# Patient Record
Sex: FEMALE | Race: BLACK OR AFRICAN AMERICAN | NOT HISPANIC OR LATINO | Employment: OTHER | URBAN - METROPOLITAN AREA
[De-identification: names, ages, dates, MRNs, and addresses within clinical notes are randomized per-mention and may not be internally consistent; named-entity substitution may affect disease eponyms.]

---

## 2017-08-02 ENCOUNTER — OFFICE VISIT (OUTPATIENT)
Dept: URGENT CARE | Facility: CLINIC | Age: 57
End: 2017-08-02
Payer: COMMERCIAL

## 2017-08-02 VITALS
DIASTOLIC BLOOD PRESSURE: 126 MMHG | SYSTOLIC BLOOD PRESSURE: 159 MMHG | TEMPERATURE: 98 F | BODY MASS INDEX: 44.89 KG/M2 | HEART RATE: 79 BPM | HEIGHT: 67 IN | WEIGHT: 286 LBS | OXYGEN SATURATION: 99 %

## 2017-08-02 DIAGNOSIS — J01.90 ACUTE SINUSITIS, RECURRENCE NOT SPECIFIED, UNSPECIFIED LOCATION: Primary | ICD-10-CM

## 2017-08-02 DIAGNOSIS — I10 ESSENTIAL HYPERTENSION: ICD-10-CM

## 2017-08-02 DIAGNOSIS — E11.8 TYPE 2 DIABETES MELLITUS WITH COMPLICATION, WITHOUT LONG-TERM CURRENT USE OF INSULIN: ICD-10-CM

## 2017-08-02 PROBLEM — E11.9 DIABETES MELLITUS, TYPE 2: Status: ACTIVE | Noted: 2017-08-02

## 2017-08-02 PROCEDURE — 99204 OFFICE O/P NEW MOD 45 MIN: CPT | Mod: S$GLB,,, | Performed by: FAMILY MEDICINE

## 2017-08-02 PROCEDURE — 4010F ACE/ARB THERAPY RXD/TAKEN: CPT | Mod: S$GLB,,, | Performed by: FAMILY MEDICINE

## 2017-08-02 RX ORDER — AMOXICILLIN AND CLAVULANATE POTASSIUM 875; 125 MG/1; MG/1
1 TABLET, FILM COATED ORAL 2 TIMES DAILY
Qty: 20 TABLET | Refills: 0 | Status: SHIPPED | OUTPATIENT
Start: 2017-08-02 | End: 2017-08-12

## 2017-08-02 RX ORDER — CODEINE PHOSPHATE AND GUAIFENESIN 10; 100 MG/5ML; MG/5ML
10 SOLUTION ORAL NIGHTLY PRN
Qty: 120 ML | Refills: 0 | Status: SHIPPED | OUTPATIENT
Start: 2017-08-02 | End: 2017-08-16

## 2017-08-02 RX ORDER — BISOPROLOL FUMARATE 10 MG/1
10 TABLET, FILM COATED ORAL 2 TIMES DAILY
COMMUNITY
Start: 2017-05-10 | End: 2018-09-24

## 2017-08-02 RX ORDER — VALSARTAN AND HYDROCHLOROTHIAZIDE 320; 25 MG/1; MG/1
1 TABLET, FILM COATED ORAL DAILY
COMMUNITY
Start: 2017-05-08 | End: 2018-07-30 | Stop reason: CLARIF

## 2017-08-02 RX ORDER — DULAGLUTIDE 0.75 MG/.5ML
INJECTION, SOLUTION SUBCUTANEOUS
COMMUNITY
Start: 2017-07-03 | End: 2018-04-10 | Stop reason: ALTCHOICE

## 2017-08-02 RX ORDER — METFORMIN HYDROCHLORIDE 1000 MG/1
1000 TABLET ORAL 2 TIMES DAILY WITH MEALS
COMMUNITY
Start: 2017-04-25 | End: 2018-09-20

## 2017-08-02 RX ORDER — BIMATOPROST 0.1 MG/ML
1 SOLUTION/ DROPS OPHTHALMIC NIGHTLY
COMMUNITY
Start: 2017-05-19

## 2017-08-02 RX ORDER — MONTELUKAST SODIUM 10 MG/1
10 TABLET ORAL NIGHTLY
Qty: 30 TABLET | Refills: 2 | Status: ON HOLD | OUTPATIENT
Start: 2017-08-02 | End: 2018-04-04

## 2017-08-02 RX ORDER — CETIRIZINE HYDROCHLORIDE 10 MG/1
10 TABLET ORAL DAILY
Qty: 30 TABLET | Refills: 2 | Status: ON HOLD | OUTPATIENT
Start: 2017-08-02 | End: 2018-04-04

## 2017-08-02 RX ORDER — AMLODIPINE BESYLATE 10 MG/1
10 TABLET ORAL EVERY MORNING
COMMUNITY
Start: 2017-05-08 | End: 2018-09-24

## 2017-08-02 RX ORDER — TIMOLOL MALEATE 5 MG/ML
1 SOLUTION/ DROPS OPHTHALMIC 2 TIMES DAILY
COMMUNITY
Start: 2017-05-19

## 2017-08-02 NOTE — PROGRESS NOTES
"Subjective:       Patient ID: Saba Driver is a 56 y.o. female.    Vitals:  height is 5' 7" (1.702 m) and weight is 129.7 kg (286 lb). Her oral temperature is 98 °F (36.7 °C). Her blood pressure is 159/126 (abnormal) and her pulse is 79. Her oxygen saturation is 99%.     Chief Complaint: Sore Throat and Cough    Sore Throat    This is a new problem. The current episode started in the past 7 days. The problem has been gradually worsening. Neither side of throat is experiencing more pain than the other. The maximum temperature recorded prior to her arrival was 100.4 - 100.9 F. The fever has been present for 3 to 4 days. Associated symptoms include congestion and coughing. Pertinent negatives include no abdominal pain, ear pain, headaches, hoarse voice or shortness of breath.   Cough   Associated symptoms include a sore throat. Pertinent negatives include no chest pain, chills, ear pain, eye redness, fever, headaches, myalgias, shortness of breath or wheezing.     Review of Systems   Constitution: Negative for chills, fever and malaise/fatigue.   HENT: Positive for congestion and sore throat. Negative for ear pain, headaches and hoarse voice.    Eyes: Negative for discharge and redness.   Cardiovascular: Negative for chest pain, dyspnea on exertion and leg swelling.   Respiratory: Positive for cough and sputum production. Negative for shortness of breath and wheezing.    Musculoskeletal: Negative for myalgias.   Gastrointestinal: Negative for abdominal pain and nausea.       Objective:      Physical Exam   Constitutional: She is oriented to person, place, and time. She appears well-developed and well-nourished.   HENT:   Head: Normocephalic.   Right Ear: External ear normal.   Left Ear: External ear normal.   Nose: Mucosal edema, rhinorrhea and sinus tenderness present. Right sinus exhibits maxillary sinus tenderness and frontal sinus tenderness. Left sinus exhibits maxillary sinus tenderness and frontal sinus " tenderness.   Mouth/Throat: Oropharyngeal exudate, posterior oropharyngeal edema and posterior oropharyngeal erythema present.   Eyes: Conjunctivae and EOM are normal.   Neck: Normal range of motion.   Cardiovascular: Normal rate, regular rhythm and normal heart sounds.    Pulmonary/Chest: Effort normal.   Neurological: She is oriented to person, place, and time. She has normal reflexes.   Vitals reviewed.      Assessment:       1. Acute sinusitis, recurrence not specified, unspecified location    2. Essential hypertension    3. Type 2 diabetes mellitus with complication, without long-term current use of insulin        Plan:         Acute sinusitis, recurrence not specified, unspecified location  -     amoxicillin-clavulanate 875-125mg (AUGMENTIN) 875-125 mg per tablet; Take 1 tablet by mouth 2 (two) times daily.  Dispense: 20 tablet; Refill: 0  -     montelukast (SINGULAIR) 10 mg tablet; Take 1 tablet (10 mg total) by mouth nightly.  Dispense: 30 tablet; Refill: 2  -     cetirizine (ZYRTEC) 10 MG tablet; Take 1 tablet (10 mg total) by mouth once daily.  Dispense: 30 tablet; Refill: 2  -     guaifenesin-codeine 100-10 mg/5 ml (CHERATUSSIN AC)  mg/5 mL syrup; Take 10 mLs by mouth nightly as needed for Cough.  Dispense: 120 mL; Refill: 0    Essential hypertension         -      Your Blood Pressure was elevated today. Take your blood pressure daily for a couple of weeks. If it remains elevated above 140/90 you need to make a followup appt with your doctor and get started on new medication      Type 2 diabetes mellitus with complication, without long-term current use of insulin

## 2017-08-02 NOTE — PATIENT INSTRUCTIONS

## 2017-09-05 ENCOUNTER — OFFICE VISIT (OUTPATIENT)
Dept: URGENT CARE | Facility: CLINIC | Age: 57
End: 2017-09-05
Payer: COMMERCIAL

## 2017-09-05 VITALS
OXYGEN SATURATION: 100 % | RESPIRATION RATE: 20 BRPM | SYSTOLIC BLOOD PRESSURE: 141 MMHG | HEART RATE: 76 BPM | DIASTOLIC BLOOD PRESSURE: 61 MMHG | BODY MASS INDEX: 44.89 KG/M2 | HEIGHT: 67 IN | TEMPERATURE: 98 F | WEIGHT: 286 LBS

## 2017-09-05 DIAGNOSIS — M94.0 ACUTE COSTOCHONDRITIS: Primary | ICD-10-CM

## 2017-09-05 PROCEDURE — 3008F BODY MASS INDEX DOCD: CPT | Mod: S$GLB,,,

## 2017-09-05 PROCEDURE — 99213 OFFICE O/P EST LOW 20 MIN: CPT | Mod: S$GLB,,,

## 2017-09-05 NOTE — PATIENT INSTRUCTIONS
Chest Wall Pain: Costochondritis    The chest pain that you have had today is caused by costochondritis. This condition is caused by an inflammation of the cartilage joining your ribs to your breastbone. It is not caused by heart or lung problems. The inflammation may have been brought on by a blow to the chest, lifting heavy objects, intense exercise, or an illness that made you cough and sneeze. It often occurs during times of emotional stress. It can be painful, but it is not dangerous. It usually goes away in 1 to 2 weeks. But it may happen again. Rarely, a more serious condition may cause symptoms similar to costochondritis. Thats why its important to watch for the warning signs listed below.  Home care  Follow these guidelines when caring for yourself at home:  · If you feel that emotional stress is a cause of your condition, try to figure out the sources of that stress. It may not be obvious! Learn ways to deal with the stress in your life. This can include regular exercise, muscle relaxation, meditation, or simply taking time out for yourself. For more information about this, talk with your health care provider. Or go to your local library and look at books on stress reduction.  · You may use acetaminophen or ibuprofen to control pain, unless another pain medicine was prescribed. If you have liver disease or ever had a stomach ulcer, talk with your health care provider before using these medicines.  · You can also help ease pain by using a hot, wet compress or heating pad. Use this with or without a medicated skin cream that helps relieves pain.  · Do stretching exercise as advised by your provider.  · Take any prescribed medicines as directed.  Follow-up care  Follow up with your health care provider, or as advised, if you do not start to get better in the next 2 days.  When to seek medical advice  Call your health care provider right away if any of these occur:  · A change in the type of pain. Call  if it feels different, becomes more serious, lasts longer, or spreads into your shoulder, arm, neck, jaw, or back.  · Shortness of breath or pain gets worse when you breathe  · Weakness, dizziness, or fainting  · Cough with dark-colored sputum (phlegm) or blood  · Abdominal pain  · Dark red or black stools  · Fever of 100.4ºF (38ºC) or higher, or as directed by your health care provider  Date Last Reviewed: 11/24/2014  © 1247-0562 [employer]. [address] [place] [address]. All rights reserved. This information is not intended as a substitute for professional medical care. Always follow your healthcare professional's instructions.      I recommend regular doses of Aleve 220 mg (up to two tabs three times a day) taken regularly and for a short time.       Patient was advised that there is no suggestion of coronary disease.   Return as needed.

## 2017-09-05 NOTE — PROGRESS NOTES
Subjective:       Patient ID: Saab Driver is a 56 y.o. female.    Vitals:  vitals were not taken for this visit.    Chief Complaint: Cough    Patient has a feeling of rubbing in the chest on insp/exp at night especially.  No Pain per se.  She has hypertension and diabetes well controlled per Dr. Verma.  No exertional symoptom.    Not SOB.  No prior ACS, etc.  No sputum production or fever.        Cough   This is a recurrent problem. The current episode started 1 to 4 weeks ago. The problem has been unchanged. The problem occurs every few minutes. The cough is non-productive. Associated symptoms include chest pain and shortness of breath. Pertinent negatives include no chills, ear pain, eye redness, fever, headaches, myalgias, sore throat or wheezing. The symptoms are aggravated by exercise and lying down. She has tried nothing for the symptoms. The treatment provided no relief. Pt states she has H/x of pleurisy     Review of Systems   Constitution: Negative for chills, fever and malaise/fatigue.   HENT: Negative for congestion, ear pain, hoarse voice and sore throat.    Eyes: Negative for discharge and redness.   Cardiovascular: Positive for chest pain and dyspnea on exertion. Negative for leg swelling.   Respiratory: Positive for cough and shortness of breath. Negative for sputum production and wheezing.    Musculoskeletal: Negative for myalgias.   Gastrointestinal: Negative for abdominal pain and nausea.   Neurological: Negative for headaches.       Objective:      Physical Exam   Constitutional: She is oriented to person, place, and time. She appears well-nourished. No distress.   HENT:   Mouth/Throat: Oropharynx is clear and moist.   Cardiovascular: Normal rate and regular rhythm.  Exam reveals no friction rub.    No murmur heard.  Pulmonary/Chest: Effort normal and breath sounds normal. She has no wheezes. She exhibits tenderness (At right lower costochondral junction.   ).   Neurological: She is alert and  oriented to person, place, and time.       Assessment:       No diagnosis found.    Plan:         There are no diagnoses linked to this encounter.

## 2018-03-16 PROBLEM — N95.0 PMB (POSTMENOPAUSAL BLEEDING): Status: ACTIVE | Noted: 2018-03-16

## 2018-03-21 ENCOUNTER — INITIAL CONSULT (OUTPATIENT)
Dept: GYNECOLOGIC ONCOLOGY | Facility: CLINIC | Age: 58
End: 2018-03-21
Payer: COMMERCIAL

## 2018-03-21 VITALS
SYSTOLIC BLOOD PRESSURE: 166 MMHG | HEART RATE: 67 BPM | WEIGHT: 255.94 LBS | BODY MASS INDEX: 40.09 KG/M2 | DIASTOLIC BLOOD PRESSURE: 72 MMHG

## 2018-03-21 DIAGNOSIS — C80.1 ADENOCARCINOMA: ICD-10-CM

## 2018-03-21 DIAGNOSIS — R59.9 ADENOPATHY: Primary | ICD-10-CM

## 2018-03-21 DIAGNOSIS — D49.89 NEOPLASM OF PELVIS: ICD-10-CM

## 2018-03-21 PROCEDURE — 99245 OFF/OP CONSLTJ NEW/EST HI 55: CPT | Mod: S$GLB,,, | Performed by: OBSTETRICS & GYNECOLOGY

## 2018-03-21 PROCEDURE — 99999 PR PBB SHADOW E&M-EST. PATIENT-LVL III: CPT | Mod: PBBFAC,,, | Performed by: OBSTETRICS & GYNECOLOGY

## 2018-03-21 RX ORDER — NORETHINDRONE 5 MG/1
5 TABLET ORAL DAILY
COMMUNITY
Start: 2018-03-14 | End: 2018-05-31

## 2018-03-21 NOTE — LETTER
March 24, 2018      Shakir Alexis MD  [address]  [address]  [place] [address]           [facility] - Gynecologic Oncology  [address], [address]  [facility] [address]  Phone: [phone]  Fax: [phone]          Patient: Saba Driver   MR Number: [id number]   YOB: 1960   Date of Visit: 3/21/2018       Dear Dr. Shakir Alexis:    Thank you for referring Saba Driver to me for evaluation. Attached you will find relevant portions of my assessment and plan of care.    If you have questions, please do not hesitate to call me. I look forward to following Saba Driver along with you.    Sincerely,    Noelle Lala MD    Enclosure  CC:  No Recipients    If you would like to receive this communication electronically, please contact [email or web address] or [phone] to request more information on [employer] Link access.    For providers and/or their staff who would like to refer a patient to [facility], please contact us through our one-stop-shop provider referral line, [facility], at [phone].    If you feel you have received this communication in error or would no longer like to receive these types of communications, please e-mail [email or web address]

## 2018-03-21 NOTE — PROGRESS NOTES
Subjective:      Patient ID: Saba Driver is a 57 y.o. female.    Chief Complaint: Advice Only (ref by Dr Alexis)      HPI  Referred by Dr. Shakir Alexis for postmenopausal bleeding. I have reviewed Dr. Alexis's notes. P0. On pelvic uterus is enlarged and fixed, unable to perform EMB due to altered anatomy of the cervix. She had been admitted to  for vaginal bleeding and anemia requiring transfusion. Fixed sore R inguinal node. Blind pap returned adenocarcinoma.     Pelvic US 3/12/18  UT enlarged 13.3cm with 5.1cm fibroid anterior. Limited visualization of EMS.  LOV 2.9cm  ROV 3.6cm    Medical co-morbidities include DM, HTN. No anticoagulation.     No prior abdominal surgeries    Family history significant for mom with pancreatic cancer, denies history of breast, ovarian, uterine, or colon cancer.     MMG last year normal per patient.   Review of Systems   Constitutional: Negative for appetite change, chills, fatigue and fever.   HENT: Negative for mouth sores.    Respiratory: Negative for cough and shortness of breath.    Cardiovascular: Negative for leg swelling.   Gastrointestinal: Negative for abdominal pain, blood in stool, constipation and diarrhea.   Endocrine: Negative for cold intolerance.   Genitourinary: Negative for dysuria and vaginal bleeding.   Musculoskeletal: Negative for myalgias.   Skin: Negative for rash.   Allergic/Immunologic: Negative.    Neurological: Negative for weakness and numbness.   Hematological: Negative for adenopathy. Does not bruise/bleed easily.   Psychiatric/Behavioral: Negative for confusion.       Past Medical History:   Diagnosis Date    Adenocarcinoma 3/24/2018    Diabetes mellitus, type 2     Glaucoma     Hypertension      Past Surgical History:   Procedure Laterality Date    BREAST LUMPECTOMY      R early 20's L at 13 years old     Family History   Problem Relation Age of Onset    Diabetes Mother     Hypertension Mother     Pancreatic cancer Mother     Hypertension  Maternal Grandmother     Diabetes Maternal Grandmother     Hypertension Maternal Grandfather     Diabetes Maternal Grandfather     Breast cancer Neg Hx     Colon cancer Neg Hx     Ovarian cancer Neg Hx      Social History     Social History    Marital status: Single     Spouse name: N/A    Number of children: N/A    Years of education: N/A     Occupational History    Not on file.     Social History Main Topics    Smoking status: Never Smoker    Smokeless tobacco: Never Used    Alcohol use Yes    Drug use: No    Sexual activity: No     Other Topics Concern    Not on file     Social History Narrative    No narrative on file     Current Outpatient Prescriptions   Medication Sig    amlodipine (NORVASC) 10 MG tablet Take 10 mg by mouth once daily.     bisoprolol (ZEBETA) 10 MG tablet Take 10 mg by mouth 2 (two) times daily.     LUMIGAN 0.01 % Drop Place 1 drop into both eyes every evening.     metformin (GLUCOPHAGE) 1000 MG tablet Take 1,000 mg by mouth 2 (two) times daily with meals.     norethindrone (AYGESTIN) 5 mg Tab Take 5 mg by mouth 2 (two) times daily.     timolol maleate 0.5% (TIMOPTIC) 0.5 % Drop Place 1 drop into both eyes 2 (two) times daily.     valsartan-hydrochlorothiazide (DIOVAN-HCT) 320-25 mg per tablet Take 1 tablet by mouth once daily.     cetirizine (ZYRTEC) 10 MG tablet Take 1 tablet (10 mg total) by mouth once daily.    montelukast (SINGULAIR) 10 mg tablet Take 1 tablet (10 mg total) by mouth nightly.    TRULICITY 0.75 mg/0.5 mL PnIj      No current facility-administered medications for this visit.      Review of patient's allergies indicates:  No Known Allergies    Objective:   Physical Exam:   Constitutional: She is oriented to person, place, and time. She appears well-developed and well-nourished.    HENT:   Head: Normocephalic and atraumatic.    Eyes: EOM are normal. Pupils are equal, round, and reactive to light.    Neck: Normal range of motion. Neck supple. No  thyromegaly present.    Cardiovascular: Normal rate, regular rhythm and intact distal pulses.     Pulmonary/Chest: Effort normal and breath sounds normal. No respiratory distress. She has no wheezes.        Abdominal: Soft. Bowel sounds are normal. She exhibits no distension, no ascites and no mass. There is no tenderness.     Genitourinary: Rectum normal.       Pelvic exam was performed with patient supine. There is no lesion on the right labia. There is no lesion on the left labia. Uterus is enlarged and fixed.   Genitourinary Comments: Unable to visualize cervix due to distorted anatomy. No mucosal lesions. Enlarged fixed uterus, no mobility. Enlarged fixed R inguinal node.            Musculoskeletal: Normal range of motion and moves all extremeties.      Lymphadenopathy:     She has no cervical adenopathy.        Right: Inguinal adenopathy present. No supraclavicular adenopathy present.        Left: No inguinal and no supraclavicular adenopathy present.    Neurological: She is alert and oriented to person, place, and time.    Skin: Skin is warm and dry. No rash noted.    Psychiatric: She has a normal mood and affect.       Assessment:     1. Adenopathy    2. Neoplasm of pelvis    3. Adenocarcinoma        Plan:     Orders Placed This Encounter   Procedures    CT Chest With Contrast    CT Abdomen Pelvis With Contrast    IR Biopsy Lymph Node       I reviewed with the patient the constellation of findings thus far consistent with an advanced gynecologic malignancy. Likely uterine in origin. Inguinal adenopathy would make this Stage IV. I have recommended CT CAP for complete metastatic survey. Pap cytology showing adenocarcinoma but need tissue diagnosis. Will ask IR for biopsy. Pelvic disease is surgically unresectable at present. I have recommended neoadjuvant chemotherapy based on tissue diagnosis. The patient was allowed to ask questions. All were answered to her satisfaction.

## 2018-03-22 ENCOUNTER — TELEPHONE (OUTPATIENT)
Dept: GYNECOLOGIC ONCOLOGY | Facility: CLINIC | Age: 58
End: 2018-03-22

## 2018-03-22 ENCOUNTER — HOSPITAL ENCOUNTER (OUTPATIENT)
Dept: RADIOLOGY | Facility: HOSPITAL | Age: 58
Discharge: HOME | End: 2018-03-22
Attending: OBSTETRICS & GYNECOLOGY
Payer: COMMERCIAL

## 2018-03-22 DIAGNOSIS — D49.89 NEOPLASM OF PELVIS: ICD-10-CM

## 2018-03-22 DIAGNOSIS — D49.89 NEOPLASM OF PELVIS: Primary | ICD-10-CM

## 2018-03-22 PROCEDURE — 25500020 PHARM REV CODE 255: Performed by: OBSTETRICS & GYNECOLOGY

## 2018-03-22 PROCEDURE — 74177 CT ABD & PELVIS W/CONTRAST: CPT | Mod: TC

## 2018-03-22 PROCEDURE — 71260 CT THORAX DX C+: CPT | Mod: TC

## 2018-03-22 PROCEDURE — 74177 CT ABD & PELVIS W/CONTRAST: CPT | Mod: 26,,, | Performed by: RADIOLOGY

## 2018-03-22 PROCEDURE — 71260 CT THORAX DX C+: CPT | Mod: 26,,, | Performed by: RADIOLOGY

## 2018-03-22 RX ADMIN — IOHEXOL 30 ML: 300 INJECTION, SOLUTION INTRAVENOUS at 03:03

## 2018-03-22 RX ADMIN — IOHEXOL 100 ML: 350 INJECTION, SOLUTION INTRAVENOUS at 03:03

## 2018-03-22 NOTE — TELEPHONE ENCOUNTER
----- Message from Cecilia De La Vega sent at 3/22/2018  9:29 AM CDT -----  Patient will be coming in today 3/22 for a Ct scan patient will need lab orders for creatinine please. Thanks

## 2018-03-22 NOTE — TELEPHONE ENCOUNTER
Pt informed she will need to have labs done today before ct scan. Pt advised she will need to be there for 1:30. She voiced understanding

## 2018-03-22 NOTE — TELEPHONE ENCOUNTER
Spoke with pt. Pt informed biopsy is under review wit the IR department. Pt advised biopsy is to be done at main campus. Pt voiced understanding

## 2018-03-22 NOTE — TELEPHONE ENCOUNTER
----- Message from Apple Esqueda sent at 3/22/2018  8:07 AM CDT -----  Contact: Jacquelyn  x  1st Request  _  2nd Request  _  3rd Request    Who: Jacquelyn    Why: please send orders for ct scan and blood work..please advise    What Number to Call Back: [phone]    When to Expect a call back: (Before the end of the day)   -- if call after 3:00 call back will be tomorrow.

## 2018-03-22 NOTE — TELEPHONE ENCOUNTER
----- Message from Adria Navarrete sent at 3/22/2018  8:36 AM CDT -----  Contact: Pt  X_ 1st Request  _ 2nd Request  _ 3rd Request    Who:DINORAH FRIED [[id number]]    Why: Patient would like to speak with staff in regards to scheduling her biopsy    What Number to Call Back: [phone]    When to Expect a call back: (Before the end of the day)  -- if call after 3:00 call back will be tomorrow.

## 2018-03-23 ENCOUNTER — TELEPHONE (OUTPATIENT)
Dept: GYNECOLOGIC ONCOLOGY | Facility: CLINIC | Age: 58
End: 2018-03-23

## 2018-03-23 NOTE — TELEPHONE ENCOUNTER
Reviewed CT with patient. Demonstrates inguinal node and enlarged uterus. Also reviewed pap showing adenocarcinoma. I have recommended we continue as planned with the IR biopsy. Pelvic disease is not surgically resectable at this time. Will plan for neoadjuvant chemotherapy once more definitive diagnosis is established with IR biopsy. She voiced understanding.

## 2018-03-24 PROBLEM — C80.1 ADENOCARCINOMA: Status: ACTIVE | Noted: 2018-03-24

## 2018-03-27 ENCOUNTER — TELEPHONE (OUTPATIENT)
Dept: GYNECOLOGIC ONCOLOGY | Facility: CLINIC | Age: 58
End: 2018-03-27

## 2018-03-27 NOTE — TELEPHONE ENCOUNTER
----- Message from Candy Marroquin sent at 3/27/2018  8:38 AM CDT -----  Contact: DINORAH FRIED [[id number]]  x_  1st Request  _  2nd Request  _  3rd Request        Who: DINORAH FRIED [[id number]]  Why: Requesting a call back in regards to getting results and the plan of care.     Please return the call at earliest convenience. Thanks!    What Number to Call Back: [phone]      When to Expect a call back: (Within 24 hours)

## 2018-03-27 NOTE — TELEPHONE ENCOUNTER
Spoke with pt. Pt informed biopsy is in review, that's why appt has not been scheduled. Pt advised I will contact the IR department. She voiced understanding

## 2018-03-27 NOTE — TELEPHONE ENCOUNTER
Spoke with pt. Pt advised approval for biopsy to be scheduled was received this morning, she should be getting to call with appt date and time. She says thank you

## 2018-03-28 DIAGNOSIS — D49.89 NEOPLASM OF PELVIS: Primary | ICD-10-CM

## 2018-04-03 DIAGNOSIS — D49.89 NEOPLASM OF PELVIS: Primary | ICD-10-CM

## 2018-04-04 ENCOUNTER — SURGERY (OUTPATIENT)
Age: 58
End: 2018-04-04

## 2018-04-04 ENCOUNTER — HOSPITAL ENCOUNTER (OUTPATIENT)
Facility: HOSPITAL | Age: 58
Discharge: HOME | End: 2018-04-04
Attending: OBSTETRICS & GYNECOLOGY | Admitting: OBSTETRICS & GYNECOLOGY
Payer: COMMERCIAL

## 2018-04-04 VITALS
SYSTOLIC BLOOD PRESSURE: 130 MMHG | HEIGHT: 67 IN | TEMPERATURE: 100 F | RESPIRATION RATE: 14 BRPM | BODY MASS INDEX: 40.02 KG/M2 | HEART RATE: 62 BPM | WEIGHT: 255 LBS | OXYGEN SATURATION: 100 % | DIASTOLIC BLOOD PRESSURE: 62 MMHG

## 2018-04-04 DIAGNOSIS — D49.89 NEOPLASM OF PELVIS: ICD-10-CM

## 2018-04-04 DIAGNOSIS — R59.9 ADENOPATHY: ICD-10-CM

## 2018-04-04 LAB — POCT GLUCOSE: 128 MG/DL (ref 70–110)

## 2018-04-04 PROCEDURE — 82962 GLUCOSE BLOOD TEST: CPT

## 2018-04-04 PROCEDURE — 25000003 PHARM REV CODE 250: Performed by: FAMILY MEDICINE

## 2018-04-04 PROCEDURE — 88305 TISSUE EXAM BY PATHOLOGIST: CPT | Mod: 26,,, | Performed by: PATHOLOGY

## 2018-04-04 PROCEDURE — 88342 IMHCHEM/IMCYTCHM 1ST ANTB: CPT | Mod: 26,,, | Performed by: PATHOLOGY

## 2018-04-04 PROCEDURE — 88333 PATH CONSLTJ SURG CYTO XM 1: CPT | Mod: 26,,, | Performed by: PATHOLOGY

## 2018-04-04 PROCEDURE — 88305 TISSUE EXAM BY PATHOLOGIST: CPT | Performed by: PATHOLOGY

## 2018-04-04 PROCEDURE — 63600175 PHARM REV CODE 636 W HCPCS: Performed by: RADIOLOGY

## 2018-04-04 PROCEDURE — 63600175 PHARM REV CODE 636 W HCPCS: Performed by: FAMILY MEDICINE

## 2018-04-04 PROCEDURE — 88342 IMHCHEM/IMCYTCHM 1ST ANTB: CPT | Performed by: PATHOLOGY

## 2018-04-04 PROCEDURE — 88341 IMHCHEM/IMCYTCHM EA ADD ANTB: CPT | Mod: 26,,, | Performed by: PATHOLOGY

## 2018-04-04 RX ORDER — SODIUM CHLORIDE 9 MG/ML
500 INJECTION, SOLUTION INTRAVENOUS ONCE
Status: COMPLETED | OUTPATIENT
Start: 2018-04-04 | End: 2018-04-04

## 2018-04-04 RX ORDER — MIDAZOLAM HYDROCHLORIDE 1 MG/ML
INJECTION INTRAMUSCULAR; INTRAVENOUS CODE/TRAUMA/SEDATION MEDICATION
Status: COMPLETED | OUTPATIENT
Start: 2018-04-04 | End: 2018-04-04

## 2018-04-04 RX ORDER — FENTANYL CITRATE 50 UG/ML
INJECTION, SOLUTION INTRAMUSCULAR; INTRAVENOUS CODE/TRAUMA/SEDATION MEDICATION
Status: COMPLETED | OUTPATIENT
Start: 2018-04-04 | End: 2018-04-04

## 2018-04-04 RX ORDER — MIDAZOLAM HYDROCHLORIDE 1 MG/ML
1 INJECTION INTRAMUSCULAR; INTRAVENOUS
Status: DISCONTINUED | OUTPATIENT
Start: 2018-04-04 | End: 2018-04-04 | Stop reason: HOSPADM

## 2018-04-04 RX ORDER — FENTANYL CITRATE 50 UG/ML
50 INJECTION, SOLUTION INTRAMUSCULAR; INTRAVENOUS
Status: DISCONTINUED | OUTPATIENT
Start: 2018-04-04 | End: 2018-04-04 | Stop reason: HOSPADM

## 2018-04-04 RX ADMIN — FENTANYL CITRATE 50 MCG: 50 INJECTION, SOLUTION INTRAMUSCULAR; INTRAVENOUS at 11:04

## 2018-04-04 RX ADMIN — MIDAZOLAM HYDROCHLORIDE 1 MG: 1 INJECTION, SOLUTION INTRAMUSCULAR; INTRAVENOUS at 11:04

## 2018-04-04 RX ADMIN — SODIUM CHLORIDE 500 ML: 9 INJECTION, SOLUTION INTRAVENOUS at 09:04

## 2018-04-04 RX ADMIN — MIDAZOLAM HYDROCHLORIDE 1 MG: 1 INJECTION, SOLUTION INTRAMUSCULAR; INTRAVENOUS at 10:04

## 2018-04-04 RX ADMIN — FENTANYL CITRATE 50 MCG: 50 INJECTION, SOLUTION INTRAMUSCULAR; INTRAVENOUS at 10:04

## 2018-04-04 NOTE — PROGRESS NOTES
Patient arrived in [facility] bay [facility] via stretcher post procedure. Report received from RELL Vazquez. Patient denies pain or discomfort. Dressing to right groin is clean, dry and intact.

## 2018-04-04 NOTE — H&P
Radiology History & Physical      SUBJECTIVE:     Chief Complaint: lymphadenopathy    History of Present Illness:  Saba Driver is a 57 y.o. female who presents for right inguinal node biopsy.    Past Medical History:   Diagnosis Date    Adenocarcinoma 3/24/2018    Diabetes mellitus, type 2     Glaucoma     Hypertension      Past Surgical History:   Procedure Laterality Date    BREAST LUMPECTOMY      R early 20's L at 13 years old       Home Meds:   Prior to Admission medications    Medication Sig Start Date End Date Taking? Authorizing Provider   amlodipine (NORVASC) 10 MG tablet Take 10 mg by mouth once daily.  5/8/17  Yes Historical Provider, MD   bisoprolol (ZEBETA) 10 MG tablet Take 10 mg by mouth 2 (two) times daily.  5/10/17  Yes Historical Provider, MD   LUMIGAN 0.01 % Drop Place 1 drop into both eyes every evening.  5/19/17  Yes Historical Provider, MD   metformin (GLUCOPHAGE) 1000 MG tablet Take 1,000 mg by mouth 2 (two) times daily with meals.  4/25/17  Yes Historical Provider, MD   norethindrone (AYGESTIN) 5 mg Tab Take 5 mg by mouth 2 (two) times daily.  3/14/18  Yes Historical Provider, MD   timolol maleate 0.5% (TIMOPTIC) 0.5 % Drop Place 1 drop into both eyes 2 (two) times daily.  5/19/17  Yes Historical Provider, MD   valsartan-hydrochlorothiazide (DIOVAN-HCT) 320-25 mg per tablet Take 1 tablet by mouth once daily.  5/8/17  Yes Historical Provider, MD   TRULICITY 0.75 mg/0.5 mL PnIj  7/3/17   Historical Provider, MD   cetirizine (ZYRTEC) 10 MG tablet Take 1 tablet (10 mg total) by mouth once daily. 8/2/17 4/4/18  Silvestre Almodovar MD   montelukast (SINGULAIR) 10 mg tablet Take 1 tablet (10 mg total) by mouth nightly. 8/2/17 4/4/18  Silvestre Almodovar MD     Anticoagulants/Antiplatelets: no anticoagulation    Allergies: Review of patient's allergies indicates:  No Known Allergies  Sedation History:  no adverse reactions    Review of Systems:   As documented in primary provider H&P.      OBJECTIVE:      Vital Signs (Most Recent)  Temp: 98.1 °F (36.7 °C) (04/04/18 0902)  Pulse: 66 (04/04/18 0902)  Resp: 16 (04/04/18 0902)  BP: (!) 143/52 (04/04/18 0902)  SpO2: 100 % (04/04/18 0902)    Physical Exam:  ASA: 3  Mallampati: 1      Laboratory  Lab Results   Component Value Date    INR 0.9 04/04/2018       Lab Results   Component Value Date    WBC 11.10 04/04/2018    HGB 8.8 (L) 04/04/2018    HCT 29.8 (L) 04/04/2018    MCV 74 (L) 04/04/2018     (H) 04/04/2018      Lab Results   Component Value Date    GLU 97 03/22/2018     03/22/2018    K 3.7 03/22/2018     03/22/2018    CO2 27 03/22/2018    BUN 9 03/22/2018    CREATININE 0.9 03/22/2018    CALCIUM 9.6 03/22/2018       ASSESSMENT/PLAN:     Sedation Plan: moderate  Patient will undergo ct guided right inguinal lymph node biopsy.    Sameer Mohamud MD  Department of Radiology  Pager: [phone]

## 2018-04-04 NOTE — PROCEDURES
Radiology Post-Procedure Note    Pre Op Diagnosis: Lymphadenopathy  Post Op Diagnosis: Same    Procedure: ultrasound guided right inguinal lymph node biopsy.    Procedure performed by: Dr. Elias Price and Dr. Jim Villanueva    Written Informed Consent Obtained: Yes  Specimen Removed: YES 4 biopsy specimens of lymph node  Estimated Blood Loss: Minimal    Findings:     Successful ultrasound guided biopsy of an enlarged right inguinal lymph node. Please see separate imaging dictation for further details.    Patient tolerated procedure well.    Jim Villanueva MD  PGY-5  Department of Radiology  [phone]

## 2018-04-04 NOTE — DISCHARGE INSTRUCTIONS
For scheduling: Call Roseline at [phone]    For questions or concerns call: NATALIE MON-FRI 8 AM- 5PM [phone]. Radiology resident on call [phone].    For immediate concerns that are not emergent, you may call our radiology clinic at: [phone]

## 2018-04-04 NOTE — SEDATION DOCUMENTATION
Pt to Interventional Radiology room 173 via stretcher for Lymph node biopsy. Pt transferred to procedure table in the supine position. No complaints of pain or discomfort at this time. Procedure site (right groin) prepped by Dr Villanueva. Will continue to monitor.

## 2018-04-04 NOTE — PROGRESS NOTES
Pt given discharge instructions/handouts. Questions answered. Pt and friend verbalize understanding. PIV. Pt given all personal items. Pt resting comfortably, denies pain/discomfort. Pt to garage, wheelchair offered, pt denied. No acute events.

## 2018-04-05 NOTE — DISCHARGE SUMMARY
Radiology Discharge Summary      Hospital Course: No complications    Admit Date: 4/4/2018  Discharge Date: 4/4/2018    Instructions Given to Patient: Yes  Diet: Resume prior diet  Activity: activity as tolerated and no driving for today    Description of Condition on Discharge: Stable  Vital Signs (Most Recent): Temp: 100 °F (37.8 °C) (04/04/18 1145)  Pulse: 62 (04/04/18 1230)  Resp: 14 (04/04/18 1230)  BP: 130/62 (04/04/18 1230)  SpO2: 100 % (04/04/18 1230)    Discharge Disposition: Home    Discharge Diagnosis: Lymphadenopathy     Follow-up: per Oncology    Jim Villanueva MD  PGY-5  Department of Radiology  [phone]

## 2018-04-06 ENCOUNTER — TELEPHONE (OUTPATIENT)
Dept: INTERVENTIONAL RADIOLOGY/VASCULAR | Facility: CLINIC | Age: 58
End: 2018-04-06

## 2018-04-09 ENCOUNTER — TELEPHONE (OUTPATIENT)
Dept: INTERVENTIONAL RADIOLOGY/VASCULAR | Facility: HOSPITAL | Age: 58
End: 2018-04-09

## 2018-04-09 NOTE — TELEPHONE ENCOUNTER
Spoke with patient via telephone. Notified of lab results. Recommended follow up. Patient tells me she has a primary care provider. I will mail her a copy of her results to bring to her PCP for follow up. Patient verbalized understanding and agreement. She denies any SOB, dizziness, or fatigue.

## 2018-04-10 ENCOUNTER — TELEPHONE (OUTPATIENT)
Dept: HEMATOLOGY/ONCOLOGY | Facility: CLINIC | Age: 58
End: 2018-04-10

## 2018-04-10 ENCOUNTER — OFFICE VISIT (OUTPATIENT)
Dept: GYNECOLOGIC ONCOLOGY | Facility: CLINIC | Age: 58
End: 2018-04-10
Payer: COMMERCIAL

## 2018-04-10 VITALS
HEIGHT: 67 IN | BODY MASS INDEX: 38.92 KG/M2 | SYSTOLIC BLOOD PRESSURE: 153 MMHG | WEIGHT: 248 LBS | DIASTOLIC BLOOD PRESSURE: 64 MMHG | HEART RATE: 75 BPM

## 2018-04-10 DIAGNOSIS — C54.1 ENDOMETRIAL CANCER: ICD-10-CM

## 2018-04-10 PROCEDURE — 3078F DIAST BP <80 MM HG: CPT | Mod: CPTII,S$GLB,, | Performed by: OBSTETRICS & GYNECOLOGY

## 2018-04-10 PROCEDURE — 3077F SYST BP >= 140 MM HG: CPT | Mod: CPTII,S$GLB,, | Performed by: OBSTETRICS & GYNECOLOGY

## 2018-04-10 PROCEDURE — 99999 PR PBB SHADOW E&M-EST. PATIENT-LVL III: CPT | Mod: PBBFAC,,, | Performed by: OBSTETRICS & GYNECOLOGY

## 2018-04-10 PROCEDURE — 99214 OFFICE O/P EST MOD 30 MIN: CPT | Mod: S$GLB,,, | Performed by: OBSTETRICS & GYNECOLOGY

## 2018-04-10 NOTE — LETTER
April 14, 2018        Shakir Alexis MD  [address]  [address]  [place] [address]             [facility] Gynecologic On[facility]  [address], [address]  [facility] [address]  Phone: [phone]  Fax: [phone]   Patient: Saba Driver   MR Number: [id number]   YOB: 1960   Date of Visit: 4/10/2018       Dear Dr. Alexis:    Thank you for referring Saba Driver to me for evaluation. Below are the relevant portions of my assessment and plan of care.            If you have questions, please do not hesitate to call me. I look forward to following Saba along with you.    Sincerely,      Noelle Lala MD           CC  Kate Boyd MD

## 2018-04-12 ENCOUNTER — LAB VISIT (OUTPATIENT)
Dept: LAB | Facility: HOSPITAL | Age: 58
End: 2018-04-12
Attending: INTERNAL MEDICINE
Payer: COMMERCIAL

## 2018-04-12 ENCOUNTER — INITIAL CONSULT (OUTPATIENT)
Dept: HEMATOLOGY/ONCOLOGY | Facility: CLINIC | Age: 58
End: 2018-04-12
Payer: COMMERCIAL

## 2018-04-12 ENCOUNTER — TELEPHONE (OUTPATIENT)
Dept: HEMATOLOGY/ONCOLOGY | Facility: CLINIC | Age: 58
End: 2018-04-12

## 2018-04-12 VITALS
DIASTOLIC BLOOD PRESSURE: 67 MMHG | WEIGHT: 247.69 LBS | SYSTOLIC BLOOD PRESSURE: 149 MMHG | HEIGHT: 67 IN | BODY MASS INDEX: 38.88 KG/M2 | OXYGEN SATURATION: 98 % | HEART RATE: 78 BPM | TEMPERATURE: 98 F

## 2018-04-12 DIAGNOSIS — C80.1 ADENOCARCINOMA: ICD-10-CM

## 2018-04-12 DIAGNOSIS — D49.89 NEOPLASM OF PELVIS: Primary | ICD-10-CM

## 2018-04-12 DIAGNOSIS — E11.8 TYPE 2 DIABETES MELLITUS WITH COMPLICATION, WITHOUT LONG-TERM CURRENT USE OF INSULIN: ICD-10-CM

## 2018-04-12 DIAGNOSIS — D49.89 PELVIC NEOPLASM: Primary | ICD-10-CM

## 2018-04-12 DIAGNOSIS — D50.9 IRON DEFICIENCY ANEMIA, UNSPECIFIED IRON DEFICIENCY ANEMIA TYPE: ICD-10-CM

## 2018-04-12 LAB
FERRITIN SERPL-MCNC: 25 NG/ML
IRON SERPL-MCNC: 13 UG/DL
SATURATED IRON: 3 %
TOTAL IRON BINDING CAPACITY: 451 UG/DL
TRANSFERRIN SERPL-MCNC: 305 MG/DL

## 2018-04-12 PROCEDURE — 83540 ASSAY OF IRON: CPT

## 2018-04-12 PROCEDURE — 99999 PR PBB SHADOW E&M-EST. PATIENT-LVL IV: CPT | Mod: PBBFAC,,, | Performed by: INTERNAL MEDICINE

## 2018-04-12 PROCEDURE — 99205 OFFICE O/P NEW HI 60 MIN: CPT | Mod: S$GLB,,, | Performed by: INTERNAL MEDICINE

## 2018-04-12 PROCEDURE — 82728 ASSAY OF FERRITIN: CPT

## 2018-04-12 PROCEDURE — 36415 COLL VENOUS BLD VENIPUNCTURE: CPT

## 2018-04-12 PROCEDURE — 3077F SYST BP >= 140 MM HG: CPT | Mod: CPTII,S$GLB,, | Performed by: INTERNAL MEDICINE

## 2018-04-12 PROCEDURE — 3078F DIAST BP <80 MM HG: CPT | Mod: CPTII,S$GLB,, | Performed by: INTERNAL MEDICINE

## 2018-04-12 RX ORDER — PROCHLORPERAZINE MALEATE 10 MG
10 TABLET ORAL EVERY 6 HOURS PRN
Qty: 30 TABLET | Refills: 3 | Status: SHIPPED | OUTPATIENT
Start: 2018-04-12 | End: 2018-09-14 | Stop reason: SDUPTHER

## 2018-04-12 RX ORDER — ONDANSETRON HYDROCHLORIDE 8 MG/1
8 TABLET, FILM COATED ORAL EVERY 12 HOURS PRN
Qty: 30 TABLET | Refills: 2 | Status: SHIPPED | OUTPATIENT
Start: 2018-04-12 | End: 2018-09-14 | Stop reason: SDUPTHER

## 2018-04-12 NOTE — PROGRESS NOTES
Subjective:       Patient ID: Saba Driver is a 57 y.o. female.    Chief Complaint: Consult (transfer from )    HPI   REASON FOR CONSULTATION: Stage IVB (inguinal mets) endometrial cancer  REFERRING PHYSICIAN; Noelle Lala M.D.       Pt transferring care to  for therapy     HPI ( per Dr. Lala) Referred by Dr. Shakir Alexis for postmenopausal bleeding. I have reviewed Dr. Alexis's notes. P0. On pelvic uterus is enlarged and fixed, unable to perform EMB due to altered anatomy of the cervix. She had been admitted to  for vaginal bleeding and anemia requiring transfusion. Fixed sore R inguinal node. Blind pap returned adenocarcinoma.      Seen in consult with Dr. Lala  3/21/18. Constellation of findings thus far consistent with an advanced gynecologic malignancy. Likely uterine in origin. Inguinal adenopathy would make this Stage IVB. Pap cytology showing adenocarcinoma but need tissue diagnosis. Will plan IR for biopsy of inguinal node. Pelvic disease is surgically unresectable at present. Recommended neoadjuvant chemotherapy based on tissue diagnosis.      IR biopsy 4/4/18  FINAL PATHOLOGIC DIAGNOSIS  RIGHT INGUINAL LYMPH NODE, CT GUIDED BIOPSY WITH ONSITE ADEQUACY (CYTOLOGY AND CELL BLOCK):  - Adenocarcinoma. See note.  Note: The carcinoma is positive for CK7, p53, vimentin and ER (patchy). It is negative for CDX2, CEA, CK20 and WT1. This immunoprofile is compatible with an endometrial origin. All stains have appropriate controls.      Today,she is doing well  She reports vag bleeding  She reports diminished appetite and wt loss  45 lb wt loss x 9 mos   No pelvic or abd pain   No SOB/CP   Accompanied by family members     Adela Verma PCP    Review of Systems   Constitutional: Negative for appetite change, fatigue, fever and unexpected weight change.   HENT: Negative for mouth sores.    Eyes: Negative for visual disturbance.   Respiratory: Negative for cough and shortness of breath.    Cardiovascular:  "Negative for chest pain.   Gastrointestinal: Negative for abdominal pain and diarrhea.   Genitourinary: Positive for vaginal bleeding. Negative for frequency.   Musculoskeletal: Negative for back pain.   Skin: Negative for rash.   Neurological: Negative for headaches.   Hematological: Negative for adenopathy.   Psychiatric/Behavioral: The patient is not nervous/anxious.        Objective:       Vitals:    04/12/18 1322   BP: (!) 149/67   BP Location: Left arm   Patient Position: Sitting   BP Method: Medium (Automatic)   Pulse: 78   Temp: 98.2 °F (36.8 °C)   TempSrc: Oral   SpO2: 98%   Weight: 112.4 kg (247 lb 11 oz)   Height: 5' 7" (1.702 m)         Physical Exam   Constitutional: She is oriented to person, place, and time. She appears well-developed and well-nourished.   HENT:   Head: Normocephalic.   Mouth/Throat: Oropharynx is clear and moist. No oropharyngeal exudate.   Eyes: Conjunctivae and lids are normal. Pupils are equal, round, and reactive to light. No scleral icterus.   Neck: Normal range of motion. Neck supple. No thyromegaly present.   Cardiovascular: Normal rate, regular rhythm and normal heart sounds.    No murmur heard.  Pulmonary/Chest: Breath sounds normal. She has no wheezes. She has no rales.   Abdominal: Soft. Bowel sounds are normal. She exhibits no distension and no mass. There is no hepatosplenomegaly. There is no tenderness. There is no rebound and no guarding.   Musculoskeletal: Normal range of motion. She exhibits no edema or tenderness.   Lymphadenopathy:     She has no cervical adenopathy.     She has no axillary adenopathy.        Right: No supraclavicular adenopathy present.        Left: No supraclavicular adenopathy present.   Neurological: She is alert and oriented to person, place, and time. No cranial nerve deficit. Coordination normal.   Skin: Skin is warm and dry. No ecchymosis, no petechiae and no rash noted. No erythema.   Psychiatric: She has a normal mood and affect.     "     CT a/p 3/23/2018   1. Right groin mass, differential diagnosis includes lymphoma.  2. Large uterus, multiple fibroids.  3. Breast nodules and calcifications discussed above.  4. Additional remote findings above.    Assessment:       1. Neoplasm of pelvis    2. Adenocarcinoma    3. Iron deficiency anemia, unspecified iron deficiency anemia type    4. Type 2 diabetes mellitus with complication, without long-term current use of insulin        Plan:   In summary, patient with recently diagnosedStage IVB (inguinal mets) endometrial cancer. Dr. Lala has discussed staging, prognosis and plan with patient. This was reviewed with pt. The natural history of endometrial cancer. 5 year survival for stage IVB is 15%. Surgically unresectable at present based on exam and imaging. It has been  recommended neoadjuvant systemic chemotherapy with Carbo AUC6/Taxol 175mg/m2 IV [id number]. Will plan for 4 cycles and repeat imaging/clinical exam for consideration of surgical debulking. She would like to have treatment on the WB.   Plan testing cbc,cmp, Festudes today  PAC placement  Chemo teaching  Informed consent     All questions posed answered to patient's satisfaction    Thank you for allowing me to participate the care of your patient    Cbc,cmp, Fe studies  prior to f/u  In 1mo      Cc: MD Noelle Hicks MD

## 2018-04-12 NOTE — Clinical Note
PAC placement FE studies today Cbc,cmp, FE studies prior to f/u  Chemo teachng  Informed consent Chemo cycle 1 next week

## 2018-04-12 NOTE — PATIENT INSTRUCTIONS
Carboplatin injection  What is this medicine?  CARBOPLATIN (RUDI lata sahra tin) is a chemotherapy drug. It targets fast dividing cells, like cancer cells, and causes these cells to die. This medicine is used to treat ovarian cancer and many other cancers.  How should I use this medicine?  This drug is usually given as an infusion into a vein. It is administered in a hospital or clinic by a specially trained health care professional.  Talk to your pediatrician regarding the use of this medicine in children. Special care may be needed.  What side effects may I notice from receiving this medicine?  Side effects that you should report to your doctor or health care professional as soon as possible:  · allergic reactions like skin rash, itching or hives, swelling of the face, lips, or tongue  · signs of infection - fever or chills, cough, sore throat, pain or difficulty passing urine  · signs of decreased platelets or bleeding - bruising, pinpoint red spots on the skin, black, tarry stools, nosebleeds  · signs of decreased red blood cells - unusually weak or tired, fainting spells, lightheadedness  · breathing problems  · changes in hearing  · changes in vision  · chest pain  · high blood pressure  · low blood counts - This drug may decrease the number of white blood cells, red blood cells and platelets. You may be at increased risk for infections and bleeding.  · nausea and vomiting  · pain, swelling, redness or irritation at the injection site  · pain, tingling, numbness in the hands or feet  · problems with balance, talking, walking  · trouble passing urine or change in the amount of urine  Side effects that usually do not require medical attention (report to your doctor or health care professional if they continue or are bothersome):  · hair loss  · loss of appetite  · metallic taste in the mouth or changes in taste  What may interact with this medicine?  · medicines for seizures  · medicines to increase blood counts  like filgrastim, pegfilgrastim, sargramostim  · some antibiotics like amikacin, gentamicin, neomycin, streptomycin, tobramycin  · vaccines  Talk to your doctor or health care professional before taking any of these medicines:  · acetaminophen  · aspirin  · ibuprofen  · ketoprofen  · naproxen  What if I miss a dose?  It is important not to miss a dose. Call your doctor or health care professional if you are unable to keep an appointment.  Where should I keep my medicine?  This drug is given in a hospital or clinic and will not be stored at home.  What should I tell my health care provider before I take this medicine?  They need to know if you have any of these conditions:  · blood disorders  · hearing problems  · kidney disease  · recent or ongoing radiation therapy  · an unusual or allergic reaction to carboplatin, cisplatin, other chemotherapy, other medicines, foods, dyes, or preservatives  · pregnant or trying to get pregnant  · breast-feeding  What should I watch for while using this medicine?  Your condition will be monitored carefully while you are receiving this medicine. You will need important blood work done while you are taking this medicine.  This drug may make you feel generally unwell. This is not uncommon, as chemotherapy can affect healthy cells as well as cancer cells. Report any side effects. Continue your course of treatment even though you feel ill unless your doctor tells you to stop.  In some cases, you may be given additional medicines to help with side effects. Follow all directions for their use.  Call your doctor or health care professional for advice if you get a fever, chills or sore throat, or other symptoms of a cold or flu. Do not treat yourself. This drug decreases your body's ability to fight infections. Try to avoid being around people who are sick.  This medicine may increase your risk to bruise or bleed. Call your doctor or health care professional if you notice any unusual  bleeding.  Be careful brushing and flossing your teeth or using a toothpick because you may get an infection or bleed more easily. If you have any dental work done, tell your dentist you are receiving this medicine.  Avoid taking products that contain aspirin, acetaminophen, ibuprofen, naproxen, or ketoprofen unless instructed by your doctor. These medicines may hide a fever.  Do not become pregnant while taking this medicine. Women should inform their doctor if they wish to become pregnant or think they might be pregnant. There is a potential for serious side effects to an unborn child. Talk to your health care professional or pharmacist for more information. Do not breast-feed an infant while taking this medicine.  NOTE:This sheet is a summary. It may not cover all possible information. If you have questions about this medicine, talk to your doctor, pharmacist, or health care provider. Copyright© 2017 [employer]        Paclitaxel injection  What is this medicine?  PACLITAXEL (BRAYDEN li TAX el) is a chemotherapy drug. It targets fast dividing cells, like cancer cells, and causes these cells to die. This medicine is used to treat ovarian cancer, breast cancer, and other cancers.  How should I use this medicine?  This drug is given as an infusion into a vein. It is administered in a hospital or clinic by a specially trained health care professional.  Talk to your pediatrician regarding the use of this medicine in children. Special care may be needed.  What side effects may I notice from receiving this medicine?  Side effects that you should report to your doctor or health care professional as soon as possible:  · allergic reactions like skin rash, itching or hives, swelling of the face, lips, or tongue  · low blood counts - This drug may decrease the number of white blood cells, red blood cells and platelets. You may be at increased risk for infections and bleeding.  · signs of infection - fever or chills, cough,  sore throat, pain or difficulty passing urine  · signs of decreased platelets or bleeding - bruising, pinpoint red spots on the skin, black, tarry stools, nosebleeds  · signs of decreased red blood cells - unusually weak or tired, fainting spells, lightheadedness  · breathing problems  · chest pain  · high or low blood pressure  · mouth sores  · nausea and vomiting  · pain, swelling, redness or irritation at the injection site  · pain, tingling, numbness in the hands or feet  · slow or irregular heartbeat  · swelling of the ankle, feet, hands  Side effects that usually do not require medical attention (report to your doctor or health care professional if they continue or are bothersome):  · bone pain  · complete hair loss including hair on your head, underarms, pubic hair, eyebrows, and eyelashes  · changes in the color of fingernails  · diarrhea  · loosening of the fingernails  · loss of appetite  · muscle or joint pain  · red flush to skin  · sweating  What may interact with this medicine?  Do not take this medicine with any of the following medications:  · disulfiram  · metronidazole  This medicine may also interact with the following medications:  · cyclosporine  · diazepam  · ketoconazole  · medicines to increase blood counts like filgrastim, pegfilgrastim, sargramostim  · other chemotherapy drugs like cisplatin, doxorubicin, epirubicin, etoposide, teniposide, vincristine  · quinidine  · testosterone  · vaccines  · verapamil  Talk to your doctor or health care professional before taking any of these medicines:  · acetaminophen  · aspirin  · ibuprofen  · ketoprofen  · naproxen  What if I miss a dose?  It is important not to miss your dose. Call your doctor or health care professional if you are unable to keep an appointment.  Where should I keep my medicine?  This drug is given in a hospital or clinic and will not be stored at home.  What should I tell my health care provider before I take this medicine?  They  need to know if you have any of these conditions:  · blood disorders  · irregular heartbeat  · infection (especially a virus infection such as chickenpox, cold sores, or herpes)  · liver disease  · previous or ongoing radiation therapy  · an unusual or allergic reaction to paclitaxel, alcohol, polyoxyethylated castor oil, other chemotherapy agents, other medicines, foods, dyes, or preservatives  · pregnant or trying to get pregnant  · breast-feeding  What should I watch for while using this medicine?  Your condition will be monitored carefully while you are receiving this medicine. You will need important blood work done while you are taking this medicine.  This medicine can cause serious allergic reactions. To reduce your risk you will need to take other medicine(s) before treatment with this medicine. If you experience allergic reactions like skin rash, itching or hives, swelling of the face, lips, or tongue, tell your doctor or health care professional right away.  In some cases, you may be given additional medicines to help with side effects. Follow all directions for their use.  This drug may make you feel generally unwell. This is not uncommon, as chemotherapy can affect healthy cells as well as cancer cells. Report any side effects. Continue your course of treatment even though you feel ill unless your doctor tells you to stop.  Call your doctor or health care professional for advice if you get a fever, chills or sore throat, or other symptoms of a cold or flu. Do not treat yourself. This drug decreases your body's ability to fight infections. Try to avoid being around people who are sick.  This medicine may increase your risk to bruise or bleed. Call your doctor or health care professional if you notice any unusual bleeding.  Be careful brushing and flossing your teeth or using a toothpick because you may get an infection or bleed more easily. If you have any dental work done, tell your dentist you are  receiving this medicine.  Avoid taking products that contain aspirin, acetaminophen, ibuprofen, naproxen, or ketoprofen unless instructed by your doctor. These medicines may hide a fever.  Do not become pregnant while taking this medicine. Women should inform their doctor if they wish to become pregnant or think they might be pregnant. There is a potential for serious side effects to an unborn child. Talk to your health care professional or pharmacist for more information. Do not breast-feed an infant while taking this medicine.  Men are advised not to father a child while receiving this medicine.  This product may contain alcohol. Ask your pharmacist or healthcare provider if this medicine contains alcohol. Be sure to tell all healthcare providers you are taking this medicine. Certain medicines, like metronidazole and disulfiram, can cause an unpleasant reaction when taken with alcohol. The reaction includes flushing, headache, nausea, vomiting, sweating, and increased thirst. The reaction can last from 30 minutes to several hours.  NOTE:This sheet is a summary. It may not cover all possible information. If you have questions about this medicine, talk to your doctor, pharmacist, or health care provider. Copyright© 2017 [employer]        Mouth Care During Chemotherapy     Brush gently with an extra soft toothbrush and mild toothpaste.     Mouth sores (stomatitis) and dry mouth are common side effects of chemotherapy and radiation therapy. These side effects occur because these treatments affect normal cells as well as cancer cells. Using the tips on this handout may help you feel better.   Remedies that help  · Rinse with 1/2 teaspoon baking soda and 1/4 teaspoon salt mixed in 1 cup of warm water. This helps keep your mouth free of germs.  · Use products that coat and protect the mouth and throat. Or use medications that coat and soothe mouth sores themselves.  · Numb your mouth and throat with special sprays or  lozenges to make eating easier.  Prevent mouth sores  · Buy an extra soft toothbrush and mild toothpaste.  · Gently brush your teeth and gums.  · Have your dentist treat any dental problems before your therapy begins.  Moisten a dry mouth  · Drink plenty of water. Take frequent sips or suck on ice chips.  · Suck on sugar-free candy and lozenges. Chew sugar-free gum.  · Use products that moisten the mouth if your doctor prescribes them.  · Apply lip balm to help prevent dry lips.  · Avoid mouthwash that contains alcohol.  Choose foods less likely to irritate  Try foods that are:  · Soft and go down smoothly, such as a milkshake or food puréed with a   · Served cold or at room temperature  · Cooked until tender and cut into small pieces  Avoid foods that are:  · Sharp or crunchy  · Hot, salty, or spicy  · Acidic, such as citrus juices  When to seek medical advice  · You develop mouth sores  · Mouth pain keeps you from eating or resting   Date Last Reviewed: 1/5/2016  © 2093-3611 [employer]. [address] [place] [address]. All rights reserved. This information is not intended as a substitute for professional medical care. Always follow your healthcare professional's instructions.        Nail Care During Chemotherapy     Wear gloves when you garden or do housework.      Minor nail problems are common side effects of chemotherapy. These side effects occur because the treatment affects normal cells as well as cancer cells. To manage these side effects, try the tips below.  Caution: Call your doctor if your cuticles become red and painful or show other changes.   Nail changes  Nail problems tend to be minor. In most cases, you can take care of them yourself. Dont be surprised if your nails become:  · Darkened  · Brittle  · Cracked  · Marked with vertical lines or bands  · Detached from the skin  What you can do  If nail problems occur, be patient. Damaged nails can be repaired only with  new growth, which is slow. Fingernails grow about 1/8 inch a month. Toenails grow about 1/24 inch a month. Until your nails grow back:  · Keep them short and filed. Be careful when cutting around cuticles.  · Talk to your healthcare provider before using nail strengtheners or seeing a manicurist.  · Reduce the risk of infection. Wear gloves when washing dishes, gardening, or doing other work around the house. Be careful when you cut your nails and cuticles.  Date Last Reviewed: 1/5/2016 © 2000-2017 [employer]. [address] [place] [id number]. All rights reserved. This information is not intended as a substitute for professional medical care. Always follow your healthcare professional's instructions.        Nutrition During Chemotherapy     Drink plenty of liquids, such as water.     During chemotherapy, the energy provided by a healthy diet can help you rebuild normal cells. It can also help you keep up your strength and fight infection. As a result, you may feel better and be more able to cope with side effects. Ask your doctor about your nutrition needs.  Drink plenty of fluids  · Fluids help the body produce urine and decrease constipation. They help prevent kidney and bladder problems. They also help replace fluids lost from vomiting and diarrhea.  · Try water, unsweetened juices, and other flavored drinks without caffeine. They flush toxins from the body.  Get enough calories  · Calories are fuel. The body uses this fuel to perform all of its functions, including healing.  · Its OK to be lean, but be sure you are not underweight. If you are, try eating more calories.  · Eat calorie-dense foods such as avocados, peanut butter, eggs, and ice cream.  · If you need extra calories, add butter, gravy, and sauces to foods (if tolerated).  · If you don't need the extra calories, try to limit foods that are fried, greasy, or high in fat or added sugar.  Eat protein, fruits, and  vegetables  · Protein builds muscle, bone, skin, and blood. It helps your body heal and fight infection. It also helps boost your energy level.  · Good protein choices include yogurt, eggs, chicken, lean meats, beans, and peanut butter.  · Fruits and vegetables are full of important vitamins, minerals, and fiber to help your body function properly.  · Try to eat a variety of vegetables, fruits, whole grains, and beans.  · Ask your doctor about instant protein powder or other supplements.  Eating right during treatment  Side effects may make it a little harder to eat well on some days. The following tips will help you continue to get the nutrition you need:  · Be open to new foods and recipes.  · Eat small portions often and slowly.  · Have a healthy snack instead of a meal if you are not very hungry.  · Try eating in a new setting.  · Physical activity, such as walking, can help increase your appetite. Try to be active for at least 30 minutes each day.  · Boost your diet by getting the vitamins and minerals you need from fruits, vegetables, and whole grains.  · If you live alone and are not up to cooking, ask your healthcare provider about Meals on Wheels or other outreach programs.  · Sometimes, it is best to follow your appetitie. Eat when you are hungry, but when you ar enot, forcing yourself to eat can make you feel bad, nauseated, or even cause you to vomit.   Date Last Reviewed: 1/6/2016  © 8092-1992 [employer]. [address] [place] [address], [place] [address]. All rights reserved. This information is not intended as a substitute for professional medical care. Always follow your healthcare professional's instructions.        Understanding Chemotherapy     Chemotherapy is often given in an outpatient setting.      Chemotherapy (chemo) is a treatment for cancer. Chemo can be a single medication. Or, it can be a combination of medicines. When used alone or along with surgery or radiation therapy, it can  "often shrink a tumor or prevent its spread.  How chemotherapy works  Chemotherapy kills cells that grow quickly.  Cancer cells can be fast growing cells; but many healthy cells grow fast, too.  Fast growing cells of the mouth, stomach lining, bone marrow, skin and hair are able to grow back, but cancer cells that die, are not.  That is why side effects such as hair loss, nausea, and low blood cell counts resolve with time.  Usually, chemotherapy is given in "cycles" of treatment.  A "cycle" is the time from one cancer treatment to the next.  For example, if a treatment is given 2 weeks in a row, and then one week off, it is referred to as a 3 week cycle.  If a treatment is given once every 3 week, it is referred to as a 3 week cycle, too.  Time between treatments (during the cycle) is necessary to let normal cell recover before the next treatment.  The goals of chemotherapy  Chemo can kill cancer cells. As a result, it may do the following:  · Shrink cancer before surgery (neoadjuvent care)  · Rid the body of cancer cells that remain after surgery (adjuvent care)  · Reduce symptoms (such as pain) (palliative care)  · Control cancer for a period of time (palliative care)  · Cause remission (no evidence of the disease on medical testing)  · Cure cancer (no evidence of the disease years after treatment)  Side effects of chemotherapy  When healthy cells are damaged, side effects may develop including:  · Nausea and vomiting  · Hair loss  · Anemia (low red blood cell count)  · Infections  · Bleeding  · Mouth and throat sores  · Skin changes (dry skin, itching, acne)  · Lack of interest in sex  · Trouble remembering and concentrating  · Stress and depression  Long-term risks and complications  There are some long-term risks with chemo. But the benefits usually outweigh the risks. Risks depend on the type of chemo used. Some possible long-term risks include:  · Infertility  · Damage to certain organs, such as the heart, " kidneys, liver, or lungs  · Lasting nerve damage  · Another cancer forming at a later time  Date Last Reviewed: 12/27/2015  © 6911-1396 [employer]. [address] [place] [address], [place] [address]. All rights reserved. This information is not intended as a substitute for professional medical care. Always follow your healthcare professional's instructions.        Chemotherapy: Common Questions About Activity During Treatment     Working from home may be an option.   You may have questions about how chemotherapy could affect the things you take for granted in everyday life. Here are some answers to common questions, and some of the adjustments you may need to make.  Will I still be able to work?  Many people do still work during chemotherapy. If you find you have less energy, you may need to talk with your employer about adjusting your schedule:  · Work at home or reduce the number of hours you work.  · Plan time off that fits best with your treatment cycle.  Should I exercise?  Ask your healthcare provider about starting an exercise program. It may help you sleep better and sometimes even helps control your appetite. It is also good for your sense of well-being:  · Exercise when you feel most energetic.  · Keep the pace moderate. Even small amounts of exercise can help. Instead of jogging, walk or ride a stationary bike.  Will I be affected sexually?  Chemotherapy can cause sexual changes in men and women:  · You may notice changes in your desire to have sex. Hugging and cuddling may seem more important now.  · Chemotherapy can cause short-term or permanent infertility. Talk to your healthcare provider if you are planning on having children. Men may want to bank, or freeze, sperm.  · Most chemotherapy drugs can cause birth defects if taken during pregnancy. Talk to your healthcare provider about whether you need to use birth control throughout treatment.  Date Last Reviewed: 12/27/2015  © 4307-2283 [employer]. [address] [place] [address], [place] [address]. All rights reserved. This information is not intended as a substitute for professional medical care. Always follow your healthcare professional's instructions.        Managing Fatigue     Family members can help with meals and chores around the house.   Fatigue is common. It can be caused by worry, lack of sleep, or poor appetite. Fatigue can also be a sign of anemia, a shortage of red blood cells. You might need medical treatment for anemia. The tips below can help you feel better.  Conserving energy  · Keep track of the times of day when you are most tired and plan around them. For instance, if you are more tired in the afternoon, try to get tasks done in the morning.  · Decide which tasks are most important. Do those first.  · Pass tasks along to others when you need to. Ask for help.  · Accept help when its offered. Tell people what they can do to help. For instance, you may need someone to fix a meal, fold clothes, or put gas in your car.  · Plan rest times. You may want to take a nap each day. Just sitting quietly for a few minutes can make you feel more rested.  What you can do to feel better  · Relax before you try to sleep. Take a bath or read for a while.  · Form a sleep pattern. Go to bed at the same time each night and get up at the same time each morning.  · Eat well. Choose foods from all of the food groups each day.  · Exercise. Take a brisk walk to help increase your energy.  · Avoid caffeine and alcohol. Drink plenty of water or fruit juices instead.  Treating anemia  If you begin to feel more tired than normal, tell your doctor. Fatigue could be a sign of anemia. This problem is fairly common in cancer patients, especially during chemotherapy and radiation treatments. If your red blood cell count is too low, you may get a blood transfusion. In some cases, you may need medicine to increase the number of red blood cells your body  makes.  When to call your healthcare provider  Call your healthcare provider if you have:  · Shortness of breath or chest pain  · A dizzy feeling when you get up from lying or sitting down  · Paler skin than normal  · Extreme tiredness that is not helped by sleep   Date Last Reviewed: 1/3/2016  © 5456-3282 [employer]. [address] [place] [address], [place] [address]. All rights reserved. This information is not intended as a substitute for professional medical care. Always follow your healthcare professional's instructions.        Discharge Instructions: Flushing Your Central Line  You are going home with a central line in place. This is also called a central venous access device (CVAD) or central venous catheter (CVC). A small, soft tube called a catheter has been put in a vein that leads to your heart. This line stays in place until you no longer need it, and then it is taken out. The line carries medicine or nutrition into your body. It may also be used to draw blood. The central line must be flushed regularly. This will keep it clean and prevent blood clots from blocking the catheter. To flush it, you will use a syringe to inject solution into the injection cap of the catheter. This sheet explains how to do that.  Prevent infection with good hand hygiene  A central line can let germs into your body. This can lead to serious and sometimes deadly infections. To prevent infection, its very important that you, your caregivers, and others around you use good hand hygiene. This means washing your hands well with soap and water, and cleaning them with alcohol-based hand gel as directed. Never touch the central line or bandage (dressing) without first using one of these methods.  To wash your hands with soap and water:  · Wet your hands with warm water. (Avoid hot water, which can cause skin irritation when you wash your hands often.)  · Apply enough soap to cover the entire surface of your hands, including  your fingers.  · Rub your hands together briskly for at least 15 seconds. Make sure to rub the front and back of each hand up to the wrist, your fingers and fingernails, between the fingers, and each thumb.  · Rinse your hands with warm water.  · Dry your hands completely with a new, unused paper towel. Dont use a cloth towel or other reusable towel. These can harbor germs.  · Use the paper towel to turn off the faucet, then throw it away. If youre in a bathroom, also use a paper towel to open the door instead of touching the handle.  When you dont have access to soap and water: Use alcohol-based hand gel to clean your hands. The gel should have at least 60% alcohol. Follow the instructions on the package. Your health care team can answer any questions you have about when to use hand gel, or when its better to wash with soap and water.   When to flush the central line  · Flush the central line at least once every 12 hours, or as instructed by your healthcare team.  · Flush the central line before and after infusing medicines through the line.  · Follow any special instructions your healthcare team gives you.  Avoiding infection while flushing the central line  To flush the central line, you need to touch the end of the catheter (the lumen) and inject solution into it. The central line provides a direct path into your bloodstream. This makes the risk of infection high. So you must be very careful to keep your work area and supplies clean. Following the steps below will help. You may also get specific instructions from your healthcare team.  Supplies for flushing the central line  A general list of supplies is below. Your healthcare team will provide you with a list of specific items and brands to use. Or you may get a kit that has everything you need. Your supplies may include:  · Saline flush (a solution that cleans the central line) or heparin flush (a medication that prevents blood clots). These often come in  already filled (prefilled) syringes.  · Disinfectant supplies (such as chlorhexidine wipes) for cleaning the top of the catheter.  · Plastic bags for throwing away supplies after flushing.  To flush the catheter  Step 1. Wash your hands  Wash your hands well with soap and water. Use the method described above.  Step 2. Prepare your work area  · Choose an area with a hard, flat surface where you can easily spread out the supplies, such as a desk or table. Dont use the bathroom. It has too many germs.  · Move pets and children out of the work area. Keep them out until the flushing process is done.  · Clean washable surfaces with soap and water. Dry with a clean, unused paper towel. Then throw the paper towel away.  · Spread clean, unused paper towels over your work surface. Use as many as you need to cover it.  · If you need to cough or sneeze, move away from your work surface first.   Step 3. Lay out your supplies  · Clean your hands with soap and water or hand gel. Do this before you lay out your supplies on the work surface.  · After cleaning your hands, only touch your supplies. If you do touch anything else, such as furniture or your clothes, clean your hands again. This is very important for preventing infection.  · Place your supplies on the cleaned and dried work surface. Lay them out in the order you will be using them.  · You will likely use prefilled syringes that contain saline or heparin. If you are not using prefilled syringes, ask your healthcare team for instructions.  · Keep syringes capped for now. This keeps the tips germ-free (sterile).   Step 4. Flush the catheter  · Clean the injection cap on your catheter, using disinfectant wipes or other supplies, as directed by your healthcare team. Using friction, scrub the top, the tip (including the threaded edges), and the sides for 10 to 15 seconds. Then wait for the cap to dry completely (up to 30 seconds). Follow any specific instructions your  provider has given.  · After cleaning the injection cap, dont touch it. Instead, hold the end of the catheter (the lumen) securely. Make sure the cleaned injection cap doesnt touch anything. If it does, germs could easily enter your body.  · Uncap the syringe and remove air bubbles as directed by your healthcare team. If youre flushing separately with saline and heparin, use the saline solution first.  · If the tubing above the injection cap is clamped, unclamp it now.  · Attach the syringe to the injection cap and twist to secure it.  · Pull back on the syringe plunger and watch for blood in the catheter. This is a sign that the catheter is working correctly. If you dont see blood moving through the catheter, stop and call your healthcare team right away. (Note: For each lumen, only check for blood during the first flush. If youre infusing medicine and then flushing the catheter again, you dont need to check for blood with the second flush.)  · Push the plunger slowly so the solution goes into the catheter. The plunger should be easy to push. If there is resistance, dont force it. Make sure the syringe is twisted into the injection cap and the tubing above the cap is unclamped. If you still feel resistance, stop and call your healthcare provider.  · Repeat the process for each lumen. Use a new syringe each time. You may have as many as 3 lumens.  · After injecting the solution, reclamp the tubing (if there is a clamp). Your healthcare team may give you specific instructions about this.  · If youre also flushing with heparin, repeat all of Step 4 using the heparin flush.   Step 5. Dispose of used supplies  · After you use a syringe, place it in a special container (sharps container). You can buy a sharps container at a pharmacy or medical supply store. You can also use an empty laundry detergent bottle, or any other puncture-proof container and lid.  · Discard any other used supplies in a plastic bag. Throw  this in the trash.  · Wash your hands well with soap and water.  Follow-up care  A home health care nurse or other nurse will follow your progress. Contact this person if you have any questions about the flushing process.  Follow up with your healthcare provider, or as advised.  When to seek medical care  Call your healthcare provider right away if you have any of the following:  · Pain, burning, or swelling in your shoulder, chest, back, arm, or leg  · Fever of 100.4°F (38.0°C) or higher  · Chills  · Signs of infection at the catheter site (pain, redness, drainage, burning, or stinging)  · Leaking at the catheter insertion site  · Coughing, wheezing, or shortness of breath  · A racing or irregular heartbeat  · Muscle stiffness or trouble moving  · Gurgling noises coming from the catheter  · Problems flushing the catheter  · The catheter falls out, breaks, cracks, leaks, or has other damage   Date Last Reviewed: 7/1/2016  © 3964-4734 [employer]. [address] [place] [address], [place] [address]. All rights reserved. This information is not intended as a substitute for professional medical care. Always follow your healthcare professional's instructions.

## 2018-04-12 NOTE — LETTER
April 15, 2018      Noelle Lala MD  [address]  [facility] [address]           [facility]  [address] [facility] [address] [place] [address]  Phone: [phone]          Patient: Saba Driver   MR Number: [id number]   YOB: 1960   Date of Visit: 4/12/2018       Dear Dr. Noelle Lala:    Thank you for referring Saba Driver to me for evaluation. Attached you will find relevant portions of my assessment and plan of care.    If you have questions, please do not hesitate to call me. I look forward to following Saba Driver along with you.    Sincerely,        Enclosure  CC:  No Recipients    If you would like to receive this communication electronically, please contact [email or web address] or [phone] to request more information on [employer] Link access.    For providers and/or their staff who would like to refer a patient to [facility], please contact us through our one-stop-shop provider referral line, Janeen Cheung, at [phone].    If you feel you have received this communication in error or would no longer like to receive these types of communications, please e-mail [email or web address]

## 2018-04-14 PROBLEM — C54.1 ENDOMETRIAL CANCER: Status: ACTIVE | Noted: 2018-04-14

## 2018-04-14 NOTE — PROGRESS NOTES
Subjective:      Patient ID: Saba Driver is a 57 y.o. female.    Chief Complaint: Follow-up (Review biopsy results)      HPI  Referred by Dr. Shakir Alexis for postmenopausal bleeding. P0. On pelvic uterus is enlarged and fixed, unable to perform EMB due to altered anatomy of the cervix. She had been admitted to  for vaginal bleeding and anemia requiring transfusion. Fixed sore R inguinal node. Blind pap returned adenocarcinoma.      Pelvic US 3/12/18  UT enlarged 13.3cm with 5.1cm fibroid anterior. Limited visualization of EMS.  LOV 2.9cm  ROV 3.6cm     Medical co-morbidities include DM, HTN. No anticoagulation.      No prior abdominal surgeries     Family history significant for mom with pancreatic cancer, denies history of breast, ovarian, uterine, or colon cancer.      MMG last year normal per patient.     Seen in consult with me 3/21/18. Constellation of findings thus far consistent with an advanced gynecologic malignancy. Likely uterine in origin. Inguinal adenopathy would make this Stage IVB. Pap cytology showing adenocarcinoma but need tissue diagnosis. Will plan IR for biopsy of inguinal node. Pelvic disease is surgically unresectable at present. Recommended neoadjuvant chemotherapy based on tissue diagnosis.     IR biopsy 4/4/18  FINAL PATHOLOGIC DIAGNOSIS  RIGHT INGUINAL LYMPH NODE, CT GUIDED BIOPSY WITH ONSITE ADEQUACY (CYTOLOGY AND CELL BLOCK):  - Adenocarcinoma. See note.  Note: The carcinoma is positive for CK7, p53, vimentin and ER (patchy). It is negative for CDX2, CEA, CK20 and WT1. This immunoprofile is compatible with an endometrial origin. All stains have appropriate controls.    Presents today for further treatment planning discussion.     [place]iew of Systems   Constitutional: Negative for appetite change, chills, fatigue and fever.   HENT: Negative for mouth sores.    Respiratory: Negative for cough and shortness of breath.    Cardiovascular: Negative for leg swelling.   Gastrointestinal:  Negative for abdominal pain, blood in stool, constipation and diarrhea.   Endocrine: Negative for cold intolerance.   Genitourinary: Negative for dysuria and vaginal bleeding.   Musculoskeletal: Negative for myalgias.   Skin: Negative for rash.   Allergic/Immunologic: Negative.    Neurological: Negative for weakness and numbness.   Hematological: Negative for adenopathy. Does not bruise/bleed easily.   Psychiatric/Behavioral: Negative for confusion.       Objective:   Physical Exam:   Constitutional: She is oriented to person, place, and time. She appears well-developed and well-nourished.    HENT:   Head: Normocephalic and atraumatic.    Eyes: EOM are normal. Pupils are equal, round, and reactive to light.    Neck: Normal range of motion. Neck supple. No thyromegaly present.    Cardiovascular: Normal rate, regular rhythm and intact distal pulses.     Pulmonary/Chest: Effort normal and breath sounds normal. No respiratory distress. She has no wheezes.        Abdominal: Soft. Bowel sounds are normal. She exhibits no distension, no ascites and no mass. There is no tenderness.             Musculoskeletal: Normal range of motion and moves all extremeties.      Lymphadenopathy:     She has no cervical adenopathy.        Right: No supraclavicular adenopathy present.        Left: No supraclavicular adenopathy present.    Neurological: She is alert and oriented to person, place, and time.    Skin: Skin is warm and dry. No rash noted.    Psychiatric: She has a normal mood and affect.       Assessment:     1. Endometrial cancer        Plan:   No orders of the defined types were placed in this encounter.    Stage IVB (inguinal mets) endometrial cancer. We discussed the natural history of endometrial cancer. 5 year survival for stage IVB is 15%. Surgically unresectable at present based on exam and imaging. I have recommended neoadjuvant systemic chemotherapy with Carbo AUC6/Taxol 175mg/m2 IV d24qcec. Will plan for 4 cycles and  repeat imaging/clinical exam for consideration of surgical debulking. She would like to have treatment on the WB. I have reached out to Dr. Boyd for this.

## 2018-04-17 ENCOUNTER — HOSPITAL ENCOUNTER (OUTPATIENT)
Dept: INTERVENTIONAL RADIOLOGY/VASCULAR | Facility: HOSPITAL | Age: 58
Discharge: HOME | End: 2018-04-17
Attending: INTERNAL MEDICINE
Payer: COMMERCIAL

## 2018-04-17 ENCOUNTER — SURGERY (OUTPATIENT)
Age: 58
End: 2018-04-17

## 2018-04-17 ENCOUNTER — HOSPITAL ENCOUNTER (OUTPATIENT)
Dept: PREADMISSION TESTING | Facility: HOSPITAL | Age: 58
Discharge: HOME | End: 2018-04-17
Attending: RADIOLOGY
Payer: COMMERCIAL

## 2018-04-17 ENCOUNTER — CLINICAL SUPPORT (OUTPATIENT)
Dept: HEMATOLOGY/ONCOLOGY | Facility: CLINIC | Age: 58
End: 2018-04-17
Attending: RADIOLOGY
Payer: COMMERCIAL

## 2018-04-17 ENCOUNTER — HOSPITAL ENCOUNTER (OUTPATIENT)
Facility: HOSPITAL | Age: 58
Discharge: HOME | End: 2018-04-17
Attending: RADIOLOGY | Admitting: RADIOLOGY
Payer: COMMERCIAL

## 2018-04-17 VITALS
SYSTOLIC BLOOD PRESSURE: 128 MMHG | HEART RATE: 69 BPM | DIASTOLIC BLOOD PRESSURE: 55 MMHG | RESPIRATION RATE: 18 BRPM | OXYGEN SATURATION: 100 %

## 2018-04-17 VITALS
HEIGHT: 67 IN | TEMPERATURE: 99 F | SYSTOLIC BLOOD PRESSURE: 126 MMHG | DIASTOLIC BLOOD PRESSURE: 62 MMHG | OXYGEN SATURATION: 100 % | RESPIRATION RATE: 18 BRPM | HEART RATE: 81 BPM | WEIGHT: 247.19 LBS | BODY MASS INDEX: 38.8 KG/M2

## 2018-04-17 DIAGNOSIS — D49.89 NEOPLASM OF PELVIS: ICD-10-CM

## 2018-04-17 DIAGNOSIS — D49.89 NEOPLASM OF PELVIS: Primary | ICD-10-CM

## 2018-04-17 DIAGNOSIS — D49.89 PELVIC NEOPLASM: ICD-10-CM

## 2018-04-17 DIAGNOSIS — C54.1 ENDOMETRIAL CANCER: Primary | ICD-10-CM

## 2018-04-17 DIAGNOSIS — Z79.899 CHEMOPROPHYLAXIS: Primary | ICD-10-CM

## 2018-04-17 LAB
ALBUMIN SERPL BCP-MCNC: 2.9 G/DL
ALP SERPL-CCNC: 72 U/L
ALT SERPL W/O P-5'-P-CCNC: 17 U/L
ANION GAP SERPL CALC-SCNC: 10 MMOL/L
AST SERPL-CCNC: 13 U/L
BILIRUB SERPL-MCNC: 0.2 MG/DL
BUN SERPL-MCNC: 10 MG/DL
CALCIUM SERPL-MCNC: 9.5 MG/DL
CHLORIDE SERPL-SCNC: 101 MMOL/L
CO2 SERPL-SCNC: 29 MMOL/L
CREAT SERPL-MCNC: 0.9 MG/DL
EST. GFR  (AFRICAN AMERICAN): >60 ML/MIN/1.73 M^2
EST. GFR  (NON AFRICAN AMERICAN): >60 ML/MIN/1.73 M^2
GLUCOSE SERPL-MCNC: 143 MG/DL
POTASSIUM SERPL-SCNC: 3.3 MMOL/L
PROT SERPL-MCNC: 7.1 G/DL
SODIUM SERPL-SCNC: 140 MMOL/L

## 2018-04-17 PROCEDURE — 36561 INSERT TUNNELED CV CATH: CPT | Mod: RT,,, | Performed by: RADIOLOGY

## 2018-04-17 PROCEDURE — 99153 MOD SED SAME PHYS/QHP EA: CPT

## 2018-04-17 PROCEDURE — 99999 PR PBB SHADOW E&M-EST. PATIENT-LVL I: CPT | Mod: PBBFAC,,,

## 2018-04-17 PROCEDURE — 77001 FLUOROGUIDE FOR VEIN DEVICE: CPT | Mod: 26,,, | Performed by: RADIOLOGY

## 2018-04-17 PROCEDURE — 99152 MOD SED SAME PHYS/QHP 5/>YRS: CPT | Mod: ,,, | Performed by: RADIOLOGY

## 2018-04-17 PROCEDURE — 77001 FLUOROGUIDE FOR VEIN DEVICE: CPT | Mod: TC

## 2018-04-17 PROCEDURE — 36561 INSERT TUNNELED CV CATH: CPT

## 2018-04-17 PROCEDURE — 99152 MOD SED SAME PHYS/QHP 5/>YRS: CPT

## 2018-04-17 PROCEDURE — 76937 US GUIDE VASCULAR ACCESS: CPT | Mod: TC

## 2018-04-17 PROCEDURE — 36415 COLL VENOUS BLD VENIPUNCTURE: CPT

## 2018-04-17 PROCEDURE — 80053 COMPREHEN METABOLIC PANEL: CPT

## 2018-04-17 PROCEDURE — 63600175 PHARM REV CODE 636 W HCPCS: Performed by: RADIOLOGY

## 2018-04-17 PROCEDURE — 76937 US GUIDE VASCULAR ACCESS: CPT | Mod: 26,,, | Performed by: RADIOLOGY

## 2018-04-17 RX ORDER — MIDAZOLAM HYDROCHLORIDE 1 MG/ML
INJECTION INTRAMUSCULAR; INTRAVENOUS CODE/TRAUMA/SEDATION MEDICATION
Status: COMPLETED | OUTPATIENT
Start: 2018-04-17 | End: 2018-04-17

## 2018-04-17 RX ORDER — FENTANYL CITRATE 50 UG/ML
INJECTION, SOLUTION INTRAMUSCULAR; INTRAVENOUS CODE/TRAUMA/SEDATION MEDICATION
Status: COMPLETED | OUTPATIENT
Start: 2018-04-17 | End: 2018-04-17

## 2018-04-17 RX ORDER — HYDROCODONE BITARTRATE AND ACETAMINOPHEN 5; 325 MG/1; MG/1
1 TABLET ORAL EVERY 4 HOURS PRN
Status: DISCONTINUED | OUTPATIENT
Start: 2018-04-17 | End: 2018-04-18 | Stop reason: HOSPADM

## 2018-04-17 RX ORDER — DEXAMETHASONE 4 MG/1
8 TABLET ORAL EVERY 12 HOURS
Qty: 24 TABLET | Refills: 0 | Status: SHIPPED | OUTPATIENT
Start: 2018-04-17 | End: 2018-07-30 | Stop reason: CLARIF

## 2018-04-17 RX ORDER — CEFAZOLIN SODIUM 1 G/50ML
SOLUTION INTRAVENOUS
Status: COMPLETED | OUTPATIENT
Start: 2018-04-17 | End: 2018-04-17

## 2018-04-17 RX ADMIN — FENTANYL CITRATE 50 MCG: 50 INJECTION INTRAMUSCULAR; INTRAVENOUS at 09:04

## 2018-04-17 RX ADMIN — CEFAZOLIN SODIUM 2 G: 1 SOLUTION INTRAVENOUS at 09:04

## 2018-04-17 RX ADMIN — MIDAZOLAM HYDROCHLORIDE 1 MG: 1 INJECTION, SOLUTION INTRAMUSCULAR; INTRAVENOUS at 09:04

## 2018-04-17 NOTE — DISCHARGE INSTRUCTIONS
Mediport Postoperative Instructions    Care of the Incision  1.  After the surgery, the incision will be covered with a gauze dressing.  This dressing may be removed after 48 hours.  Under the dressing you will find several thin paper tapes (steri-strips) covering the incision.  The Steri-strips should be left in place.  They will curl up at the edges and fall off on their won in a few weeks.  The stitches are underneath the skin and will dissolve.    2.  Your mediport can be accessed anytime after your discharge from the hospital.    3.  You may shower the night of your procedure.  Do not scrub the incision.  Just let the water run over the dressing and then gently pat it dry.  In two days, after removal of the dressing, do not scrub the incision.  Let the water run over the incision and then gently pat it dry.    4.  Do not submerge the incision for 6 weeks (no baths, hot tubs or swimming pools.)    Comfort Measures  1.  For the first few days, it is common for the area around the incision to be swollen, discolored (black & blue), and sore.  To help reduce swelling, apply an ice pack or a  bag of frozen peas may be applied to the swollen area for 15 to 20 minutes every hour for 3 days or more as needed.  Wear loose, comfortable clothing.    2  We recommend the use of over-the-counter anti-inflammatory medication such as ibuprofen (Advil, Motrin, Aleve) to minimize swelling, inflammation, and pain.  Take medication every 4-6 hours with food.    Activity  1.  Lifting with the arm on the side of the mediport should be restricted to 10-15 pounds or less for 2 weeks.  Avoid pushing, pulling, straining, or any strenuous activity.  You may climb stairs.  You may drive if you are comfortable enough behind the wheel and can safely operate the vehicle.    Do not drive, drink alcohol, or sign legal documents for 24 hours, or if taking narcotic pain medication.    Return to Work  1.  You may return to work when you feel you  are ready at any point after surgery.    Follow Up  1.  You will not have to return to the office for follow up care of your mediport.  The cancer nurses are very well educated on mediports and will monitor your healing.  We will see you back for mediport removal once approved by your doctors and when treatments are complete.    When to Call the Office  1.  Call our office if you develop a fever (temperature greater than 101), shaking chills, bleeding, increasing redness or drainage from your incision, or with any other problem that concerns you.  Our office number is [phone].      Fall Prevention  Millions of people fall every year and injure themselves. You may have had anesthesia or sedation which may increase your risk of falling. You may have health issues that put you at an increased risk of falling.     Here are ways to reduce your risk of falling.  ·   · Make your home safe by keeping walkways clear of objects you may trip over.  · Use non-slip pads under rugs. Do not use area rugs or small throw rugs.  · Use non-slip mats in bathtubs and showers.  · Install handrails and lights on staircases.  · Do not walk in poorly lit areas.  · Do not stand on chairs or wobbly ladders.  · Use caution when reaching overhead or looking upward. This position can cause a loss of balance.  · Be sure your shoes fit properly, have non-slip bottoms and are in good condition.   · Wear shoes both inside and out. Avoid going barefoot or wearing slippers.  · Be cautious when going up and down stairs, curbs, and when walking on uneven sidewalks.  · If your balance is poor, consider using a cane or walker.  · If your fall was related to alcohol use, stop or limit alcohol intake.   · If your fall was related to use of sleeping medicines, talk to your doctor about this. You may need to reduce your dosage at bedtime if you awaken during the night to go to the bathroom.    · To reduce the need for nighttime bathroom trips:  ¨ Avoid  drinking fluids for several hours before going to bed  ¨ Empty your bladder before going to bed  ¨ Men can keep a urinal at the bedside  · Stay as active as you can. Balance, flexibility, strength, and endurance all come from exercise. They all play a role in preventing falls. Ask your healthcare provider which types of activity are right for you.  · Get your vision checked on a regular basis.  · If you have pets, know where they are before you stand up or walk so you don't trip over them.  · Use night lights.

## 2018-04-17 NOTE — PLAN OF CARE
Irais-operative instructions, medication directives and pain scales reviewed with Ms Driver. All questions the patient had  were answered. Re-assurance about surgical procedure and day of surgery routine given as needed. Ms Driver verbalized understanding of the irais-op instructions.Labs drawn and sent to lab.

## 2018-04-17 NOTE — TELEPHONE ENCOUNTER
Called pt to let her know that the Decadron rx was sent to the pharmacy, instructions reviewed. She voiced understanding.

## 2018-04-17 NOTE — DISCHARGE SUMMARY
Radiology Discharge Summary      Admit date: 4/17/2018  7:25 AM  Discharge date: April 17, 2018    Instructions Given to patient: YesVerbal    Diet: Regular    Activity:Restriction as listed: No strenuous activities for 48 hours    Medications on discharge (List): Refer to Discharge Medication List    Hospital Course: Placement of right chest port for long-term IV access.    Description of Condition on Discharge: stable    Discharge Disposition: Home    Discharge Diagnosis: Endometrial cancer    Follow up with Dr. Boyd as scheduled.

## 2018-04-17 NOTE — OP NOTE
[facility]  Interventional Radiology  High Risk Procedure - Outpatient    Date: 04/17/2018 Time: 10:34 AM    Pre-Op Diagnosis: Port placement for chemo, endometrial cancer    Post-Op Diagnosis: same    Procedure Performed by: Gm Craig MD    Assistant: none    Procedure: Placement of right IJ chest port.    Specimen/Tissue Removed: No    Estimated Blood Loss: Less than 20 mL    Procedure Note/Findings: Right chest port placed and flushes well. No immediate post-procedure complications noted.    Please refer to dictated report for additional details.

## 2018-04-17 NOTE — PROGRESS NOTES
Chemotherapy Education    Saba Driver attended chemotherapy class alone.  [facility] chemo class video watched and resource binder provided to patient as well as handouts on Taxol and Carboplatin from [email or web address].  Most common side effects and regimen reviewed.  Information provided for all support groups and additional relevant resources.  Also provided my contact information for any future questions or concerns. Maximum support offered.  Schedule provided and reviewed. She has nausea prescriptions should she need them and Decadron rx also provided to take prior to Thursday's first chemo appt. Port placed earlier, consent in chart.

## 2018-04-17 NOTE — H&P
[facility]  History & Physical - Short Stay  Interventional Radiology    SUBJECTIVE:     Chief Complaint/Reason for Admission: Endometrial Cancer for Port-A-Cath placement    Informant(s):  self and Electronic Health Record    History of Present Illness:  Saba Driver is a 57 y.o. female with a history of endometrial cancer.    Patient presents for Port-A-Cath placement.    Scheduled Meds:   Continuous Infusions:   PRN Meds:     Review of patient's allergies indicates:  No Known Allergies    Past Medical History:   Diagnosis Date    Adenocarcinoma 3/24/2018    Diabetes mellitus, type 2     Endometrial cancer 4/14/2018    Glaucoma     Hypertension      Past Surgical History:   Procedure Laterality Date    BREAST LUMPECTOMY      R early 20's L at 13 years old     Family History   Problem Relation Age of Onset    Diabetes Mother     Hypertension Mother     Pancreatic cancer Mother     Hypertension Maternal Grandmother     Diabetes Maternal Grandmother     Hypertension Maternal Grandfather     Diabetes Maternal Grandfather     Breast cancer Neg Hx     Colon cancer Neg Hx     Ovarian cancer Neg Hx      Social History   Substance Use Topics    Smoking status: Never Smoker    Smokeless tobacco: Never Used    Alcohol use Yes      Comment: occassional use        Review of Systems:  ROS not obtained.    OBJECTIVE:     Vital Signs (Most Recent):       Physical Exam:  Lungs: No respiratory distress  Cardiac: regular rate and rhythm  alert and oriented    Laboratory  CBC:   Lab Results   Component Value Date/Time    WBC 11.10 04/04/2018 07:55 AM    RBC 4.01 04/04/2018 07:55 AM    HGB 8.8 (L) 04/04/2018 07:55 AM    HCT 29.8 (L) 04/04/2018 07:55 AM     (H) 04/04/2018 07:55 AM    MCV 74 (L) 04/04/2018 07:55 AM    MCH 21.9 (L) 04/04/2018 07:55 AM    MCHC 29.5 (L) 04/04/2018 07:55 AM     Coagulation:   Lab Results   Component Value Date/Time    INR 0.9 04/04/2018 07:55 AM          ASSESSMENT/PLAN:     Chest port placement for chemotherapy.    Patient will undergo chest port placement.    Sedation/Anesthesia Assessment:  ASA Classification: II = Mild systemic disease  Mallampati Score: II (hard and soft palate, upper portion of tonsils anduvula visible)    Sedation History: No problems    Sedation Plan: Conscious sedation

## 2018-04-19 ENCOUNTER — INFUSION (OUTPATIENT)
Dept: INFUSION THERAPY | Facility: HOSPITAL | Age: 58
End: 2018-04-19
Attending: INTERNAL MEDICINE
Payer: COMMERCIAL

## 2018-04-19 DIAGNOSIS — D49.89 NEOPLASM OF PELVIS: Primary | ICD-10-CM

## 2018-04-19 PROCEDURE — 96417 CHEMO IV INFUS EACH ADDL SEQ: CPT

## 2018-04-19 PROCEDURE — 96361 HYDRATE IV INFUSION ADD-ON: CPT

## 2018-04-19 PROCEDURE — A4216 STERILE WATER/SALINE, 10 ML: HCPCS | Performed by: INTERNAL MEDICINE

## 2018-04-19 PROCEDURE — S0028 INJECTION, FAMOTIDINE, 20 MG: HCPCS | Performed by: INTERNAL MEDICINE

## 2018-04-19 PROCEDURE — 96415 CHEMO IV INFUSION ADDL HR: CPT

## 2018-04-19 PROCEDURE — 25000003 PHARM REV CODE 250: Performed by: INTERNAL MEDICINE

## 2018-04-19 PROCEDURE — 63600175 PHARM REV CODE 636 W HCPCS: Performed by: INTERNAL MEDICINE

## 2018-04-19 PROCEDURE — 96413 CHEMO IV INFUSION 1 HR: CPT

## 2018-04-19 PROCEDURE — 96367 TX/PROPH/DG ADDL SEQ IV INF: CPT

## 2018-04-19 RX ORDER — HEPARIN 100 UNIT/ML
500 SYRINGE INTRAVENOUS
Status: DISCONTINUED | OUTPATIENT
Start: 2018-04-19 | End: 2018-04-19 | Stop reason: HOSPADM

## 2018-04-19 RX ORDER — DIPHENHYDRAMINE HYDROCHLORIDE 50 MG/ML
50 INJECTION INTRAMUSCULAR; INTRAVENOUS ONCE AS NEEDED
Status: CANCELLED | OUTPATIENT
Start: 2018-04-19 | End: 2018-04-19

## 2018-04-19 RX ORDER — FAMOTIDINE 10 MG/ML
20 INJECTION INTRAVENOUS
Status: CANCELLED | OUTPATIENT
Start: 2018-04-19

## 2018-04-19 RX ORDER — SODIUM CHLORIDE 0.9 % (FLUSH) 0.9 %
10 SYRINGE (ML) INJECTION
Status: DISCONTINUED | OUTPATIENT
Start: 2018-04-19 | End: 2018-04-19 | Stop reason: HOSPADM

## 2018-04-19 RX ORDER — FAMOTIDINE 10 MG/ML
20 INJECTION INTRAVENOUS
Status: DISCONTINUED | OUTPATIENT
Start: 2018-04-19 | End: 2018-04-19

## 2018-04-19 RX ORDER — HEPARIN 100 UNIT/ML
500 SYRINGE INTRAVENOUS
Status: CANCELLED | OUTPATIENT
Start: 2018-04-19

## 2018-04-19 RX ORDER — FAMOTIDINE 20 MG/50ML
20 INJECTION, SOLUTION INTRAVENOUS 2 TIMES DAILY
Status: DISCONTINUED | OUTPATIENT
Start: 2018-04-19 | End: 2018-04-19 | Stop reason: HOSPADM

## 2018-04-19 RX ORDER — SODIUM CHLORIDE 0.9 % (FLUSH) 0.9 %
10 SYRINGE (ML) INJECTION
Status: CANCELLED | OUTPATIENT
Start: 2018-04-19

## 2018-04-19 RX ORDER — EPINEPHRINE 0.3 MG/.3ML
0.3 INJECTION SUBCUTANEOUS ONCE AS NEEDED
Status: CANCELLED | OUTPATIENT
Start: 2018-04-19 | End: 2018-04-19

## 2018-04-19 RX ADMIN — SODIUM CHLORIDE: 0.9 INJECTION, SOLUTION INTRAVENOUS at 02:04

## 2018-04-19 RX ADMIN — PALONOSETRON HYDROCHLORIDE: 0.25 INJECTION INTRAVENOUS at 10:04

## 2018-04-19 RX ADMIN — FAMOTIDINE 20 MG: 20 INJECTION, SOLUTION INTRAVENOUS at 09:04

## 2018-04-19 RX ADMIN — SODIUM CHLORIDE 875 MG: 9 INJECTION, SOLUTION INTRAVENOUS at 01:04

## 2018-04-19 RX ADMIN — SODIUM CHLORIDE, PRESERVATIVE FREE 10 ML: 5 INJECTION INTRAVENOUS at 02:04

## 2018-04-19 RX ADMIN — DIPHENHYDRAMINE HYDROCHLORIDE 50 MG: 50 INJECTION INTRAMUSCULAR; INTRAVENOUS at 09:04

## 2018-04-19 RX ADMIN — PACLITAXEL 402 MG: 6 INJECTION, SOLUTION INTRAVENOUS at 10:04

## 2018-04-19 RX ADMIN — HEPARIN 500 UNITS: 100 SYRINGE at 02:04

## 2018-04-19 NOTE — PLAN OF CARE
Problem: Patient Care Overview  Goal: Plan of Care Review  Outcome: Ongoing (interventions implemented as appropriate)  Patient introduced to chemo unit and staff. Instructed on possible side effects and when to contact MD. Patient received Carboplatin and Taxol. Tolerated well. No reactions noted during visit. VSS. Patient received discharge instructions and verbalized understanding.

## 2018-04-28 PROBLEM — D50.9 IRON DEFICIENCY ANEMIA: Status: ACTIVE | Noted: 2018-04-28

## 2018-05-08 ENCOUNTER — LAB VISIT (OUTPATIENT)
Dept: LAB | Facility: HOSPITAL | Age: 58
End: 2018-05-08
Attending: INTERNAL MEDICINE
Payer: COMMERCIAL

## 2018-05-08 DIAGNOSIS — C80.1 ADENOCARCINOMA: ICD-10-CM

## 2018-05-08 LAB
ALBUMIN SERPL BCP-MCNC: 3.1 G/DL
ALP SERPL-CCNC: 76 U/L
ALT SERPL W/O P-5'-P-CCNC: 18 U/L
ANION GAP SERPL CALC-SCNC: 11 MMOL/L
AST SERPL-CCNC: 18 U/L
BASOPHILS # BLD AUTO: 0.05 K/UL
BASOPHILS NFR BLD: 0.5 %
BILIRUB SERPL-MCNC: 0.2 MG/DL
BUN SERPL-MCNC: 10 MG/DL
CALCIUM SERPL-MCNC: 9.1 MG/DL
CHLORIDE SERPL-SCNC: 102 MMOL/L
CO2 SERPL-SCNC: 28 MMOL/L
CREAT SERPL-MCNC: 0.9 MG/DL
DIFFERENTIAL METHOD: ABNORMAL
EOSINOPHIL # BLD AUTO: 0.3 K/UL
EOSINOPHIL NFR BLD: 3.5 %
ERYTHROCYTE [DISTWIDTH] IN BLOOD BY AUTOMATED COUNT: 18.6 %
EST. GFR  (AFRICAN AMERICAN): >60 ML/MIN/1.73 M^2
EST. GFR  (NON AFRICAN AMERICAN): >60 ML/MIN/1.73 M^2
FERRITIN SERPL-MCNC: 22 NG/ML
GLUCOSE SERPL-MCNC: 219 MG/DL
HCT VFR BLD AUTO: 28.4 %
HGB BLD-MCNC: 7.6 G/DL
IMM GRANULOCYTES # BLD AUTO: 0.08 K/UL
IMM GRANULOCYTES NFR BLD AUTO: 0.8 %
IRON SERPL-MCNC: 23 UG/DL
LYMPHOCYTES # BLD AUTO: 2.9 K/UL
LYMPHOCYTES NFR BLD: 30.2 %
MCH RBC QN AUTO: 20.3 PG
MCHC RBC AUTO-ENTMCNC: 26.8 G/DL
MCV RBC AUTO: 76 FL
MONOCYTES # BLD AUTO: 0.8 K/UL
MONOCYTES NFR BLD: 8 %
NEUTROPHILS # BLD AUTO: 5.4 K/UL
NEUTROPHILS NFR BLD: 57 %
NRBC BLD-RTO: 0 /100 WBC
PLATELET # BLD AUTO: 560 K/UL
PMV BLD AUTO: 10.3 FL
POTASSIUM SERPL-SCNC: 3.6 MMOL/L
PROT SERPL-MCNC: 6.9 G/DL
RBC # BLD AUTO: 3.74 M/UL
SATURATED IRON: 5 %
SODIUM SERPL-SCNC: 141 MMOL/L
TOTAL IRON BINDING CAPACITY: 465 UG/DL
TRANSFERRIN SERPL-MCNC: 314 MG/DL
WBC # BLD AUTO: 9.46 K/UL

## 2018-05-08 PROCEDURE — 36415 COLL VENOUS BLD VENIPUNCTURE: CPT | Mod: PO

## 2018-05-08 PROCEDURE — 85025 COMPLETE CBC W/AUTO DIFF WBC: CPT

## 2018-05-08 PROCEDURE — 82728 ASSAY OF FERRITIN: CPT

## 2018-05-08 PROCEDURE — 83540 ASSAY OF IRON: CPT

## 2018-05-08 PROCEDURE — 80053 COMPREHEN METABOLIC PANEL: CPT

## 2018-05-10 ENCOUNTER — OFFICE VISIT (OUTPATIENT)
Dept: HEMATOLOGY/ONCOLOGY | Facility: CLINIC | Age: 58
End: 2018-05-10
Payer: COMMERCIAL

## 2018-05-10 ENCOUNTER — INFUSION (OUTPATIENT)
Dept: INFUSION THERAPY | Facility: HOSPITAL | Age: 58
End: 2018-05-10
Attending: INTERNAL MEDICINE
Payer: COMMERCIAL

## 2018-05-10 VITALS
HEIGHT: 67 IN | SYSTOLIC BLOOD PRESSURE: 144 MMHG | BODY MASS INDEX: 38.17 KG/M2 | DIASTOLIC BLOOD PRESSURE: 69 MMHG | WEIGHT: 243.19 LBS | HEART RATE: 83 BPM | TEMPERATURE: 99 F | OXYGEN SATURATION: 98 %

## 2018-05-10 DIAGNOSIS — T45.1X5A ANTINEOPLASTIC CHEMOTHERAPY INDUCED ANEMIA: ICD-10-CM

## 2018-05-10 DIAGNOSIS — D49.89 NEOPLASM OF PELVIS: Primary | ICD-10-CM

## 2018-05-10 DIAGNOSIS — D50.9 IRON DEFICIENCY ANEMIA, UNSPECIFIED IRON DEFICIENCY ANEMIA TYPE: ICD-10-CM

## 2018-05-10 DIAGNOSIS — D64.81 ANTINEOPLASTIC CHEMOTHERAPY INDUCED ANEMIA: ICD-10-CM

## 2018-05-10 DIAGNOSIS — D75.838 REACTIVE THROMBOCYTOSIS: ICD-10-CM

## 2018-05-10 DIAGNOSIS — Z09 CHEMOTHERAPY FOLLOW-UP EXAMINATION: ICD-10-CM

## 2018-05-10 DIAGNOSIS — C54.1 ENDOMETRIAL CANCER: Primary | ICD-10-CM

## 2018-05-10 LAB
ABO + RH BLD: NORMAL
BLD GP AB SCN CELLS X3 SERPL QL: NORMAL
ERYTHROCYTE [DISTWIDTH] IN BLOOD BY AUTOMATED COUNT: 18 %
HCT VFR BLD AUTO: 26.4 %
HGB BLD-MCNC: 7.6 G/DL
MCH RBC QN AUTO: 20.3 PG
MCHC RBC AUTO-ENTMCNC: 28.8 G/DL
MCV RBC AUTO: 70 FL
NEUTROPHILS # BLD AUTO: 8.4 K/UL
PLATELET # BLD AUTO: 556 K/UL
PMV BLD AUTO: 9.8 FL
RBC # BLD AUTO: 3.75 M/UL
RH BLD: NORMAL
WBC # BLD AUTO: 10.26 K/UL

## 2018-05-10 PROCEDURE — 96367 TX/PROPH/DG ADDL SEQ IV INF: CPT

## 2018-05-10 PROCEDURE — 96413 CHEMO IV INFUSION 1 HR: CPT

## 2018-05-10 PROCEDURE — 86901 BLOOD TYPING SEROLOGIC RH(D): CPT

## 2018-05-10 PROCEDURE — 86850 RBC ANTIBODY SCREEN: CPT

## 2018-05-10 PROCEDURE — 99999 PR PBB SHADOW E&M-EST. PATIENT-LVL IV: CPT | Mod: PBBFAC,,, | Performed by: INTERNAL MEDICINE

## 2018-05-10 PROCEDURE — 3077F SYST BP >= 140 MM HG: CPT | Mod: CPTII,S$GLB,, | Performed by: INTERNAL MEDICINE

## 2018-05-10 PROCEDURE — 96415 CHEMO IV INFUSION ADDL HR: CPT

## 2018-05-10 PROCEDURE — S0028 INJECTION, FAMOTIDINE, 20 MG: HCPCS | Performed by: INTERNAL MEDICINE

## 2018-05-10 PROCEDURE — 25000003 PHARM REV CODE 250: Performed by: INTERNAL MEDICINE

## 2018-05-10 PROCEDURE — 86920 COMPATIBILITY TEST SPIN: CPT

## 2018-05-10 PROCEDURE — 96417 CHEMO IV INFUS EACH ADDL SEQ: CPT

## 2018-05-10 PROCEDURE — 63600175 PHARM REV CODE 636 W HCPCS: Mod: JG | Performed by: INTERNAL MEDICINE

## 2018-05-10 PROCEDURE — A4216 STERILE WATER/SALINE, 10 ML: HCPCS | Performed by: INTERNAL MEDICINE

## 2018-05-10 PROCEDURE — 3078F DIAST BP <80 MM HG: CPT | Mod: CPTII,S$GLB,, | Performed by: INTERNAL MEDICINE

## 2018-05-10 PROCEDURE — 99215 OFFICE O/P EST HI 40 MIN: CPT | Mod: S$GLB,,, | Performed by: INTERNAL MEDICINE

## 2018-05-10 PROCEDURE — 3008F BODY MASS INDEX DOCD: CPT | Mod: CPTII,S$GLB,, | Performed by: INTERNAL MEDICINE

## 2018-05-10 PROCEDURE — 85027 COMPLETE CBC AUTOMATED: CPT

## 2018-05-10 RX ORDER — DIPHENHYDRAMINE HYDROCHLORIDE 50 MG/ML
50 INJECTION INTRAMUSCULAR; INTRAVENOUS ONCE AS NEEDED
Status: CANCELLED | OUTPATIENT
Start: 2018-05-10 | End: 2018-05-10

## 2018-05-10 RX ORDER — ACETAMINOPHEN 325 MG/1
650 TABLET ORAL
Status: CANCELLED | OUTPATIENT
Start: 2018-05-10

## 2018-05-10 RX ORDER — HEPARIN 100 UNIT/ML
500 SYRINGE INTRAVENOUS
Status: CANCELLED | OUTPATIENT
Start: 2018-05-19

## 2018-05-10 RX ORDER — HEPARIN 100 UNIT/ML
500 SYRINGE INTRAVENOUS
Status: CANCELLED | OUTPATIENT
Start: 2018-05-10

## 2018-05-10 RX ORDER — FAMOTIDINE 20 MG/50ML
20 INJECTION, SOLUTION INTRAVENOUS
Status: COMPLETED | OUTPATIENT
Start: 2018-05-10 | End: 2018-05-10

## 2018-05-10 RX ORDER — SODIUM CHLORIDE 0.9 % (FLUSH) 0.9 %
10 SYRINGE (ML) INJECTION
Status: CANCELLED | OUTPATIENT
Start: 2018-05-10

## 2018-05-10 RX ORDER — HYDROCODONE BITARTRATE AND ACETAMINOPHEN 500; 5 MG/1; MG/1
TABLET ORAL ONCE
Status: CANCELLED | OUTPATIENT
Start: 2018-05-10 | End: 2018-05-10

## 2018-05-10 RX ORDER — EPINEPHRINE 0.3 MG/.3ML
0.3 INJECTION SUBCUTANEOUS ONCE AS NEEDED
Status: CANCELLED | OUTPATIENT
Start: 2018-05-10 | End: 2018-05-10

## 2018-05-10 RX ORDER — HEPARIN 100 UNIT/ML
500 SYRINGE INTRAVENOUS
Status: DISCONTINUED | OUTPATIENT
Start: 2018-05-10 | End: 2018-05-10 | Stop reason: HOSPADM

## 2018-05-10 RX ORDER — SODIUM CHLORIDE 0.9 % (FLUSH) 0.9 %
10 SYRINGE (ML) INJECTION
Status: DISCONTINUED | OUTPATIENT
Start: 2018-05-10 | End: 2018-05-10 | Stop reason: HOSPADM

## 2018-05-10 RX ORDER — DIPHENHYDRAMINE HCL 25 MG
25 CAPSULE ORAL
Status: CANCELLED | OUTPATIENT
Start: 2018-05-10

## 2018-05-10 RX ORDER — SODIUM CHLORIDE 0.9 % (FLUSH) 0.9 %
10 SYRINGE (ML) INJECTION
Status: CANCELLED | OUTPATIENT
Start: 2018-05-19

## 2018-05-10 RX ORDER — FAMOTIDINE 10 MG/ML
20 INJECTION INTRAVENOUS
Status: DISCONTINUED | OUTPATIENT
Start: 2018-05-10 | End: 2018-05-10

## 2018-05-10 RX ADMIN — PACLITAXEL 402 MG: 6 INJECTION, SOLUTION, CONCENTRATE INTRAVENOUS at 11:05

## 2018-05-10 RX ADMIN — DIPHENHYDRAMINE HYDROCHLORIDE 50 MG: 50 INJECTION INTRAMUSCULAR; INTRAVENOUS at 10:05

## 2018-05-10 RX ADMIN — CARBOPLATIN 875 MG: 10 INJECTION, SOLUTION INTRAVENOUS at 02:05

## 2018-05-10 RX ADMIN — SODIUM CHLORIDE, PRESERVATIVE FREE 10 ML: 5 INJECTION INTRAVENOUS at 03:05

## 2018-05-10 RX ADMIN — FAMOTIDINE 20 MG: 20 INJECTION, SOLUTION INTRAVENOUS at 11:05

## 2018-05-10 RX ADMIN — PALONOSETRON HYDROCHLORIDE: 0.25 INJECTION INTRAVENOUS at 10:05

## 2018-05-10 RX ADMIN — HEPARIN 500 UNITS: 100 SYRINGE at 03:05

## 2018-05-10 NOTE — PROGRESS NOTES
Subjective:       Patient ID: Saba Driver is a 57 y.o. female.    Chief Complaint: Follow-up    HPI   Diagnosis: Stage IVB (inguinal mets) Endometrial CA    Pt transferred  care to  for therapy     HPI ( per Dr. Lala) Referred by Dr. Shakir Alexis for postmenopausal bleeding. I have reviewed Dr. Alexis's notes. P0. On pelvic uterus is enlarged and fixed, unable to perform EMB due to altered anatomy of the cervix. She had been admitted to  for vaginal bleeding and anemia requiring transfusion. Fixed sore R inguinal node. Blind pap returned adenocarcinoma.      Seen in consult with Dr. Lala  3/21/18. Constellation of findings thus far consistent with an advanced gynecologic malignancy. Likely uterine in origin. Inguinal adenopathy would make this Stage IVB. Pap cytology showing adenocarcinoma but need tissue diagnosis. Will plan IR for biopsy of inguinal node. Pelvic disease is surgically unresectable at present. Recommended neoadjuvant chemotherapy based on tissue diagnosis.      IR biopsy 4/4/18  FINAL PATHOLOGIC DIAGNOSIS  RIGHT INGUINAL LYMPH NODE, CT GUIDED BIOPSY WITH ONSITE ADEQUACY (CYTOLOGY AND CELL BLOCK):  - Adenocarcinoma. See note.  Note: The carcinoma is positive for CK7, p53, vimentin and ER (patchy). It is negative for CDX2, CEA, CK20 and WT1. This immunoprofile is compatible with an endometrial origin. All stains have appropriate controls.      S/p cycle 1 of chemo 4/19/2018  Mild taste alterations  Progressive  fatigue  No SOB/CP/lightheadedness  Appetite diminished and wt loss 4 lbs since last visit  Episodes of nausea/vomiting relieved with prescribed antiemetic regimen  Pelvic pain sig improved  No vag bleeding    CBC reveals wbc 9460/mm3 Hb 7.6g/dl Hct 28.4% plt ct 560k     Past Medical History:   Diagnosis Date    Adenocarcinoma 3/24/2018    Diabetes mellitus, type 2     Endometrial cancer 4/14/2018    Glaucoma     Hypertension          Review of Systems   Constitutional: Positive for  "appetite change and fatigue. Negative for fever and unexpected weight change.   HENT: Negative for mouth sores.    Eyes: Negative for visual disturbance.   Respiratory: Negative for cough and shortness of breath.    Cardiovascular: Negative for chest pain.   Gastrointestinal: Negative for abdominal pain and diarrhea.   Genitourinary: Negative for frequency and vaginal bleeding.   Musculoskeletal: Negative for back pain.   Skin: Negative for rash.   Neurological: Negative for headaches.   Hematological: Negative for adenopathy.   Psychiatric/Behavioral: The patient is not nervous/anxious.        Objective:       Vitals:    05/10/18 0815 05/10/18 0817   BP: (!) 144/66 (!) 144/69   BP Location: Left arm Right arm   Patient Position: Sitting Sitting   BP Method: Large (Automatic) Large (Automatic)   Pulse: 83    Temp: 98.5 °F (36.9 °C)    TempSrc: Oral    SpO2: 98%    Weight: 110.3 kg (243 lb 2.7 oz)    Height: 5' 7" (1.702 m)          Physical Exam   Constitutional: She is oriented to person, place, and time. She appears well-developed and well-nourished.   HENT:   Head: Normocephalic.   Mouth/Throat: Oropharynx is clear and moist. No oropharyngeal exudate.   Eyes: Conjunctivae and lids are normal. Pupils are equal, round, and reactive to light. No scleral icterus.   Neck: Normal range of motion. Neck supple. No thyromegaly present.   Cardiovascular: Normal rate, regular rhythm and normal heart sounds.    No murmur heard.  Pulmonary/Chest: Breath sounds normal. She has no wheezes. She has no rales.   Abdominal: Soft. Bowel sounds are normal. She exhibits no distension and no mass. There is no hepatosplenomegaly. There is no tenderness. There is no rebound and no guarding.   Musculoskeletal: Normal range of motion. She exhibits no edema or tenderness.   Lymphadenopathy:     She has no cervical adenopathy.     She has no axillary adenopathy.        Right: No supraclavicular adenopathy present.        Left: No " supraclavicular adenopathy present.   Neurological: She is alert and oriented to person, place, and time. No cranial nerve deficit. Coordination normal.   Skin: Skin is warm and dry. No ecchymosis, no petechiae and no rash noted. No erythema.   Psychiatric: She has a normal mood and affect.         CT a/p 3/23/2018   1. Right groin mass, differential diagnosis includes lymphoma.  2. Large uterus, multiple fibroids.  3. Breast nodules and calcifications discussed above.  4. Additional remote findings above.    Assessment:       1. Endometrial cancer    2. Iron deficiency anemia, unspecified iron deficiency anemia type    3. Chemotherapy follow-up examination    4. Antineoplastic chemotherapy induced anemia    5. Reactive thrombocytosis        Plan:   ECOG 0  1-5 Pt with Stage IVB (inguinal mets) endometrial cancer.   It has been  recommended neoadjuvant systemic chemotherapy with Carbo AUC6/Taxol 175mg/m2 IV [id number].   Will plan for 4 cycles and repeat imaging/clinical exam for consideration of surgical debulking.  S/p cycle 1 of chemo  Proceed with cycle 2 chemo   Hb 7.6 g/dl   Thrombocytosis, reactive, TANYA  Repeat CBC - plan 2uprbc if Hb remains <8g/dl   Type and cross today   Consent for prbc transfusion obtained   Orders for prbc transfusion completed  Plan  Injectafer x 2  Proceed with chemo cycle 2 today     Cbc,cmp, Fe studies  prior to f/u  In 3 wks      45 minutes spent during this visit of which greater than 50% devoted to counseling and coordination of care regarding diagnosis and management plan.    Cc: MD Noelle Hicks MD

## 2018-05-10 NOTE — PLAN OF CARE
Problem: Patient Care Overview  Goal: Plan of Care Review  Outcome: Ongoing (interventions implemented as appropriate)  Patient received Carboplatin and Taxol. Tolerated well. No reactions noted during visit. VSS. Received discharge instructions and verbalized understanding.

## 2018-05-11 ENCOUNTER — INFUSION (OUTPATIENT)
Dept: INFUSION THERAPY | Facility: HOSPITAL | Age: 58
End: 2018-05-11
Attending: INTERNAL MEDICINE
Payer: COMMERCIAL

## 2018-05-11 VITALS
DIASTOLIC BLOOD PRESSURE: 64 MMHG | RESPIRATION RATE: 17 BRPM | TEMPERATURE: 98 F | OXYGEN SATURATION: 100 % | HEART RATE: 87 BPM | SYSTOLIC BLOOD PRESSURE: 142 MMHG

## 2018-05-11 DIAGNOSIS — C54.1 ENDOMETRIAL CANCER: Primary | ICD-10-CM

## 2018-05-11 LAB
BLD PROD TYP BPU: NORMAL
BLD PROD TYP BPU: NORMAL
BLOOD UNIT EXPIRATION DATE: NORMAL
BLOOD UNIT EXPIRATION DATE: NORMAL
BLOOD UNIT TYPE CODE: 600
BLOOD UNIT TYPE CODE: 600
BLOOD UNIT TYPE: NORMAL
BLOOD UNIT TYPE: NORMAL
CODING SYSTEM: NORMAL
CODING SYSTEM: NORMAL
DISPENSE STATUS: NORMAL
DISPENSE STATUS: NORMAL
NUM UNITS TRANS PACKED RBC: NORMAL
NUM UNITS TRANS PACKED RBC: NORMAL

## 2018-05-11 PROCEDURE — P9038 RBC IRRADIATED: HCPCS

## 2018-05-11 PROCEDURE — 36430 TRANSFUSION BLD/BLD COMPNT: CPT

## 2018-05-11 PROCEDURE — 25000003 PHARM REV CODE 250: Performed by: INTERNAL MEDICINE

## 2018-05-11 PROCEDURE — A4216 STERILE WATER/SALINE, 10 ML: HCPCS | Performed by: INTERNAL MEDICINE

## 2018-05-11 PROCEDURE — 27201040 HC RC 50 FILTER

## 2018-05-11 PROCEDURE — 63600175 PHARM REV CODE 636 W HCPCS: Performed by: INTERNAL MEDICINE

## 2018-05-11 RX ORDER — DIPHENHYDRAMINE HCL 25 MG
25 CAPSULE ORAL
Status: COMPLETED | OUTPATIENT
Start: 2018-05-11 | End: 2018-05-11

## 2018-05-11 RX ORDER — SODIUM CHLORIDE 0.9 % (FLUSH) 0.9 %
10 SYRINGE (ML) INJECTION
Status: DISCONTINUED | OUTPATIENT
Start: 2018-05-11 | End: 2018-05-11 | Stop reason: HOSPADM

## 2018-05-11 RX ORDER — ACETAMINOPHEN 325 MG/1
650 TABLET ORAL
Status: COMPLETED | OUTPATIENT
Start: 2018-05-11 | End: 2018-05-11

## 2018-05-11 RX ORDER — HEPARIN 100 UNIT/ML
500 SYRINGE INTRAVENOUS
Status: COMPLETED | OUTPATIENT
Start: 2018-05-11 | End: 2018-05-11

## 2018-05-11 RX ORDER — HYDROCODONE BITARTRATE AND ACETAMINOPHEN 500; 5 MG/1; MG/1
TABLET ORAL
Status: DISCONTINUED | OUTPATIENT
Start: 2018-05-11 | End: 2018-05-11 | Stop reason: HOSPADM

## 2018-05-11 RX ADMIN — HEPARIN 500 UNITS: 100 SYRINGE at 12:05

## 2018-05-11 RX ADMIN — ACETAMINOPHEN 650 MG: 325 TABLET ORAL at 09:05

## 2018-05-11 RX ADMIN — DIPHENHYDRAMINE HYDROCHLORIDE 25 MG: 25 CAPSULE ORAL at 09:05

## 2018-05-11 RX ADMIN — SODIUM CHLORIDE, PRESERVATIVE FREE 10 ML: 5 INJECTION INTRAVENOUS at 12:05

## 2018-05-11 NOTE — PLAN OF CARE
Problem: Patient Care Overview  Goal: Plan of Care Review  Outcome: Ongoing (interventions implemented as appropriate)  Patient received 2 units of pRBCs. Tolerated well. VSS. No reactions noted during visit. Received discharge instructions and verbalized understanding.

## 2018-05-15 DIAGNOSIS — D64.9 ANEMIA: Primary | ICD-10-CM

## 2018-05-18 ENCOUNTER — INFUSION (OUTPATIENT)
Dept: INFUSION THERAPY | Facility: HOSPITAL | Age: 58
End: 2018-05-18
Attending: INTERNAL MEDICINE
Payer: COMMERCIAL

## 2018-05-18 ENCOUNTER — DOCUMENTATION ONLY (OUTPATIENT)
Dept: INFUSION THERAPY | Facility: HOSPITAL | Age: 58
End: 2018-05-18

## 2018-05-18 VITALS
RESPIRATION RATE: 18 BRPM | OXYGEN SATURATION: 100 % | DIASTOLIC BLOOD PRESSURE: 66 MMHG | HEART RATE: 69 BPM | SYSTOLIC BLOOD PRESSURE: 135 MMHG | TEMPERATURE: 98 F

## 2018-05-18 DIAGNOSIS — D50.9 IRON DEFICIENCY ANEMIA, UNSPECIFIED IRON DEFICIENCY ANEMIA TYPE: Primary | ICD-10-CM

## 2018-05-18 PROCEDURE — 96365 THER/PROPH/DIAG IV INF INIT: CPT

## 2018-05-18 PROCEDURE — A4216 STERILE WATER/SALINE, 10 ML: HCPCS | Performed by: INTERNAL MEDICINE

## 2018-05-18 PROCEDURE — 25000003 PHARM REV CODE 250: Performed by: INTERNAL MEDICINE

## 2018-05-18 PROCEDURE — 63600175 PHARM REV CODE 636 W HCPCS: Mod: JG | Performed by: INTERNAL MEDICINE

## 2018-05-18 RX ORDER — HEPARIN 100 UNIT/ML
500 SYRINGE INTRAVENOUS
Status: DISCONTINUED | OUTPATIENT
Start: 2018-05-18 | End: 2018-05-18 | Stop reason: HOSPADM

## 2018-05-18 RX ORDER — SODIUM CHLORIDE 0.9 % (FLUSH) 0.9 %
10 SYRINGE (ML) INJECTION
Status: DISCONTINUED | OUTPATIENT
Start: 2018-05-18 | End: 2018-05-18 | Stop reason: HOSPADM

## 2018-05-18 RX ADMIN — HEPARIN 500 UNITS: 100 SYRINGE at 09:05

## 2018-05-18 RX ADMIN — FERRIC CARBOXYMALTOSE INJECTION 750 MG: 50 INJECTION, SOLUTION INTRAVENOUS at 08:05

## 2018-05-18 RX ADMIN — SODIUM CHLORIDE, PRESERVATIVE FREE 10 ML: 5 INJECTION INTRAVENOUS at 09:05

## 2018-05-18 NOTE — PLAN OF CARE
Problem: Patient Care Overview  Goal: Plan of Care Review  Outcome: Outcome(s) achieved Date Met: 05/18/18  Reviewed the need for iron and possible side effects with patient. Patient received Injectafer infusion. Tolerated well. VSS. No reactions noted during visit. Patient monitored 15 minutes post infusion. Received discharge instructions and verbalized understanding.

## 2018-05-18 NOTE — PROGRESS NOTES
Called patient and left message. Instructed patient to try OTC Melatonin for her insomnia first. If she finds no relief through the weekend call MD office Monday morning.

## 2018-05-25 ENCOUNTER — INFUSION (OUTPATIENT)
Dept: INFUSION THERAPY | Facility: HOSPITAL | Age: 58
End: 2018-05-25
Attending: INTERNAL MEDICINE
Payer: COMMERCIAL

## 2018-05-25 DIAGNOSIS — D50.9 IRON DEFICIENCY ANEMIA, UNSPECIFIED IRON DEFICIENCY ANEMIA TYPE: Primary | ICD-10-CM

## 2018-05-25 PROCEDURE — 96374 THER/PROPH/DIAG INJ IV PUSH: CPT

## 2018-05-25 PROCEDURE — 25000003 PHARM REV CODE 250: Performed by: INTERNAL MEDICINE

## 2018-05-25 PROCEDURE — 63600175 PHARM REV CODE 636 W HCPCS: Mod: JG | Performed by: INTERNAL MEDICINE

## 2018-05-25 PROCEDURE — A4216 STERILE WATER/SALINE, 10 ML: HCPCS | Performed by: INTERNAL MEDICINE

## 2018-05-25 RX ORDER — SODIUM CHLORIDE 0.9 % (FLUSH) 0.9 %
10 SYRINGE (ML) INJECTION
Status: DISCONTINUED | OUTPATIENT
Start: 2018-05-25 | End: 2018-05-25 | Stop reason: HOSPADM

## 2018-05-25 RX ORDER — HEPARIN 100 UNIT/ML
500 SYRINGE INTRAVENOUS
Status: DISCONTINUED | OUTPATIENT
Start: 2018-05-25 | End: 2018-05-25 | Stop reason: HOSPADM

## 2018-05-25 RX ADMIN — SODIUM CHLORIDE, PRESERVATIVE FREE 10 ML: 5 INJECTION INTRAVENOUS at 09:05

## 2018-05-25 RX ADMIN — HEPARIN 500 UNITS: 100 SYRINGE at 09:05

## 2018-05-25 RX ADMIN — FERRIC CARBOXYMALTOSE INJECTION 750 MG: 50 INJECTION, SOLUTION INTRAVENOUS at 08:05

## 2018-05-25 NOTE — PLAN OF CARE
Problem: Patient Care Overview  Goal: Individualization & Mutuality  Outcome: Ongoing (interventions implemented as appropriate)  Tolerated Injectafer 2/2. AVS given to pt.

## 2018-05-31 ENCOUNTER — OFFICE VISIT (OUTPATIENT)
Dept: HEMATOLOGY/ONCOLOGY | Facility: CLINIC | Age: 58
End: 2018-05-31
Payer: COMMERCIAL

## 2018-05-31 ENCOUNTER — INFUSION (OUTPATIENT)
Dept: INFUSION THERAPY | Facility: HOSPITAL | Age: 58
End: 2018-05-31
Attending: INTERNAL MEDICINE
Payer: COMMERCIAL

## 2018-05-31 VITALS
SYSTOLIC BLOOD PRESSURE: 134 MMHG | RESPIRATION RATE: 16 BRPM | DIASTOLIC BLOOD PRESSURE: 60 MMHG | TEMPERATURE: 98 F | HEART RATE: 87 BPM

## 2018-05-31 VITALS
WEIGHT: 238 LBS | SYSTOLIC BLOOD PRESSURE: 153 MMHG | OXYGEN SATURATION: 97 % | TEMPERATURE: 99 F | BODY MASS INDEX: 37.35 KG/M2 | DIASTOLIC BLOOD PRESSURE: 70 MMHG | HEIGHT: 67 IN | HEART RATE: 88 BPM

## 2018-05-31 DIAGNOSIS — D64.81 ANTINEOPLASTIC CHEMOTHERAPY INDUCED ANEMIA: ICD-10-CM

## 2018-05-31 DIAGNOSIS — D49.89 NEOPLASM OF PELVIS: Primary | ICD-10-CM

## 2018-05-31 DIAGNOSIS — Z09 CHEMOTHERAPY FOLLOW-UP EXAMINATION: ICD-10-CM

## 2018-05-31 DIAGNOSIS — C54.1 ENDOMETRIAL CANCER: Primary | ICD-10-CM

## 2018-05-31 DIAGNOSIS — E87.6 HYPOKALEMIA: ICD-10-CM

## 2018-05-31 DIAGNOSIS — T45.1X5A ANTINEOPLASTIC CHEMOTHERAPY INDUCED ANEMIA: ICD-10-CM

## 2018-05-31 PROCEDURE — S0028 INJECTION, FAMOTIDINE, 20 MG: HCPCS | Performed by: INTERNAL MEDICINE

## 2018-05-31 PROCEDURE — 63600175 PHARM REV CODE 636 W HCPCS: Performed by: INTERNAL MEDICINE

## 2018-05-31 PROCEDURE — 99214 OFFICE O/P EST MOD 30 MIN: CPT | Mod: S$GLB,,, | Performed by: INTERNAL MEDICINE

## 2018-05-31 PROCEDURE — 96361 HYDRATE IV INFUSION ADD-ON: CPT

## 2018-05-31 PROCEDURE — 25000003 PHARM REV CODE 250: Performed by: INTERNAL MEDICINE

## 2018-05-31 PROCEDURE — 96367 TX/PROPH/DG ADDL SEQ IV INF: CPT

## 2018-05-31 PROCEDURE — 3008F BODY MASS INDEX DOCD: CPT | Mod: CPTII,S$GLB,, | Performed by: INTERNAL MEDICINE

## 2018-05-31 PROCEDURE — 3074F SYST BP LT 130 MM HG: CPT | Mod: CPTII,S$GLB,, | Performed by: INTERNAL MEDICINE

## 2018-05-31 PROCEDURE — 96413 CHEMO IV INFUSION 1 HR: CPT

## 2018-05-31 PROCEDURE — 3078F DIAST BP <80 MM HG: CPT | Mod: CPTII,S$GLB,, | Performed by: INTERNAL MEDICINE

## 2018-05-31 PROCEDURE — 96417 CHEMO IV INFUS EACH ADDL SEQ: CPT

## 2018-05-31 PROCEDURE — 96415 CHEMO IV INFUSION ADDL HR: CPT

## 2018-05-31 PROCEDURE — 99999 PR PBB SHADOW E&M-EST. PATIENT-LVL III: CPT | Mod: PBBFAC,,, | Performed by: INTERNAL MEDICINE

## 2018-05-31 PROCEDURE — A4216 STERILE WATER/SALINE, 10 ML: HCPCS | Performed by: INTERNAL MEDICINE

## 2018-05-31 RX ORDER — FAMOTIDINE 20 MG/50ML
20 INJECTION, SOLUTION INTRAVENOUS
Status: COMPLETED | OUTPATIENT
Start: 2018-05-31 | End: 2018-05-31

## 2018-05-31 RX ORDER — SODIUM CHLORIDE 0.9 % (FLUSH) 0.9 %
10 SYRINGE (ML) INJECTION
Status: DISCONTINUED | OUTPATIENT
Start: 2018-05-31 | End: 2018-05-31 | Stop reason: HOSPADM

## 2018-05-31 RX ORDER — FAMOTIDINE 10 MG/ML
20 INJECTION INTRAVENOUS
Status: DISCONTINUED | OUTPATIENT
Start: 2018-05-31 | End: 2018-05-31

## 2018-05-31 RX ORDER — DIPHENHYDRAMINE HYDROCHLORIDE 50 MG/ML
50 INJECTION INTRAMUSCULAR; INTRAVENOUS ONCE AS NEEDED
Status: CANCELLED | OUTPATIENT
Start: 2018-05-31 | End: 2018-05-31

## 2018-05-31 RX ORDER — SODIUM CHLORIDE 0.9 % (FLUSH) 0.9 %
10 SYRINGE (ML) INJECTION
Status: CANCELLED | OUTPATIENT
Start: 2018-05-31

## 2018-05-31 RX ORDER — FAMOTIDINE 10 MG/ML
20 INJECTION INTRAVENOUS
Status: CANCELLED | OUTPATIENT
Start: 2018-05-31

## 2018-05-31 RX ORDER — HEPARIN 100 UNIT/ML
500 SYRINGE INTRAVENOUS
Status: DISCONTINUED | OUTPATIENT
Start: 2018-05-31 | End: 2018-05-31 | Stop reason: HOSPADM

## 2018-05-31 RX ORDER — HEPARIN 100 UNIT/ML
500 SYRINGE INTRAVENOUS
Status: CANCELLED | OUTPATIENT
Start: 2018-05-31

## 2018-05-31 RX ORDER — POTASSIUM CHLORIDE 20 MEQ/1
20 TABLET, EXTENDED RELEASE ORAL 2 TIMES DAILY
Qty: 6 TABLET | Refills: 0 | Status: SHIPPED | OUTPATIENT
Start: 2018-05-31 | End: 2018-06-21 | Stop reason: ALTCHOICE

## 2018-05-31 RX ORDER — EPINEPHRINE 0.3 MG/.3ML
0.3 INJECTION SUBCUTANEOUS ONCE AS NEEDED
Status: CANCELLED | OUTPATIENT
Start: 2018-05-31 | End: 2018-05-31

## 2018-05-31 RX ADMIN — CARBOPLATIN 900 MG: 10 INJECTION, SOLUTION INTRAVENOUS at 01:05

## 2018-05-31 RX ADMIN — PALONOSETRON HYDROCHLORIDE: 0.25 INJECTION INTRAVENOUS at 09:05

## 2018-05-31 RX ADMIN — PACLITAXEL 402 MG: 300 INJECTION, SOLUTION INTRAVENOUS at 10:05

## 2018-05-31 RX ADMIN — SODIUM CHLORIDE: 0.9 INJECTION, SOLUTION INTRAVENOUS at 09:05

## 2018-05-31 RX ADMIN — DIPHENHYDRAMINE HYDROCHLORIDE 50 MG: 50 INJECTION INTRAMUSCULAR; INTRAVENOUS at 09:05

## 2018-05-31 RX ADMIN — FAMOTIDINE 20 MG: 20 INJECTION, SOLUTION INTRAVENOUS at 09:05

## 2018-05-31 RX ADMIN — SODIUM CHLORIDE, PRESERVATIVE FREE 10 ML: 5 INJECTION INTRAVENOUS at 02:05

## 2018-05-31 RX ADMIN — HEPARIN 500 UNITS: 100 SYRINGE at 02:05

## 2018-05-31 NOTE — PLAN OF CARE
Problem: Patient Care Overview  Goal: Plan of Care Review  Outcome: Ongoing (interventions implemented as appropriate)  Patient received Taxol and Carboplatin. Tolerated well. VSS. No reactions noted during visit. Received discharge instructions and verbalized understanding.

## 2018-05-31 NOTE — PROGRESS NOTES
Subjective:       Patient ID: Saba Driver is a 57 y.o. female.    Chief Complaint: No chief complaint on file.    HPI   Diagnosis: Stage IVB (inguinal mets) Endometrial CA    Pt transferred  care to  for therapy     HPI ( per Dr. Lala) Referred by Dr. Shakir Alexis for postmenopausal bleeding. I have reviewed Dr. Alexis's notes. P0. On pelvic uterus is enlarged and fixed, unable to perform EMB due to altered anatomy of the cervix. She had been admitted to  for vaginal bleeding and anemia requiring transfusion. Fixed sore R inguinal node. Blind pap returned adenocarcinoma.      Seen in consult with Dr. Lala  3/21/18. Constellation of findings thus far consistent with an advanced gynecologic malignancy. Likely uterine in origin. Inguinal adenopathy would make this Stage IVB. Pap cytology showing adenocarcinoma but need tissue diagnosis. Will plan IR for biopsy of inguinal node. Pelvic disease is surgically unresectable at present. Recommended neoadjuvant chemotherapy based on tissue diagnosis.      IR biopsy 4/4/18  FINAL PATHOLOGIC DIAGNOSIS  RIGHT INGUINAL LYMPH NODE, CT GUIDED BIOPSY WITH ONSITE ADEQUACY (CYTOLOGY AND CELL BLOCK):  - Adenocarcinoma. See note.  Note: The carcinoma is positive for CK7, p53, vimentin and ER (patchy). It is negative for CDX2, CEA, CK20 and WT1. This immunoprofile is compatible with an endometrial origin. All stains have appropriate controls.    S/p cycle 2 of chemo 5/10/2018    She is doing well  No new issues  Mild taste alterations  Mild fatigue  No SOB/CP  No ightheadedness  Episodes of nausea/vomiting relieved with prescribed antiemetic regimen  No pain   No vag bleeding    CBC reveals wbc 6.86  /mm3 Hb 10.5 g/dl Hct 36.2 % plt ct 259k     Past Medical History:   Diagnosis Date    Adenocarcinoma 3/24/2018    Diabetes mellitus, type 2     Endometrial cancer 4/14/2018    Glaucoma     Hypertension          Review of Systems   Constitutional: Positive for appetite change and  "fatigue. Negative for fever and unexpected weight change.   HENT: Negative for mouth sores.    Eyes: Negative for visual disturbance.   Respiratory: Negative for cough and shortness of breath.    Cardiovascular: Negative for chest pain.   Gastrointestinal: Negative for abdominal pain and diarrhea.   Genitourinary: Negative for frequency and vaginal bleeding.   Musculoskeletal: Negative for back pain.   Skin: Negative for rash.   Neurological: Negative for headaches.   Hematological: Negative for adenopathy.   Psychiatric/Behavioral: The patient is not nervous/anxious.        Objective:       Vitals:    05/31/18 0819 05/31/18 0821   BP: (!) 146/65 (!) 153/70   BP Location: Left arm Right arm   Patient Position: Sitting Sitting   BP Method: Large (Automatic) Large (Automatic)   Pulse: 88    Temp: 99.1 °F (37.3 °C)    TempSrc: Oral    SpO2: 97%    Weight: 108 kg (237 lb 15.8 oz)    Height: 5' 7" (1.702 m)          Physical Exam   Constitutional: She is oriented to person, place, and time. She appears well-developed and well-nourished.   HENT:   Head: Normocephalic.   Mouth/Throat: Oropharynx is clear and moist. No oropharyngeal exudate.   Eyes: Conjunctivae and lids are normal. Pupils are equal, round, and reactive to light. No scleral icterus.   Neck: Normal range of motion. Neck supple. No thyromegaly present.   Cardiovascular: Normal rate, regular rhythm and normal heart sounds.    No murmur heard.  Pulmonary/Chest: Breath sounds normal. She has no wheezes. She has no rales.   Abdominal: Soft. Bowel sounds are normal. She exhibits no distension and no mass. There is no hepatosplenomegaly. There is no tenderness. There is no rebound and no guarding.   Musculoskeletal: Normal range of motion. She exhibits no edema or tenderness.   Lymphadenopathy:     She has no cervical adenopathy.     She has no axillary adenopathy.        Right: No supraclavicular adenopathy present.        Left: No supraclavicular adenopathy " present.   Neurological: She is alert and oriented to person, place, and time. No cranial nerve deficit. Coordination normal.   Skin: Skin is warm and dry. No ecchymosis, no petechiae and no rash noted. No erythema.   Psychiatric: She has a normal mood and affect.         CT a/p 3/23/2018   1. Right groin mass, differential diagnosis includes lymphoma.  2. Large uterus, multiple fibroids.  3. Breast nodules and calcifications discussed above.  4. Additional remote findings above.    Assessment:       1. Endometrial cancer    2. Chemotherapy follow-up examination    3. Antineoplastic chemotherapy induced anemia    4. Hypokalemia        Plan:   ECOG 0  1-4 Pt with Stage IVB (inguinal mets) endometrial cancer.   It has been  recommended neoadjuvant systemic chemotherapy with Carbo AUC6/Taxol 175mg/m2 IV [id number]. [id number]  Will plan for 4 cycles and repeat imaging/clinical exam for consideration of surgical debulking.  S/p cycle 2 of chemo  Proceed with cycle 3 chemo   Hb 7.6 g/dl to 10.5g/dl s/p 2uprbc transfusion  S/p  Injectafer x 2  Proceed with chemo cycle 3 today   Rx for K supp provided to pt     Cbc,cmp, Fe studies ,Mag prior to f/u  In 3 wks    Cc: MD Noelle Hicks MD

## 2018-06-19 ENCOUNTER — LAB VISIT (OUTPATIENT)
Dept: LAB | Facility: HOSPITAL | Age: 58
End: 2018-06-19
Attending: INTERNAL MEDICINE
Payer: COMMERCIAL

## 2018-06-19 DIAGNOSIS — E87.6 HYPOKALEMIA: ICD-10-CM

## 2018-06-19 DIAGNOSIS — C54.1 ENDOMETRIAL CANCER: ICD-10-CM

## 2018-06-19 LAB
ALBUMIN SERPL BCP-MCNC: 3.4 G/DL
ALP SERPL-CCNC: 82 U/L
ALT SERPL W/O P-5'-P-CCNC: 17 U/L
ANION GAP SERPL CALC-SCNC: 7 MMOL/L
AST SERPL-CCNC: 16 U/L
BASOPHILS # BLD AUTO: 0.04 K/UL
BASOPHILS NFR BLD: 0.7 %
BILIRUB SERPL-MCNC: 0.3 MG/DL
BUN SERPL-MCNC: 12 MG/DL
CALCIUM SERPL-MCNC: 8.4 MG/DL
CHLORIDE SERPL-SCNC: 98 MMOL/L
CO2 SERPL-SCNC: 30 MMOL/L
CREAT SERPL-MCNC: 0.8 MG/DL
DIFFERENTIAL METHOD: ABNORMAL
EOSINOPHIL # BLD AUTO: 0.1 K/UL
EOSINOPHIL NFR BLD: 1.9 %
ERYTHROCYTE [DISTWIDTH] IN BLOOD BY AUTOMATED COUNT: 23.9 %
EST. GFR  (AFRICAN AMERICAN): >60 ML/MIN/1.73 M^2
EST. GFR  (NON AFRICAN AMERICAN): >60 ML/MIN/1.73 M^2
GLUCOSE SERPL-MCNC: 172 MG/DL
HCT VFR BLD AUTO: 35.4 %
HGB BLD-MCNC: 10.5 G/DL
IMM GRANULOCYTES # BLD AUTO: 0.04 K/UL
IMM GRANULOCYTES NFR BLD AUTO: 0.7 %
LYMPHOCYTES # BLD AUTO: 2.7 K/UL
LYMPHOCYTES NFR BLD: 47.5 %
MAGNESIUM SERPL-MCNC: 1.2 MG/DL
MCH RBC QN AUTO: 24 PG
MCHC RBC AUTO-ENTMCNC: 29.7 G/DL
MCV RBC AUTO: 81 FL
MONOCYTES # BLD AUTO: 0.6 K/UL
MONOCYTES NFR BLD: 10.2 %
NEUTROPHILS # BLD AUTO: 2.2 K/UL
NEUTROPHILS NFR BLD: 39 %
NRBC BLD-RTO: 0 /100 WBC
PLATELET # BLD AUTO: 213 K/UL
PMV BLD AUTO: 10.2 FL
POTASSIUM SERPL-SCNC: 2.8 MMOL/L
PROT SERPL-MCNC: 6.4 G/DL
RBC # BLD AUTO: 4.37 M/UL
SODIUM SERPL-SCNC: 135 MMOL/L
WBC # BLD AUTO: 5.71 K/UL

## 2018-06-19 PROCEDURE — 36415 COLL VENOUS BLD VENIPUNCTURE: CPT | Mod: PO

## 2018-06-19 PROCEDURE — 83735 ASSAY OF MAGNESIUM: CPT

## 2018-06-19 PROCEDURE — 85025 COMPLETE CBC W/AUTO DIFF WBC: CPT

## 2018-06-19 PROCEDURE — 80053 COMPREHEN METABOLIC PANEL: CPT

## 2018-06-21 ENCOUNTER — INFUSION (OUTPATIENT)
Dept: INFUSION THERAPY | Facility: HOSPITAL | Age: 58
End: 2018-06-21
Attending: INTERNAL MEDICINE
Payer: COMMERCIAL

## 2018-06-21 ENCOUNTER — TELEPHONE (OUTPATIENT)
Dept: GYNECOLOGIC ONCOLOGY | Facility: CLINIC | Age: 58
End: 2018-06-21

## 2018-06-21 ENCOUNTER — OFFICE VISIT (OUTPATIENT)
Dept: HEMATOLOGY/ONCOLOGY | Facility: CLINIC | Age: 58
End: 2018-06-21
Payer: COMMERCIAL

## 2018-06-21 VITALS
OXYGEN SATURATION: 100 % | TEMPERATURE: 98 F | SYSTOLIC BLOOD PRESSURE: 129 MMHG | DIASTOLIC BLOOD PRESSURE: 61 MMHG | HEART RATE: 79 BPM | RESPIRATION RATE: 16 BRPM

## 2018-06-21 VITALS
SYSTOLIC BLOOD PRESSURE: 131 MMHG | HEART RATE: 80 BPM | DIASTOLIC BLOOD PRESSURE: 67 MMHG | OXYGEN SATURATION: 99 % | HEIGHT: 67 IN | TEMPERATURE: 99 F | BODY MASS INDEX: 37.37 KG/M2 | WEIGHT: 238.13 LBS

## 2018-06-21 DIAGNOSIS — C54.1 ENDOMETRIAL CANCER: Primary | ICD-10-CM

## 2018-06-21 DIAGNOSIS — T45.1X5A ANTINEOPLASTIC CHEMOTHERAPY INDUCED ANEMIA: ICD-10-CM

## 2018-06-21 DIAGNOSIS — E87.6 HYPOKALEMIA: ICD-10-CM

## 2018-06-21 DIAGNOSIS — D64.81 ANTINEOPLASTIC CHEMOTHERAPY INDUCED ANEMIA: ICD-10-CM

## 2018-06-21 DIAGNOSIS — D49.89 NEOPLASM OF PELVIS: Primary | ICD-10-CM

## 2018-06-21 DIAGNOSIS — Z09 CHEMOTHERAPY FOLLOW-UP EXAMINATION: ICD-10-CM

## 2018-06-21 PROCEDURE — 96367 TX/PROPH/DG ADDL SEQ IV INF: CPT

## 2018-06-21 PROCEDURE — 3078F DIAST BP <80 MM HG: CPT | Mod: CPTII,S$GLB,, | Performed by: INTERNAL MEDICINE

## 2018-06-21 PROCEDURE — 96415 CHEMO IV INFUSION ADDL HR: CPT

## 2018-06-21 PROCEDURE — 63600175 PHARM REV CODE 636 W HCPCS: Performed by: INTERNAL MEDICINE

## 2018-06-21 PROCEDURE — 99999 PR PBB SHADOW E&M-EST. PATIENT-LVL IV: CPT | Mod: PBBFAC,,, | Performed by: INTERNAL MEDICINE

## 2018-06-21 PROCEDURE — 3008F BODY MASS INDEX DOCD: CPT | Mod: CPTII,S$GLB,, | Performed by: INTERNAL MEDICINE

## 2018-06-21 PROCEDURE — S0028 INJECTION, FAMOTIDINE, 20 MG: HCPCS | Performed by: INTERNAL MEDICINE

## 2018-06-21 PROCEDURE — 3075F SYST BP GE 130 - 139MM HG: CPT | Mod: CPTII,S$GLB,, | Performed by: INTERNAL MEDICINE

## 2018-06-21 PROCEDURE — 96413 CHEMO IV INFUSION 1 HR: CPT

## 2018-06-21 PROCEDURE — A4216 STERILE WATER/SALINE, 10 ML: HCPCS | Performed by: INTERNAL MEDICINE

## 2018-06-21 PROCEDURE — 99214 OFFICE O/P EST MOD 30 MIN: CPT | Mod: S$GLB,,, | Performed by: INTERNAL MEDICINE

## 2018-06-21 PROCEDURE — 25000003 PHARM REV CODE 250: Performed by: INTERNAL MEDICINE

## 2018-06-21 PROCEDURE — 96417 CHEMO IV INFUS EACH ADDL SEQ: CPT

## 2018-06-21 PROCEDURE — 96366 THER/PROPH/DIAG IV INF ADDON: CPT

## 2018-06-21 RX ORDER — EPINEPHRINE 0.3 MG/.3ML
0.3 INJECTION SUBCUTANEOUS ONCE AS NEEDED
Status: CANCELLED | OUTPATIENT
Start: 2018-06-21 | End: 2018-06-21

## 2018-06-21 RX ORDER — SODIUM CHLORIDE 0.9 % (FLUSH) 0.9 %
10 SYRINGE (ML) INJECTION
Status: CANCELLED | OUTPATIENT
Start: 2018-06-21

## 2018-06-21 RX ORDER — HEPARIN 100 UNIT/ML
500 SYRINGE INTRAVENOUS
Status: DISCONTINUED | OUTPATIENT
Start: 2018-06-21 | End: 2018-06-21 | Stop reason: HOSPADM

## 2018-06-21 RX ORDER — HEPARIN 100 UNIT/ML
500 SYRINGE INTRAVENOUS
Status: CANCELLED | OUTPATIENT
Start: 2018-06-21

## 2018-06-21 RX ORDER — SODIUM CHLORIDE 0.9 % (FLUSH) 0.9 %
10 SYRINGE (ML) INJECTION
Status: DISCONTINUED | OUTPATIENT
Start: 2018-06-21 | End: 2018-06-21 | Stop reason: HOSPADM

## 2018-06-21 RX ORDER — DIPHENHYDRAMINE HYDROCHLORIDE 50 MG/ML
50 INJECTION INTRAMUSCULAR; INTRAVENOUS ONCE AS NEEDED
Status: CANCELLED | OUTPATIENT
Start: 2018-06-21 | End: 2018-06-21

## 2018-06-21 RX ORDER — POTASSIUM CHLORIDE 14.9 MG/ML
20 INJECTION INTRAVENOUS ONCE
Status: COMPLETED | OUTPATIENT
Start: 2018-06-21 | End: 2018-06-21

## 2018-06-21 RX ORDER — FAMOTIDINE 10 MG/ML
20 INJECTION INTRAVENOUS
Status: CANCELLED | OUTPATIENT
Start: 2018-06-21

## 2018-06-21 RX ORDER — FAMOTIDINE 10 MG/ML
20 INJECTION INTRAVENOUS
Status: DISCONTINUED | OUTPATIENT
Start: 2018-06-21 | End: 2018-06-21

## 2018-06-21 RX ORDER — POTASSIUM CHLORIDE 20 MEQ/1
20 TABLET, EXTENDED RELEASE ORAL 2 TIMES DAILY
Qty: 10 TABLET | Refills: 0 | Status: SHIPPED | OUTPATIENT
Start: 2018-06-21 | End: 2018-07-30 | Stop reason: CLARIF

## 2018-06-21 RX ORDER — FAMOTIDINE 20 MG/50ML
20 INJECTION, SOLUTION INTRAVENOUS
Status: COMPLETED | OUTPATIENT
Start: 2018-06-21 | End: 2018-06-21

## 2018-06-21 RX ADMIN — CARBOPLATIN 900 MG: 10 INJECTION, SOLUTION INTRAVENOUS at 01:06

## 2018-06-21 RX ADMIN — DIPHENHYDRAMINE HYDROCHLORIDE 50 MG: 50 INJECTION INTRAMUSCULAR; INTRAVENOUS at 09:06

## 2018-06-21 RX ADMIN — FAMOTIDINE 20 MG: 20 INJECTION, SOLUTION INTRAVENOUS at 10:06

## 2018-06-21 RX ADMIN — HEPARIN SODIUM (PORCINE) LOCK FLUSH IV SOLN 100 UNIT/ML 500 UNITS: 100 SOLUTION at 04:06

## 2018-06-21 RX ADMIN — PACLITAXEL 402 MG: 300 INJECTION, SOLUTION INTRAVENOUS at 10:06

## 2018-06-21 RX ADMIN — SODIUM CHLORIDE: 0.9 INJECTION, SOLUTION INTRAVENOUS at 01:06

## 2018-06-21 RX ADMIN — PALONOSETRON HYDROCHLORIDE: 0.25 INJECTION INTRAVENOUS at 09:06

## 2018-06-21 RX ADMIN — POTASSIUM CHLORIDE 20 MEQ: 14.9 INJECTION, SOLUTION INTRAVENOUS at 02:06

## 2018-06-21 RX ADMIN — SODIUM CHLORIDE, PRESERVATIVE FREE 10 ML: 5 INJECTION INTRAVENOUS at 04:06

## 2018-06-21 NOTE — Clinical Note
Chemo today KCL 20meq IV today or tomorrow CT imaging next week Schedule follow-up with Dr. Noelle GARCIA following CT next week ( estalbished pt of dr. nanda garcia)  Cbc,cmp prior to f/u

## 2018-06-21 NOTE — PLAN OF CARE
Problem: Patient Care Overview  Goal: Plan of Care Review  Outcome: Ongoing (interventions implemented as appropriate)  Tolerated Carbo and Taxol. No reactions noted. VSS. Tolerated 20 KCL. Pt also has script for oral K. Next appts given. Pt discharged.

## 2018-06-21 NOTE — PROGRESS NOTES
Subjective:       Patient ID: Saba Driver is a 57 y.o. female.    Chief Complaint: Follow-up    HPI   Diagnosis: Stage IVB (inguinal mets) Endometrial CA    Pt transferred  care to  for therapy     HPI ( per Dr. Lala) Referred by Dr. Shakir Alexis for postmenopausal bleeding. I have reviewed Dr. Alexis's notes. P0. On pelvic uterus is enlarged and fixed, unable to perform EMB due to altered anatomy of the cervix. She had been admitted to  for vaginal bleeding and anemia requiring transfusion. Fixed sore R inguinal node. Blind pap returned adenocarcinoma.      Seen in consult with Dr. Lala  3/21/18. Constellation of findings thus far consistent with an advanced gynecologic malignancy. Likely uterine in origin. Inguinal adenopathy would make this Stage IVB. Pap cytology showing adenocarcinoma but need tissue diagnosis. Will plan IR for biopsy of inguinal node. Pelvic disease is surgically unresectable at present. Recommended neoadjuvant chemotherapy based on tissue diagnosis.      IR biopsy 4/4/18  FINAL PATHOLOGIC DIAGNOSIS  RIGHT INGUINAL LYMPH NODE, CT GUIDED BIOPSY WITH ONSITE ADEQUACY (CYTOLOGY AND CELL BLOCK):  - Adenocarcinoma. See note.  Note: The carcinoma is positive for CK7, p53, vimentin and ER (patchy). It is negative for CDX2, CEA, CK20 and WT1. This immunoprofile is compatible with an endometrial origin. All stains have appropriate controls.    S/p cycle 3 of chemo completed 5/21/2018      She is doing well  No new issues  Mild fatigue  No SOB/CP  No N/V  No pain   She reports occasional vag discharge    CBC reveals wbc   5710 /mm3 Hb 10.5 g/dl Hct 35.4 % plt ct 213k     Past Medical History:   Diagnosis Date    Adenocarcinoma 3/24/2018    Diabetes mellitus, type 2     Endometrial cancer 4/14/2018    Glaucoma     Hypertension          Review of Systems   Constitutional: Positive for appetite change and fatigue. Negative for fever and unexpected weight change.   HENT: Negative for mouth sores.   "  Eyes: Negative for visual disturbance.   Respiratory: Negative for cough and shortness of breath.    Cardiovascular: Negative for chest pain.   Gastrointestinal: Negative for abdominal pain and diarrhea.   Genitourinary: Negative for frequency and vaginal bleeding.   Musculoskeletal: Negative for back pain.   Skin: Negative for rash.   Neurological: Negative for headaches.   Hematological: Negative for adenopathy.   Psychiatric/Behavioral: The patient is not nervous/anxious.        Objective:       Vitals:    06/21/18 0808   BP: 131/67   BP Location: Right arm   Patient Position: Sitting   BP Method: Large (Automatic)   Pulse: 80   Temp: 98.5 °F (36.9 °C)   TempSrc: Oral   SpO2: 99%   Weight: 108 kg (238 lb 1.6 oz)   Height: 5' 7" (1.702 m)         Physical Exam   Constitutional: She is oriented to person, place, and time. She appears well-developed and well-nourished.   HENT:   Head: Normocephalic.   Mouth/Throat: Oropharynx is clear and moist. No oropharyngeal exudate.   Eyes: Conjunctivae and lids are normal. Pupils are equal, round, and reactive to light. No scleral icterus.   Neck: Normal range of motion. Neck supple. No thyromegaly present.   Cardiovascular: Normal rate, regular rhythm and normal heart sounds.    No murmur heard.  Pulmonary/Chest: Breath sounds normal. She has no wheezes. She has no rales.   Abdominal: Soft. Bowel sounds are normal. She exhibits no distension and no mass. There is no hepatosplenomegaly. There is no tenderness. There is no rebound and no guarding.   Musculoskeletal: Normal range of motion. She exhibits no edema or tenderness.   Lymphadenopathy:     She has no cervical adenopathy.     She has no axillary adenopathy.        Right: No supraclavicular adenopathy present.        Left: No supraclavicular adenopathy present.   Neurological: She is alert and oriented to person, place, and time. No cranial nerve deficit. Coordination normal.   Skin: Skin is warm and dry. No " ecchymosis, no petechiae and no rash noted. No erythema.   Psychiatric: She has a normal mood and affect.         CT a/p 3/23/2018   1. Right groin mass, differential diagnosis includes lymphoma.  2. Large uterus, multiple fibroids.  3. Breast nodules and calcifications discussed above.  4. Additional remote findings above.    Assessment:       1. Endometrial cancer    2. Chemotherapy follow-up examination    3. Hypokalemia    4. Antineoplastic chemotherapy induced anemia        Plan:   ECOG 0  1-4 Pt with Stage IVB (inguinal mets) endometrial cancer.   It has been  recommended neoadjuvant systemic chemotherapy with Carbo AUC6/Taxol 175mg/m2 IV [id number]. [id number]  Will plan for 4 cycles and repeat imaging/clinical exam for consideration of surgical debulking.  S/p cycle 3 of chemo  Proceed with cycle 4 of neoadjuvant chemo   Rx for K supp provided to pt   Plan KCL IV 20meq  Plan CT imaging following chemo   Follow-up with Dr. GARCIA following imaging studies     Follow-up in 1mo    Cc: MD Noelle Hicks MD

## 2018-06-25 ENCOUNTER — HOSPITAL ENCOUNTER (OUTPATIENT)
Dept: RADIOLOGY | Facility: HOSPITAL | Age: 58
Discharge: HOME | End: 2018-06-25
Attending: INTERNAL MEDICINE
Payer: COMMERCIAL

## 2018-06-25 DIAGNOSIS — C54.1 ENDOMETRIAL CANCER: ICD-10-CM

## 2018-06-25 PROCEDURE — 74177 CT ABD & PELVIS W/CONTRAST: CPT | Mod: TC

## 2018-06-25 PROCEDURE — 25500020 PHARM REV CODE 255: Performed by: INTERNAL MEDICINE

## 2018-06-25 PROCEDURE — 71260 CT THORAX DX C+: CPT | Mod: 26,,, | Performed by: RADIOLOGY

## 2018-06-25 PROCEDURE — 74177 CT ABD & PELVIS W/CONTRAST: CPT | Mod: 26,,, | Performed by: RADIOLOGY

## 2018-06-25 RX ADMIN — IOHEXOL 100 ML: 350 INJECTION, SOLUTION INTRAVENOUS at 01:06

## 2018-06-25 RX ADMIN — IOHEXOL 15 ML: 300 INJECTION, SOLUTION INTRAVENOUS at 01:06

## 2018-06-26 ENCOUNTER — TELEPHONE (OUTPATIENT)
Dept: GYNECOLOGIC ONCOLOGY | Facility: CLINIC | Age: 58
End: 2018-06-26

## 2018-06-26 ENCOUNTER — OFFICE VISIT (OUTPATIENT)
Dept: GYNECOLOGIC ONCOLOGY | Facility: CLINIC | Age: 58
End: 2018-06-26
Payer: COMMERCIAL

## 2018-06-26 VITALS
SYSTOLIC BLOOD PRESSURE: 127 MMHG | WEIGHT: 227.31 LBS | HEIGHT: 67 IN | DIASTOLIC BLOOD PRESSURE: 63 MMHG | BODY MASS INDEX: 35.68 KG/M2 | HEART RATE: 76 BPM

## 2018-06-26 DIAGNOSIS — C54.1 ENDOMETRIAL CANCER: Primary | ICD-10-CM

## 2018-06-26 PROCEDURE — 99999 PR PBB SHADOW E&M-EST. PATIENT-LVL III: CPT | Mod: PBBFAC,,, | Performed by: OBSTETRICS & GYNECOLOGY

## 2018-06-26 PROCEDURE — 3078F DIAST BP <80 MM HG: CPT | Mod: CPTII,S$GLB,, | Performed by: OBSTETRICS & GYNECOLOGY

## 2018-06-26 PROCEDURE — 3008F BODY MASS INDEX DOCD: CPT | Mod: CPTII,S$GLB,, | Performed by: OBSTETRICS & GYNECOLOGY

## 2018-06-26 PROCEDURE — 3074F SYST BP LT 130 MM HG: CPT | Mod: CPTII,S$GLB,, | Performed by: OBSTETRICS & GYNECOLOGY

## 2018-06-26 PROCEDURE — 99214 OFFICE O/P EST MOD 30 MIN: CPT | Mod: S$GLB,,, | Performed by: OBSTETRICS & GYNECOLOGY

## 2018-06-26 NOTE — LETTER
June 30, 2018        Kate Boyd MD  [address]  [address]  [facility] [address]             [facility] Gynecologic On[facility]  [address]  [facility] [address]  Phone: [phone]  Fax: [phone]   Patient: Saba Driver   MR Number: [id number]   YOB: 1960   Date of Visit: 6/26/2018       Dear Dr. Boyd:    Thank you for referring Saba Driver to me for evaluation. Below are the relevant portions of my assessment and plan of care.            If you have questions, please do not hesitate to call me. I look forward to following Saba along with you.    Sincerely,      Noelle Lala MD           CC  No Recipients

## 2018-06-30 RX ORDER — SODIUM CHLORIDE 9 MG/ML
INJECTION, SOLUTION INTRAVENOUS CONTINUOUS
Status: CANCELLED | OUTPATIENT
Start: 2018-06-30

## 2018-06-30 RX ORDER — LIDOCAINE HYDROCHLORIDE 10 MG/ML
1 INJECTION, SOLUTION EPIDURAL; INFILTRATION; INTRACAUDAL; PERINEURAL ONCE
Status: CANCELLED | OUTPATIENT
Start: 2018-06-30 | End: 2018-06-30

## 2018-06-30 NOTE — PROGRESS NOTES
Subjective:      Patient ID: Saba Driver is a 57 y.o. female.    Chief Complaint: Follow-up (CT results)      HPI  Presents today for follow up. S/p 3 cycles neoadjuvant chemotherapy carbo/taxol with Dr. Boyd.     CT CAP 6/25/18  Impression   Significant decrease in size in the right inguinal node.  Lobulated enlarged uterus.  RECIST SUMMARY:  Date of prior examination for comparison: 03/22/2018  Lesion 1: Right inguinal node  1.4 cm(smallest diameter ).  Series 2 image 118.  Prior measurement 4.0 cm.     Oncologic history:  Referred by Dr. Shakir Alexis for postmenopausal bleeding. P0. On pelvic uterus is enlarged and fixed, unable to perform EMB due to altered anatomy of the cervix. She had been admitted to  for vaginal bleeding and anemia requiring transfusion. Fixed sore R inguinal node. Blind pap returned adenocarcinoma.      Pelvic US 3/12/18  UT enlarged 13.3cm with 5.1cm fibroid anterior. Limited visualization of EMS.  LOV 2.9cm  ROV 3.6cm     Medical co-morbidities include DM, HTN. No anticoagulation.      No prior abdominal surgeries     Family history significant for mom with pancreatic cancer, denies history of breast, ovarian, uterine, or colon cancer.      MMG last year normal per patient.      Seen in consult with me 3/21/18. Constellation of findings thus far consistent with an advanced gynecologic malignancy. Likely uterine in origin. Inguinal adenopathy would make this Stage IVB. Pap cytology showing adenocarcinoma but need tissue diagnosis. Will plan IR for biopsy of inguinal node. Pelvic disease is surgically unresectable at present. Recommended neoadjuvant chemotherapy based on tissue diagnosis.      IR biopsy 4/4/18  FINAL PATHOLOGIC DIAGNOSIS  RIGHT INGUINAL LYMPH NODE, CT GUIDED BIOPSY WITH ONSITE ADEQUACY (CYTOLOGY AND CELL BLOCK):  - Adenocarcinoma. See note.  Note: The carcinoma is positive for CK7, p53, vimentin and ER (patchy). It is negative for CDX2, CEA, CK20 and WT1. This  immunoprofile is compatible with an endometrial origin. All stains have appropriate controls.     Initiate neoadjuvant chemotherapy with Dr. Boyd. Carbo/taxol 4/19/18. IV port.   Review of Systems   Constitutional: Negative for appetite change, chills, fatigue and fever.   HENT: Negative for mouth sores.    Respiratory: Negative for cough and shortness of breath.    Cardiovascular: Negative for leg swelling.   Gastrointestinal: Negative for abdominal pain, blood in stool, constipation and diarrhea.   Endocrine: Negative for cold intolerance.   Genitourinary: Negative for dysuria and vaginal bleeding.   Musculoskeletal: Negative for myalgias.   Skin: Negative for rash.   Allergic/Immunologic: Negative.    Neurological: Negative for weakness and numbness.   Hematological: Negative for adenopathy. Does not bruise/bleed easily.   Psychiatric/Behavioral: Negative for confusion.       Objective:   Physical Exam:   Constitutional: She is oriented to person, place, and time. She appears well-developed and well-nourished.    HENT:   Head: Normocephalic and atraumatic.    Eyes: EOM are normal. Pupils are equal, round, and reactive to light.    Neck: Normal range of motion. Neck supple. No thyromegaly present.    Cardiovascular: Normal rate, regular rhythm and intact distal pulses.     Pulmonary/Chest: Effort normal and breath sounds normal. No respiratory distress. She has no wheezes.        Abdominal: Soft. Bowel sounds are normal. She exhibits no distension, no ascites and no mass. There is no tenderness.     Genitourinary: Rectum normal and vagina normal. Pelvic exam was performed with patient supine. There is no lesion on the right labia. There is no lesion on the left labia.   Genitourinary Comments: Unable to discretely palpable previously palpable enlarged right inguinal lymph node. Uterus enlarged and lobulated however more mobility and feels surgically resectable.            Musculoskeletal: Normal range of motion  and moves all extremeties.      Lymphadenopathy:     She has no cervical adenopathy.        Right: No inguinal and no supraclavicular adenopathy present.        Left: No inguinal and no supraclavicular adenopathy present.    Neurological: She is alert and oriented to person, place, and time.    Skin: Skin is warm and dry. No rash noted.    Psychiatric: She has a normal mood and affect.       Assessment:     1. Endometrial cancer        Plan:   No orders of the defined types were placed in this encounter.    Stage IV endometrial cancer. S/p 3 cycles neoadjuvant carbo/taxol with good response to therapy. Decrease in inguinal lymph node and uterus feels more mobile. I have recommended interval surgical debulking. Will need to wait for a few weeks from last chemotherapy dose. Plan [facility] xlap/BRIAN/BSO/tumor debulking/right inguinal lymph node resection. She desires to proceed. The risks, benefits, and indications of the procedure were discussed with the patient and her family members if present.  These included bleeding, transfusion, infection, damage to surrounding tissues (bowel, bladder, ureter), wound separation, perioperative cardiac events, VTE, pneumonia, and possible death.  She voiced understanding, all questions were answered and consents were signed.  1. xlap/BRIAN/BSO/tumor debulking/right inguinal lymph node dissection 8/2/18 [facility]  2. Pre op anesthesia

## 2018-07-17 ENCOUNTER — INFUSION (OUTPATIENT)
Dept: INFUSION THERAPY | Facility: HOSPITAL | Age: 58
End: 2018-07-17
Attending: INTERNAL MEDICINE
Payer: COMMERCIAL

## 2018-07-17 DIAGNOSIS — C54.1 ENDOMETRIAL CANCER: Primary | ICD-10-CM

## 2018-07-17 PROCEDURE — A4216 STERILE WATER/SALINE, 10 ML: HCPCS | Performed by: INTERNAL MEDICINE

## 2018-07-17 PROCEDURE — 63600175 PHARM REV CODE 636 W HCPCS: Performed by: INTERNAL MEDICINE

## 2018-07-17 PROCEDURE — 25000003 PHARM REV CODE 250: Performed by: INTERNAL MEDICINE

## 2018-07-17 PROCEDURE — 96523 IRRIG DRUG DELIVERY DEVICE: CPT

## 2018-07-17 RX ORDER — SODIUM CHLORIDE 0.9 % (FLUSH) 0.9 %
10 SYRINGE (ML) INJECTION
Status: DISCONTINUED | OUTPATIENT
Start: 2018-07-17 | End: 2018-07-17 | Stop reason: HOSPADM

## 2018-07-17 RX ORDER — HEPARIN 100 UNIT/ML
500 SYRINGE INTRAVENOUS
Status: COMPLETED | OUTPATIENT
Start: 2018-07-17 | End: 2018-07-17

## 2018-07-17 RX ADMIN — HEPARIN 500 UNITS: 100 SYRINGE at 11:07

## 2018-07-17 RX ADMIN — SODIUM CHLORIDE, PRESERVATIVE FREE 10 ML: 5 INJECTION INTRAVENOUS at 11:07

## 2018-07-17 NOTE — PLAN OF CARE
Problem: Patient Care Overview  Goal: Plan of Care Review  Outcome: Ongoing (interventions implemented as appropriate)  Patient tolerated monthly portacath flush.

## 2018-07-23 ENCOUNTER — ANESTHESIA EVENT (OUTPATIENT)
Dept: SURGERY | Facility: HOSPITAL | Age: 58
DRG: 741 | End: 2018-07-23
Payer: COMMERCIAL

## 2018-07-23 DIAGNOSIS — Z01.818 PREOPERATIVE TESTING: Primary | ICD-10-CM

## 2018-07-23 DIAGNOSIS — E08.8 DIABETES MELLITUS DUE TO UNDERLYING CONDITION WITH COMPLICATION, WITHOUT LONG-TERM CURRENT USE OF INSULIN: ICD-10-CM

## 2018-07-23 NOTE — PRE ADMISSION SCREENING
Anesthesia Assessment: Preoperative EQUATION    Planned Procedure: Procedure(s) (LRB):  HYSTERECTOMY, TOTAL, ABDOMINAL (N/A)  SALPINGO-OOPHORECTOMY, BILATERAL (Bilateral)  LYMPHADENECTOMY (N/A)  Requested Anesthesia Type:General  Surgeon: Noelle Lala MD  Service: OB/GYN  Known or anticipated Date of Surgery:8/2/2018    Surgeon notes: reviewed      Last PCP note: outside [facility]   Subspecialty notes: Hematology/Oncology    Other important co-morbidities: per EPIC: endometrial cancer, HTN, DM2, anemia, obesity     Tests already available:  Available tests,  within 3 months . 6/19/18 CBC, CMP. NO EKG.            Instructions given. (See in Nurse's note)    Optimization:  Anesthesia Preop Clinic Assessment  Indicated-will schedule POC    Medical Opinion Indicated-will ask OS PCP for last visit note, labs or EKG         Plan:    Testing:  Hematology Profile, A1C, T&S and EKG   Pre-anesthesia  visit       Visit focus: concerns in complex and/or prolonged anesthesia     Consultation:PCP     Patient  has previously scheduled Medical Appointment: none  Navigation: Tests Scheduled.              Consults scheduled.             Results will be tracked by Preop Clinic.

## 2018-07-23 NOTE — ANESTHESIA PREPROCEDURE EVALUATION
Pre Admission Screening  Justine Her RN      []Hide copied text  []Tezver for attribution information  Anesthesia Assessment: Preoperative EQUATION     Planned Procedure: Procedure(s) (LRB):  HYSTERECTOMY, TOTAL, ABDOMINAL (N/A)  SALPINGO-OOPHORECTOMY, BILATERAL (Bilateral)  LYMPHADENECTOMY (N/A)  Requested Anesthesia Type:General  Surgeon: Noelle Lala MD  Service: OB/GYN  Known or anticipated Date of Surgery:8/2/2018     Surgeon notes: reviewed        Last PCP note: outside [facility]   Subspecialty notes: Hematology/Oncology     Other important co-morbidities: per EPIC: endometrial cancer, HTN, DM2, anemia, obesity     Tests already available:  Available tests,  within 3 months . 6/19/18 CBC, CMP. NO EKG.                            Instructions given. (See in Nurse's note)     Optimization:  Anesthesia Preop Clinic Assessment  Indicated-will schedule POC    Medical Opinion Indicated-will ask OS PCP for last visit note, labs or EKG                                        Plan:    Testing:  Hematology Profile, A1C, T&S and EKG   Pre-anesthesia  visit                                        Visit focus: concerns in complex and/or prolonged anesthesia                           Consultation:PCP                           Patient  has previously scheduled Medical Appointment: none  Navigation: Tests Scheduled.                         Consults scheduled.                        Results will be tracked by Preop Clinic.                                Electronically signed by Justine Her RN at 7/23/2018 11:08 AM        Pre-admit on 8/2/2018            Detailed Report      7/25/18-OS PCP fax obtained: includes EKG and last visit note 3/12/18, labs 3/9/18 (A1c 6.1, CMP, Heme prof H/H 23.3 & 6.5) labs 3/14/18 H/H 34.2 & 10.4. Will scan to media and cancel EKG appt.      [facility]  Anesthesia Pre-Operative Evaluation         Patient Name: Saba Driver  YOB: 1960  MRN:  [id number]    SUBJECTIVE:     Pre-operative evaluation for Procedure(s) (LRB):  HYSTERECTOMY, TOTAL, ABDOMINAL (N/A)  SALPINGO-OOPHORECTOMY, BILATERAL (Bilateral)  LYMPHADENECTOMY (N/A)     08/01/2018    Saba Driver is a 57 y.o. female w/ a significant PMHx of T2DM and HTN. She was diagnosed with endometrial cancer (3/2018) and is currently s/p neoadjuvant chemotherapy. Uterus decreased appropriately in size to pursue surgical intervention.    Patient now presents for the above procedure(s).      LDA: None documented.    Prev airway: None documented.    Drips: None documented.    Patient Active Problem List   Diagnosis    Hypertension    Diabetes mellitus, type 2    PMB (postmenopausal bleeding)    Adenocarcinoma    Neoplasm of pelvis    Endometrial cancer    Pelvic neoplasm    Iron deficiency anemia       Review of patient's allergies indicates:  No Known Allergies    Current Outpatient Medications:  No current facility-administered medications for this encounter.     Current Outpatient Prescriptions:     hydroCHLOROthiazide (HYDRODIURIL) 25 MG tablet, Take 25 mg by mouth every morning., Disp: , Rfl:     amlodipine (NORVASC) 10 MG tablet, Take 10 mg by mouth every morning. , Disp: , Rfl:     bisoprolol (ZEBETA) 10 MG tablet, Take 10 mg by mouth 2 (two) times daily. , Disp: , Rfl:     LUMIGAN 0.01 % Drop, Place 1 drop into both eyes every evening. , Disp: , Rfl:     metformin (GLUCOPHAGE) 1000 MG tablet, Take 1,000 mg by mouth 2 (two) times daily with meals. , Disp: , Rfl:     ondansetron (ZOFRAN) 8 MG tablet, Take 1 tablet (8 mg total) by mouth every 12 (twelve) hours as needed for Nausea., Disp: 30 tablet, Rfl: 2    prochlorperazine (COMPAZINE) 10 MG tablet, Take 1 tablet (10 mg total) by mouth every 6 (six) hours as needed., Disp: 30 tablet, Rfl: 3    timolol maleate 0.5% (TIMOPTIC) 0.5 % Drop, Place 1 drop into both eyes 2 (two) times daily. , Disp: , Rfl:     Past Surgical History:   Procedure  Laterality Date    BREAST LUMPECTOMY      R early 20's L at 13 years old       Social History     Social History    Marital status: Single     Spouse name: N/A    Number of children: N/A    Years of education: N/A     Occupational History    Not on file.     Social History Main Topics    Smoking status: Never Smoker    Smokeless tobacco: Never Used    Alcohol use Yes      Comment: occassional use    Drug use: No    Sexual activity: No     Other Topics Concern    Not on file     Social History Narrative    No narrative on file       OBJECTIVE:     Vital Signs Range (Last 24H):         Significant Labs:  Lab Results   Component Value Date    WBC 7.41 07/30/2018    HGB 11.0 (L) 07/30/2018    HCT 35.3 (L) 07/30/2018     07/30/2018    ALT 17 06/19/2018    AST 16 06/19/2018     (L) 06/19/2018    K 2.8 (L) 06/19/2018    CL 98 06/19/2018    CREATININE 0.8 06/19/2018    BUN 12 06/19/2018    CO2 30 (H) 06/19/2018    INR 0.9 04/04/2018    HGBA1C 6.5 (H) 07/30/2018       Diagnostic Studies: No relevant studies.    EKG: No recent studies available.    2D ECHO:  No results found for this or any previous visit.      ASSESSMENT/PLAN:         Anesthesia Evaluation      I have reviewed the Medications.   Steroids Taken In Past Year: Decadron    Review of Systems  Anesthesia Hx:  No problems with previous Anesthesia History of prior surgery of interest to airway management or planning: Previous anesthesia: General colonoscopy 9 yrs ago with general anesthesia.  Denies Family Hx of Anesthesia complications.    Social:  Non-Smoker, No Alcohol Use    Hematology/Oncology:         -- Anemia: Hematology Comments: Last transfusion 6/2018 Current/Recent Cancer. (stage IV (inguinal mets) endometrial cancer; last chemo 6 weeks ago)   EENT/Dental:   Glasses; glaucoma   Cardiovascular:   Hypertension  Functional Capacity good / => 4 METS, walking; climb 1 FOS in home; denies CP, SOB    Pulmonary:   Denies Asthma.  Denies  Sleep Apnea.    Renal/:  Renal/ Normal     Hepatic/GI:   Denies GERD.    Musculoskeletal:  Musculoskeletal Normal    OB/GYN/PEDS:  Endometrial cancer   Neurological:   Denies CVA. Denies Seizures.  Denies Pain    Endocrine:   Diabetes (A1C 6.5 7/30/18), type 2    Psych:  Psychiatric Normal           Physical Exam  General:  Well nourished    Airway/Jaw/Neck:  Airway Findings: Mouth Opening: Normal Tongue: Normal  General Airway Assessment: Adult  Jaw/Neck Findings:     Neck ROM: Normal ROM      Dental:  Dental Findings: In tact    Chest/Lungs:  Chest/Lungs Findings: Clear to auscultation, Normal Respiratory Rate     Heart/Vascular:  Heart Findings: Rate: Normal  Rhythm: Regular Rhythm  Sounds: Normal  Vascular Findings: (R chest)  Vascular Access: Port-a-Cath        Mental Status:  Mental Status Findings:  Cooperative, Alert and Oriented       Pt was seen in POC 7/30/18/Polly Menezes RN        Anesthesia Plan  Type of Anesthesia, risks & benefits discussed:  Anesthesia Type:  general  Patient's Preference:   Intra-op Monitoring Plan: standard ASA monitors  Intra-op Monitoring Plan Comments:   Post Op Pain Control Plan: multimodal analgesia, IV/PO Opioids PRN and per primary service following discharge from PACU  Post Op Pain Control Plan Comments:   Induction:   IV  Beta Blocker:  Patient is on a Beta-Blocker and has received one dose within the past 24 hours (No further documentation required).       Informed Consent: Patient understands risks and agrees with Anesthesia plan.  Questions answered. Anesthesia consent signed with patient.  ASA Score: 2     Day of Surgery Review of History & Physical:  There are no significant changes.  H&P update referred to the surgeon.         Ready For Surgery From Anesthesia Perspective.

## 2018-07-30 ENCOUNTER — HOSPITAL ENCOUNTER (OUTPATIENT)
Dept: PREADMISSION TESTING | Facility: HOSPITAL | Age: 58
Discharge: HOME | DRG: 741 | End: 2018-07-30
Attending: ANESTHESIOLOGY
Payer: COMMERCIAL

## 2018-07-30 VITALS
RESPIRATION RATE: 18 BRPM | TEMPERATURE: 98 F | HEIGHT: 67 IN | OXYGEN SATURATION: 99 % | SYSTOLIC BLOOD PRESSURE: 145 MMHG | DIASTOLIC BLOOD PRESSURE: 67 MMHG | BODY MASS INDEX: 35.45 KG/M2 | WEIGHT: 225.88 LBS | HEART RATE: 70 BPM

## 2018-07-30 RX ORDER — HYDROCHLOROTHIAZIDE 25 MG/1
25 TABLET ORAL EVERY MORNING
COMMUNITY
End: 2018-09-20

## 2018-07-30 NOTE — DISCHARGE INSTRUCTIONS
Your surgery has been scheduled for:__________________________________________    You should report to:  ____[facility], located on the [place] side of the first floor of the           [facility] ([phone])  ____[facility], located on the [facility] side of the            Second floor of the [facility] ([phone])  ____3rd Floor [facility] located on the [facility] side of the [facility] [phone]  Please Note   - Tell your doctor if you take Aspirin, products containing Aspirin, herbal medications  or blood thinners, such as Coumadin, Ticlid, or Plavix.  (Consult your provider regarding holding or stopping before surgery).  - Arrange for someone to drive you home following surgery.  You will not be allowed to leave the surgical facility alone or drive yourself home following sedation and anesthesia.  Before Surgery  - Stop taking all herbal medications 14days prior to surgery  - No Motrin/Advil (Ibuprofen) 7 days before surgery  - No Aleve (Naproxen) 7 days before surgery  - No Goody's/BC powder 7 days before surgery  - Stop Taking Asprin, products containing Asprin _____days before surgery  - Stop taking blood thinners_______days before surgery  - Refrain from drinking alcoholic beverages for 24hours before and after surgery  - Stop or limit smoking _________days before surgery  - You may take Tylenol for pain  Night before Surgery  NOTHING TO EAT OR DRINK AFTER MIDNIGHT (OR FOLLOW SURGEON'S INSTRUCTIONS)  - Take a shower or bath (shower is recommended).  Bathe with Hibiclens soap or an antibacterial soap from the neck down.  If not supplied by your surgeon, hibiclens soap will need to be purchased over the counter in pharmacy.  Rinse soap off thoroughly.  - Shampoo your hair with your regular shampoo                             The Day of Surgery  - Take another bath or shower with hibiclens or any  antibacterial soap, to reduce the chance of infection.  - Take heart and blood pressure medications with a small sip of water, as advised by the perioperative team.  - Do not take fluid pills  - You may brush your teeth and rinse your mouth, but do not swall any additional water.   - Do not apply perfumes, powder, body lotions or deodorant on the day of surgery.  - Nail polish should be removed.  - Do not wear makeup or moisturizer  - Wear comfortable clothes, such as a button front shirt and loose fitting pants.  - Leave all jewelry, including body piercings, and valuables at home.    - Bring any devices you will neeed after surgery such as crutches or canes.  - If you have sleep apnea, please bring your CPAP machine  In the event that your physical condition changes including the onset of a cold or respiratory illness, or if you have to delay or cancel your surgery, please notify your surgeon.    Anesthesia: General Anesthesia  Youre due to have surgery. During surgery, youll be given medication called anesthesia. (It is also called anesthetic.) This will keep you comfortable and pain-free. Your anesthesia provider will use general anesthesia. This sheet tells you more about it.  What is general anesthesia?     You are watched continuously during your procedure by the anesthesia provider   General anesthesia puts you into a state like deep sleep. It goes into the bloodstream (IV anesthetics), into the lungs (gas anesthetics), or both. You feel nothing during the procedure. You will not remember it. During the procedure, the anesthesia provider monitors you continuously. He or she checks your heart rate and rhythm, blood pressure, breathing, and blood oxygen.  · IV Anesthetics. IV anesthetics are given through an IV line in your arm. Theyre often given first. This is so you are asleep before a gas anesthetic is started. Some kinds of IV anesthetics relieve pain. Others relax you. Your doctor will decide which  kind is best in your case.  · Gas Anesthetics. Gas anesthetics are breathed into the lungs. They are often used to keep you asleep. They can be given through a facemask or a tube placed in your larynx or trachea (breathing tube).  ? If you have a facemask, your anesthesia provider will most likely place it over your nose and mouth while youre still awake. Youll breathe oxygen through the mask as your IV anesthetic is started. Gas anesthetic may be added through the mask.  ? If you have a tube in the larynx or trachea, it will be inserted into your throat after youre asleep.  Anesthesia tools and medications  You will likely have:  · IV anesthetics. These are put into an IV line into your bloodstream.  · Gas anesthetics. You breathe these anesthetics into your lungs, where they pass into your bloodstream.  · Pulse oximeter. This is a small clip that is attached to the end of your finger. This measures your blood oxygen level.  · Electrocardiography leads (electrodes). These are small sticky pads that are placed on your chest. They record your heart rate and rhythm.  · Blood pressure cuff. This reads your blood pressure.  Risks and possible complications  General anesthesia has some risks. These include:  · Breathing problems  · Nausea and vomiting  · Sore throat or hoarseness (usually temporary)  · Allergic reaction to the anesthetic  · Irregular heartbeat (rare)  · Cardiac arrest (rare)   Anesthesia safety  · Follow all instructions you are given for how long not to eat or drink before your procedure.  · Be sure your doctor knows what medications and drugs you take. This includes over-the-counter medications, herbs, supplements, alcohol or other drugs. You will be asked when those were last taken.  · Have an adult family member or friend drive you home after the procedure.  · For the first 24 hours after your surgery:  ? Do not drive or use heavy equipment.  ? Have a trusted family member or spouse make important  decisions or sign documents.  ? Avoid alcohol.  ? Have a responsible adult stay with you. He or she can watch for problems and help keep you safe.  Date Last Reviewed: 10/16/2014  © 2556-5271 [employer]. [address] [place] [address], [place] [address]. All rights reserved. This information is not intended as a substitute for professional medical care. Always follow your healthcare professional's instructions.

## 2018-08-01 ENCOUNTER — TELEPHONE (OUTPATIENT)
Dept: GYNECOLOGIC ONCOLOGY | Facility: CLINIC | Age: 58
End: 2018-08-01

## 2018-08-01 NOTE — HOSPITAL COURSE
08/02/2018 Admit to GYN oncology. To OR for planned procedures.  08/03/2018 POD #1 s/p Xlap/BRIAN/BSO. Uncomplicated procedure,  cc. Patient reports moderate pain overnight. Tolerated some clears. Vitals stable and normal. BG elevated in the 200s, but improving. Has not yet ambulated. UOP marginal overnight. Merchant d/c'd today. -flatus/-BM.  08/04/2018 POD#2, doing well this morning. Pain is well-controlled with pain medication; patient reports gas pain overnight causing her discomfort, improved this AM. Tolerating a regular diet with no n/v. Not yet passing flatus or having BM this AM. Voiding spontaneously with no difficulty. Ambulated in room yesterday, today she is ambulating in halls.  08/05/2018 POD#3, doing well this AM. Continues to meet post-operative goals. Pain well-controlled, patient taking minimal pain medications. Ambulating frequently. +flatus/-BM. Stable for discharge this morning as patient is meeting all post-operative goals. Will have her follow up in clinic in 4 weeks.

## 2018-08-01 NOTE — SUBJECTIVE & OBJECTIVE
Oncology Treatment Plan:   OP GYN PACLITAXEL + CARBOPLATIN ([facility])    Oncology History: as above    Past Medical History:   Diagnosis Date    Adenocarcinoma 3/24/2018    Diabetes mellitus, type 2     Endometrial cancer 4/14/2018    Glaucoma     Hypertension      Past Surgical History:   Procedure Laterality Date    BREAST LUMPECTOMY      R early 20's L at 13 years old     Family History     Problem Relation (Age of Onset)    Diabetes Mother, Maternal Grandmother, Maternal Grandfather    Hypertension Mother, Maternal Grandmother, Maternal Grandfather    Pancreatic cancer Mother        Social History Main Topics    Smoking status: Never Smoker    Smokeless tobacco: Never Used    Alcohol use Yes      Comment: occassional use    Drug use: No    Sexual activity: No       No prescriptions prior to admission.       Review of patient's allergies indicates:  No Known Allergies    Review of Systems   Constitutional: Negative for activity change, chills and fatigue.   Eyes: Negative for visual disturbance.   Respiratory: Negative for shortness of breath.    Cardiovascular: Negative for chest pain and palpitations.   Gastrointestinal: Negative for abdominal pain, constipation, diarrhea, nausea and vomiting.   Genitourinary: Negative for dysuria, vaginal bleeding, vaginal discharge, vaginal pain and vaginal odor.   Musculoskeletal: Negative for myalgias.   Skin:  Negative for rash.   Neurological: Negative for headaches.   Psychiatric/Behavioral: Negative for depression.      Objective:     Vital Signs (Most Recent):    Vital Signs (24h Range):           There is no height or weight on file to calculate BMI.    Physical Exam:   Constitutional: She is oriented to person, place, and time. She appears well-developed and well-nourished.    HENT:   Head: Normocephalic and atraumatic.    Eyes: EOM are normal. Pupils are equal, round, and reactive to light.    Neck: Normal range of motion. Neck supple.    Cardiovascular:  Normal rate, regular rhythm and normal heart sounds.     Pulmonary/Chest: Effort normal and breath sounds normal. No respiratory distress.        Abdominal: Soft. Bowel sounds are normal. She exhibits no distension. There is no tenderness. There is no rebound and no guarding.             Musculoskeletal: Normal range of motion.       Neurological: She is alert and oriented to person, place, and time.    Skin: Skin is warm and dry. No rash noted.    Psychiatric: She has a normal mood and affect. Her behavior is normal. Judgment and thought content normal.     Laboratory:  Pre CBC: 7.4/11/35.3/306  7/30 A1c: 6.5  Baseline Cr: 0.8    Diagnostic Results:  06/20/2018 CT CAP:  There is a port in the right upper thorax, distal tip in the SVC.  The airways are patent.  The airways are patent. No mediastinal or hilar lymphadenopathy.  No pericardial effusion.  No pulmonary nodule, mass or airspace consolidation.  The axillary regions appear normal.    The liver, gallbladder, stomach, pancreas, spleen and adrenal glands appear normal.  The bilateral kidneys concentrate and excrete contrast normally.  The aorta tapers normally.  Lobulated enlarged uterus measuring 17.3 x 11.7 x 14.7 cm, with multiple uterine fibroid, it appears not significantly changed from the prior study.  The bladder is nondistended.  No inflammatory changes of the bowel seen, no bowel obstruction.  The bladder is nondistended.  The right inguinal lymph node has significantly decreased in size measuring 1.4 cm in its smallest diameter, series 2, image 118.  No new lymphadenopathy seen.  The osseous structures appear within normal limits for age.

## 2018-08-01 NOTE — HPI
Saba Driver is a 57 y.o. female with endometrial adenocarcinoma s/p 4 cycles of neoadjuvent carbo/taxol, here for scheduled Xlap/BRIAN/BSO/debulking and staging.     Per last clinic encounter:  Presents today for follow up. S/p 3 cycles neoadjuvant chemotherapy carbo/taxol with Dr. Boyd.      CT CAP 6/25/18  Impression   Significant decrease in size in the right inguinal node.  Lobulated enlarged uterus.  RECIST SUMMARY:  Date of prior examination for comparison: 03/22/2018  Lesion 1: Right inguinal node  1.4 cm(smallest diameter ).  Series 2 image 118.  Prior measurement 4.0 cm.      Oncologic history:  Referred by Dr. Shakir Alexis for postmenopausal bleeding. P0. On pelvic uterus is enlarged and fixed, unable to perform EMB due to altered anatomy of the cervix. She had been admitted to  for vaginal bleeding and anemia requiring transfusion. Fixed sore R inguinal node. Blind pap returned adenocarcinoma.      Pelvic US 3/12/18  UT enlarged 13.3cm with 5.1cm fibroid anterior. Limited visualization of EMS.  LOV 2.9cm  ROV 3.6cm     Medical co-morbidities include DM, HTN. No anticoagulation.      No prior abdominal surgeries     Family history significant for mom with pancreatic cancer, denies history of breast, ovarian, uterine, or colon cancer.      MMG last year normal per patient.      Seen in consult with me 3/21/18. Constellation of findings thus far consistent with an advanced gynecologic malignancy. Likely uterine in origin. Inguinal adenopathy would make this Stage IVB. Pap cytology showing adenocarcinoma but need tissue diagnosis. Will plan IR for biopsy of inguinal node. Pelvic disease is surgically unresectable at present. Recommended neoadjuvant chemotherapy based on tissue diagnosis.      IR biopsy 4/4/18  FINAL PATHOLOGIC DIAGNOSIS  RIGHT INGUINAL LYMPH NODE, CT GUIDED BIOPSY WITH ONSITE ADEQUACY (CYTOLOGY AND CELL BLOCK):  - Adenocarcinoma. See note.  Note: The carcinoma is positive for CK7, p53,  vimentin and ER (patchy). It is negative for CDX2, CEA, CK20 and WT1. This immunoprofile is compatible with an endometrial origin. All stains have appropriate controls.     Initiate neoadjuvant chemotherapy with Dr. Boyd. Carbo/taxol 4/19/18. IV port  Now s/p 4 cycles of neoadjuvent therapy.

## 2018-08-01 NOTE — ASSESSMENT & PLAN NOTE
- no changes in health or surgical history since last seen  - s/p 4 cycles of neoadjuvent carbo/taxol  - no questions or concerns this AM  - to OR for planned procedures

## 2018-08-01 NOTE — ASSESSMENT & PLAN NOTE
- holding home diuretic perioperatively  - continue home bisprolol 10 mg daily, norvasc 10 mg daily

## 2018-08-02 ENCOUNTER — HOSPITAL ENCOUNTER (INPATIENT)
Facility: HOSPITAL | Age: 58
LOS: 3 days | Discharge: HOME | DRG: 741 | End: 2018-08-05
Attending: OBSTETRICS & GYNECOLOGY | Admitting: OBSTETRICS & GYNECOLOGY
Payer: COMMERCIAL

## 2018-08-02 ENCOUNTER — ANESTHESIA (OUTPATIENT)
Dept: SURGERY | Facility: HOSPITAL | Age: 58
DRG: 741 | End: 2018-08-02
Payer: COMMERCIAL

## 2018-08-02 DIAGNOSIS — Z90.710 S/P TAH-BSO (TOTAL ABDOMINAL HYSTERECTOMY AND BILATERAL SALPINGO-OOPHORECTOMY): Primary | ICD-10-CM

## 2018-08-02 DIAGNOSIS — C54.1 ENDOMETRIAL CANCER: ICD-10-CM

## 2018-08-02 DIAGNOSIS — Z90.722 S/P TAH-BSO (TOTAL ABDOMINAL HYSTERECTOMY AND BILATERAL SALPINGO-OOPHORECTOMY): Primary | ICD-10-CM

## 2018-08-02 DIAGNOSIS — Z90.79 S/P TAH-BSO (TOTAL ABDOMINAL HYSTERECTOMY AND BILATERAL SALPINGO-OOPHORECTOMY): Primary | ICD-10-CM

## 2018-08-02 DIAGNOSIS — D64.81 ANTINEOPLASTIC CHEMOTHERAPY INDUCED ANEMIA: ICD-10-CM

## 2018-08-02 DIAGNOSIS — T45.1X5A ANTINEOPLASTIC CHEMOTHERAPY INDUCED ANEMIA: ICD-10-CM

## 2018-08-02 LAB
POCT GLUCOSE: 151 MG/DL (ref 70–110)
POCT GLUCOSE: 234 MG/DL (ref 70–110)
POCT GLUCOSE: 244 MG/DL (ref 70–110)

## 2018-08-02 PROCEDURE — 0UT70ZZ RESECTION OF BILATERAL FALLOPIAN TUBES, OPEN APPROACH: ICD-10-PCS | Performed by: OBSTETRICS & GYNECOLOGY

## 2018-08-02 PROCEDURE — 88309 TISSUE EXAM BY PATHOLOGIST: CPT | Mod: 26,,, | Performed by: PATHOLOGY

## 2018-08-02 PROCEDURE — 20600001 HC STEP DOWN PRIVATE ROOM

## 2018-08-02 PROCEDURE — 63600175 PHARM REV CODE 636 W HCPCS: Performed by: STUDENT IN AN ORGANIZED HEALTH CARE EDUCATION/TRAINING PROGRAM

## 2018-08-02 PROCEDURE — 0UT90ZZ RESECTION OF UTERUS, OPEN APPROACH: ICD-10-PCS | Performed by: OBSTETRICS & GYNECOLOGY

## 2018-08-02 PROCEDURE — 12000002 HC ACUTE/MED SURGE SEMI-PRIVATE ROOM

## 2018-08-02 PROCEDURE — 37000009 HC ANESTHESIA EA ADD 15 MINS: Performed by: OBSTETRICS & GYNECOLOGY

## 2018-08-02 PROCEDURE — 58150 TOTAL HYSTERECTOMY: CPT | Mod: 22,,, | Performed by: OBSTETRICS & GYNECOLOGY

## 2018-08-02 PROCEDURE — 25000003 PHARM REV CODE 250: Performed by: OBSTETRICS & GYNECOLOGY

## 2018-08-02 PROCEDURE — C9290 INJ, BUPIVACAINE LIPOSOME: HCPCS | Performed by: OBSTETRICS & GYNECOLOGY

## 2018-08-02 PROCEDURE — 36000709 HC OR TIME LEV III EA ADD 15 MIN: Performed by: OBSTETRICS & GYNECOLOGY

## 2018-08-02 PROCEDURE — 82962 GLUCOSE BLOOD TEST: CPT | Performed by: OBSTETRICS & GYNECOLOGY

## 2018-08-02 PROCEDURE — 36000708 HC OR TIME LEV III 1ST 15 MIN: Performed by: OBSTETRICS & GYNECOLOGY

## 2018-08-02 PROCEDURE — D9220A PRA ANESTHESIA: Mod: CRNA,,, | Performed by: NURSE ANESTHETIST, CERTIFIED REGISTERED

## 2018-08-02 PROCEDURE — 27201423 OPTIME MED/SURG SUP & DEVICES STERILE SUPPLY: Performed by: OBSTETRICS & GYNECOLOGY

## 2018-08-02 PROCEDURE — 27201040 HC RC 50 FILTER

## 2018-08-02 PROCEDURE — 71000033 HC RECOVERY, INTIAL HOUR: Performed by: OBSTETRICS & GYNECOLOGY

## 2018-08-02 PROCEDURE — 25000003 PHARM REV CODE 250: Performed by: STUDENT IN AN ORGANIZED HEALTH CARE EDUCATION/TRAINING PROGRAM

## 2018-08-02 PROCEDURE — 37000008 HC ANESTHESIA 1ST 15 MINUTES: Performed by: OBSTETRICS & GYNECOLOGY

## 2018-08-02 PROCEDURE — 63600175 PHARM REV CODE 636 W HCPCS: Performed by: OBSTETRICS & GYNECOLOGY

## 2018-08-02 PROCEDURE — 88309 TISSUE EXAM BY PATHOLOGIST: CPT | Performed by: PATHOLOGY

## 2018-08-02 PROCEDURE — D9220A PRA ANESTHESIA: Mod: ANES,,, | Performed by: ANESTHESIOLOGY

## 2018-08-02 PROCEDURE — 25000003 PHARM REV CODE 250: Performed by: NURSE ANESTHETIST, CERTIFIED REGISTERED

## 2018-08-02 PROCEDURE — 71000039 HC RECOVERY, EACH ADD'L HOUR: Performed by: OBSTETRICS & GYNECOLOGY

## 2018-08-02 PROCEDURE — 94761 N-INVAS EAR/PLS OXIMETRY MLT: CPT

## 2018-08-02 PROCEDURE — 27000221 HC OXYGEN, UP TO 24 HOURS

## 2018-08-02 PROCEDURE — 86920 COMPATIBILITY TEST SPIN: CPT

## 2018-08-02 PROCEDURE — 63600175 PHARM REV CODE 636 W HCPCS: Performed by: NURSE ANESTHETIST, CERTIFIED REGISTERED

## 2018-08-02 PROCEDURE — 0UT20ZZ RESECTION OF BILATERAL OVARIES, OPEN APPROACH: ICD-10-PCS | Performed by: OBSTETRICS & GYNECOLOGY

## 2018-08-02 RX ORDER — LIDOCAINE HYDROCHLORIDE 10 MG/ML
1 INJECTION, SOLUTION EPIDURAL; INFILTRATION; INTRACAUDAL; PERINEURAL ONCE
Status: DISCONTINUED | OUTPATIENT
Start: 2018-08-02 | End: 2018-08-02

## 2018-08-02 RX ORDER — ROCURONIUM BROMIDE 10 MG/ML
INJECTION, SOLUTION INTRAVENOUS
Status: DISCONTINUED | OUTPATIENT
Start: 2018-08-02 | End: 2018-08-08

## 2018-08-02 RX ORDER — SODIUM CHLORIDE AND POTASSIUM CHLORIDE 150; 900 MG/100ML; MG/100ML
INJECTION, SOLUTION INTRAVENOUS
Status: DISPENSED
Start: 2018-08-02 | End: 2018-08-03

## 2018-08-02 RX ORDER — ONDANSETRON 8 MG/1
8 TABLET, ORALLY DISINTEGRATING ORAL EVERY 8 HOURS PRN
Status: DISCONTINUED | OUTPATIENT
Start: 2018-08-02 | End: 2018-08-05 | Stop reason: HOSPADM

## 2018-08-02 RX ORDER — HYDROCODONE BITARTRATE AND ACETAMINOPHEN 500; 5 MG/1; MG/1
TABLET ORAL ONCE
Status: DISCONTINUED | OUTPATIENT
Start: 2018-08-02 | End: 2018-08-05 | Stop reason: HOSPADM

## 2018-08-02 RX ORDER — MIDAZOLAM HYDROCHLORIDE 1 MG/ML
INJECTION, SOLUTION INTRAMUSCULAR; INTRAVENOUS
Status: DISCONTINUED | OUTPATIENT
Start: 2018-08-02 | End: 2018-08-08

## 2018-08-02 RX ORDER — SIMETHICONE 80 MG
1 TABLET,CHEWABLE ORAL 3 TIMES DAILY PRN
Status: DISCONTINUED | OUTPATIENT
Start: 2018-08-02 | End: 2018-08-05 | Stop reason: HOSPADM

## 2018-08-02 RX ORDER — AMOXICILLIN 250 MG
1 CAPSULE ORAL 2 TIMES DAILY
Status: DISCONTINUED | OUTPATIENT
Start: 2018-08-02 | End: 2018-08-05 | Stop reason: HOSPADM

## 2018-08-02 RX ORDER — OXYCODONE AND ACETAMINOPHEN 5; 325 MG/1; MG/1
1 TABLET ORAL EVERY 4 HOURS PRN
Qty: 30 TABLET | Refills: 0 | Status: SHIPPED | OUTPATIENT
Start: 2018-08-02 | End: 2018-08-04

## 2018-08-02 RX ORDER — BUPIVACAINE HYDROCHLORIDE 2.5 MG/ML
INJECTION, SOLUTION EPIDURAL; INFILTRATION; INTRACAUDAL
Status: DISCONTINUED | OUTPATIENT
Start: 2018-08-02 | End: 2018-08-02

## 2018-08-02 RX ORDER — SODIUM CHLORIDE 9 MG/ML
INJECTION, SOLUTION INTRAVENOUS CONTINUOUS
Status: DISCONTINUED | OUTPATIENT
Start: 2018-08-02 | End: 2018-08-02

## 2018-08-02 RX ORDER — BISOPROLOL FUMARATE 5 MG/1
10 TABLET, FILM COATED ORAL 2 TIMES DAILY
Status: DISCONTINUED | OUTPATIENT
Start: 2018-08-02 | End: 2018-08-05 | Stop reason: HOSPADM

## 2018-08-02 RX ORDER — DIPHENHYDRAMINE HCL 25 MG
25 CAPSULE ORAL
Status: DISCONTINUED | OUTPATIENT
Start: 2018-08-02 | End: 2018-08-05 | Stop reason: HOSPADM

## 2018-08-02 RX ORDER — ONDANSETRON 2 MG/ML
INJECTION INTRAMUSCULAR; INTRAVENOUS
Status: DISCONTINUED | OUTPATIENT
Start: 2018-08-02 | End: 2018-08-08

## 2018-08-02 RX ORDER — LIDOCAINE HCL/PF 100 MG/5ML
SYRINGE (ML) INTRAVENOUS
Status: DISCONTINUED | OUTPATIENT
Start: 2018-08-02 | End: 2018-08-08

## 2018-08-02 RX ORDER — HYDROMORPHONE HYDROCHLORIDE 1 MG/ML
0.5 INJECTION, SOLUTION INTRAMUSCULAR; INTRAVENOUS; SUBCUTANEOUS
Status: DISCONTINUED | OUTPATIENT
Start: 2018-08-02 | End: 2018-08-05 | Stop reason: HOSPADM

## 2018-08-02 RX ORDER — PHENYLEPHRINE HYDROCHLORIDE 10 MG/ML
INJECTION INTRAVENOUS
Status: DISCONTINUED | OUTPATIENT
Start: 2018-08-02 | End: 2018-08-08

## 2018-08-02 RX ORDER — MUPIROCIN 20 MG/G
1 OINTMENT TOPICAL 2 TIMES DAILY
Status: DISCONTINUED | OUTPATIENT
Start: 2018-08-02 | End: 2018-08-05 | Stop reason: HOSPADM

## 2018-08-02 RX ORDER — SODIUM CHLORIDE AND POTASSIUM CHLORIDE 150; 900 MG/100ML; MG/100ML
INJECTION, SOLUTION INTRAVENOUS CONTINUOUS
Status: DISCONTINUED | OUTPATIENT
Start: 2018-08-02 | End: 2018-08-04

## 2018-08-02 RX ORDER — PROCHLORPERAZINE EDISYLATE 5 MG/ML
5 INJECTION INTRAMUSCULAR; INTRAVENOUS EVERY 4 HOURS PRN
Status: DISCONTINUED | OUTPATIENT
Start: 2018-08-02 | End: 2018-08-05 | Stop reason: HOSPADM

## 2018-08-02 RX ORDER — AMLODIPINE BESYLATE 10 MG/1
10 TABLET ORAL EVERY MORNING
Status: DISCONTINUED | OUTPATIENT
Start: 2018-08-03 | End: 2018-08-05 | Stop reason: HOSPADM

## 2018-08-02 RX ORDER — ACETAMINOPHEN 10 MG/ML
1000 INJECTION, SOLUTION INTRAVENOUS ONCE
Status: DISCONTINUED | OUTPATIENT
Start: 2018-08-02 | End: 2018-08-02

## 2018-08-02 RX ORDER — SODIUM CHLORIDE 0.9 % (FLUSH) 0.9 %
3 SYRINGE (ML) INJECTION
Status: DISCONTINUED | OUTPATIENT
Start: 2018-08-02 | End: 2018-08-02 | Stop reason: HOSPADM

## 2018-08-02 RX ORDER — FENTANYL CITRATE 50 UG/ML
INJECTION, SOLUTION INTRAMUSCULAR; INTRAVENOUS
Status: DISCONTINUED | OUTPATIENT
Start: 2018-08-02 | End: 2018-08-08

## 2018-08-02 RX ORDER — ACETAMINOPHEN 325 MG/1
650 TABLET ORAL
Status: DISCONTINUED | OUTPATIENT
Start: 2018-08-02 | End: 2018-08-05 | Stop reason: HOSPADM

## 2018-08-02 RX ORDER — NEOSTIGMINE METHYLSULFATE 1 MG/ML
INJECTION, SOLUTION INTRAVENOUS
Status: DISCONTINUED | OUTPATIENT
Start: 2018-08-02 | End: 2018-08-08

## 2018-08-02 RX ORDER — GLUCAGON 1 MG
1 KIT INJECTION
Status: DISCONTINUED | OUTPATIENT
Start: 2018-08-02 | End: 2018-08-05 | Stop reason: HOSPADM

## 2018-08-02 RX ORDER — OXYCODONE HYDROCHLORIDE 5 MG/1
10 TABLET ORAL EVERY 4 HOURS PRN
Status: DISCONTINUED | OUTPATIENT
Start: 2018-08-02 | End: 2018-08-05 | Stop reason: HOSPADM

## 2018-08-02 RX ORDER — IBUPROFEN 200 MG
16 TABLET ORAL
Status: DISCONTINUED | OUTPATIENT
Start: 2018-08-02 | End: 2018-08-05 | Stop reason: HOSPADM

## 2018-08-02 RX ORDER — KETAMINE HCL IN 0.9 % NACL 50 MG/5 ML
SYRINGE (ML) INTRAVENOUS
Status: DISCONTINUED | OUTPATIENT
Start: 2018-08-02 | End: 2018-08-08

## 2018-08-02 RX ORDER — INSULIN ASPART 100 [IU]/ML
1-10 INJECTION, SOLUTION INTRAVENOUS; SUBCUTANEOUS
Status: DISCONTINUED | OUTPATIENT
Start: 2018-08-02 | End: 2018-08-05 | Stop reason: HOSPADM

## 2018-08-02 RX ORDER — ACETAMINOPHEN 10 MG/ML
1000 INJECTION, SOLUTION INTRAVENOUS EVERY 8 HOURS
Status: COMPLETED | OUTPATIENT
Start: 2018-08-02 | End: 2018-08-03

## 2018-08-02 RX ORDER — GLYCOPYRROLATE 0.2 MG/ML
INJECTION INTRAMUSCULAR; INTRAVENOUS
Status: DISCONTINUED | OUTPATIENT
Start: 2018-08-02 | End: 2018-08-08

## 2018-08-02 RX ORDER — IBUPROFEN 200 MG
24 TABLET ORAL
Status: DISCONTINUED | OUTPATIENT
Start: 2018-08-02 | End: 2018-08-05 | Stop reason: HOSPADM

## 2018-08-02 RX ORDER — DEXAMETHASONE SODIUM PHOSPHATE 4 MG/ML
INJECTION, SOLUTION INTRA-ARTICULAR; INTRALESIONAL; INTRAMUSCULAR; INTRAVENOUS; SOFT TISSUE
Status: DISCONTINUED | OUTPATIENT
Start: 2018-08-02 | End: 2018-08-08

## 2018-08-02 RX ORDER — CEFAZOLIN SODIUM 1 G/3ML
2 INJECTION, POWDER, FOR SOLUTION INTRAMUSCULAR; INTRAVENOUS
Status: COMPLETED | OUTPATIENT
Start: 2018-08-02 | End: 2018-08-02

## 2018-08-02 RX ORDER — OXYCODONE HYDROCHLORIDE 5 MG/1
5 TABLET ORAL EVERY 4 HOURS PRN
Status: DISCONTINUED | OUTPATIENT
Start: 2018-08-02 | End: 2018-08-05 | Stop reason: HOSPADM

## 2018-08-02 RX ORDER — PROPOFOL 10 MG/ML
VIAL (ML) INTRAVENOUS
Status: DISCONTINUED | OUTPATIENT
Start: 2018-08-02 | End: 2018-08-08

## 2018-08-02 RX ORDER — GLYCOPYRROLATE 0.2 MG/ML
INJECTION INTRAMUSCULAR; INTRAVENOUS
Status: DISCONTINUED | OUTPATIENT
Start: 2018-08-02 | End: 2018-08-02

## 2018-08-02 RX ORDER — HYDROMORPHONE HYDROCHLORIDE 1 MG/ML
0.2 INJECTION, SOLUTION INTRAMUSCULAR; INTRAVENOUS; SUBCUTANEOUS EVERY 5 MIN PRN
Status: DISCONTINUED | OUTPATIENT
Start: 2018-08-02 | End: 2018-08-02 | Stop reason: HOSPADM

## 2018-08-02 RX ORDER — EPHEDRINE SULFATE 50 MG/ML
INJECTION, SOLUTION INTRAVENOUS
Status: DISCONTINUED | OUTPATIENT
Start: 2018-08-02 | End: 2018-08-08

## 2018-08-02 RX ADMIN — HYDROMORPHONE HYDROCHLORIDE 0.2 MG: 1 INJECTION, SOLUTION INTRAMUSCULAR; INTRAVENOUS; SUBCUTANEOUS at 07:08

## 2018-08-02 RX ADMIN — HYDROMORPHONE HYDROCHLORIDE 0.2 MG: 1 INJECTION, SOLUTION INTRAMUSCULAR; INTRAVENOUS; SUBCUTANEOUS at 10:08

## 2018-08-02 RX ADMIN — EPHEDRINE SULFATE 5 MG: 50 INJECTION, SOLUTION INTRAMUSCULAR; INTRAVENOUS; SUBCUTANEOUS at 02:08

## 2018-08-02 RX ADMIN — INSULIN ASPART 4 UNITS: 100 INJECTION, SOLUTION INTRAVENOUS; SUBCUTANEOUS at 10:08

## 2018-08-02 RX ADMIN — FENTANYL CITRATE 50 MCG: 50 INJECTION, SOLUTION INTRAMUSCULAR; INTRAVENOUS at 05:08

## 2018-08-02 RX ADMIN — BISOPROLOL FUMARATE 10 MG: 5 TABLET, COATED ORAL at 09:08

## 2018-08-02 RX ADMIN — SODIUM CHLORIDE: 0.9 INJECTION, SOLUTION INTRAVENOUS at 10:08

## 2018-08-02 RX ADMIN — PROPOFOL 40 MG: 10 INJECTION, EMULSION INTRAVENOUS at 04:08

## 2018-08-02 RX ADMIN — CEFAZOLIN 2 G: 330 INJECTION, POWDER, FOR SOLUTION INTRAMUSCULAR; INTRAVENOUS at 02:08

## 2018-08-02 RX ADMIN — PHENYLEPHRINE HYDROCHLORIDE 100 MCG: 10 INJECTION INTRAVENOUS at 02:08

## 2018-08-02 RX ADMIN — ONDANSETRON 4 MG: 2 INJECTION INTRAMUSCULAR; INTRAVENOUS at 04:08

## 2018-08-02 RX ADMIN — LIDOCAINE HYDROCHLORIDE 100 MG: 20 INJECTION, SOLUTION INTRAVENOUS at 01:08

## 2018-08-02 RX ADMIN — Medication 50 MG: at 01:08

## 2018-08-02 RX ADMIN — SENNOSIDES AND DOCUSATE SODIUM 1 TABLET: 8.6; 5 TABLET ORAL at 09:08

## 2018-08-02 RX ADMIN — ROCURONIUM BROMIDE 50 MG: 10 INJECTION, SOLUTION INTRAVENOUS at 01:08

## 2018-08-02 RX ADMIN — FENTANYL CITRATE 100 MCG: 50 INJECTION, SOLUTION INTRAMUSCULAR; INTRAVENOUS at 01:08

## 2018-08-02 RX ADMIN — EPHEDRINE SULFATE 10 MG: 50 INJECTION, SOLUTION INTRAMUSCULAR; INTRAVENOUS; SUBCUTANEOUS at 04:08

## 2018-08-02 RX ADMIN — GLYCOPYRROLATE 0.6 MG: 0.2 INJECTION, SOLUTION INTRAMUSCULAR; INTRAVENOUS at 05:08

## 2018-08-02 RX ADMIN — SODIUM CHLORIDE, SODIUM GLUCONATE, SODIUM ACETATE, POTASSIUM CHLORIDE, MAGNESIUM CHLORIDE, SODIUM PHOSPHATE, DIBASIC, AND POTASSIUM PHOSPHATE: .53; .5; .37; .037; .03; .012; .00082 INJECTION, SOLUTION INTRAVENOUS at 02:08

## 2018-08-02 RX ADMIN — SODIUM CHLORIDE, SODIUM GLUCONATE, SODIUM ACETATE, POTASSIUM CHLORIDE, MAGNESIUM CHLORIDE, SODIUM PHOSPHATE, DIBASIC, AND POTASSIUM PHOSPHATE: .53; .5; .37; .037; .03; .012; .00082 INJECTION, SOLUTION INTRAVENOUS at 01:08

## 2018-08-02 RX ADMIN — MUPIROCIN 1 G: 20 OINTMENT TOPICAL at 09:08

## 2018-08-02 RX ADMIN — ROCURONIUM BROMIDE 10 MG: 10 INJECTION, SOLUTION INTRAVENOUS at 02:08

## 2018-08-02 RX ADMIN — ROCURONIUM BROMIDE 20 MG: 10 INJECTION, SOLUTION INTRAVENOUS at 02:08

## 2018-08-02 RX ADMIN — IBUPROFEN 800 MG: 800 INJECTION INTRAVENOUS at 06:08

## 2018-08-02 RX ADMIN — PHENYLEPHRINE HYDROCHLORIDE 200 MCG: 10 INJECTION INTRAVENOUS at 02:08

## 2018-08-02 RX ADMIN — EPHEDRINE SULFATE 10 MG: 50 INJECTION, SOLUTION INTRAMUSCULAR; INTRAVENOUS; SUBCUTANEOUS at 03:08

## 2018-08-02 RX ADMIN — HYDROMORPHONE HYDROCHLORIDE 0.2 MG: 1 INJECTION, SOLUTION INTRAMUSCULAR; INTRAVENOUS; SUBCUTANEOUS at 08:08

## 2018-08-02 RX ADMIN — SODIUM CHLORIDE AND POTASSIUM CHLORIDE: 9; 1.49 INJECTION, SOLUTION INTRAVENOUS at 06:08

## 2018-08-02 RX ADMIN — PROPOFOL 160 MG: 10 INJECTION, EMULSION INTRAVENOUS at 01:08

## 2018-08-02 RX ADMIN — NEOSTIGMINE METHYLSULFATE 4 MG: 1 INJECTION INTRAVENOUS at 05:08

## 2018-08-02 RX ADMIN — OXYCODONE HYDROCHLORIDE 10 MG: 5 TABLET ORAL at 06:08

## 2018-08-02 RX ADMIN — ACETAMINOPHEN 1000 MG: 10 INJECTION, SOLUTION INTRAVENOUS at 09:08

## 2018-08-02 RX ADMIN — DEXAMETHASONE SODIUM PHOSPHATE 4 MG: 4 INJECTION, SOLUTION INTRAMUSCULAR; INTRAVENOUS at 02:08

## 2018-08-02 RX ADMIN — MIDAZOLAM HYDROCHLORIDE 2 MG: 1 INJECTION, SOLUTION INTRAMUSCULAR; INTRAVENOUS at 01:08

## 2018-08-02 NOTE — H&P
[facility]  Gynecologic Oncology  H&P    Patient Name: Saba Driver  MRN: [id number]  Admission Date: 8/2/2018  Primary Care Provider: Adela Verma MD   Principal Problem: Endometrial cancer    Subjective:     Chief Complaint/Reason for Admission: scheduled Xlap/BRIAN/BSO    History of Present Illness:  Saba Driver is a 57 y.o. female with endometrial adenocarcinoma s/p 4 cycles of neoadjuvent carbo/taxol, here for scheduled Xlap/BRIAN/BSO/debulking and staging.     Per last clinic encounter:  Presents today for follow up. S/p 3 cycles neoadjuvant chemotherapy carbo/taxol with Dr. Boyd.      CT CAP 6/25/18  Impression   Significant decrease in size in the right inguinal node.  Lobulated enlarged uterus.  RECIST SUMMARY:  Date of prior examination for comparison: 03/22/2018  Lesion 1: Right inguinal node  1.4 cm(smallest diameter ).  Series 2 image 118.  Prior measurement 4.0 cm.      Oncologic history:  Referred by Dr. Shakir Alexis for postmenopausal bleeding. P0. On pelvic uterus is enlarged and fixed, unable to perform EMB due to altered anatomy of the cervix. She had been admitted to  for vaginal bleeding and anemia requiring transfusion. Fixed sore R inguinal node. Blind pap returned adenocarcinoma.      Pelvic US 3/12/18  UT enlarged 13.3cm with 5.1cm fibroid anterior. Limited visualization of EMS.  LOV 2.9cm  ROV 3.6cm     Medical co-morbidities include DM, HTN. No anticoagulation.      No prior abdominal surgeries     Family history significant for mom with pancreatic cancer, denies history of breast, ovarian, uterine, or colon cancer.      MMG last year normal per patient.      Seen in consult with me 3/21/18. Constellation of findings thus far consistent with an advanced gynecologic malignancy. Likely uterine in origin. Inguinal adenopathy would make this Stage IVB. Pap cytology showing adenocarcinoma but need tissue diagnosis. Will plan IR for biopsy of inguinal node. Pelvic disease  is surgically unresectable at present. Recommended neoadjuvant chemotherapy based on tissue diagnosis.      IR biopsy 4/4/18  FINAL PATHOLOGIC DIAGNOSIS  RIGHT INGUINAL LYMPH NODE, CT GUIDED BIOPSY WITH ONSITE ADEQUACY (CYTOLOGY AND CELL BLOCK):  - Adenocarcinoma. See note.  Note: The carcinoma is positive for CK7, p53, vimentin and ER (patchy). It is negative for CDX2, CEA, CK20 and WT1. This immunoprofile is compatible with an endometrial origin. All stains have appropriate controls.     Initiate neoadjuvant chemotherapy with Dr. Boyd. Carbo/taxol 4/19/18. IV port  Now s/p 4 cycles of neoadjuvent therapy.    Hospital Course:  08/02/2018 Admit to GYN oncology. To OR for planned procedures.    Oncology Treatment Plan:   OP GYN PACLITAXEL + CARBOPLATIN (AUC)    Oncology History: as above    Past Medical History:   Diagnosis Date    Adenocarcinoma 3/24/2018    Diabetes mellitus, type 2     Endometrial cancer 4/14/2018    Glaucoma     Hypertension      Past Surgical History:   Procedure Laterality Date    BREAST LUMPECTOMY      R early 20's L at 13 years old     Family History     Problem Relation (Age of Onset)    Diabetes Mother, Maternal Grandmother, Maternal Grandfather    Hypertension Mother, Maternal Grandmother, Maternal Grandfather    Pancreatic cancer Mother        Social History Main Topics    Smoking status: Never Smoker    Smokeless tobacco: Never Used    Alcohol use Yes      Comment: occassional use    Drug use: No    Sexual activity: No       No prescriptions prior to admission.       Review of patient's allergies indicates:  No Known Allergies    Review of Systems   Constitutional: Negative for activity change, chills and fatigue.   Eyes: Negative for visual disturbance.   Respiratory: Negative for shortness of breath.    Cardiovascular: Negative for chest pain and palpitations.   Gastrointestinal: Negative for abdominal pain, constipation, diarrhea, nausea and vomiting.   Genitourinary:  Negative for dysuria, vaginal bleeding, vaginal discharge, vaginal pain and vaginal odor.   Musculoskeletal: Negative for myalgias.   Skin:  Negative for rash.   Neurological: Negative for headaches.   Psychiatric/Behavioral: Negative for depression.      Objective:     Vital Signs (Most Recent):    Vital Signs (24h Range):           There is no height or weight on file to calculate BMI.    Physical Exam:   Constitutional: She is oriented to person, place, and time. She appears well-developed and well-nourished.    HENT:   Head: Normocephalic and atraumatic.    Eyes: EOM are normal. Pupils are equal, round, and reactive to light.    Neck: Normal range of motion. Neck supple.    Cardiovascular: Normal rate, regular rhythm and normal heart sounds.     Pulmonary/Chest: Effort normal and breath sounds normal. No respiratory distress.        Abdominal: Soft. Bowel sounds are normal. She exhibits no distension. There is no tenderness. There is no rebound and no guarding.             Musculoskeletal: Normal range of motion.       Neurological: She is alert and oriented to person, place, and time.    Skin: Skin is warm and dry. No rash noted.    Psychiatric: She has a normal mood and affect. Her behavior is normal. Judgment and thought content normal.     Laboratory:  Pre CBC: 7.4/11/35.3/306  7/30 A1c: 6.5  Baseline Cr: 0.8    Diagnostic Results:  06/20/2018 CT CAP:  There is a port in the right upper thorax, distal tip in the SVC.  The airways are patent.  The airways are patent. No mediastinal or hilar lymphadenopathy.  No pericardial effusion.  No pulmonary nodule, mass or airspace consolidation.  The axillary regions appear normal.    The liver, gallbladder, stomach, pancreas, spleen and adrenal glands appear normal.  The bilateral kidneys concentrate and excrete contrast normally.  The aorta tapers normally.  Lobulated enlarged uterus measuring 17.3 x 11.7 x 14.7 cm, with multiple uterine fibroid, it appears not  significantly changed from the prior study.  The bladder is nondistended.  No inflammatory changes of the bowel seen, no bowel obstruction.  The bladder is nondistended.  The right inguinal lymph node has significantly decreased in size measuring 1.4 cm in its smallest diameter, series 2, image 118.  No new lymphadenopathy seen.  The osseous structures appear within normal limits for age.    Assessment/Plan:     * Endometrial cancer    - as above        Adenocarcinoma    - no changes in health or surgical history since last seen  - s/p 4 cycles of neoadjuvent carbo/taxol  - no questions or concerns this AM  - to OR for planned procedures        Diabetes mellitus, type 2    - hold home meds  - SSI with POCT BG q6h         Hypertension    - holding home diuretic perioperatively  - continue home bisprolol 10 mg daily, norvasc 10 mg daily            Evangelina Howell MD  Gynecologic Oncology  [facility]

## 2018-08-02 NOTE — PROGRESS NOTES
Spoke w/ Dr. Fried resident and asked if pt needs to be marked for the R inguinal lyphectomy, she will speak w/ dr cheema and do it if she needs.

## 2018-08-03 ENCOUNTER — TELEPHONE (OUTPATIENT)
Dept: GYNECOLOGIC ONCOLOGY | Facility: CLINIC | Age: 58
End: 2018-08-03

## 2018-08-03 LAB
ALBUMIN SERPL BCP-MCNC: 2.8 G/DL
ALP SERPL-CCNC: 75 U/L
ALT SERPL W/O P-5'-P-CCNC: 12 U/L
ANION GAP SERPL CALC-SCNC: 12 MMOL/L
AST SERPL-CCNC: 15 U/L
BASOPHILS # BLD AUTO: 0.02 K/UL
BASOPHILS NFR BLD: 0.2 %
BILIRUB SERPL-MCNC: 0.3 MG/DL
BUN SERPL-MCNC: 11 MG/DL
CALCIUM SERPL-MCNC: 8.2 MG/DL
CHLORIDE SERPL-SCNC: 102 MMOL/L
CO2 SERPL-SCNC: 23 MMOL/L
CREAT SERPL-MCNC: 0.7 MG/DL
DIFFERENTIAL METHOD: ABNORMAL
EOSINOPHIL # BLD AUTO: 0 K/UL
EOSINOPHIL NFR BLD: 0 %
ERYTHROCYTE [DISTWIDTH] IN BLOOD BY AUTOMATED COUNT: 18.4 %
EST. GFR  (AFRICAN AMERICAN): >60 ML/MIN/1.73 M^2
EST. GFR  (NON AFRICAN AMERICAN): >60 ML/MIN/1.73 M^2
GLUCOSE SERPL-MCNC: 221 MG/DL
HCT VFR BLD AUTO: 32.4 %
HGB BLD-MCNC: 10.2 G/DL
IMM GRANULOCYTES # BLD AUTO: 0.08 K/UL
IMM GRANULOCYTES NFR BLD AUTO: 0.6 %
LYMPHOCYTES # BLD AUTO: 1.5 K/UL
LYMPHOCYTES NFR BLD: 11.5 %
MAGNESIUM SERPL-MCNC: 1 MG/DL
MCH RBC QN AUTO: 27.1 PG
MCHC RBC AUTO-ENTMCNC: 31.5 G/DL
MCV RBC AUTO: 86 FL
MONOCYTES # BLD AUTO: 0.9 K/UL
MONOCYTES NFR BLD: 6.5 %
NEUTROPHILS # BLD AUTO: 10.6 K/UL
NEUTROPHILS NFR BLD: 81.2 %
NRBC BLD-RTO: 0 /100 WBC
PHOSPHATE SERPL-MCNC: 4.5 MG/DL
PLATELET # BLD AUTO: 338 K/UL
PMV BLD AUTO: 9.8 FL
POCT GLUCOSE: 163 MG/DL (ref 70–110)
POCT GLUCOSE: 184 MG/DL (ref 70–110)
POCT GLUCOSE: 224 MG/DL (ref 70–110)
POCT GLUCOSE: 229 MG/DL (ref 70–110)
POTASSIUM SERPL-SCNC: 3.6 MMOL/L
PROT SERPL-MCNC: 6.1 G/DL
RBC # BLD AUTO: 3.77 M/UL
SODIUM SERPL-SCNC: 137 MMOL/L
WBC # BLD AUTO: 12.99 K/UL

## 2018-08-03 PROCEDURE — 94761 N-INVAS EAR/PLS OXIMETRY MLT: CPT

## 2018-08-03 PROCEDURE — 63600175 PHARM REV CODE 636 W HCPCS: Performed by: STUDENT IN AN ORGANIZED HEALTH CARE EDUCATION/TRAINING PROGRAM

## 2018-08-03 PROCEDURE — 85025 COMPLETE CBC W/AUTO DIFF WBC: CPT

## 2018-08-03 PROCEDURE — 99900035 HC TECH TIME PER 15 MIN (STAT)

## 2018-08-03 PROCEDURE — 84100 ASSAY OF PHOSPHORUS: CPT

## 2018-08-03 PROCEDURE — 80053 COMPREHEN METABOLIC PANEL: CPT

## 2018-08-03 PROCEDURE — 99024 POSTOP FOLLOW-UP VISIT: CPT | Mod: ,,, | Performed by: OBSTETRICS & GYNECOLOGY

## 2018-08-03 PROCEDURE — 36415 COLL VENOUS BLD VENIPUNCTURE: CPT

## 2018-08-03 PROCEDURE — 83735 ASSAY OF MAGNESIUM: CPT

## 2018-08-03 PROCEDURE — 20600001 HC STEP DOWN PRIVATE ROOM

## 2018-08-03 PROCEDURE — 25000003 PHARM REV CODE 250: Performed by: OBSTETRICS & GYNECOLOGY

## 2018-08-03 PROCEDURE — 25000003 PHARM REV CODE 250: Performed by: STUDENT IN AN ORGANIZED HEALTH CARE EDUCATION/TRAINING PROGRAM

## 2018-08-03 RX ORDER — IBUPROFEN 400 MG/1
800 TABLET ORAL EVERY 6 HOURS
Status: DISCONTINUED | OUTPATIENT
Start: 2018-08-03 | End: 2018-08-05 | Stop reason: HOSPADM

## 2018-08-03 RX ORDER — ENOXAPARIN SODIUM 100 MG/ML
40 INJECTION SUBCUTANEOUS EVERY 24 HOURS
Status: DISCONTINUED | OUTPATIENT
Start: 2018-08-03 | End: 2018-08-05 | Stop reason: HOSPADM

## 2018-08-03 RX ORDER — ENOXAPARIN SODIUM 100 MG/ML
40 INJECTION SUBCUTANEOUS DAILY
Qty: 8.4 ML | Refills: 0 | Status: SHIPPED | OUTPATIENT
Start: 2018-08-03 | End: 2018-08-25

## 2018-08-03 RX ADMIN — OXYCODONE HYDROCHLORIDE 10 MG: 5 TABLET ORAL at 12:08

## 2018-08-03 RX ADMIN — IBUPROFEN 800 MG: 400 TABLET, FILM COATED ORAL at 05:08

## 2018-08-03 RX ADMIN — INSULIN ASPART 4 UNITS: 100 INJECTION, SOLUTION INTRAVENOUS; SUBCUTANEOUS at 09:08

## 2018-08-03 RX ADMIN — SODIUM CHLORIDE AND POTASSIUM CHLORIDE: 9; 1.49 INJECTION, SOLUTION INTRAVENOUS at 10:08

## 2018-08-03 RX ADMIN — INSULIN ASPART 1 UNITS: 100 INJECTION, SOLUTION INTRAVENOUS; SUBCUTANEOUS at 10:08

## 2018-08-03 RX ADMIN — ONDANSETRON 8 MG: 8 TABLET, ORALLY DISINTEGRATING ORAL at 07:08

## 2018-08-03 RX ADMIN — INSULIN ASPART 4 UNITS: 100 INJECTION, SOLUTION INTRAVENOUS; SUBCUTANEOUS at 12:08

## 2018-08-03 RX ADMIN — IBUPROFEN 800 MG: 400 TABLET, FILM COATED ORAL at 11:08

## 2018-08-03 RX ADMIN — SODIUM CHLORIDE AND POTASSIUM CHLORIDE: 9; 1.49 INJECTION, SOLUTION INTRAVENOUS at 12:08

## 2018-08-03 RX ADMIN — MUPIROCIN 1 G: 20 OINTMENT TOPICAL at 09:08

## 2018-08-03 RX ADMIN — ENOXAPARIN SODIUM 40 MG: 100 INJECTION SUBCUTANEOUS at 04:08

## 2018-08-03 RX ADMIN — AMLODIPINE BESYLATE 10 MG: 10 TABLET ORAL at 06:08

## 2018-08-03 RX ADMIN — BISOPROLOL FUMARATE 10 MG: 5 TABLET, COATED ORAL at 08:08

## 2018-08-03 RX ADMIN — MUPIROCIN 1 G: 20 OINTMENT TOPICAL at 08:08

## 2018-08-03 RX ADMIN — MAGNESIUM SULFATE HEPTAHYDRATE 1 G: 500 INJECTION, SOLUTION INTRAMUSCULAR; INTRAVENOUS at 12:08

## 2018-08-03 RX ADMIN — MAGNESIUM SULFATE HEPTAHYDRATE 1 G: 500 INJECTION, SOLUTION INTRAMUSCULAR; INTRAVENOUS at 11:08

## 2018-08-03 RX ADMIN — IBUPROFEN 800 MG: 800 INJECTION INTRAVENOUS at 06:08

## 2018-08-03 RX ADMIN — OXYCODONE HYDROCHLORIDE 10 MG: 5 TABLET ORAL at 10:08

## 2018-08-03 RX ADMIN — OXYCODONE HYDROCHLORIDE 10 MG: 5 TABLET ORAL at 04:08

## 2018-08-03 RX ADMIN — MAGNESIUM SULFATE HEPTAHYDRATE 1 G: 500 INJECTION, SOLUTION INTRAMUSCULAR; INTRAVENOUS at 10:08

## 2018-08-03 RX ADMIN — IBUPROFEN 800 MG: 800 INJECTION INTRAVENOUS at 01:08

## 2018-08-03 RX ADMIN — SENNOSIDES AND DOCUSATE SODIUM 1 TABLET: 8.6; 5 TABLET ORAL at 08:08

## 2018-08-03 RX ADMIN — PROCHLORPERAZINE EDISYLATE 5 MG: 5 INJECTION INTRAMUSCULAR; INTRAVENOUS at 12:08

## 2018-08-03 RX ADMIN — INSULIN ASPART 2 UNITS: 100 INJECTION, SOLUTION INTRAVENOUS; SUBCUTANEOUS at 05:08

## 2018-08-03 RX ADMIN — BISOPROLOL FUMARATE 10 MG: 5 TABLET, COATED ORAL at 11:08

## 2018-08-03 RX ADMIN — ACETAMINOPHEN 1000 MG: 10 INJECTION, SOLUTION INTRAVENOUS at 02:08

## 2018-08-03 RX ADMIN — ACETAMINOPHEN 1000 MG: 10 INJECTION, SOLUTION INTRAVENOUS at 05:08

## 2018-08-03 RX ADMIN — OXYCODONE HYDROCHLORIDE 10 MG: 5 TABLET ORAL at 05:08

## 2018-08-03 RX ADMIN — SENNOSIDES AND DOCUSATE SODIUM 1 TABLET: 8.6; 5 TABLET ORAL at 09:08

## 2018-08-03 NOTE — NURSING TRANSFER
Nursing Transfer Note      8/2/2018     Transfer To: [id number]    Transfer via stretcher    Transfer with bernadette chaney    Transported by transport    Medicines sent: no    Chart send with patient: Yes    Notified: charly

## 2018-08-03 NOTE — ANESTHESIA RELEASE NOTE
"Anesthesia Release from PACU Note    Patient: Saba Driver    Procedure(s) Performed: Procedure(s) (LRB):  HYSTERECTOMY, TOTAL, ABDOMINAL, WITH BILATERAL SALPINGO-OOPHORECTOMY (N/A)    Anesthesia type: general    Post pain: Adequate analgesia    Post assessment: no apparent anesthetic complications, tolerated procedure well and no evidence of recall    Last Vitals:   Visit Vitals  /62   Pulse 72   Temp 36.4 °C (97.6 °F) (Temporal)   Resp 19   Ht 5' 7" (1.702 m)   Wt 102.1 kg (225 lb)   SpO2 100%   Breastfeeding? No   BMI 35.24 kg/m²       Post vital signs: stable    Level of consciousness: awake, alert  and oriented    Nausea/Vomiting: no nausea/no vomiting    Complications: none    Airway Patency: patent    Respiratory: unassisted    Cardiovascular: stable and blood pressure at baseline    Hydration: euvolemic  "

## 2018-08-03 NOTE — BRIEF OP NOTE
[facility]  Brief Operative Note    SUMMARY     Surgery Date: 8/2/2018     Surgeon(s) and Role:     * Sushma K Reddy, MD - Resident - Assisting     * Noelle Lala MD - Primary     * Noelle Lala MD - Primary     * Evangelina Howell MD - Resident - Assisting     * Evangelina Howell MD - Resident - Assisting    Pre-op Diagnosis:  Endometrial cancer [C54.1]    Post-op Diagnosis:  Post-Op Diagnosis Codes:     * Endometrial cancer [C54.1]    Procedure(s) (LRB):  HYSTERECTOMY, TOTAL, ABDOMINAL, WITH BILATERAL SALPINGO-OOPHORECTOMY (N/A)    Anesthesia: General    Description of Procedure:   Midline abdominal incision revealing grossly enlarged fibroid uterus with intramural, cervical, and subserosal fibroids noted up to ~ 7 cm in greatest diameter. Uterus, cervix, bilateral tubes, and ovaries were removed without complication. Mild adhesive disease in the posterior cul-de-sac was noted and taken down without incident. No LN dissection was performed. Patient tolerated the procedure well and was taken to recovery in good condition.     Description of the findings of the procedure: as above    Estimated Blood Loss: 100 mL         Specimens:   Specimen (12h ago through future)    Uterus, cervix, bilateral fallopian tubs, ovaries sent for pathology review        Evangelina Howell MD, PhD  OBGYN, PGY-3

## 2018-08-03 NOTE — PLAN OF CARE
Patient is POD # 1 and progressing as expected.  Planing for possible d/c on Sunday if all post op milestones are met.  CM sent a message to the gynonc clinic to schedule f/u.  Rx for Lovenox was sent to [facility].  The patient is aware that the copay is $10.  States that she will have a family pick the rx up tomorrow morning.  Patient will need administration education prior to d/c.      Future Appointments  Date Time Provider Department Center   8/14/2018 11:00 AM CHAIR 06 [facility] [place] [facility]   8/29/2018 10:15 AM Noelle Lala MD [facility] GYN ONC [facility]        08/03/18 1151   Final Note   Assessment Type Final Discharge Note   Discharge Disposition Home   What phone number can be called within the next 1-3 days to see how you are doing after discharge? ([phone])   Hospital Follow Up  Appt(s) scheduled? Yes   Discharge plans and expectations educations in teach back method with documentation complete? Yes

## 2018-08-03 NOTE — SUBJECTIVE & OBJECTIVE
Interval History:   POD #1 s/p Xlap/BRIAN/BSO. Uncomplicated procedure,  cc. Patient reports moderate pain overnight. Tolerated some clears and breakfast this am. Vitals stable and normal. BG elevated in the 200s, but improving. Has not yet ambulated. UOP marginal overnight. Shonda d/c'd today. -flatus/-BM.    Scheduled Meds:   sodium chloride   Intravenous Once    acetaminophen  1,000 mg Intravenous Q8H    amLODIPine  10 mg Oral QAM    bisoprolol  10 mg Oral BID    ibuprofen  800 mg Intravenous Q6H    mupirocin  1 g Nasal BID    senna-docusate 8.6-50 mg  1 tablet Oral BID     Continuous Infusions:   0/9% NACL & POTASSIUM CHLORIDE 20 MEQ/L 125 mL/hr at 08/03/18 0013     PRN Meds:acetaminophen, dextrose 50%, dextrose 50%, diphenhydrAMINE, glucagon (human recombinant), glucose, glucose, HYDROmorphone, insulin aspart U-100, ondansetron, oxyCODONE, oxyCODONE, prochlorperazine, simethicone    Review of patient's allergies indicates:  No Known Allergies    Objective:     Vital Signs (Most Recent):  Temp: 97.7 °F (36.5 °C) (08/03/18 0413)  Pulse: 69 (08/03/18 0413)  Resp: 18 (08/03/18 0413)  BP: 139/65 (08/03/18 0413)  SpO2: 96 % (08/03/18 0413) Vital Signs (24h Range):  Temp:  [97 °F (36.1 °C)-98.2 °F (36.8 °C)] 97.7 °F (36.5 °C)  Pulse:  [58-75] 69  Resp:  [13-21] 18  SpO2:  [96 %-100 %] 96 %  BP: (110-149)/(53-68) 139/65     Weight: 109.1 kg (240 lb 9.6 oz)  Body mass index is 37.68 kg/m².    Intake/Output - Last 3 Shifts       08/01 0700 - 08/02 0659 08/02 0700 - 08/03 0659    I.V. (mL/kg)  3200 (29.3)    Total Intake(mL/kg)  3200 (29.3)    Urine (mL/kg/hr)  1135    Blood  100    Total Output   1235    Net   +1965                Physical Exam:   Constitutional: She is oriented to person, place, and time. She appears well-developed and well-nourished.    HENT:   Head: Normocephalic and atraumatic.    Eyes: Conjunctivae and EOM are normal. Pupils are equal, round, and reactive to light.    Neck: Normal range  of motion. Neck supple.    Cardiovascular: Normal rate, regular rhythm and normal heart sounds.     Pulmonary/Chest: Effort normal and breath sounds normal. No respiratory distress.        Abdominal: Soft. Bowel sounds are normal. She exhibits distension and abdominal incision. There is tenderness. There is no rebound and no guarding.   Midline addominal incision is bandaged with steristrips in place. No excessive drainage. Mild/appropriate TTP and distention. NO rebound or guarding.     Genitourinary:   Genitourinary Comments: Merchant in place draining clear, yellow urine; pelvic deferred           Musculoskeletal: Normal range of motion.       Neurological: She is alert and oriented to person, place, and time.    Skin: Skin is warm and dry. No rash noted.    Psychiatric: She has a normal mood and affect. Her behavior is normal. Judgment and thought content normal.     Lines/Drains/Airways     Central Venous Catheter Line                 Port A Cath Single Lumen 04/17/18 0955 right internal jugular 107 days          Drain                 Urethral Catheter 08/02/18 1410 Straight-tip;Non-latex 16 Fr. less than 1 day          Peripheral Intravenous Line                 Peripheral IV - Single Lumen 08/02/18 1004 Right Forearm less than 1 day         Peripheral IV - Single Lumen 08/02/18 1405 Left Wrist less than 1 day              Laboratory:    Recent Labs  Lab 07/30/18  1110 08/03/18  0440   WBC 7.41 12.99*   HGB 11.0* 10.2*   HCT 35.3* 32.4*   MCV 85 86    338        Recent Labs  Lab 08/03/18  0440      K 3.6      CO2 23   BUN 11   CREATININE 0.7   *   MG 1.0*   PHOS 4.5     Diagnostic Results:  Pathology pending

## 2018-08-03 NOTE — PROGRESS NOTES
Pt resting quietly, VSS, states abd pain 3/10 and tolerable, abd dressing intact, small amt of bloody drainage noted and marked, saniya po meds and fluids, niece and female visitor to bedside for 8pm visit, stable at present, awaiting room assignment.

## 2018-08-03 NOTE — TELEPHONE ENCOUNTER
----- Message from Nini Reed RN sent at 8/3/2018  9:38 AM CDT -----  Contact: Nini Davis Morning,    Ms Valdez will likely d/c this weekend.  Can you please schedule me a post op visit in 4-6 weeks?    Thanks     Nini Reed RN, CM  BMT   Ext. [phone]

## 2018-08-03 NOTE — PROGRESS NOTES
Pharmacist Intervention IV to PO Note    Saba Driver is a 57 y.o. female being treated with IV medication ibuprofen    Patient Data:    Vital Signs (Most Recent):  Temp: 98.1 °F (36.7 °C) (08/03/18 0756)  Pulse: 70 (08/03/18 0756)  Resp: 18 (08/03/18 0756)  BP: 135/63 (08/03/18 0756)  SpO2: 96 % (08/03/18 0756) Vital Signs (72h Range):  Temp:  [97 °F (36.1 °C)-98.2 °F (36.8 °C)]   Pulse:  [58-75]   Resp:  [13-21]   BP: (110-149)/(53-68)   SpO2:  [96 %-100 %]      CBC:    Recent Labs     Lab 07/30/18  1110 08/03/18  0440   WBC 7.41 12.99*   RBC 4.15 3.77*   HGB 11.0* 10.2*   HCT 35.3* 32.4*    338   MCV 85 86   MCH 26.5* 27.1   MCHC 31.2* 31.5*     CMP:     Recent Labs     Lab 08/03/18  0440   *   CALCIUM 8.2*   ALBUMIN 2.8*   PROT 6.1      K 3.6   CO2 23      BUN 11   CREATININE 0.7   ALKPHOS 75   ALT 12   AST 15   BILITOT 0.3       Dietary Orders:  Diet Orders            Diet diabetic [facility]; 2000 Calorie: Diabetic starting at 08/02 2210            Based on the following criteria, this patient qualifies for intravenous to oral conversion:  [x] The patients gastrointestinal tract is functioning (tolerating medications via oral or enteral route for 24 hours and tolerating food or enteral feeds for 24 hours).  [x] The patient is hemodynamically stable for 24 hours (heart rate <100 beats per minute, systolic blood pressure >99 mm Hg, and respiratory rate <20 breaths per minute).  [x] The patient shows clinical improvement (afebrile for at least 24 hours and white blood cell count downtrending or normalized). Additionally, the patient must be non-neutropenic (absolute neutrophil count >500 cells/mm3).  [x] For antimicrobials, the patient has received IV therapy for at least 24 hours.    IV medication ibuprofen will be changed to oral medication ibuprofen    Pharmacist's Name: Kalpesh Costa  Pharmacist's Extension: [phone]

## 2018-08-03 NOTE — ASSESSMENT & PLAN NOTE
- holding home diuretic perioperatively  - continue home bisprolol 10 mg daily, norvasc 10 mg daily  - BP: (110-149)/(53-68) 139/65

## 2018-08-03 NOTE — ANESTHESIA POSTPROCEDURE EVALUATION
"Anesthesia Post Evaluation    Patient: Saba Driver    Procedure(s) Performed: Procedure(s) (LRB):  HYSTERECTOMY, TOTAL, ABDOMINAL, WITH BILATERAL SALPINGO-OOPHORECTOMY (N/A)    Final Anesthesia Type: general  Patient location during evaluation: PACU  Patient participation: Yes- Able to Participate  Level of consciousness: awake and alert  Post-procedure vital signs: reviewed and stable  Pain management: adequate  Airway patency: patent  PONV status at discharge: No PONV  Anesthetic complications: no      Cardiovascular status: hemodynamically stable  Respiratory status: unassisted, spontaneous ventilation and room air  Hydration status: euvolemic  Follow-up not needed.        Visit Vitals  /62   Pulse 72   Temp 36.4 °C (97.6 °F) (Temporal)   Resp 19   Ht 5' 7" (1.702 m)   Wt 102.1 kg (225 lb)   SpO2 100%   Breastfeeding? No   BMI 35.24 kg/m²       Pain/Perla Score: Pain Assessment Performed: Yes (8/2/2018 10:00 PM)  Presence of Pain: complains of pain/discomfort (8/2/2018 10:00 PM)  Pain Rating Prior to Med Admin: 4 (8/2/2018 10:13 PM)  Pain Rating Post Med Admin: 4 (8/2/2018 10:00 PM)  Perla Score: 10 (8/2/2018 10:00 PM)      "

## 2018-08-03 NOTE — ASSESSMENT & PLAN NOTE
- Procedure complications: none  - EBL: 100 cc  - IVF @ 125 cc/h; d/c now that she is tolerating PO and UOP has picked up  - Diet: tolerated clears, ADAT to diabetic diet 2000 kcal  - Pain control: IV ibuprofen/tylenol, oxy IR 5/10 mg, 0.5 mg dilaudid BTP; transition to PO today  - In/Out: Fleming d/c'd this AM, POD #1; UOP marginal 0.45 cc/kg/h overnight; this AM with 600 cc in fleming bag  - Bowel function: -flatus/-BM  - PRN: simethicone, zofran, phenergan, benadryl  - Heme: Preop CBC 7.4/11/35.3/306, Post CBC 13/10.2/32.4/338; BMP normal, mild hyperglycemia   - PPX: ambulation encouraged, IS encouraged, lovenox anticoagulation to begin today, TEDs/SCDs in place    Disposition: Will plan to evaluate the patient as she meets her post-operative milestones. Will tentatively plan for discharge to home POD #2-3.

## 2018-08-03 NOTE — TELEPHONE ENCOUNTER
----- Message from Willian Howard sent at 8/3/2018  9:58 AM CDT -----  Contact: Nini gipson, done  Thanks   ----- Message -----  From: Emilie Zaman MA  Sent: 8/3/2018   9:54 AM  To: Willian Howard        ----- Message -----  From: Nini Reed RN  Sent: 8/3/2018   9:38 AM  To: Spike Gipson,    Ms Valdez will likely d/c this weekend.  Can you please schedule me a post op visit in 4-6 weeks?    Thanks     Nini Reed RN, CM  BMT   Ext. [phone]

## 2018-08-04 LAB
BASOPHILS # BLD AUTO: 0.07 K/UL
BASOPHILS NFR BLD: 0.5 %
DIFFERENTIAL METHOD: ABNORMAL
EOSINOPHIL # BLD AUTO: 0.1 K/UL
EOSINOPHIL NFR BLD: 0.9 %
ERYTHROCYTE [DISTWIDTH] IN BLOOD BY AUTOMATED COUNT: 19 %
GLUCOSE SERPL-MCNC: 226 MG/DL (ref 70–110)
HCT VFR BLD AUTO: 32.1 %
HGB BLD-MCNC: 9.9 G/DL
IMM GRANULOCYTES # BLD AUTO: 0.2 K/UL
IMM GRANULOCYTES NFR BLD AUTO: 1.5 %
LYMPHOCYTES # BLD AUTO: 2.5 K/UL
LYMPHOCYTES NFR BLD: 18.4 %
MAGNESIUM SERPL-MCNC: 1.5 MG/DL
MCH RBC QN AUTO: 26.5 PG
MCHC RBC AUTO-ENTMCNC: 30.8 G/DL
MCV RBC AUTO: 86 FL
MONOCYTES # BLD AUTO: 0.9 K/UL
MONOCYTES NFR BLD: 6.7 %
NEUTROPHILS # BLD AUTO: 9.9 K/UL
NEUTROPHILS NFR BLD: 72 %
NRBC BLD-RTO: 0 /100 WBC
PHOSPHATE SERPL-MCNC: 2.1 MG/DL
PLATELET # BLD AUTO: 228 K/UL
PMV BLD AUTO: 9.5 FL
POCT GLUCOSE: 148 MG/DL (ref 70–110)
POCT GLUCOSE: 160 MG/DL (ref 70–110)
POCT GLUCOSE: 188 MG/DL (ref 70–110)
POCT GLUCOSE: 223 MG/DL (ref 70–110)
RBC # BLD AUTO: 3.73 M/UL
WBC # BLD AUTO: 13.68 K/UL

## 2018-08-04 PROCEDURE — 84100 ASSAY OF PHOSPHORUS: CPT

## 2018-08-04 PROCEDURE — 83735 ASSAY OF MAGNESIUM: CPT

## 2018-08-04 PROCEDURE — 25000003 PHARM REV CODE 250: Performed by: STUDENT IN AN ORGANIZED HEALTH CARE EDUCATION/TRAINING PROGRAM

## 2018-08-04 PROCEDURE — 85025 COMPLETE CBC W/AUTO DIFF WBC: CPT

## 2018-08-04 PROCEDURE — 20600001 HC STEP DOWN PRIVATE ROOM

## 2018-08-04 PROCEDURE — 63600175 PHARM REV CODE 636 W HCPCS: Performed by: STUDENT IN AN ORGANIZED HEALTH CARE EDUCATION/TRAINING PROGRAM

## 2018-08-04 PROCEDURE — 36415 COLL VENOUS BLD VENIPUNCTURE: CPT

## 2018-08-04 PROCEDURE — 25000003 PHARM REV CODE 250: Performed by: OBSTETRICS & GYNECOLOGY

## 2018-08-04 RX ORDER — OXYCODONE AND ACETAMINOPHEN 5; 325 MG/1; MG/1
1 TABLET ORAL EVERY 4 HOURS PRN
Qty: 30 TABLET | Refills: 0 | Status: SHIPPED | OUTPATIENT
Start: 2018-08-04 | End: 2018-08-27

## 2018-08-04 RX ADMIN — POTASSIUM PHOSPHATE, MONOBASIC AND POTASSIUM PHOSPHATE, DIBASIC 15 MMOL: 224; 236 INJECTION, SOLUTION, CONCENTRATE INTRAVENOUS at 10:08

## 2018-08-04 RX ADMIN — MUPIROCIN 1 G: 20 OINTMENT TOPICAL at 08:08

## 2018-08-04 RX ADMIN — OXYCODONE HYDROCHLORIDE 10 MG: 5 TABLET ORAL at 08:08

## 2018-08-04 RX ADMIN — ENOXAPARIN SODIUM 40 MG: 100 INJECTION SUBCUTANEOUS at 05:08

## 2018-08-04 RX ADMIN — INSULIN ASPART 1 UNITS: 100 INJECTION, SOLUTION INTRAVENOUS; SUBCUTANEOUS at 10:08

## 2018-08-04 RX ADMIN — MAGNESIUM SULFATE HEPTAHYDRATE 1 G: 500 INJECTION, SOLUTION INTRAMUSCULAR; INTRAVENOUS at 09:08

## 2018-08-04 RX ADMIN — INSULIN ASPART 4 UNITS: 100 INJECTION, SOLUTION INTRAVENOUS; SUBCUTANEOUS at 12:08

## 2018-08-04 RX ADMIN — BISOPROLOL FUMARATE 10 MG: 5 TABLET, COATED ORAL at 08:08

## 2018-08-04 RX ADMIN — MAGNESIUM SULFATE HEPTAHYDRATE 1 G: 500 INJECTION, SOLUTION INTRAMUSCULAR; INTRAVENOUS at 10:08

## 2018-08-04 RX ADMIN — IBUPROFEN 800 MG: 400 TABLET, FILM COATED ORAL at 12:08

## 2018-08-04 RX ADMIN — ONDANSETRON 8 MG: 8 TABLET, ORALLY DISINTEGRATING ORAL at 10:08

## 2018-08-04 RX ADMIN — OXYCODONE HYDROCHLORIDE 10 MG: 5 TABLET ORAL at 03:08

## 2018-08-04 RX ADMIN — SODIUM CHLORIDE AND POTASSIUM CHLORIDE: 9; 1.49 INJECTION, SOLUTION INTRAVENOUS at 05:08

## 2018-08-04 RX ADMIN — SENNOSIDES AND DOCUSATE SODIUM 1 TABLET: 8.6; 5 TABLET ORAL at 08:08

## 2018-08-04 RX ADMIN — AMLODIPINE BESYLATE 10 MG: 10 TABLET ORAL at 06:08

## 2018-08-04 RX ADMIN — OXYCODONE HYDROCHLORIDE 5 MG: 5 TABLET ORAL at 06:08

## 2018-08-04 RX ADMIN — IBUPROFEN 800 MG: 400 TABLET, FILM COATED ORAL at 05:08

## 2018-08-04 RX ADMIN — INSULIN ASPART 2 UNITS: 100 INJECTION, SOLUTION INTRAVENOUS; SUBCUTANEOUS at 05:08

## 2018-08-04 NOTE — ASSESSMENT & PLAN NOTE
- holding home diuretic perioperatively  - continue home bisprolol 10 mg daily, norvasc 10 mg daily  - BP: (122-165)/(58-75) 165/75

## 2018-08-04 NOTE — PROGRESS NOTES
[facility]  Gynecologic Oncology  Progress Note      Patient Name: Saba Drievr  MRN: [id number]  Admission Date: 8/2/2018  Hospital Length of Stay: 2 days  Attending Provider: Noelle Lala MD  Primary Care Provider: Adela Verma MD  Principal Problem: Endometrial cancer    Follow-up For: Procedure(s) (LRB):  HYSTERECTOMY, TOTAL, ABDOMINAL, WITH BILATERAL SALPINGO-OOPHORECTOMY (N/A)  Post-Operative Day: 2 Days Post-Op  Subjective:      History of Present Illness:  Saba Driver is a 57 y.o. female with endometrial adenocarcinoma s/p 4 cycles of neoadjuvent carbo/taxol, here for scheduled Xlap/BRIAN/BSO/debulking and staging.     Per last clinic encounter:  Presents today for follow up. S/p 3 cycles neoadjuvant chemotherapy carbo/taxol with Dr. Boyd.      CT CAP 6/25/18  Impression   Significant decrease in size in the right inguinal node.  Lobulated enlarged uterus.  RECIST SUMMARY:  Date of prior examination for comparison: 03/22/2018  Lesion 1: Right inguinal node  1.4 cm(smallest diameter ).  Series 2 image 118.  Prior measurement 4.0 cm.      Oncologic history:  Referred by Dr. Shakir Alexis for postmenopausal bleeding. P0. On pelvic uterus is enlarged and fixed, unable to perform EMB due to altered anatomy of the cervix. She had been admitted to  for vaginal bleeding and anemia requiring transfusion. Fixed sore R inguinal node. Blind pap returned adenocarcinoma.      Pelvic US 3/12/18  UT enlarged 13.3cm with 5.1cm fibroid anterior. Limited visualization of EMS.  LOV 2.9cm  ROV 3.6cm     Medical co-morbidities include DM, HTN. No anticoagulation.      No prior abdominal surgeries     Family history significant for mom with pancreatic cancer, denies history of breast, ovarian, uterine, or colon cancer.      MMG last year normal per patient.      Seen in consult with me 3/21/18. Constellation of findings thus far consistent with an advanced gynecologic malignancy. Likely uterine in  origin. Inguinal adenopathy would make this Stage IVB. Pap cytology showing adenocarcinoma but need tissue diagnosis. Will plan IR for biopsy of inguinal node. Pelvic disease is surgically unresectable at present. Recommended neoadjuvant chemotherapy based on tissue diagnosis.      IR biopsy 4/4/18  FINAL PATHOLOGIC DIAGNOSIS  RIGHT INGUINAL LYMPH NODE, CT GUIDED BIOPSY WITH ONSITE ADEQUACY (CYTOLOGY AND CELL BLOCK):  - Adenocarcinoma. See note.  Note: The carcinoma is positive for CK7, p53, vimentin and ER (patchy). It is negative for CDX2, CEA, CK20 and WT1. This immunoprofile is compatible with an endometrial origin. All stains have appropriate controls.     Initiate neoadjuvant chemotherapy with Dr. Boyd. Carbo/taxol 4/19/18. IV port  Now s/p 4 cycles of neoadjuvent therapy.    Hospital Course:  08/02/2018 Admit to GYN oncology. To OR for planned procedures.  08/03/2018 POD #1 s/p Xlap/BRIAN/BSO. Uncomplicated procedure,  cc. Patient reports moderate pain overnight. Tolerated some clears. Vitals stable and normal. BG elevated in the 200s, but improving. Has not yet ambulated. UOP marginal overnight. Merchant d/c'd today. -flatus/-BM.  08/04/2018 POD#2, doing well this morning. Pain is well-controlled with pain medication; patient reports gas pain overnight causing her discomfort, improved this AM. Tolerating a regular diet with no n/v. Not yet passing flatus or having BM. Voiding spontaneously with no difficulty. Ambulated in room yesterday, today she is ambulating in halls. Will plan for discharge later today or tomorrow morning once patient is passing flatus.    Interval History:    POD#2, doing well this morning. Pain is well-controlled with pain medication; patient reports gas pain overnight causing her discomfort, improved this AM. Tolerating a regular diet with no n/v. Not yet passing flatus or having BM. Voiding spontaneously with no difficulty. Ambulated in room yesterday, today she is ambulating in  halls.    Scheduled Meds:   sodium chloride   Intravenous Once    amLODIPine  10 mg Oral QAM    bisoprolol  10 mg Oral BID    enoxaparin  40 mg Subcutaneous Daily    ibuprofen  800 mg Oral Q6H    magnesium sulfate IVPB  1 g Intravenous Q1H    mupirocin  1 g Nasal BID    potassium phosphate IVPB  15 mmol Intravenous Once    senna-docusate 8.6-50 mg  1 tablet Oral BID     Continuous Infusions:    PRN Meds:acetaminophen, dextrose 50%, dextrose 50%, diphenhydrAMINE, glucagon (human recombinant), glucose, glucose, HYDROmorphone, insulin aspart U-100, ondansetron, oxyCODONE, oxyCODONE, prochlorperazine, simethicone    Review of patient's allergies indicates:  No Known Allergies    Objective:     Vital Signs (Most Recent):  Temp: 98.3 °F (36.8 °C) (08/04/18 0821)  Pulse: 67 (08/04/18 0821)  Resp: 18 (08/04/18 0821)  BP: (!) 165/75 (08/04/18 0821)  SpO2: 96 % (08/04/18 0821) Vital Signs (24h Range):  Temp:  [97.5 °F (36.4 °C)-98.4 °F (36.9 °C)] 98.3 °F (36.8 °C)  Pulse:  [62-74] 67  Resp:  [17-20] 18  SpO2:  [95 %-97 %] 96 %  BP: (122-165)/(58-75) 165/75     Weight: 109.1 kg (240 lb 9.6 oz)  Body mass index is 37.68 kg/m².    Intake/Output - Last 3 Shifts       08/02 0700 - 08/03 0659 08/03 0700 - 08/04 0659 08/04 0700 - 08/05 0659    P.O.  1440 360    I.V. (mL/kg) 3200 (29.3) 4479.2 (41.1) 614.6 (5.6)    IV Piggyback   250    Total Intake(mL/kg) 3200 (29.3) 5919.2 (54.3) 1224.6 (11.2)    Urine (mL/kg/hr) 1135 2700 (1) 550 (1.2)    Blood 100      Total Output 1235 2700 550    Net +1965 +3219.2 +674.6           Urine Occurrence  3 x     Stool Occurrence  0 x     Emesis Occurrence  0 x           Physical Exam:   Constitutional: She is oriented to person, place, and time. She appears well-developed and well-nourished.    HENT:   Head: Normocephalic and atraumatic.    Eyes: Conjunctivae and EOM are normal. Pupils are equal, round, and reactive to light.    Neck: Normal range of motion. Neck supple.    Cardiovascular:  Normal rate, regular rhythm and normal heart sounds.     Pulmonary/Chest: Effort normal and breath sounds normal. No respiratory distress.        Abdominal: Soft. Bowel sounds are normal. She exhibits distension and abdominal incision. There is tenderness. There is no rebound and no guarding.   Midline abdominal incision is bandaged with steristrips in place. No excessive drainage. Soft/nontender abdomen with no rebound or guarding.     Genitourinary:   Genitourinary Comments: Merchant in place draining clear, yellow urine; pelvic deferred           Musculoskeletal: Normal range of motion.       Neurological: She is alert and oriented to person, place, and time.    Skin: Skin is warm and dry. No rash noted.    Psychiatric: She has a normal mood and affect. Her behavior is normal. Judgment and thought content normal.     Lines/Drains/Airways     Central Venous Catheter Line                 Port A Cath Single Lumen 04/17/18 0955 right internal jugular 109 days          Peripheral Intravenous Line                 Peripheral IV - Single Lumen 08/02/18 1004 Right Forearm 2 days         Peripheral IV - Single Lumen 08/02/18 1405 Left Wrist 1 day              Laboratory:    Recent Labs  Lab 07/30/18  1110 08/03/18  0440 08/04/18  0333   WBC 7.41 12.99* 13.68*   HGB 11.0* 10.2* 9.9*   HCT 35.3* 32.4* 32.1*   MCV 85 86 86    338 228        Recent Labs  Lab 08/03/18  0440 08/04/18  0333     --    K 3.6  --      --    CO2 23  --    BUN 11  --    CREATININE 0.7  --    *  --    MG 1.0* 1.5*   PHOS 4.5 2.1*     Diagnostic Results:  Pathology pending    Assessment/Plan:     * Endometrial cancer    - as above        S/P BRIAN-BSO 8/2/18    - Procedure complications: none  - EBL: 100 cc  - Diet: tolerating diabetic diet 2000 kcal  - Pain control: PO ibuprofen, oxy IR 5/10 mg, 0.5 mg dilaudid BTP  - In/Out: Merchant d/c'd POD #1; UOP adequate 1cc/kg/hr in last 24 hours  - Bowel function: -flatus/-BM  - PRN:  simethicone, zofran, phenergan, benadryl  - Heme: Preop CBC 7.4/11/35.3/306, Post CBC 13/10.2/32.4/338; BMP normal, mild hyperglycemia   - PPX: ambulation encouraged, IS encouraged, lovenox anticoagulation to begin today, TEDs/SCDs in place    Disposition: Will plan to evaluate the patient as she meets her post-operative milestones. Will tentatively plan for discharge to home today or tomorrow once patient is meeting all goals.           Adenocarcinoma    - s/p 4 cycles of neoadjuvent carbo/taxol  - now s/p Xlap/BRIAN/BSO  - began lovenox ppx yesterday with teaching done   - will continue at home on discharge x 3 weeks        Diabetes mellitus, type 2    - hold home meds  - SSI with POCT BG q6h   - mild hypergylcemia is improving        Hypertension    - holding home diuretic perioperatively  - continue home bisprolol 10 mg daily, norvasc 10 mg daily  - BP: (122-165)/(58-75) 165/75          VTE Risk Mitigation         Ordered     enoxaparin injection 40 mg  Daily      08/03/18 1012     IP VTE HIGH RISK PATIENT  Once      08/02/18 1755     Place STEPHANIA hose  Until discontinued      08/02/18 1755     Place sequential compression device  Until discontinued      08/02/18 1755        Sushma K Reddy, MD  Gynecologic Oncology  [facility]

## 2018-08-04 NOTE — ASSESSMENT & PLAN NOTE
- Procedure complications: none  - EBL: 100 cc  - Diet: tolerating diabetic diet 2000 kcal  - Pain control: PO ibuprofen, oxy IR 5/10 mg, 0.5 mg dilaudid BTP  - In/Out: Merchant d/c'd POD #1; UOP adequate 1cc/kg/hr in last 24 hours  - Bowel function: -flatus/-BM  - PRN: simethicone, zofran, phenergan, benadryl  - Heme: Preop CBC 7.4/11/35.3/306, Post CBC 13/10.2/32.4/338; BMP normal, mild hyperglycemia   - PPX: ambulation encouraged, IS encouraged, lovenox anticoagulation to begin today, TEDs/SCDs in place    Disposition: Will plan to evaluate the patient as she meets her post-operative milestones. Will tentatively plan for discharge to home today or tomorrow once patient is meeting all goals.

## 2018-08-04 NOTE — ASSESSMENT & PLAN NOTE
- s/p 4 cycles of neoadjuvent carbo/taxol  - now s/p Xlap/BRIAN/BSO  - began lovenox ppx yesterday with teaching done   - will continue at home on discharge x 3 weeks

## 2018-08-04 NOTE — SUBJECTIVE & OBJECTIVE
Interval History:    POD#2, doing well this morning. Pain is well-controlled with pain medication; patient reports gas pain overnight causing her discomfort, improved this AM. Tolerating a regular diet with no n/v. Not yet passing flatus or having BM. Voiding spontaneously with no difficulty. Ambulated in room yesterday, today she is ambulating in halls.    Scheduled Meds:   sodium chloride   Intravenous Once    amLODIPine  10 mg Oral QAM    bisoprolol  10 mg Oral BID    enoxaparin  40 mg Subcutaneous Daily    ibuprofen  800 mg Oral Q6H    magnesium sulfate IVPB  1 g Intravenous Q1H    mupirocin  1 g Nasal BID    potassium phosphate IVPB  15 mmol Intravenous Once    senna-docusate 8.6-50 mg  1 tablet Oral BID     Continuous Infusions:    PRN Meds:acetaminophen, dextrose 50%, dextrose 50%, diphenhydrAMINE, glucagon (human recombinant), glucose, glucose, HYDROmorphone, insulin aspart U-100, ondansetron, oxyCODONE, oxyCODONE, prochlorperazine, simethicone    Review of patient's allergies indicates:  No Known Allergies    Objective:     Vital Signs (Most Recent):  Temp: 98.3 °F (36.8 °C) (08/04/18 0821)  Pulse: 67 (08/04/18 0821)  Resp: 18 (08/04/18 0821)  BP: (!) 165/75 (08/04/18 0821)  SpO2: 96 % (08/04/18 0821) Vital Signs (24h Range):  Temp:  [97.5 °F (36.4 °C)-98.4 °F (36.9 °C)] 98.3 °F (36.8 °C)  Pulse:  [62-74] 67  Resp:  [17-20] 18  SpO2:  [95 %-97 %] 96 %  BP: (122-165)/(58-75) 165/75     Weight: 109.1 kg (240 lb 9.6 oz)  Body mass index is 37.68 kg/m².    Intake/Output - Last 3 Shifts       08/02 0700 - 08/03 0659 08/03 0700 - 08/04 0659 08/04 0700 - 08/05 0659    P.O.  1440 360    I.V. (mL/kg) 3200 (29.3) 4479.2 (41.1) 614.6 (5.6)    IV Piggyback   250    Total Intake(mL/kg) 3200 (29.3) 5919.2 (54.3) 1224.6 (11.2)    Urine (mL/kg/hr) 1135 2700 (1) 550 (1.2)    Blood 100      Total Output 1235 2700 550    Net +1965 +3219.2 +674.6           Urine Occurrence  3 x     Stool Occurrence  0 x     Emesis  Occurrence  0 x           Physical Exam:   Constitutional: She is oriented to person, place, and time. She appears well-developed and well-nourished.    HENT:   Head: Normocephalic and atraumatic.    Eyes: Conjunctivae and EOM are normal. Pupils are equal, round, and reactive to light.    Neck: Normal range of motion. Neck supple.    Cardiovascular: Normal rate, regular rhythm and normal heart sounds.     Pulmonary/Chest: Effort normal and breath sounds normal. No respiratory distress.        Abdominal: Soft. Bowel sounds are normal. She exhibits distension and abdominal incision. There is tenderness. There is no rebound and no guarding.   Midline abdominal incision is bandaged with steristrips in place. No excessive drainage. Soft/nontender abdomen with no rebound or guarding.     Genitourinary:   Genitourinary Comments: Merchant in place draining clear, yellow urine; pelvic deferred           Musculoskeletal: Normal range of motion.       Neurological: She is alert and oriented to person, place, and time.    Skin: Skin is warm and dry. No rash noted.    Psychiatric: She has a normal mood and affect. Her behavior is normal. Judgment and thought content normal.     Lines/Drains/Airways     Central Venous Catheter Line                 Port A Cath Single Lumen 04/17/18 0955 right internal jugular 109 days          Peripheral Intravenous Line                 Peripheral IV - Single Lumen 08/02/18 1004 Right Forearm 2 days         Peripheral IV - Single Lumen 08/02/18 1405 Left Wrist 1 day              Laboratory:    Recent Labs  Lab 07/30/18  1110 08/03/18  0440 08/04/18  0333   WBC 7.41 12.99* 13.68*   HGB 11.0* 10.2* 9.9*   HCT 35.3* 32.4* 32.1*   MCV 85 86 86    338 228        Recent Labs  Lab 08/03/18  0440 08/04/18  0333     --    K 3.6  --      --    CO2 23  --    BUN 11  --    CREATININE 0.7  --    *  --    MG 1.0* 1.5*   PHOS 4.5 2.1*     Diagnostic Results:  Pathology pending

## 2018-08-05 VITALS
TEMPERATURE: 98 F | OXYGEN SATURATION: 95 % | BODY MASS INDEX: 37.77 KG/M2 | HEART RATE: 59 BPM | HEIGHT: 67 IN | WEIGHT: 240.63 LBS | DIASTOLIC BLOOD PRESSURE: 73 MMHG | SYSTOLIC BLOOD PRESSURE: 185 MMHG | RESPIRATION RATE: 16 BRPM

## 2018-08-05 LAB
BASOPHILS # BLD AUTO: 0.05 K/UL
BASOPHILS NFR BLD: 0.6 %
DIFFERENTIAL METHOD: ABNORMAL
EOSINOPHIL # BLD AUTO: 0.3 K/UL
EOSINOPHIL NFR BLD: 3.1 %
ERYTHROCYTE [DISTWIDTH] IN BLOOD BY AUTOMATED COUNT: 18.8 %
HCT VFR BLD AUTO: 32.7 %
HGB BLD-MCNC: 10.1 G/DL
IMM GRANULOCYTES # BLD AUTO: 0.12 K/UL
IMM GRANULOCYTES NFR BLD AUTO: 1.4 %
LYMPHOCYTES # BLD AUTO: 2.7 K/UL
LYMPHOCYTES NFR BLD: 32.6 %
MAGNESIUM SERPL-MCNC: 1.4 MG/DL
MCH RBC QN AUTO: 26.9 PG
MCHC RBC AUTO-ENTMCNC: 30.9 G/DL
MCV RBC AUTO: 87 FL
MONOCYTES # BLD AUTO: 0.6 K/UL
MONOCYTES NFR BLD: 7.6 %
NEUTROPHILS # BLD AUTO: 4.5 K/UL
NEUTROPHILS NFR BLD: 54.7 %
NRBC BLD-RTO: 0 /100 WBC
PHOSPHATE SERPL-MCNC: 3.6 MG/DL
PLATELET # BLD AUTO: 378 K/UL
PMV BLD AUTO: 10.1 FL
POCT GLUCOSE: 162 MG/DL (ref 70–110)
RBC # BLD AUTO: 3.76 M/UL
WBC # BLD AUTO: 8.29 K/UL

## 2018-08-05 PROCEDURE — 84100 ASSAY OF PHOSPHORUS: CPT

## 2018-08-05 PROCEDURE — 83735 ASSAY OF MAGNESIUM: CPT

## 2018-08-05 PROCEDURE — 25000003 PHARM REV CODE 250: Performed by: OBSTETRICS & GYNECOLOGY

## 2018-08-05 PROCEDURE — 36415 COLL VENOUS BLD VENIPUNCTURE: CPT

## 2018-08-05 PROCEDURE — 25000003 PHARM REV CODE 250: Performed by: STUDENT IN AN ORGANIZED HEALTH CARE EDUCATION/TRAINING PROGRAM

## 2018-08-05 PROCEDURE — 85025 COMPLETE CBC W/AUTO DIFF WBC: CPT

## 2018-08-05 RX ORDER — POLYETHYLENE GLYCOL 3350 17 G/17G
17 POWDER, FOR SOLUTION ORAL ONCE
Status: DISCONTINUED | OUTPATIENT
Start: 2018-08-05 | End: 2018-08-05

## 2018-08-05 RX ORDER — LANOLIN ALCOHOL/MO/W.PET/CERES
400 CREAM (GRAM) TOPICAL DAILY
Refills: 0 | COMMUNITY
Start: 2018-08-05 | End: 2019-01-01

## 2018-08-05 RX ADMIN — IBUPROFEN 800 MG: 400 TABLET, FILM COATED ORAL at 06:08

## 2018-08-05 RX ADMIN — OXYCODONE HYDROCHLORIDE 10 MG: 5 TABLET ORAL at 10:08

## 2018-08-05 RX ADMIN — INSULIN ASPART 2 UNITS: 100 INJECTION, SOLUTION INTRAVENOUS; SUBCUTANEOUS at 07:08

## 2018-08-05 RX ADMIN — AMLODIPINE BESYLATE 10 MG: 10 TABLET ORAL at 07:08

## 2018-08-05 RX ADMIN — OXYCODONE HYDROCHLORIDE 5 MG: 5 TABLET ORAL at 08:08

## 2018-08-05 RX ADMIN — SENNOSIDES AND DOCUSATE SODIUM 1 TABLET: 8.6; 5 TABLET ORAL at 08:08

## 2018-08-05 RX ADMIN — IBUPROFEN 800 MG: 400 TABLET, FILM COATED ORAL at 12:08

## 2018-08-05 RX ADMIN — MUPIROCIN 1 G: 20 OINTMENT TOPICAL at 08:08

## 2018-08-05 RX ADMIN — BISOPROLOL FUMARATE 10 MG: 5 TABLET, COATED ORAL at 08:08

## 2018-08-05 NOTE — DISCHARGE SUMMARY
[facility]  Gynecologic Oncology  Discharge Summary    Patient Name: Saba Driver  MRN: [id number]  Admission Date: 8/2/2018  Hospital Length of Stay: 3 days  Discharge Date and Time:  08/05/2018 8:28 AM  Attending Physician: Noelle Lala MD   Discharging Provider: Sushma K Reddy, MD  Primary Care Provider: Adela Verma MD    HPI:   Saba Driver is a 57 y.o. female with endometrial adenocarcinoma s/p 4 cycles of neoadjuvent carbo/taxol, here for scheduled Xlap/BRIAN/BSO/debulking and staging.     Per last clinic encounter:  Presents today for follow up. S/p 3 cycles neoadjuvant chemotherapy carbo/taxol with Dr. Boyd.      CT CAP 6/25/18  Impression   Significant decrease in size in the right inguinal node.  Lobulated enlarged uterus.  RECIST SUMMARY:  Date of prior examination for comparison: 03/22/2018  Lesion 1: Right inguinal node  1.4 cm(smallest diameter ).  Series 2 image 118.  Prior measurement 4.0 cm.      Oncologic history:  Referred by Dr. Shakir Alexis for postmenopausal bleeding. P0. On pelvic uterus is enlarged and fixed, unable to perform EMB due to altered anatomy of the cervix. She had been admitted to  for vaginal bleeding and anemia requiring transfusion. Fixed sore R inguinal node. Blind pap returned adenocarcinoma.      Pelvic US 3/12/18  UT enlarged 13.3cm with 5.1cm fibroid anterior. Limited visualization of EMS.  LOV 2.9cm  ROV 3.6cm     Medical co-morbidities include DM, HTN. No anticoagulation.      No prior abdominal surgeries     Family history significant for mom with pancreatic cancer, denies history of breast, ovarian, uterine, or colon cancer.      MMG last year normal per patient.      Seen in consult with me 3/21/18. Constellation of findings thus far consistent with an advanced gynecologic malignancy. Likely uterine in origin. Inguinal adenopathy would make this Stage IVB. Pap cytology showing adenocarcinoma but need tissue diagnosis. Will plan IR for  biopsy of inguinal node. Pelvic disease is surgically unresectable at present. Recommended neoadjuvant chemotherapy based on tissue diagnosis.      IR biopsy 4/4/18  FINAL PATHOLOGIC DIAGNOSIS  RIGHT INGUINAL LYMPH NODE, CT GUIDED BIOPSY WITH ONSITE ADEQUACY (CYTOLOGY AND CELL BLOCK):  - Adenocarcinoma. See note.  Note: The carcinoma is positive for CK7, p53, vimentin and ER (patchy). It is negative for CDX2, CEA, CK20 and WT1. This immunoprofile is compatible with an endometrial origin. All stains have appropriate controls.     Initiate neoadjuvant chemotherapy with Dr. Boyd. Carbo/taxol 4/19/18. IV port  Now s/p 4 cycles of neoadjuvent therapy.    Hospital Course:  08/02/2018 Admit to GYN oncology. To OR for planned procedures.  08/03/2018 POD #1 s/p Xlap/BRIAN/BSO. Uncomplicated procedure,  cc. Patient reports moderate pain overnight. Tolerated some clears. Vitals stable and normal. BG elevated in the 200s, but improving. Has not yet ambulated. UOP marginal overnight. Merchant d/c'd today. -flatus/-BM.  08/04/2018 POD#2, doing well this morning. Pain is well-controlled with pain medication; patient reports gas pain overnight causing her discomfort, improved this AM. Tolerating a regular diet with no n/v. Not yet passing flatus or having BM this AM. Voiding spontaneously with no difficulty. Ambulated in room yesterday, today she is ambulating in halls.  08/05/2018 POD#3, doing well this AM. Continues to meet post-operative goals. Pain well-controlled, patient taking minimal pain medications. Ambulating frequently. +flatus/-BM. Stable for discharge this morning as patient is meeting all post-operative goals. Will have her follow up in clinic in 4 weeks.    Procedure(s) (LRB):  HYSTERECTOMY, TOTAL, ABDOMINAL, WITH BILATERAL SALPINGO-OOPHORECTOMY (N/A)     Consults         Status Ordering Provider     IP consult to case management  Once     Provider:  (Not yet assigned)    BREN Potts           Pending Diagnostic Studies:     None        Final Active Diagnoses:    Diagnosis Date Noted POA    PRINCIPAL PROBLEM:  Adenocarcinoma [C80.1] 03/24/2018 Yes    S/P BRIAN-BSO 8/2/18 [Z90.710, Z90.722, Z90.79] 08/02/2018 Not Applicable    Endometrial cancer [C54.1] 04/14/2018 Yes    Hypertension [I10] 08/02/2017 Yes    Diabetes mellitus, type 2 [E11.9] 08/02/2017 Yes      Problems Resolved During this Admission:    Diagnosis Date Noted Date Resolved POA        Does this patient meet criteria for extended DVT prophylaxis? Yes, patient was prescribed Lovenox daily with lovenox teaching done prior to discharge.     Discharged Condition: good    Disposition: Home or Self Care    Follow Up:  Follow-up Information     Noelle Lala MD.    Specialty:  Gynecologic Oncology  Why:  Post-op checkup  Contact information:  [address]  [facility] [address]  [phone]                 Patient Instructions:     Call MD for:  temperature >100.4     Call MD for:  persistent nausea and vomiting or diarrhea     Call MD for:  severe uncontrolled pain     Call MD for:   Scheduling Instructions: Bright red vaginal bleeding heavier than spotting     Other restrictions (specify):   Order Comments: No heavy lifting for 4-6 weeks (nothing heavier than 10 lbs)  Nothing in the vagina for 6 weeks -no sex, tampons, douching, etc.  No tub baths - showers only - for 6 weeks       Medications:  Reconciled Home Medications:      Medication List      START taking these medications    enoxaparin 40 mg/0.4 mL Syrg  Commonly known as:  LOVENOX  Inject 0.4 mLs (40 mg total) into the skin once daily for 21 days     magnesium oxide 400 mg tablet  Commonly known as:  MAG-OX  Take 1 tablet (400 mg total) by mouth once daily.     oxyCODONE-acetaminophen 5-325 mg per tablet  Commonly known as:  PERCOCET  Take 1 tablet by mouth every 4 (four) hours as needed for Pain.        CONTINUE taking these medications    amLODIPine 10 MG  tablet  Commonly known as:  NORVASC  Take 10 mg by mouth every morning.     bisoprolol 10 MG tablet  Commonly known as:  ZEBETA  Take 10 mg by mouth 2 (two) times daily.     hydroCHLOROthiazide 25 MG tablet  Commonly known as:  HYDRODIURIL  Take 25 mg by mouth every morning.     LUMIGAN 0.01 % Drop  Generic drug:  bimatoprost  Place 1 drop into both eyes every evening.     metFORMIN 1000 MG tablet  Commonly known as:  GLUCOPHAGE  Take 1,000 mg by mouth 2 (two) times daily with meals.     ondansetron 8 MG tablet  Commonly known as:  ZOFRAN  Take 1 tablet (8 mg total) by mouth every 12 (twelve) hours as needed for Nausea.     prochlorperazine 10 MG tablet  Commonly known as:  COMPAZINE  Take 1 tablet (10 mg total) by mouth every 6 (six) hours as needed.     timolol maleate 0.5% 0.5 % Drop  Commonly known as:  TIMOPTIC  Place 1 drop into both eyes 2 (two) times daily.            Sushma K Reddy, MD  Gynecologic Oncology  [facility]

## 2018-08-05 NOTE — SUBJECTIVE & OBJECTIVE
Interval History:   POD#3, doing well this AM. Continues to meet post-operative goals. Pain well-controlled, patient taking minimal pain medications. Ambulating frequently. +flatus/-BM.     Scheduled Meds:   sodium chloride   Intravenous Once    amLODIPine  10 mg Oral QAM    bisoprolol  10 mg Oral BID    enoxaparin  40 mg Subcutaneous Daily    ibuprofen  800 mg Oral Q6H    magnesium sulfate IVPB  1 g Intravenous Q1H    mupirocin  1 g Nasal BID    senna-docusate 8.6-50 mg  1 tablet Oral BID     Continuous Infusions:    PRN Meds:acetaminophen, dextrose 50%, dextrose 50%, diphenhydrAMINE, glucagon (human recombinant), glucose, glucose, HYDROmorphone, insulin aspart U-100, ondansetron, oxyCODONE, oxyCODONE, prochlorperazine, simethicone    Review of patient's allergies indicates:  No Known Allergies    Objective:     Vital Signs (Most Recent):  Temp: 98 °F (36.7 °C) (08/05/18 0728)  Pulse: (!) 59 (08/05/18 0728)  Resp: 16 (08/05/18 0728)  BP: (!) 185/73 (08/05/18 0728)  SpO2: 95 % (08/05/18 0728) Vital Signs (24h Range):  Temp:  [97.9 °F (36.6 °C)-98.6 °F (37 °C)] 98 °F (36.7 °C)  Pulse:  [59-68] 59  Resp:  [16-20] 16  SpO2:  [93 %-100 %] 95 %  BP: (155-185)/(71-75) 185/73     Weight: 109.1 kg (240 lb 9.6 oz)  Body mass index is 37.68 kg/m².    Intake/Output - Last 3 Shifts       08/03 0700 - 08/04 0659 08/04 0700 - 08/05 0659 08/05 0700 - 08/06 0659    P.O. 1440 1440     I.V. (mL/kg) 4479.2 (41.1) 614.6 (5.6)     IV Piggyback  250     Total Intake(mL/kg) 5919.2 (54.3) 2304.6 (21.1)     Urine (mL/kg/hr) 2700 (1) 1700 (0.6)     Total Output 2700 1700      Net +3219.2 +604.6             Urine Occurrence 3 x      Stool Occurrence 0 x      Emesis Occurrence 0 x            Physical Exam:   Constitutional: She is oriented to person, place, and time. She appears well-developed and well-nourished.    HENT:   Head: Normocephalic and atraumatic.    Eyes: Conjunctivae and EOM are normal. Pupils are equal, round, and  reactive to light.    Neck: Normal range of motion. Neck supple.    Cardiovascular: Normal rate, regular rhythm and normal heart sounds.     Pulmonary/Chest: Effort normal and breath sounds normal. No respiratory distress.        Abdominal: Soft. Bowel sounds are normal. She exhibits abdominal incision. She exhibits no distension. There is no tenderness. There is no rebound and no guarding.   Midline abdominal incision is bandaged with steristrips in place, dried blood on steri strips but no active bleeding or bright red blood. Soft/nontender abdomen with no rebound or guarding.     Genitourinary:   Genitourinary Comments: Merchant in place draining clear, yellow urine; pelvic deferred           Musculoskeletal: Normal range of motion.       Neurological: She is alert and oriented to person, place, and time.    Skin: Skin is warm and dry. No rash noted.    Psychiatric: She has a normal mood and affect. Her behavior is normal. Judgment and thought content normal.     Lines/Drains/Airways     Central Venous Catheter Line                 Port A Cath Single Lumen 04/17/18 0955 right internal jugular 109 days          Peripheral Intravenous Line                 Peripheral IV - Single Lumen 08/02/18 1004 Right Forearm 2 days         Peripheral IV - Single Lumen 08/02/18 1405 Left Wrist 2 days              Laboratory:    Recent Labs  Lab 08/03/18  0440 08/04/18  0333 08/05/18  0512   WBC 12.99* 13.68* 8.29   HGB 10.2* 9.9* 10.1*   HCT 32.4* 32.1* 32.7*   MCV 86 86 87    228 378*        Recent Labs  Lab 08/03/18  0440 08/04/18  0333 08/05/18  0512     --   --    K 3.6  --   --      --   --    CO2 23  --   --    BUN 11  --   --    CREATININE 0.7  --   --    *  --   --    MG 1.0* 1.5* 1.4*   PHOS 4.5 2.1* 3.6     Diagnostic Results:  Pathology pending

## 2018-08-05 NOTE — PROGRESS NOTES
[facility]  Gynecologic Oncology  Progress Note      Patient Name: Saba Driver  MRN: [id number]  Admission Date: 8/2/2018  Hospital Length of Stay: 3 days  Attending Provider: Noelle Lala MD  Primary Care Provider: Adela Verma MD  Principal Problem: Adenocarcinoma    Follow-up For: Procedure(s) (LRB):  HYSTERECTOMY, TOTAL, ABDOMINAL, WITH BILATERAL SALPINGO-OOPHORECTOMY (N/A)  Post-Operative Day: 3 Days Post-Op  Subjective:      History of Present Illness:  Saba Driver is a 57 y.o. female with endometrial adenocarcinoma s/p 4 cycles of neoadjuvent carbo/taxol, here for scheduled Xlap/BRIAN/BSO/debulking and staging.     Per last clinic encounter:  Presents today for follow up. S/p 3 cycles neoadjuvant chemotherapy carbo/taxol with Dr. Boyd.      CT CAP 6/25/18  Impression   Significant decrease in size in the right inguinal node.  Lobulated enlarged uterus.  RECIST SUMMARY:  Date of prior examination for comparison: 03/22/2018  Lesion 1: Right inguinal node  1.4 cm(smallest diameter ).  Series 2 image 118.  Prior measurement 4.0 cm.      Oncologic history:  Referred by Dr. Shakir Alexis for postmenopausal bleeding. P0. On pelvic uterus is enlarged and fixed, unable to perform EMB due to altered anatomy of the cervix. She had been admitted to  for vaginal bleeding and anemia requiring transfusion. Fixed sore R inguinal node. Blind pap returned adenocarcinoma.      Pelvic US 3/12/18  UT enlarged 13.3cm with 5.1cm fibroid anterior. Limited visualization of EMS.  LOV 2.9cm  ROV 3.6cm     Medical co-morbidities include DM, HTN. No anticoagulation.      No prior abdominal surgeries     Family history significant for mom with pancreatic cancer, denies history of breast, ovarian, uterine, or colon cancer.      MMG last year normal per patient.      Seen in consult with me 3/21/18. Constellation of findings thus far consistent with an advanced gynecologic malignancy. Likely uterine in origin.  Inguinal adenopathy would make this Stage IVB. Pap cytology showing adenocarcinoma but need tissue diagnosis. Will plan IR for biopsy of inguinal node. Pelvic disease is surgically unresectable at present. Recommended neoadjuvant chemotherapy based on tissue diagnosis.      IR biopsy 4/4/18  FINAL PATHOLOGIC DIAGNOSIS  RIGHT INGUINAL LYMPH NODE, CT GUIDED BIOPSY WITH ONSITE ADEQUACY (CYTOLOGY AND CELL BLOCK):  - Adenocarcinoma. See note.  Note: The carcinoma is positive for CK7, p53, vimentin and ER (patchy). It is negative for CDX2, CEA, CK20 and WT1. This immunoprofile is compatible with an endometrial origin. All stains have appropriate controls.     Initiate neoadjuvant chemotherapy with Dr. Boyd. Carbo/taxol 4/19/18. IV port  Now s/p 4 cycles of neoadjuvent therapy.    Hospital Course:  08/02/2018 Admit to GYN oncology. To OR for planned procedures.  08/03/2018 POD #1 s/p Xlap/BRIAN/BSO. Uncomplicated procedure,  cc. Patient reports moderate pain overnight. Tolerated some clears. Vitals stable and normal. BG elevated in the 200s, but improving. Has not yet ambulated. UOP marginal overnight. Merchant d/c'd today. -flatus/-BM.  08/04/2018 POD#2, doing well this morning. Pain is well-controlled with pain medication; patient reports gas pain overnight causing her discomfort, improved this AM. Tolerating a regular diet with no n/v. Not yet passing flatus or having BM this AM. Voiding spontaneously with no difficulty. Ambulated in room yesterday, today she is ambulating in halls.  08/05/2018 POD#3, doing well this AM. Continues to meet post-operative goals. Pain well-controlled, patient taking minimal pain medications. Ambulating frequently. +flatus/-BM. Stable for discharge this morning as patient is meeting all post-operative goals. Will have her follow up in clinic in 4 weeks.    Interval History:   POD#3, doing well this AM. Continues to meet post-operative goals. Pain well-controlled, patient taking minimal  pain medications. Ambulating frequently. +flatus/-BM.     Scheduled Meds:   sodium chloride   Intravenous Once    amLODIPine  10 mg Oral QAM    bisoprolol  10 mg Oral BID    enoxaparin  40 mg Subcutaneous Daily    ibuprofen  800 mg Oral Q6H    magnesium sulfate IVPB  1 g Intravenous Q1H    mupirocin  1 g Nasal BID    senna-docusate 8.6-50 mg  1 tablet Oral BID     Continuous Infusions:    PRN Meds:acetaminophen, dextrose 50%, dextrose 50%, diphenhydrAMINE, glucagon (human recombinant), glucose, glucose, HYDROmorphone, insulin aspart U-100, ondansetron, oxyCODONE, oxyCODONE, prochlorperazine, simethicone    Review of patient's allergies indicates:  No Known Allergies    Objective:     Vital Signs (Most Recent):  Temp: 98 °F (36.7 °C) (08/05/18 0728)  Pulse: (!) 59 (08/05/18 0728)  Resp: 16 (08/05/18 0728)  BP: (!) 185/73 (08/05/18 0728)  SpO2: 95 % (08/05/18 0728) Vital Signs (24h Range):  Temp:  [97.9 °F (36.6 °C)-98.6 °F (37 °C)] 98 °F (36.7 °C)  Pulse:  [59-68] 59  Resp:  [16-20] 16  SpO2:  [93 %-100 %] 95 %  BP: (155-185)/(71-75) 185/73     Weight: 109.1 kg (240 lb 9.6 oz)  Body mass index is 37.68 kg/m².    Intake/Output - Last 3 Shifts       08/03 0700 - 08/04 0659 08/04 0700 - 08/05 0659 08/05 0700 - 08/06 0659    P.O. 1440 1440     I.V. (mL/kg) 4479.2 (41.1) 614.6 (5.6)     IV Piggyback  250     Total Intake(mL/kg) 5919.2 (54.3) 2304.6 (21.1)     Urine (mL/kg/hr) 2700 (1) 1700 (0.6)     Total Output 2700 1700      Net +3219.2 +604.6             Urine Occurrence 3 x      Stool Occurrence 0 x      Emesis Occurrence 0 x            Physical Exam:   Constitutional: She is oriented to person, place, and time. She appears well-developed and well-nourished.    HENT:   Head: Normocephalic and atraumatic.    Eyes: Conjunctivae and EOM are normal. Pupils are equal, round, and reactive to light.    Neck: Normal range of motion. Neck supple.    Cardiovascular: Normal rate, regular rhythm and normal heart sounds.      Pulmonary/Chest: Effort normal and breath sounds normal. No respiratory distress.        Abdominal: Soft. Bowel sounds are normal. She exhibits abdominal incision. She exhibits no distension. There is no tenderness. There is no rebound and no guarding.   Midline abdominal incision is bandaged with steristrips in place, dried blood on steri strips but no active bleeding or bright red blood. Soft/nontender abdomen with no rebound or guarding.     Genitourinary:   Genitourinary Comments: Merchant in place draining clear, yellow urine; pelvic deferred           Musculoskeletal: Normal range of motion.       Neurological: She is alert and oriented to person, place, and time.    Skin: Skin is warm and dry. No rash noted.    Psychiatric: She has a normal mood and affect. Her behavior is normal. Judgment and thought content normal.     Lines/Drains/Airways     Central Venous Catheter Line                 Port A Cath Single Lumen 04/17/18 0955 right internal jugular 109 days          Peripheral Intravenous Line                 Peripheral IV - Single Lumen 08/02/18 1004 Right Forearm 2 days         Peripheral IV - Single Lumen 08/02/18 1405 Left Wrist 2 days              Laboratory:    Recent Labs  Lab 08/03/18 0440 08/04/18  0333 08/05/18  0512   WBC 12.99* 13.68* 8.29   HGB 10.2* 9.9* 10.1*   HCT 32.4* 32.1* 32.7*   MCV 86 86 87    228 378*        Recent Labs  Lab 08/03/18 0440 08/04/18  0333 08/05/18  0512     --   --    K 3.6  --   --      --   --    CO2 23  --   --    BUN 11  --   --    CREATININE 0.7  --   --    *  --   --    MG 1.0* 1.5* 1.4*   PHOS 4.5 2.1* 3.6     Diagnostic Results:  Pathology pending    Assessment/Plan:     * Adenocarcinoma    - s/p 4 cycles of neoadjuvent carbo/taxol  - now s/p Xlap/BRIAN/BSO  - began lovenox ppx yesterday with teaching done   - will continue at home on discharge x 3 weeks        S/P BRIAN-BSO 8/2/18    - Procedure complications: none  - EBL: 100 cc  - Diet:  tolerating diabetic diet 2000 kcal  - Pain control: PO ibuprofen, oxy IR 5/10 mg, 0.5 mg dilaudid BTP  - In/Out: Merchant d/c'd POD #1; UOP adequate 0.6cc/kg/hr in last 24 hours  - Bowel function: +flatus/-BM  - PRN: simethicone, zofran, phenergan, benadryl  - Heme: Preop CBC 7.4/11/35.3/306, Post CBC 13/10.2/32.4/338; BMP normal, mild hyperglycemia   - PPX: ambulation encouraged, IS encouraged, lovenox anticoagulation to begin today, TEDs/SCDs in place    Disposition: Will plan to discharge today with 4 week follow up in clinic, as patient is meeting all post-operative goals.           Endometrial cancer    - as above        Diabetes mellitus, type 2    - hold home meds  - SSI with POCT BG q6h   - mild hypergylcemia is improving        Hypertension    - continue home bisprolol 10 mg daily, norvasc 10 mg daily  - holding home diuretic perioperatively - will have patient restart on discharge given stable/elevated BP  - BP: (155-185)/(71-75) 185/73          VTE Risk Mitigation         Ordered     enoxaparin injection 40 mg  Daily      08/03/18 1012     IP VTE HIGH RISK PATIENT  Once      08/02/18 1755     Place STEPHANIA hose  Until discontinued      08/02/18 1755     Place sequential compression device  Until discontinued      08/02/18 1755        Sushma K Reddy, MD  Gynecologic Oncology  [facility]

## 2018-08-05 NOTE — ASSESSMENT & PLAN NOTE
- Procedure complications: none  - EBL: 100 cc  - Diet: tolerating diabetic diet 2000 kcal  - Pain control: PO ibuprofen, oxy IR 5/10 mg, 0.5 mg dilaudid BTP  - In/Out: Merchant d/c'd POD #1; UOP adequate 0.6cc/kg/hr in last 24 hours  - Bowel function: +flatus/-BM  - PRN: simethicone, zofran, phenergan, benadryl  - Heme: Preop CBC 7.4/11/35.3/306, Post CBC 13/10.2/32.4/338; BMP normal, mild hyperglycemia   - PPX: ambulation encouraged, IS encouraged, lovenox anticoagulation to begin today, TEDs/SCDs in place    Disposition: Will plan to discharge today with 4 week follow up in clinic, as patient is meeting all post-operative goals.

## 2018-08-05 NOTE — ASSESSMENT & PLAN NOTE
- continue home bisprolol 10 mg daily, norvasc 10 mg daily  - holding home diuretic perioperatively - will have patient restart on discharge given stable/elevated BP  - BP: (155-185)/(71-75) 185/73

## 2018-08-05 NOTE — NURSING
ROADTEST  O2- Pt on room air sating 95%  Activity-Pt ambulates independently  Devices- Pt not being sent home on any devices.   Tolerating-Pt tolerating PO diet and medication.  Elimination-Pt voiding and having bowel movements independently.  Self Care- Pt able to do personal hygiene independently  Teaching- Pt instructed on when to take home meds.     Pt's peripheral IVs removed. Cath tips intact. Pt tolerated well. AVS and prescriptions given to pt. All questions answered. Pt verbalized understanding. Pt awaiting transport at this time.

## 2018-08-05 NOTE — PLAN OF CARE
Problem: Patient Care Overview  Goal: Plan of Care Review  Outcome: Ongoing (interventions implemented as appropriate)  Pt AAOx4 and independent with nonskid footwear on and bed locked and in lowest position with bed rails up x2. Call light is within reach. Pt ambulating in room without difficulty. Pt with c/o abdominal pain at midline incision relieved by 10 mg oxycodone prn x1. Incision open to air with steristrips intact, scant serosanguinous drainage noted at site. Pt voiding in hat in toilet without difficulty, unable to pass gas at this time. Remained afebrile throughout shift with no falls or injuries. Will continue to monitor Pt.

## 2018-08-06 LAB
BLD PROD TYP BPU: NORMAL
BLD PROD TYP BPU: NORMAL
BLOOD UNIT EXPIRATION DATE: NORMAL
BLOOD UNIT EXPIRATION DATE: NORMAL
BLOOD UNIT TYPE CODE: 2800
BLOOD UNIT TYPE CODE: 2800
BLOOD UNIT TYPE: NORMAL
BLOOD UNIT TYPE: NORMAL
CODING SYSTEM: NORMAL
CODING SYSTEM: NORMAL
DISPENSE STATUS: NORMAL
DISPENSE STATUS: NORMAL
NUM UNITS TRANS PACKED RBC: NORMAL
NUM UNITS TRANS PACKED RBC: NORMAL

## 2018-08-08 NOTE — TRANSFER OF CARE
"Anesthesia Transfer of Care Note    Patient: Saba Driver    Procedure(s) Performed: Procedure(s) (LRB):  HYSTERECTOMY, TOTAL, ABDOMINAL, WITH BILATERAL SALPINGO-OOPHORECTOMY (N/A)    Patient location: PACU    Anesthesia Type: general    Transport from OR: Transported from OR on 6-10 L/min O2 by face mask with adequate spontaneous ventilation    Post pain: adequate analgesia    Post assessment: no apparent anesthetic complications and tolerated procedure well    Post vital signs: stable    Level of consciousness: awake and alert    Nausea/Vomiting: no nausea/vomiting    Complications: none    Transfer of care protocol was followed      Last vitals:   Visit Vitals  BP (!) 185/73 (BP Location: Right arm, Patient Position: Lying)   Pulse (!) 59   Temp 36.7 °C (98 °F) (Oral)   Resp 16   Ht 5' 7" (1.702 m)   Wt 109.1 kg (240 lb 9.6 oz)   SpO2 95%   Breastfeeding? No   BMI 37.68 kg/m²     "

## 2018-08-12 NOTE — OP NOTE
DATE OF PROCEDURE:  8/2/18.     SURGEON:  Noelle Lala M.D.     ASSISTANTS:  Evangelina Howell M.D. (RES), and Sushma Reddy, M.D. (RES).     PREOPERATIVE DIAGNOSIS:    1. Stage IVB endometrial cancer  2. 30cm enlarged uterus with multiple leiomyoma  3. S/p neoadjuvant chemotherapy     POSTOPERATIVE DIAGNOSIS:    1. Stage IVB endometrial cancer  2. 30cm enlarged uterus with multiple leiomyoma  3. S/p neoadjuvant chemotherapy    PROCEDURES PERFORMED:  Total abdominal hysterectomy, bilateral salpingo-oophorectomy     ANESTHESIA:  General endotracheal anesthesia.     ESTIMATED BLOOD LOSS:  100 mL.     SPECIMENS REMOVED:  Uterus, cervix, fallopian tubes, and ovaries.     FINDINGS:  Upon exploration of the abdomen there was a 30cm multifibroid uterus, normal fallopian tubes and ovaries bilaterally. Inguinal lymph node was not palpable and thus not resected. The surgery was technically difficult due to uterine size requiring meticulous dissection and prolonged surgery time than normal.       PROCEDURE IN DETAIL:  The patient was taken to the Operating Room.  Informed consent had been obtained.  She underwent general endotracheal anesthesia without difficulty and was prepped and draped in a normal sterile fashion in dorsal lithotomy position.  Timeout was performed.  All parties agreed to the planned procedure.  Perioperative antibiotics were administered.  Merchant catheter had been placed under sterile conditions.       Midline vertical skin incision was made with the scalpel and carried down to the underlying layer of fascia. The fascia was incised in the midline and the incision extended superiorly and inferiorly.  The peritoneum was identified.  This was tented up.  This was entered sharply and the peritoneal incision was extended superiorly and inferiorly.  Bookwalter retractor was assembled and bowel was packed away with moist laparotomy sponges.  A large 30-week size multifibroid uterus was then exteriorized from the  abdomen. The bilateral round ligaments were identified.  These were suture ligated and transected.  The posterior leaf of the broad ligament was then opened bilaterally and the retroperitoneum was identified and bilateral ureters were identified.  The bilateral infundibulopelvic ligaments were then skeletonized.  These were doubly clamped with Garcia hysterectomy clamps, transected, and doubly suture ligated.  We then opened the anterior leaf of the broad ligament circumferentially and proper plane for the bladder flap was identified. Bilateral uterine arteries were then skeletonized.  These were clamped and suture ligated.  We then serially clamped, transected, and suture ligated the remainder of the uterosacral and cardinal ligaments to the level of there cervicovaginal junction. We clamped underneath the cervix and amputated the specimen. The vaginal cuff was them closed with 0-vicryl suture in running fashion.  We then reinspected the pelvis.  Bilateral ureters were noted to be peristalsing both briskly and equally and vascular pedicles were hemostatic.       Exparel was then placed superiorly and inferiorly to the fascia. We then reapproximated the fascia with a #1 looped PDS in a running fashion.  We then reapproximated the subcutaneous tissue with 2-0 Vicryl suture and the skin was closed with a subcuticular stitch.  The patient tolerated the procedure well. Sponge, lap, needle, and instrument counts were correct x2 as reported by the circulating nurse.  She was awakened from anesthesia and taken to recovery in stable condition.

## 2018-08-14 ENCOUNTER — INFUSION (OUTPATIENT)
Dept: INFUSION THERAPY | Facility: HOSPITAL | Age: 58
End: 2018-08-14
Attending: INTERNAL MEDICINE
Payer: COMMERCIAL

## 2018-08-14 DIAGNOSIS — C54.1 ENDOMETRIAL CANCER: Primary | ICD-10-CM

## 2018-08-14 PROCEDURE — A4216 STERILE WATER/SALINE, 10 ML: HCPCS | Performed by: INTERNAL MEDICINE

## 2018-08-14 PROCEDURE — 96523 IRRIG DRUG DELIVERY DEVICE: CPT

## 2018-08-14 PROCEDURE — 63600175 PHARM REV CODE 636 W HCPCS: Performed by: INTERNAL MEDICINE

## 2018-08-14 PROCEDURE — 25000003 PHARM REV CODE 250: Performed by: INTERNAL MEDICINE

## 2018-08-14 RX ORDER — SODIUM CHLORIDE 0.9 % (FLUSH) 0.9 %
10 SYRINGE (ML) INJECTION
Status: DISCONTINUED | OUTPATIENT
Start: 2018-08-14 | End: 2018-08-14 | Stop reason: HOSPADM

## 2018-08-14 RX ORDER — HEPARIN 100 UNIT/ML
500 SYRINGE INTRAVENOUS
Status: COMPLETED | OUTPATIENT
Start: 2018-08-14 | End: 2018-08-14

## 2018-08-14 RX ADMIN — HEPARIN 500 UNITS: 100 SYRINGE at 11:08

## 2018-08-14 RX ADMIN — Medication 10 ML: at 11:08

## 2018-08-26 ENCOUNTER — NURSE TRIAGE (OUTPATIENT)
Dept: ADMINISTRATIVE | Facility: CLINIC | Age: 58
End: 2018-08-26

## 2018-08-26 NOTE — TELEPHONE ENCOUNTER
Reason for Disposition   Sounds like a life-threatening emergency to the triager    Protocols used: ST DIZZINESS-A-AH    Patient is home alone and states that she is very dizzy and lightheaded. When I asked if she could take her temp or blood sugar readings, she stated she is too weak to stand to go and get those items. Patient advised to call 911 right now and she verbalized understanding.

## 2018-08-27 ENCOUNTER — INFUSION (OUTPATIENT)
Dept: INFUSION THERAPY | Facility: HOSPITAL | Age: 58
End: 2018-08-27
Attending: OBSTETRICS & GYNECOLOGY
Payer: COMMERCIAL

## 2018-08-27 ENCOUNTER — OFFICE VISIT (OUTPATIENT)
Dept: GYNECOLOGIC ONCOLOGY | Facility: CLINIC | Age: 58
End: 2018-08-27
Payer: COMMERCIAL

## 2018-08-27 ENCOUNTER — OFFICE VISIT (OUTPATIENT)
Dept: HEMATOLOGY/ONCOLOGY | Facility: CLINIC | Age: 58
End: 2018-08-27
Payer: COMMERCIAL

## 2018-08-27 ENCOUNTER — LAB VISIT (OUTPATIENT)
Dept: LAB | Facility: HOSPITAL | Age: 58
End: 2018-08-27
Attending: OBSTETRICS & GYNECOLOGY
Payer: COMMERCIAL

## 2018-08-27 VITALS
HEART RATE: 62 BPM | WEIGHT: 217.13 LBS | DIASTOLIC BLOOD PRESSURE: 66 MMHG | BODY MASS INDEX: 34.08 KG/M2 | HEIGHT: 67 IN | SYSTOLIC BLOOD PRESSURE: 146 MMHG

## 2018-08-27 VITALS
HEIGHT: 67 IN | TEMPERATURE: 98 F | DIASTOLIC BLOOD PRESSURE: 70 MMHG | HEART RATE: 57 BPM | SYSTOLIC BLOOD PRESSURE: 152 MMHG | WEIGHT: 220.88 LBS | RESPIRATION RATE: 16 BRPM | BODY MASS INDEX: 34.67 KG/M2

## 2018-08-27 VITALS
BODY MASS INDEX: 34.67 KG/M2 | HEIGHT: 67 IN | WEIGHT: 220.88 LBS | SYSTOLIC BLOOD PRESSURE: 144 MMHG | DIASTOLIC BLOOD PRESSURE: 69 MMHG | TEMPERATURE: 98 F | HEART RATE: 59 BPM | RESPIRATION RATE: 16 BRPM

## 2018-08-27 DIAGNOSIS — R42 VERTIGO: ICD-10-CM

## 2018-08-27 DIAGNOSIS — Z90.722 S/P TAH-BSO (TOTAL ABDOMINAL HYSTERECTOMY AND BILATERAL SALPINGO-OOPHORECTOMY): ICD-10-CM

## 2018-08-27 DIAGNOSIS — E83.42 HYPOMAGNESEMIA: ICD-10-CM

## 2018-08-27 DIAGNOSIS — R42 DIZZINESS: ICD-10-CM

## 2018-08-27 DIAGNOSIS — Z90.710 S/P TAH-BSO (TOTAL ABDOMINAL HYSTERECTOMY AND BILATERAL SALPINGO-OOPHORECTOMY): ICD-10-CM

## 2018-08-27 DIAGNOSIS — E83.42 HYPOMAGNESEMIA: Primary | ICD-10-CM

## 2018-08-27 DIAGNOSIS — C54.1 ENDOMETRIAL CANCER: ICD-10-CM

## 2018-08-27 DIAGNOSIS — Z90.79 S/P TAH-BSO (TOTAL ABDOMINAL HYSTERECTOMY AND BILATERAL SALPINGO-OOPHORECTOMY): ICD-10-CM

## 2018-08-27 DIAGNOSIS — D50.9 IRON DEFICIENCY ANEMIA, UNSPECIFIED IRON DEFICIENCY ANEMIA TYPE: ICD-10-CM

## 2018-08-27 DIAGNOSIS — E86.0 DEHYDRATION: Primary | ICD-10-CM

## 2018-08-27 DIAGNOSIS — R42 VERTIGO: Primary | ICD-10-CM

## 2018-08-27 LAB
ALBUMIN SERPL BCP-MCNC: 3.8 G/DL
ALP SERPL-CCNC: 88 U/L
ALT SERPL W/O P-5'-P-CCNC: 14 U/L
ANION GAP SERPL CALC-SCNC: 12 MMOL/L
AST SERPL-CCNC: 14 U/L
BACTERIA #/AREA URNS AUTO: ABNORMAL /HPF
BASOPHILS # BLD AUTO: 0.04 K/UL
BASOPHILS NFR BLD: 0.6 %
BILIRUB SERPL-MCNC: 0.4 MG/DL
BILIRUB UR QL STRIP: NEGATIVE
BUN SERPL-MCNC: 12 MG/DL
CALCIUM SERPL-MCNC: 10.3 MG/DL
CHLORIDE SERPL-SCNC: 101 MMOL/L
CLARITY UR REFRACT.AUTO: ABNORMAL
CO2 SERPL-SCNC: 31 MMOL/L
COLOR UR AUTO: ABNORMAL
CREAT SERPL-MCNC: 0.8 MG/DL
DIFFERENTIAL METHOD: ABNORMAL
EOSINOPHIL # BLD AUTO: 0.1 K/UL
EOSINOPHIL NFR BLD: 1.3 %
ERYTHROCYTE [DISTWIDTH] IN BLOOD BY AUTOMATED COUNT: 14.3 %
EST. GFR  (AFRICAN AMERICAN): >60 ML/MIN/1.73 M^2
EST. GFR  (NON AFRICAN AMERICAN): >60 ML/MIN/1.73 M^2
GLUCOSE SERPL-MCNC: 158 MG/DL
GLUCOSE UR QL STRIP: NEGATIVE
HCT VFR BLD AUTO: 37.5 %
HGB BLD-MCNC: 11.8 G/DL
HGB UR QL STRIP: NEGATIVE
HYALINE CASTS UR QL AUTO: 5 /LPF
IMM GRANULOCYTES # BLD AUTO: 0.02 K/UL
IMM GRANULOCYTES NFR BLD AUTO: 0.3 %
KETONES UR QL STRIP: ABNORMAL
LEUKOCYTE ESTERASE UR QL STRIP: ABNORMAL
LYMPHOCYTES # BLD AUTO: 2.2 K/UL
LYMPHOCYTES NFR BLD: 32.1 %
MAGNESIUM SERPL-MCNC: 1.3 MG/DL
MCH RBC QN AUTO: 27.7 PG
MCHC RBC AUTO-ENTMCNC: 31.5 G/DL
MCV RBC AUTO: 88 FL
MICROSCOPIC COMMENT: ABNORMAL
MONOCYTES # BLD AUTO: 0.5 K/UL
MONOCYTES NFR BLD: 8.1 %
NEUTROPHILS # BLD AUTO: 3.9 K/UL
NEUTROPHILS NFR BLD: 57.6 %
NITRITE UR QL STRIP: NEGATIVE
NRBC BLD-RTO: 0 /100 WBC
PH UR STRIP: 5 [PH] (ref 5–8)
PLATELET # BLD AUTO: 265 K/UL
PMV BLD AUTO: 10.4 FL
POTASSIUM SERPL-SCNC: 3.8 MMOL/L
PROT SERPL-MCNC: 7.3 G/DL
PROT UR QL STRIP: ABNORMAL
RBC # BLD AUTO: 4.26 M/UL
RBC #/AREA URNS AUTO: 0 /HPF (ref 0–4)
SODIUM SERPL-SCNC: 144 MMOL/L
SP GR UR STRIP: 1.03 (ref 1–1.03)
SQUAMOUS #/AREA URNS AUTO: 47 /HPF
URN SPEC COLLECT METH UR: ABNORMAL
UROBILINOGEN UR STRIP-ACNC: NEGATIVE EU/DL
WBC # BLD AUTO: 6.69 K/UL
WBC #/AREA URNS AUTO: 9 /HPF (ref 0–5)

## 2018-08-27 PROCEDURE — 3077F SYST BP >= 140 MM HG: CPT | Mod: CPTII,S$GLB,, | Performed by: INTERNAL MEDICINE

## 2018-08-27 PROCEDURE — 3078F DIAST BP <80 MM HG: CPT | Mod: CPTII,S$GLB,, | Performed by: INTERNAL MEDICINE

## 2018-08-27 PROCEDURE — 63600175 PHARM REV CODE 636 W HCPCS: Performed by: NURSE PRACTITIONER

## 2018-08-27 PROCEDURE — 85025 COMPLETE CBC W/AUTO DIFF WBC: CPT

## 2018-08-27 PROCEDURE — 3008F BODY MASS INDEX DOCD: CPT | Mod: CPTII,S$GLB,, | Performed by: INTERNAL MEDICINE

## 2018-08-27 PROCEDURE — 96365 THER/PROPH/DIAG IV INF INIT: CPT

## 2018-08-27 PROCEDURE — 25000003 PHARM REV CODE 250: Performed by: NURSE PRACTITIONER

## 2018-08-27 PROCEDURE — 36415 COLL VENOUS BLD VENIPUNCTURE: CPT

## 2018-08-27 PROCEDURE — 99999 PR PBB SHADOW E&M-EST. PATIENT-LVL III: CPT | Mod: PBBFAC,,, | Performed by: OBSTETRICS & GYNECOLOGY

## 2018-08-27 PROCEDURE — 83735 ASSAY OF MAGNESIUM: CPT

## 2018-08-27 PROCEDURE — 99024 POSTOP FOLLOW-UP VISIT: CPT | Mod: S$GLB,,, | Performed by: OBSTETRICS & GYNECOLOGY

## 2018-08-27 PROCEDURE — 80053 COMPREHEN METABOLIC PANEL: CPT

## 2018-08-27 PROCEDURE — 96366 THER/PROPH/DIAG IV INF ADDON: CPT

## 2018-08-27 PROCEDURE — 99999 PR PBB SHADOW E&M-EST. PATIENT-LVL IV: CPT | Mod: PBBFAC,,,

## 2018-08-27 PROCEDURE — 99215 OFFICE O/P EST HI 40 MIN: CPT | Mod: S$GLB,,, | Performed by: INTERNAL MEDICINE

## 2018-08-27 PROCEDURE — 81001 URINALYSIS AUTO W/SCOPE: CPT

## 2018-08-27 RX ORDER — ENOXAPARIN SODIUM 150 MG/ML
150 INJECTION SUBCUTANEOUS
COMMUNITY
End: 2018-09-14 | Stop reason: ALTCHOICE

## 2018-08-27 RX ORDER — MAGNESIUM SULFATE HEPTAHYDRATE 40 MG/ML
2 INJECTION, SOLUTION INTRAVENOUS
Status: COMPLETED | OUTPATIENT
Start: 2018-08-27 | End: 2018-08-27

## 2018-08-27 RX ORDER — SODIUM CHLORIDE 9 MG/ML
INJECTION, SOLUTION INTRAVENOUS CONTINUOUS
Status: DISCONTINUED | OUTPATIENT
Start: 2018-08-27 | End: 2018-08-27 | Stop reason: HOSPADM

## 2018-08-27 RX ORDER — MECLIZINE HYDROCHLORIDE 25 MG/1
25 TABLET ORAL 3 TIMES DAILY PRN
Qty: 30 TABLET | Refills: 0 | Status: SHIPPED | OUTPATIENT
Start: 2018-08-27

## 2018-08-27 RX ORDER — HEPARIN 100 UNIT/ML
500 SYRINGE INTRAVENOUS
Status: COMPLETED | OUTPATIENT
Start: 2018-08-27 | End: 2018-08-27

## 2018-08-27 RX ORDER — IRBESARTAN 150 MG/1
150 TABLET ORAL DAILY
COMMUNITY
Start: 2018-07-17 | End: 2018-09-20

## 2018-08-27 RX ADMIN — HEPARIN 500 UNITS: 100 SYRINGE at 05:08

## 2018-08-27 RX ADMIN — SODIUM CHLORIDE: 0.9 INJECTION, SOLUTION INTRAVENOUS at 03:08

## 2018-08-27 RX ADMIN — MAGNESIUM SULFATE HEPTAHYDRATE 2 G: 40 INJECTION, SOLUTION INTRAVENOUS at 03:08

## 2018-08-27 NOTE — PROGRESS NOTES
PATIENT: Saba Driver  MRN: [id number]  DATE: 8/27/2018    Subjective:     Chief complaint:  Chief Complaint   Patient presents with    Dizziness     light head, nausea       Saba Driver is a 57 y.o. female with endometrial adenocarcinoma s/p 4 cycles of neoadjuvent carbo/taxol (last treatment 6/21/18) and s/p Xlap/BRIAN/BSO on 8/2/18.      Oncologic history: per previous note  Referred by Dr. Shakir Alexis for postmenopausal bleeding. P0. On pelvic uterus is enlarged and fixed, unable to perform EMB due to altered anatomy of the cervix. She had been admitted to  for vaginal bleeding and anemia requiring transfusion. Fixed sore R inguinal node. Blind pap returned adenocarcinoma.      Pelvic US 3/12/18  UT enlarged 13.3cm with 5.1cm fibroid anterior. Limited visualization of EMS.  LOV 2.9cm  ROV 3.6cm     Medical co-morbidities include DM, HTN. No anticoagulation.      No prior abdominal surgeries     Family history significant for mom with pancreatic cancer, denies history of breast, ovarian, uterine, or colon cancer.      MMG last year normal per patient.      Seen in consult  3/21/18. Constellation of findings thus far consistent with an advanced gynecologic malignancy. Likely uterine in origin. Inguinal adenopathy would make this Stage IVB. Pap cytology showing adenocarcinoma but need tissue diagnosis. Will plan IR for biopsy of inguinal node. Pelvic disease is surgically unresectable at present. Recommended neoadjuvant chemotherapy based on tissue diagnosis.      IR biopsy 4/4/18  FINAL PATHOLOGIC DIAGNOSIS  RIGHT INGUINAL LYMPH NODE, CT GUIDED BIOPSY WITH ONSITE ADEQUACY (CYTOLOGY AND CELL BLOCK):  - Adenocarcinoma. See note.  Note: The carcinoma is positive for CK7, p53, vimentin and ER (patchy). It is negative for CDX2, CEA, CK20 and WT1. This immunoprofile is compatible with an endometrial origin. All stains have appropriate controls.     Initiate neoadjuvant chemotherapy with Dr. Boyd. Carbo/taxol 4/19/18. IV  port  Now s/p 4 cycles of neoadjuvent therapy - last treatment was 6/21/18.     CT CAP 6/25/18  Impression   Significant decrease in size in the right inguinal node.  Lobulated enlarged uterus.  RECIST SUMMARY:  Date of prior examination for comparison: 03/22/2018  Lesion 1: Right inguinal node  1.4 cm(smallest diameter ).  Series 2 image 118.  Prior measurement 4.0 cm.         08/02/2018 Admited to GYN oncology and is s/p Xlap/BRIAN/BSO.       Interval History: Ms. Driver is being seen in urgent care today. She was seen by Dr. Lala this morning and was sent here to have IV hydration. She reports dizziness and lightheadedness while walking about 5 days ago. Since then she feels that the symptoms have been getting worse. Yesterday, she developed a feeling of the room spinning. She feels it was the same symptoms that she had about 7 years ago with Vertigo. Also, she has not been eating and drinking like she should. She reports a decreased appetite. She vomited yesterday after eating grilled vegetables and fish. She has not had any nausea/vomting prior to this episode. She reports the nausea and vomiting occurred about an hour after she felt the room spin. She has not eaten since yesterday. Minimal fluids today (1/2 cup coffee). No nausea at this time. No fever/chills today but reports temperature of 99.0 last night. No coughing, SOB, CP, palpitations. No diarrhea/constipation. No abdominal pain. Pain from recent surgery dissipated about 2 weeks post.     She is accompanied by her sister.     PMFSH: all information reviewed and updated as relevant to today's visit    Review of Systems: as above  Review of Systems   Constitutional: Positive for fatigue. Negative for activity change, appetite change and fever.   HENT: Negative for mouth sores, nosebleeds and sore throat.    Eyes: Negative for visual disturbance.   Respiratory: Negative for cough and shortness of breath.    Cardiovascular: Negative for chest pain,  "palpitations and leg swelling.   Gastrointestinal: Negative for abdominal distention, abdominal pain, constipation and diarrhea.   Genitourinary: Negative for difficulty urinating, dysuria and frequency.   Musculoskeletal: Negative for arthralgias and back pain.   Skin: Negative for rash.   Neurological: Negative for numbness and headaches.   Hematological: Negative for adenopathy. Does not bruise/bleed easily.   Psychiatric/Behavioral: Negative for confusion and sleep disturbance. The patient is not nervous/anxious.    All other systems reviewed and are negative.        Objective:      Vitals:   Vitals:    08/27/18 1408   BP: (!) 144/69   BP Location: Right arm   Patient Position: Sitting   BP Method: Medium (Automatic)   Pulse: (!) 59   Resp: 16   Temp: 98.2 °F (36.8 °C)   TempSrc: Oral   Weight: 100.2 kg (220 lb 14.4 oz)   Height: 5' 7" (1.702 m)     BMI: Body mass index is 34.6 kg/m².      Physical Exam:   Physical Exam   Constitutional: She is oriented to person, place, and time. She appears well-developed and well-nourished. No distress.   HENT:   Head: Normocephalic.   Right Ear: External ear normal.   Left Ear: External ear normal.   Mouth/Throat: No oropharyngeal exudate.   No sinus tenderness.   Eyes: Conjunctivae and lids are normal. Pupils are equal, round, and reactive to light. No scleral icterus.   Neck: Trachea normal and normal range of motion. Neck supple. No thyromegaly present.   Cardiovascular: Normal rate, regular rhythm and normal heart sounds.   Pulmonary/Chest: Effort normal and breath sounds normal.   Abdominal: Soft. Normal appearance and bowel sounds are normal. She exhibits no distension and no mass. There is no tenderness.   Musculoskeletal: Normal range of motion.   Lymphadenopathy:        Head (right side): No submental and no submandibular adenopathy present.        Head (left side): No submental and no submandibular adenopathy present.     She has no cervical adenopathy. "   Neurological: She is alert and oriented to person, place, and time. She has normal strength and normal reflexes. No cranial nerve deficit. Gait normal.   Skin: Skin is warm, dry and intact. No bruising and no rash noted. No cyanosis. Nails show no clubbing.   Poor turgor   Psychiatric: She has a normal mood and affect. Her speech is normal and behavior is normal.   Nursing note and vitals reviewed.        Laboratory Data:  WBC   Date Value Ref Range Status   08/27/2018 6.69 3.90 - 12.70 K/uL Final     Hemoglobin   Date Value Ref Range Status   08/27/2018 11.8 (L) 12.0 - 16.0 g/dL Final     Hematocrit   Date Value Ref Range Status   08/27/2018 37.5 37.0 - 48.5 % Final     Platelets   Date Value Ref Range Status   08/27/2018 265 150 - 350 K/uL Final     Gran # (ANC)   Date Value Ref Range Status   08/27/2018 3.9 1.8 - 7.7 K/uL Final     Gran%   Date Value Ref Range Status   08/27/2018 57.6 38.0 - 73.0 % Final       Chemistry        Component Value Date/Time     08/27/2018 1117    K 3.8 08/27/2018 1117     08/27/2018 1117    CO2 31 (H) 08/27/2018 1117    BUN 12 08/27/2018 1117    CREATININE 0.8 08/27/2018 1117     (H) 08/27/2018 1117        Component Value Date/Time    CALCIUM 10.3 08/27/2018 1117    ALKPHOS 88 08/27/2018 1117    AST 14 08/27/2018 1117    ALT 14 08/27/2018 1117    BILITOT 0.4 08/27/2018 1117    ESTGFRAFRICA >60.0 08/27/2018 1117    EGFRNONAA >60.0 08/27/2018 1117        Mg 1.3      Assessment/Plan:     1. Dehydration    2. Hypomagnesemia    3. Vertigo    4. Endometrial cancer    5. S/P BRIAN-BSO 8/2/18        Plan:    Patient seen in urgent care for dehydration and hypomagnesemia. Plan for 1 liter NS with Mg Sulfate 2 gm IV over 2 hours today. She was encouraged to increase Mag-Ox to BID. Rx for Antivert given as patient experienced an episode similar to her previous vertigo. Rx sent per Dr. Lala. She is to follow up with Dr. Lala and Dr. Boyd as scheduled.   Encouraged hydration.  She can take her prescribed zofran if nausea returns. She understands to call or report to ED for any new/worsening symptoms.       Med and Orders:  Orders Placed This Encounter    CBC Oncology    Comprehensive metabolic panel    Magnesium    Urinalysis     Patient is in agreement with the proposed treatment plan. All questions were answered to the patient's satisfaction. Pt knows to call clinic if anything is needed before the next clinic visit. Discussed with Dr. Lala who agrees with above.       FRANCISCO Jacobson-EVERARDO  Hematology & Oncology  [facility] [place] [address]  ph. [phone]  Fax. [phone]     60 minutes were spent in coordination of patient's care, record review and counseling. More than 50% of the time was face-to-face.          Distress Screening Results: Psychosocial Distress screening score of Distress Score: 3 noted and reviewed. No intervention indicated.

## 2018-08-27 NOTE — PLAN OF CARE
Problem: Nausea/Vomiting (Adult)  Goal: Identify Related Risk Factors and Signs and Symptoms  Related risk factors and signs and symptoms are identified upon initiation of Human Response Clinical Practice Guideline (CPG)  Outcome: Ongoing (interventions implemented as appropriate)  Patient her for 2 gr Magnesium and 1 L IVF.  Assessment complete and labs reviewed.  VSS.  Chair reclined and blanket offered.  No needs expressed at this time.  Will continue to monitor.

## 2018-08-27 NOTE — LETTER
September 8, 2018        Kate Boyd MD  [address]  [address]  [place] [address]             [facility] - GYN Oncology  [address]  [place] [address]  Phone: [phone]   Patient: Saba Driver   MR Number: [id number]   YOB: 1960   Date of Visit: 8/27/2018       Dear Dr. Boyd:    Thank you for referring Saba Driver to me for evaluation. Below are the relevant portions of my assessment and plan of care.            If you have questions, please do not hesitate to call me. I look forward to following Saba along with you.    Sincerely,      Noelle Lala MD           CC  No Recipients

## 2018-08-27 NOTE — PLAN OF CARE
Problem: Patient Care Overview  Goal: Plan of Care Review  Report received from PENELOPE Adam RN. Patient completed ivf/mag with nad noted upon discharge. Port flushed, hep locked, and deaccessed. Verbalized understanding to call MD for any questions/concerns. Discharged home, escorted in wheelchair by friend.

## 2018-08-27 NOTE — TELEPHONE ENCOUNTER
Spoke with pt. Pt called to inform clinic she will come to the clinic, she will be here within 30 to 40 minutes. Pt advised Dr. Lala will be updated.

## 2018-08-28 ENCOUNTER — TELEPHONE (OUTPATIENT)
Dept: GYNECOLOGIC ONCOLOGY | Facility: CLINIC | Age: 58
End: 2018-08-28

## 2018-08-28 DIAGNOSIS — N39.0 URINARY TRACT INFECTION WITHOUT HEMATURIA, SITE UNSPECIFIED: Primary | ICD-10-CM

## 2018-08-28 RX ORDER — CIPROFLOXACIN 500 MG/1
500 TABLET ORAL 2 TIMES DAILY
Qty: 14 TABLET | Refills: 0 | Status: SHIPPED | OUTPATIENT
Start: 2018-08-28 | End: 2018-09-04

## 2018-08-28 NOTE — TELEPHONE ENCOUNTER
Spoke with pt. Per Dr. Lala, pt informed she will need to resume chemo with Dr. Boyd and schedule a 4 week post op with Dr. Lala. Pt is scheduled for 9/24/18 at the [facility]. appt letter to be mailed to pt.

## 2018-08-28 NOTE — TELEPHONE ENCOUNTER
----- Message from Candy Marroquin sent at 8/28/2018  8:55 AM CDT -----  Contact: DINORAH FRIED [[id number]]            Name of Who is Calling: DINORAH FRIED [[id number]]    What is the request in detail: Patient called she need to find out what is the next step with her plan of care. Please call her back to advise.       Can the clinic reply by [facility]: no      What Number to Call Back if not in [facility]: [phone]

## 2018-08-29 ENCOUNTER — HOSPITAL ENCOUNTER (EMERGENCY)
Facility: HOSPITAL | Age: 58
Discharge: HOME | End: 2018-08-30
Attending: EMERGENCY MEDICINE
Payer: COMMERCIAL

## 2018-08-29 DIAGNOSIS — R42 VERTIGO: Primary | ICD-10-CM

## 2018-08-29 DIAGNOSIS — R42 DIZZINESS: ICD-10-CM

## 2018-08-29 LAB
ALBUMIN SERPL BCP-MCNC: 3.7 G/DL
ALP SERPL-CCNC: 76 U/L
ALT SERPL W/O P-5'-P-CCNC: 10 U/L
ANION GAP SERPL CALC-SCNC: 10 MMOL/L
AST SERPL-CCNC: 11 U/L
BASOPHILS # BLD AUTO: 0.01 K/UL
BASOPHILS NFR BLD: 0.2 %
BILIRUB SERPL-MCNC: 0.4 MG/DL
BILIRUB UR QL STRIP: NEGATIVE
BUN SERPL-MCNC: 7 MG/DL
CALCIUM SERPL-MCNC: 9.7 MG/DL
CHLORIDE SERPL-SCNC: 104 MMOL/L
CLARITY UR: CLEAR
CO2 SERPL-SCNC: 29 MMOL/L
COLOR UR: YELLOW
CREAT SERPL-MCNC: 0.9 MG/DL
DIFFERENTIAL METHOD: NORMAL
EOSINOPHIL # BLD AUTO: 0.1 K/UL
EOSINOPHIL NFR BLD: 2.3 %
ERYTHROCYTE [DISTWIDTH] IN BLOOD BY AUTOMATED COUNT: 14.1 %
EST. GFR  (AFRICAN AMERICAN): >60 ML/MIN/1.73 M^2
EST. GFR  (NON AFRICAN AMERICAN): >60 ML/MIN/1.73 M^2
GLUCOSE SERPL-MCNC: 124 MG/DL
GLUCOSE UR QL STRIP: NEGATIVE
HCT VFR BLD AUTO: 37.8 %
HGB BLD-MCNC: 12.3 G/DL
HGB UR QL STRIP: NEGATIVE
KETONES UR QL STRIP: NEGATIVE
LEUKOCYTE ESTERASE UR QL STRIP: NEGATIVE
LYMPHOCYTES # BLD AUTO: 2.4 K/UL
LYMPHOCYTES NFR BLD: 39.5 %
MAGNESIUM SERPL-MCNC: 1.3 MG/DL
MCH RBC QN AUTO: 27.6 PG
MCHC RBC AUTO-ENTMCNC: 32.5 G/DL
MCV RBC AUTO: 85 FL
MONOCYTES # BLD AUTO: 0.6 K/UL
MONOCYTES NFR BLD: 9.5 %
NEUTROPHILS # BLD AUTO: 3 K/UL
NEUTROPHILS NFR BLD: 49.3 %
NITRITE UR QL STRIP: NEGATIVE
PH UR STRIP: 6 [PH] (ref 5–8)
PLATELET # BLD AUTO: 182 K/UL
PLATELET BLD QL SMEAR: NORMAL
PMV BLD AUTO: 10.4 FL
POCT GLUCOSE: 130 MG/DL (ref 70–110)
POTASSIUM SERPL-SCNC: 3.6 MMOL/L
PROT SERPL-MCNC: 7 G/DL
PROT UR QL STRIP: NEGATIVE
RBC # BLD AUTO: 4.45 M/UL
SODIUM SERPL-SCNC: 143 MMOL/L
SP GR UR STRIP: 1.01 (ref 1–1.03)
URN SPEC COLLECT METH UR: NORMAL
UROBILINOGEN UR STRIP-ACNC: NEGATIVE EU/DL
WBC # BLD AUTO: 6.12 K/UL

## 2018-08-29 PROCEDURE — 93005 ELECTROCARDIOGRAM TRACING: CPT

## 2018-08-29 PROCEDURE — 25000003 PHARM REV CODE 250: Performed by: EMERGENCY MEDICINE

## 2018-08-29 PROCEDURE — 81003 URINALYSIS AUTO W/O SCOPE: CPT

## 2018-08-29 PROCEDURE — 99285 EMERGENCY DEPT VISIT HI MDM: CPT | Mod: 25

## 2018-08-29 PROCEDURE — 82962 GLUCOSE BLOOD TEST: CPT

## 2018-08-29 PROCEDURE — 96374 THER/PROPH/DIAG INJ IV PUSH: CPT

## 2018-08-29 PROCEDURE — 96361 HYDRATE IV INFUSION ADD-ON: CPT

## 2018-08-29 PROCEDURE — 85025 COMPLETE CBC W/AUTO DIFF WBC: CPT

## 2018-08-29 PROCEDURE — 63600175 PHARM REV CODE 636 W HCPCS: Performed by: EMERGENCY MEDICINE

## 2018-08-29 PROCEDURE — 93010 ELECTROCARDIOGRAM REPORT: CPT | Mod: ,,, | Performed by: INTERNAL MEDICINE

## 2018-08-29 PROCEDURE — 83735 ASSAY OF MAGNESIUM: CPT

## 2018-08-29 PROCEDURE — 80053 COMPREHEN METABOLIC PANEL: CPT

## 2018-08-29 RX ORDER — LORAZEPAM 2 MG/ML
1 INJECTION INTRAMUSCULAR
Status: COMPLETED | OUTPATIENT
Start: 2018-08-29 | End: 2018-08-29

## 2018-08-29 RX ORDER — MECLIZINE HYDROCHLORIDE 25 MG/1
50 TABLET ORAL
Status: COMPLETED | OUTPATIENT
Start: 2018-08-29 | End: 2018-08-29

## 2018-08-29 RX ADMIN — MECLIZINE HYDROCHLORIDE 50 MG: 25 TABLET ORAL at 09:08

## 2018-08-29 RX ADMIN — SODIUM CHLORIDE 1000 ML: 0.9 INJECTION, SOLUTION INTRAVENOUS at 06:08

## 2018-08-29 RX ADMIN — LORAZEPAM 1 MG: 2 INJECTION INTRAMUSCULAR; INTRAVENOUS at 11:08

## 2018-08-29 NOTE — ED PROVIDER NOTES
"Encounter Date: 8/29/2018    SCRIBE #1 NOTE: I, Randy Winston, am scribing for, and in the presence of,  Ron Esteban MD. I have scribed the following portions of the note - Other sections scribed: HPI, ROS and PE.       History     Chief Complaint   Patient presents with    Dizziness     D/c with Meclizine for dizziness since Monday. + photophobia and nausea. " everything is spinning." Has not taken BP meds in a few days.      CC: Dizziness    HPI: This 57 y.o F with a hx of Adenocarcinoma, DM, type 2, Endometrial cancer, Glaucoma, and Hypertension presents to the ED c/o worsening dizziness described as "room spinning" since 8/27. Her dizziness is improved when closing her eyes and sitting still. The pt states her symptoms began as light-headedness, nausea and decreased appetite x1 week ago. She was seen by her OBGYN 8/27 for her symptoms and was Rx'ed Meclizine and sent to  for fluids with no relief. The pt was also Rx'ed Cipro for a UTI. She has not taken her BP medication in a few days. She denies fever, chills, diaphoresis, emesis, diarrhea, abdominal pain, chest pain, SOB, back pain, blurry vision, tinnitus and palpitations.    PSHx: Breast lumpectomy; Hysterectomy (08/02/2018); pr removal of ovary/tube(s) (Bilateral, 08/02/2018); HYSTERECTOMY, TOTAL, ABDOMINAL, WITH BILATERAL SALPINGO-OOPHORECTOMY (N/A, 8/2/2018); [id number] (N/A, 4/17/2018); and [id number] (N/A, 4/4/2018).          The history is provided by the patient. No  was used.     Review of patient's allergies indicates:  No Known Allergies  Past Medical History:   Diagnosis Date    Adenocarcinoma 3/24/2018    Diabetes mellitus, type 2     Endometrial cancer 4/14/2018    Glaucoma     Hypertension      Past Surgical History:   Procedure Laterality Date    BREAST LUMPECTOMY      R early 20's L at 13 years old    HYSTERECTOMY  08/02/2018    total abdominal    [place] REMOVAL OF OVARY/TUBE(S) Bilateral " "08/02/2018     Family History   Problem Relation Age of Onset    Diabetes Mother     Hypertension Mother     Pancreatic cancer Mother     Hypertension Maternal Grandmother     Diabetes Maternal Grandmother     Hypertension Maternal Grandfather     Diabetes Maternal Grandfather     Breast cancer Neg Hx     Colon cancer Neg Hx     Ovarian cancer Neg Hx      Social History     Tobacco Use    Smoking status: Never Smoker    Smokeless tobacco: Never Used   Substance Use Topics    Alcohol use: Yes     Comment: occassional use    Drug use: No     Review of Systems   Constitutional: Positive for appetite change (decreased). Negative for chills, diaphoresis and fever.   HENT: Negative for congestion, hearing loss, rhinorrhea, sore throat and tinnitus.    Eyes: Positive for visual disturbance (difficulty focusing).   Respiratory: Negative for cough and shortness of breath.    Cardiovascular: Negative for chest pain and palpitations.   Gastrointestinal: Positive for nausea. Negative for abdominal pain, diarrhea and vomiting.   Endocrine: Negative for cold intolerance, heat intolerance, polydipsia and polyuria.   Genitourinary: Negative for dysuria, frequency and hematuria.   Musculoskeletal: Negative for arthralgias, back pain, myalgias and neck pain.   Skin: Negative for rash.   Neurological: Positive for dizziness ("room spinning"). Negative for syncope, weakness, light-headedness and numbness.       Physical Exam     Initial Vitals [08/29/18 1734]   BP Pulse Resp Temp SpO2   (!) 183/75 (!) 57 16 98.7 °F (37.1 °C) 100 %      MAP       --         Physical Exam    Nursing note and vitals reviewed.  Constitutional: She appears well-developed and well-nourished. She is not diaphoretic. No distress.   HENT:   Head: Normocephalic and atraumatic.   Right Ear: Tympanic membrane, external ear and ear canal normal.   Left Ear: Tympanic membrane, external ear and ear canal normal.   Mouth/Throat: Oropharynx is clear and " moist. No oropharyngeal exudate.   Eyes: Conjunctivae and EOM are normal. Pupils are equal, round, and reactive to light. No scleral icterus. Right eye exhibits no nystagmus. Left eye exhibits no nystagmus.   Neck: Normal range of motion. Neck supple. No neck rigidity. Carotid bruit is not present. No JVD present.   Cardiovascular: Normal rate, regular rhythm and normal heart sounds. Exam reveals no gallop and no friction rub.    No murmur heard.  Pulses:       Radial pulses are 2+ on the right side, and 2+ on the left side.        Dorsalis pedis pulses are 2+ on the right side, and 2+ on the left side.   Pulmonary/Chest: No stridor. No respiratory distress. She has no decreased breath sounds. She has no wheezes. She has no rhonchi. She has no rales.   Abdominal: Soft. Normal appearance and bowel sounds are normal. She exhibits no distension, no pulsatile midline mass and no mass. There is no tenderness.   Musculoskeletal: Normal range of motion. She exhibits no edema or tenderness.   No peripheral edema. No calf swelling or tenderness bilaterally. Negative bilateral Homans sign.   Neurological: She is alert and oriented to person, place, and time. She has normal strength. No cranial nerve deficit or sensory deficit. GCS eye subscore is 4. GCS verbal subscore is 5. GCS motor subscore is 6.   Skin: Skin is warm and dry. No pallor.   Psychiatric: She has a normal mood and affect. Her behavior is normal.         ED Course   Procedures  Labs Reviewed   COMPREHENSIVE METABOLIC PANEL - Abnormal; Notable for the following components:       Result Value    Glucose 124 (*)     All other components within normal limits   MAGNESIUM - Abnormal; Notable for the following components:    Magnesium 1.3 (*)     All other components within normal limits   POCT GLUCOSE - Abnormal; Notable for the following components:    POCT Glucose 130 (*)     All other components within normal limits   CBC W/ AUTO DIFFERENTIAL   URINALYSIS, REFLEX  TO URINE CULTURE    Narrative:     Preferred Collection Type->Urine, Clean Catch     EKG Readings: (Independently Interpreted)   Initial Reading: No STEMI. Rhythm: Sinus Bradycardia. Heart Rate: 53. Ectopy: No Ectopy. Conduction: Normal. ST Segments: Normal ST Segments. Axis: Normal. Other Impression: T-wave flattening in leads III, AVF, V3-V6.       Imaging Results          MRI Brain W WO Contrast (Final result)  Result time 08/30/18 02:30:10    Final result by Tayo Moreau MD (08/30/18 02:30:10)                 Impression:      No acute intracranial abnormalities identified.  No evidence of acute infarction.    Hypoenhancing round 6 mm pituitary lesion, possible pituitary microadenoma.  No prior studies are available for comparison.      Electronically signed by: Tayo Moreau MD  Date:    08/30/2018  Time:    02:30             Narrative:    EXAMINATION:  MRI BRAIN W WO CONTRAST    CLINICAL HISTORY:  Dizziness; Dizziness and giddiness    TECHNIQUE:  Multiplanar multisequence MR imaging of the brain was performed before and after the administration of 10 mL Gadavist intravenous contrast.    COMPARISON:  CT head from 08/29/2018.    FINDINGS:  The brain is normal in contour and morphology.  No foci of abnormal parenchymal signal intensity.  No diffusion signal abnormality to suggest acute infarction.  Ventricles are normal in size and configuration without evidence for hydrocephalus.  No intracranial hemorrhage or extraaxial fluid collection.  FLAIR hyperintensity seen within the 4th ventricle which is likely artifactual from flow.  No mass or mass effect.  Flow voids are normal in appearance.  After administration of gadolinium, no abnormal foci of parenchymal enhancement identified.  Rounded hypoenhancing 6 mm pituitary lesion is seen which may represent pituitary microadenoma.    Visualized paranasal sinuses and mastoid air cells are clear.  Orbits appear normal.                               X-Ray Chest AP  Portable (Final result)  Result time 08/29/18 19:29:04    Final result by Tayo Moreau MD (08/29/18 19:29:04)                 Impression:      No acute cardiopulmonary process identified.      Electronically signed by: Tayo Moreau MD  Date:    08/29/2018  Time:    19:29             Narrative:    EXAMINATION:  XR CHEST AP PORTABLE    CLINICAL HISTORY:  Dizziness;    TECHNIQUE:  Single frontal view of the chest was performed.    COMPARISON:  None    FINDINGS:  Cardiac silhouette is normal in size.  Right-sided chest port is visualized with distal tip over the SVC lungs are symmetrically expanded.  No evidence of focal consolidative process, pneumothorax, or significant effusion.  No acute osseous abnormality identified.                               CT Head Without Contrast (Final result)  Result time 08/29/18 19:00:01    Final result by Kiran Wells MD (08/29/18 19:00:01)                 Impression:      No acute intracranial abnormality identified.  Further evaluation/follow-up as warranted.      Electronically signed by: Kiran Wells MD  Date:    08/29/2018  Time:    19:00             Narrative:    EXAMINATION:  CT HEAD WITHOUT CONTRAST    CLINICAL HISTORY:  Dizziness;    TECHNIQUE:  Low dose axial CT images obtained throughout the head without intravenous contrast. Sagittal and coronal reconstructions were performed.    COMPARISON:  None.    FINDINGS:  Intracranial compartment:    Ventricles and sulci are normal in size for age without evidence of hydrocephalus. No extra-axial blood or fluid collections.    The brain parenchyma appears normal.  Small linear calcification noted at the roof of the 3rd ventricle, nonspecific but likely vascular.  No parenchymal mass, hemorrhage, edema or major vascular distribution infarct.    Skull/extracranial contents (limited evaluation): No fracture. Mastoid air cells and paranasal sinuses are essentially clear.  Visualized portions of the orbits are within normal  limits.                                 Medical Decision Making:   History:   Old Medical Records: I decided to obtain old medical records.  Independently Interpreted Test(s):   I have ordered and independently interpreted EKG Reading(s) - see prior notes  Clinical Tests:   Lab Tests: Ordered and Reviewed  Radiological Study: Ordered and Reviewed  Medical Tests: Ordered and Reviewed  ED Management:  8/29/18 21:10 - the patient reports minimal improvement in her dizziness.  Medical decision making:  This was an emergent evaluation of this patient presenting with dizziness.  The symptoms started several days ago.  She has been taking Antivert with little improvement.  She also received IV fluids at a local urgent care clinic but reports no improvement.  She denies headache. She denies changes in her visual acuity but reports difficulty focusing due to things spinning around her.  She has no hearing loss or tinnitus.  She has no history of vertigo.  She is afebrile with stable vital signs.  She has no focal neurological deficits.  EKG is negative for dysrhythmia and acute ischemic changes.  CT of the brain is negative for acute intracranial processes.  She was initially treated with IV fluids and Antivert.  She had no improvement with these therapies.  She did received IV benzodiazepine.  She did have some relief with this medication.  The patient has a positive Marietta-Hallpike.  Epley maneuvers were performed and she had further improvement.  MRI of the brain is negative for acute ischemia.  Her presentation and workup are most consistent with benign positional vertigo.  I feel that she is stable for discharge at this time.  I will prescribe medications for supportive home management and have her follow up with PCP in a few days for recheck.            Scribe Attestation:   Scribe #1: I performed the above scribed service and the documentation accurately describes the services I performed. I attest to the accuracy of the  note.    Attending Attestation:           Physician Attestation for Scribe:  Physician Attestation Statement for Scribe #1: I, Ron Esteban MD, reviewed documentation, as scribed by Randy Winston in my presence, and it is both accurate and complete.                    Clinical Impression:   The primary encounter diagnosis was Vertigo. A diagnosis of Dizziness was also pertinent to this visit.      Disposition:   Disposition: Discharged  Condition: Stable                        Ron Esteban III, MD  08/30/18 [id number]

## 2018-08-29 NOTE — ED TRIAGE NOTES
Pt seen at [facility] on Monday for dizziness. Pt discharged with Rx for meclizine and Cipro for UTI. Pt has been taking the medications prescribed but is feeling worse. Pt c/o constant dizziness, sensitivity to light.

## 2018-08-30 VITALS
TEMPERATURE: 98 F | DIASTOLIC BLOOD PRESSURE: 75 MMHG | WEIGHT: 220 LBS | BODY MASS INDEX: 34.53 KG/M2 | RESPIRATION RATE: 18 BRPM | OXYGEN SATURATION: 100 % | HEIGHT: 67 IN | HEART RATE: 58 BPM | SYSTOLIC BLOOD PRESSURE: 158 MMHG

## 2018-08-30 PROCEDURE — 25500020 PHARM REV CODE 255: Performed by: EMERGENCY MEDICINE

## 2018-08-30 PROCEDURE — A9585 GADOBUTROL INJECTION: HCPCS | Performed by: EMERGENCY MEDICINE

## 2018-08-30 RX ORDER — MECLIZINE HYDROCHLORIDE 25 MG/1
25 TABLET ORAL 3 TIMES DAILY
Qty: 60 TABLET | Refills: 0 | Status: SHIPPED | OUTPATIENT
Start: 2018-08-30 | End: 2018-09-06

## 2018-08-30 RX ORDER — ALPRAZOLAM 0.25 MG/1
0.25 TABLET ORAL 3 TIMES DAILY PRN
Qty: 20 TABLET | Refills: 0 | Status: SHIPPED | OUTPATIENT
Start: 2018-08-30 | End: 2018-09-20

## 2018-08-30 RX ORDER — GADOBUTROL 604.72 MG/ML
10 INJECTION INTRAVENOUS
Status: COMPLETED | OUTPATIENT
Start: 2018-08-30 | End: 2018-08-30

## 2018-08-30 RX ADMIN — GADOBUTROL 10 ML: 604.72 INJECTION INTRAVENOUS at 01:08

## 2018-08-30 NOTE — ED NOTES
Report given by Magui ZACARIAS, pt AAOx4, RR even, unlabored, on cardiac monitor, continuous pulse ox, PIV LAC flushed with 5ml NS. Side rails up x 2, call light in reach, family at bedside. Will continue to monitor pt.

## 2018-09-09 NOTE — PROGRESS NOTES
Subjective:      Patient ID: Saba Driver is a 57 y.o. female.    Chief Complaint: Post-op Evaluation (3 wk post op/BRIAN/BSO/lightheadedness,dizziness, weakness)      HPI   S/p BRIAN/BSO 8/2/18. Uncomplicated post operative course. 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    Presents today for post operative visit. Endorses some new dizziness and weakness. Up and about, eating, +BM. Otherwise doing well.        Oncologic history:  Referred by Dr. Shakir Alexis for postmenopausal bleeding. P0. On pelvic uterus is enlarged and fixed, unable to perform EMB due to altered anatomy of the cervix. She had been admitted to  for vaginal bleeding and anemia requiring transfusion. Fixed sore R inguinal node. Blind pap returned adenocarcinoma.      Pelvic US 3/12/18  UT enlarged 13.3cm with 5.1cm fibroid anterior. Limited visualization of EMS.  LOV 2.9cm  ROV 3.6cm     Medical co-morbidities include DM, HTN. No anticoagulation.      No prior abdominal surgeries     Family history significant for mom with pancreatic cancer, denies history of breast, ovarian, uterine, or colon cancer.      MMG last year normal per patient.      Seen in consult with me 3/21/18. Constellation of findings thus far consistent with an advanced gynecologic malignancy. Likely uterine in origin. Inguinal adenopathy would make this Stage IVB. Pap cytology showing adenocarcinoma but need tissue diagnosis. Will plan IR for biopsy of inguinal node. Pelvic disease is surgically unresectable at present. Recommended neoadjuvant chemotherapy based on tissue diagnosis.      IR biopsy 4/4/18  FINAL PATHOLOGIC DIAGNOSIS  RIGHT INGUINAL LYMPH NODE, CT GUIDED BIOPSY WITH ONSITE ADEQUACY (CYTOLOGY AND CELL BLOCK):  - Adenocarcinoma. See note.  Note: The carcinoma is positive for CK7, p53, vimentin and ER (patchy). It is negative for CDX2, CEA, CK20 and WT1. This immunoprofile is compatible with an endometrial origin. All stains have  appropriate controls.     Initiate neoadjuvant chemotherapy with Dr. Boyd. Carbo/taxol 4/19/18. IV port.   S/p 3 cycles neoadjuvant chemotherapy carbo/taxol with Dr. Boyd.      CT CAP 6/25/18  Impression   Significant decrease in size in the right inguinal node.  Lobulated enlarged uterus.  RECIST SUMMARY:  Date of prior examination for comparison: 03/22/2018  Lesion 1: Right inguinal node  1.4 cm(smallest diameter ).  Series 2 image 118.  Prior measurement 4.0 cm.     Elected for interval cytoreductive surgery.   Review of Systems   Constitutional: Negative for appetite change, chills, fatigue and fever.   HENT: Negative for mouth sores.    Respiratory: Negative for cough and shortness of breath.    Cardiovascular: Negative for leg swelling.   Gastrointestinal: Negative for abdominal pain, blood in stool, constipation and diarrhea.   Endocrine: Negative for cold intolerance.   Genitourinary: Negative for dysuria and vaginal bleeding.   Musculoskeletal: Negative for myalgias.   Skin: Negative for rash.   Allergic/Immunologic: Negative.    Neurological: Positive for dizziness and weakness. Negative for numbness.   Hematological: Negative for adenopathy. Does not bruise/bleed easily.   Psychiatric/Behavioral: Negative for confusion.       Objective:   Physical Exam:   Constitutional: She is oriented to person, place, and time. She appears well-developed and well-nourished.    HENT:   Head: Normocephalic and atraumatic.    Eyes: EOM are normal. Pupils are equal, round, and reactive to light.    Neck: Normal range of motion. Neck supple. No thyromegaly present.    Cardiovascular: Normal rate, regular rhythm and intact distal pulses.     Pulmonary/Chest: Effort normal and breath sounds normal. No respiratory distress. She has no wheezes.        Abdominal: Soft. Bowel sounds are normal. She exhibits abdominal incision. She exhibits no distension, no ascites and no mass. There is no tenderness.   Midline incision  healing well             Musculoskeletal: Normal range of motion and moves all extremeties.      Lymphadenopathy:     She has no cervical adenopathy.        Right: No supraclavicular adenopathy present.        Left: No supraclavicular adenopathy present.    Neurological: She is alert and oriented to person, place, and time.    Skin: Skin is warm and dry. No rash noted.    Psychiatric: She has a normal mood and affect.       Assessment:     1. Vertigo    2. Dizziness        Plan:     Orders Placed This Encounter   Procedures    CBC auto differential    Comprehensive metabolic panel    Magnesium     Stage IV uterine carcinosarcoma.   Endorses dizziness and weakness. Will arrange for IVFs and check labs. Will admit if no improvement or lab abnormalities. Likely vertigo.   Rx antivert  Resume adjuvant chemo with Dr. Boyd.  Also recommend adjuvant RT (pelvis and groin). Will refer to rad onc. Likely start after the completion of chemo.     Otherwise recovering appropriately from surgery. RTC 4 weeks.

## 2018-09-13 ENCOUNTER — LAB VISIT (OUTPATIENT)
Dept: LAB | Facility: HOSPITAL | Age: 58
End: 2018-09-13
Attending: INTERNAL MEDICINE
Payer: COMMERCIAL

## 2018-09-13 DIAGNOSIS — C80.1 ADENOCARCINOMA: ICD-10-CM

## 2018-09-13 LAB
ALBUMIN SERPL BCP-MCNC: 3.4 G/DL
ALP SERPL-CCNC: 83 U/L
ALT SERPL W/O P-5'-P-CCNC: 11 U/L
ANION GAP SERPL CALC-SCNC: 8 MMOL/L
AST SERPL-CCNC: 12 U/L
BASOPHILS # BLD AUTO: 0.03 K/UL
BASOPHILS NFR BLD: 0.5 %
BILIRUB SERPL-MCNC: 0.3 MG/DL
BUN SERPL-MCNC: 11 MG/DL
CALCIUM SERPL-MCNC: 9.5 MG/DL
CHLORIDE SERPL-SCNC: 103 MMOL/L
CO2 SERPL-SCNC: 31 MMOL/L
CREAT SERPL-MCNC: 0.8 MG/DL
DIFFERENTIAL METHOD: ABNORMAL
EOSINOPHIL # BLD AUTO: 0.1 K/UL
EOSINOPHIL NFR BLD: 1.3 %
ERYTHROCYTE [DISTWIDTH] IN BLOOD BY AUTOMATED COUNT: 13.2 %
EST. GFR  (AFRICAN AMERICAN): >60 ML/MIN/1.73 M^2
EST. GFR  (NON AFRICAN AMERICAN): >60 ML/MIN/1.73 M^2
FERRITIN SERPL-MCNC: 270 NG/ML
GLUCOSE SERPL-MCNC: 119 MG/DL
HCT VFR BLD AUTO: 38.2 %
HGB BLD-MCNC: 12 G/DL
IMM GRANULOCYTES # BLD AUTO: 0.03 K/UL
IMM GRANULOCYTES NFR BLD AUTO: 0.5 %
IRON SERPL-MCNC: 57 UG/DL
LYMPHOCYTES # BLD AUTO: 2.1 K/UL
LYMPHOCYTES NFR BLD: 34.5 %
MCH RBC QN AUTO: 27.8 PG
MCHC RBC AUTO-ENTMCNC: 31.4 G/DL
MCV RBC AUTO: 89 FL
MONOCYTES # BLD AUTO: 0.5 K/UL
MONOCYTES NFR BLD: 8.7 %
NEUTROPHILS # BLD AUTO: 3.3 K/UL
NEUTROPHILS NFR BLD: 54.5 %
NRBC BLD-RTO: 0 /100 WBC
PLATELET # BLD AUTO: 265 K/UL
PMV BLD AUTO: 10.6 FL
POTASSIUM SERPL-SCNC: 3.8 MMOL/L
PROT SERPL-MCNC: 6.6 G/DL
RBC # BLD AUTO: 4.31 M/UL
SATURATED IRON: 18 %
SODIUM SERPL-SCNC: 142 MMOL/L
TOTAL IRON BINDING CAPACITY: 315 UG/DL
TRANSFERRIN SERPL-MCNC: 213 MG/DL
WBC # BLD AUTO: 6.08 K/UL

## 2018-09-13 PROCEDURE — 36415 COLL VENOUS BLD VENIPUNCTURE: CPT | Mod: PO

## 2018-09-13 PROCEDURE — 85025 COMPLETE CBC W/AUTO DIFF WBC: CPT

## 2018-09-13 PROCEDURE — 80053 COMPREHEN METABOLIC PANEL: CPT

## 2018-09-13 PROCEDURE — 82728 ASSAY OF FERRITIN: CPT

## 2018-09-13 PROCEDURE — 83540 ASSAY OF IRON: CPT

## 2018-09-14 ENCOUNTER — OFFICE VISIT (OUTPATIENT)
Dept: HEMATOLOGY/ONCOLOGY | Facility: CLINIC | Age: 58
End: 2018-09-14
Payer: COMMERCIAL

## 2018-09-14 VITALS
BODY MASS INDEX: 35.45 KG/M2 | HEIGHT: 67 IN | DIASTOLIC BLOOD PRESSURE: 82 MMHG | WEIGHT: 225.88 LBS | HEART RATE: 67 BPM | SYSTOLIC BLOOD PRESSURE: 124 MMHG | TEMPERATURE: 98 F | OXYGEN SATURATION: 97 %

## 2018-09-14 DIAGNOSIS — R42 DIZZINESS: ICD-10-CM

## 2018-09-14 DIAGNOSIS — C80.1 ADENOCARCINOMA: ICD-10-CM

## 2018-09-14 DIAGNOSIS — C54.1 ENDOMETRIAL CANCER: Primary | ICD-10-CM

## 2018-09-14 DIAGNOSIS — Z90.79 S/P TAH-BSO (TOTAL ABDOMINAL HYSTERECTOMY AND BILATERAL SALPINGO-OOPHORECTOMY): ICD-10-CM

## 2018-09-14 DIAGNOSIS — Z90.722 S/P TAH-BSO (TOTAL ABDOMINAL HYSTERECTOMY AND BILATERAL SALPINGO-OOPHORECTOMY): ICD-10-CM

## 2018-09-14 DIAGNOSIS — D49.89 NEOPLASM OF PELVIS: ICD-10-CM

## 2018-09-14 DIAGNOSIS — Z90.710 S/P TAH-BSO (TOTAL ABDOMINAL HYSTERECTOMY AND BILATERAL SALPINGO-OOPHORECTOMY): ICD-10-CM

## 2018-09-14 DIAGNOSIS — D35.2 PITUITARY ADENOMA: ICD-10-CM

## 2018-09-14 PROCEDURE — 3008F BODY MASS INDEX DOCD: CPT | Mod: CPTII,S$GLB,, | Performed by: INTERNAL MEDICINE

## 2018-09-14 PROCEDURE — 3074F SYST BP LT 130 MM HG: CPT | Mod: CPTII,S$GLB,, | Performed by: INTERNAL MEDICINE

## 2018-09-14 PROCEDURE — 3079F DIAST BP 80-89 MM HG: CPT | Mod: CPTII,S$GLB,, | Performed by: INTERNAL MEDICINE

## 2018-09-14 PROCEDURE — 99999 PR PBB SHADOW E&M-EST. PATIENT-LVL IV: CPT | Mod: PBBFAC,,, | Performed by: INTERNAL MEDICINE

## 2018-09-14 PROCEDURE — 99215 OFFICE O/P EST HI 40 MIN: CPT | Mod: S$GLB,,, | Performed by: INTERNAL MEDICINE

## 2018-09-14 RX ORDER — ONDANSETRON HYDROCHLORIDE 8 MG/1
8 TABLET, FILM COATED ORAL EVERY 12 HOURS PRN
Qty: 30 TABLET | Refills: 2 | Status: SHIPPED | OUTPATIENT
Start: 2018-09-14 | End: 2019-01-01

## 2018-09-14 RX ORDER — PROCHLORPERAZINE MALEATE 10 MG
10 TABLET ORAL EVERY 6 HOURS PRN
Qty: 30 TABLET | Refills: 3 | Status: SHIPPED | OUTPATIENT
Start: 2018-09-14 | End: 2019-01-01

## 2018-09-14 NOTE — PROGRESS NOTES
Subjective:       Patient ID: Saba Driver is a 57 y.o. female.    Chief Complaint: Follow-up    HPI   Diagnosis: Stage IVB (inguinal mets) Endometrial CA    Pt transferred  care to  for therapy     HPI ( per Dr. Lala) Referred by Dr. Shakir Alexis for postmenopausal bleeding. I have reviewed Dr. Alexis's notes. P0. On pelvic uterus is enlarged and fixed, unable to perform EMB due to altered anatomy of the cervix. She had been admitted to  for vaginal bleeding and anemia requiring transfusion. Fixed sore R inguinal node. Blind pap returned adenocarcinoma.      Seen in consult with Dr. Lala  3/21/18. Constellation of findings thus far consistent with an advanced gynecologic malignancy. Likely uterine in origin. Inguinal adenopathy would make this Stage IVB. Pap cytology showing adenocarcinoma but need tissue diagnosis. Will plan IR for biopsy of inguinal node. Pelvic disease is surgically unresectable at present. Recommended neoadjuvant chemotherapy based on tissue diagnosis.      IR biopsy 4/4/18  FINAL PATHOLOGIC DIAGNOSIS  RIGHT INGUINAL LYMPH NODE, CT GUIDED BIOPSY WITH ONSITE ADEQUACY (CYTOLOGY AND CELL BLOCK):  - Adenocarcinoma. See note.  Note: The carcinoma is positive for CK7, p53, vimentin and ER (patchy). It is negative for CDX2, CEA, CK20 and WT1. This immunoprofile is compatible with an endometrial origin. All stains have appropriate controls.    S/p cycle 4 of neoadjuvant  chemo completed 6/21/2018       Pt s/p Total abdominal hysterectomy, bilateral salpingo-oophorectomy 8/12/2018       She reports episodes of dizziness  She recently visited ED for dizziness  Workup included CT of the brain is negative for acute intracranial processes  No acute intracranial abnormalities identified.  No evidence of acute infarction.  MRI brain w w/o contrast 8/30/2018 reveals no acute intracranial abnormalities ,Hypoenhancing round 6 mm pituitary lesion, possible pituitary microadenoma.    Pt taking Antivert with little  "improvement  Mild fatigue  Appetite improved  No SOB/CP  No N/V  Mild pelvic discomfort postop      CBC reveals wbc 6080/mm3 Hb 12 g/dl Hct 38.2 % plt ct 265k     Past Medical History:   Diagnosis Date    Adenocarcinoma 3/24/2018    Diabetes mellitus, type 2     Endometrial cancer 4/14/2018    Glaucoma     Hypertension          Review of Systems   Constitutional: Positive for appetite change and fatigue. Negative for fever and unexpected weight change.   HENT: Negative for mouth sores.    Eyes: Negative for visual disturbance.   Respiratory: Negative for cough and shortness of breath.    Cardiovascular: Negative for chest pain.   Gastrointestinal: Negative for abdominal pain and diarrhea.   Genitourinary: Negative for frequency and vaginal bleeding.   Musculoskeletal: Negative for back pain.   Skin: Negative for rash.   Neurological: Positive for dizziness. Negative for headaches.   Hematological: Negative for adenopathy.   Psychiatric/Behavioral: The patient is not nervous/anxious.        Objective:       Vitals:    09/14/18 1443   BP: 124/82   BP Location: Right arm   Patient Position: Sitting   BP Method: Large (Manual)   Pulse: 67   Temp: 98.3 °F (36.8 °C)   TempSrc: Oral   SpO2: 97%   Weight: 102.4 kg (225 lb 13.8 oz)   Height: 5' 7" (1.702 m)         Physical Exam   Constitutional: She is oriented to person, place, and time. She appears well-developed and well-nourished.   HENT:   Head: Normocephalic.   Mouth/Throat: Oropharynx is clear and moist. No oropharyngeal exudate.   Eyes: Conjunctivae and lids are normal. Pupils are equal, round, and reactive to light. No scleral icterus.   Neck: Normal range of motion. Neck supple. No thyromegaly present.   Cardiovascular: Normal rate, regular rhythm and normal heart sounds.   No murmur heard.  Pulmonary/Chest: Breath sounds normal. She has no wheezes. She has no rales.   Abdominal: Soft. Bowel sounds are normal. She exhibits no distension and no mass. There is " no hepatosplenomegaly. There is no tenderness. There is no rebound and no guarding.   Musculoskeletal: Normal range of motion. She exhibits no edema or tenderness.   Lymphadenopathy:     She has no cervical adenopathy.     She has no axillary adenopathy.        Right: No supraclavicular adenopathy present.        Left: No supraclavicular adenopathy present.   Neurological: She is alert and oriented to person, place, and time. No cranial nerve deficit. Coordination normal.   Skin: Skin is warm and dry. No ecchymosis, no petechiae and no rash noted. No erythema.   Psychiatric: She has a normal mood and affect.         CT a/p 3/23/2018   1. Right groin mass, differential diagnosis includes lymphoma.  2. Large uterus, multiple fibroids.  3. Breast nodules and calcifications discussed above.  4. Additional remote findings above.    CT head w/out contrast 8/29/2018   No acute intracranial abnormality identified.    MRI brain w w/o contrast 8/30/2018   No acute intracranial abnormalities identified.  No evidence of acute infarction.    Hypoenhancing round 6 mm pituitary lesion, possible pituitary microadenoma.  No prior studies are available for comparison.  Assessment:       1. Endometrial cancer    2. S/P BRIAN-BSO 8/2/18    3. Dizziness    4. Pituitary adenoma        Plan:   ECOG 0  1-4 Pt with Stage IVB (inguinal mets) endometrial cancer.   It has been  recommended neoadjuvant systemic chemotherapy with Carbo AUC6/Taxol 175mg/m2 IV [id number]  Will plan for 4 cycles and repeat imaging/clinical exam for consideration of surgical debulking.  S/p cycle 4 of neoadjuvant  chemo completed 6/21/2018    Pt s/p Total abdominal hysterectomy, bilateral salpingo-oophorectomy 8/12/2018    Pt followed by Dr. Lala   It is recommended she resume adjuvant chemo with  3 cycles carbo/taxol Followed by  adjuvant RT (pelvis and groin).   Chemo orders completed  Plan follow-up with Rad/Onc    Plan Ambulatory Referral to Neurology for  evaluation of dizziness/pituitary adenoma    Follow-up in  10/10/18     D/w Dr. Lala    Cc: MD Noelle Hicks MD

## 2018-09-16 RX ORDER — FAMOTIDINE 10 MG/ML
20 INJECTION INTRAVENOUS
Status: CANCELLED | OUTPATIENT
Start: 2018-09-20

## 2018-09-16 RX ORDER — HEPARIN 100 UNIT/ML
500 SYRINGE INTRAVENOUS
Status: CANCELLED | OUTPATIENT
Start: 2018-09-20

## 2018-09-16 RX ORDER — EPINEPHRINE 0.3 MG/.3ML
0.3 INJECTION SUBCUTANEOUS ONCE AS NEEDED
Status: CANCELLED | OUTPATIENT
Start: 2018-09-20 | End: 2018-09-20

## 2018-09-16 RX ORDER — SODIUM CHLORIDE 0.9 % (FLUSH) 0.9 %
10 SYRINGE (ML) INJECTION
Status: CANCELLED | OUTPATIENT
Start: 2018-09-20

## 2018-09-16 RX ORDER — DIPHENHYDRAMINE HYDROCHLORIDE 50 MG/ML
50 INJECTION INTRAMUSCULAR; INTRAVENOUS ONCE AS NEEDED
Status: CANCELLED | OUTPATIENT
Start: 2018-09-20 | End: 2018-09-20

## 2018-09-19 ENCOUNTER — TELEPHONE (OUTPATIENT)
Dept: RADIATION ONCOLOGY | Facility: CLINIC | Age: 58
End: 2018-09-19

## 2018-09-19 ENCOUNTER — INITIAL CONSULT (OUTPATIENT)
Dept: RADIATION ONCOLOGY | Facility: CLINIC | Age: 58
End: 2018-09-19
Payer: COMMERCIAL

## 2018-09-19 VITALS
DIASTOLIC BLOOD PRESSURE: 74 MMHG | HEART RATE: 70 BPM | RESPIRATION RATE: 18 BRPM | HEIGHT: 67 IN | TEMPERATURE: 98 F | SYSTOLIC BLOOD PRESSURE: 168 MMHG | BODY MASS INDEX: 35.89 KG/M2 | WEIGHT: 228.69 LBS

## 2018-09-19 DIAGNOSIS — D49.59 ENDOMETRIAL NEOPLASM: ICD-10-CM

## 2018-09-19 DIAGNOSIS — C54.1 ENDOMETRIAL CANCER: Primary | ICD-10-CM

## 2018-09-19 DIAGNOSIS — C77.4: ICD-10-CM

## 2018-09-19 PROCEDURE — 99204 OFFICE O/P NEW MOD 45 MIN: CPT | Mod: S$GLB,,, | Performed by: RADIOLOGY

## 2018-09-19 PROCEDURE — 3077F SYST BP >= 140 MM HG: CPT | Mod: CPTII,S$GLB,, | Performed by: RADIOLOGY

## 2018-09-19 PROCEDURE — 3008F BODY MASS INDEX DOCD: CPT | Mod: CPTII,S$GLB,, | Performed by: RADIOLOGY

## 2018-09-19 PROCEDURE — 99999 PR PBB SHADOW E&M-EST. PATIENT-LVL IV: CPT | Mod: PBBFAC,,, | Performed by: RADIOLOGY

## 2018-09-19 PROCEDURE — 3078F DIAST BP <80 MM HG: CPT | Mod: CPTII,S$GLB,, | Performed by: RADIOLOGY

## 2018-09-19 NOTE — PROGRESS NOTES
REFERRING PHYSICIAN:   Noelle Lala M.D.    DIAGNOSIS:    FIGO Stage IV B (T1a N0 M1) carcinosarcoma of the uterus    HISTORY OF PRESENT ILLNESS:   Ms. Driver is a 57-year-old female who was recently diagnosed with endometrial cancer after 6 month history of postmenopausal bleeding requiring blood transfusions.  She was admitted to [facility] and underwent a pelvic ultrasound which revealed enlarged uterus.  She also complained of a painful right groin mass.  A Pap smear reportedly revealed adenocarcinoma.  A CT of the chest, abdomen, and pelvis on March 22, 2018 revealed enlarged uterus with central mass effect.  There was compression of the adjacent pelvic structures due to the enlarged uterus.  There was also a 3 x 4 cm right groin mass.  She underwent biopsy of the enlarged right inguinal node on April 4, 2018 which revealed adenocarcinoma compatible with endometrial origin.  She was started on neoadjuvant chemotherapy with carboplatin and Taxol and received 4 cycles.  A repeat CT of the chest, abdomen, and pelvis in June 25, 2018 revealed heterogeneous appearance of the uterus with essentially no change.  However, the enlarged lymph node in the right groin had decreased down to 1.4 cm.  There was no new adenopathy noted.    On August 2, 2018, she underwent abdominal TAHBSO.  The pathology revealed FIGO grade 3 carcinosarcoma, with invasion of 0.9 cm of myometrium out of a total thickness of 2.8 cm.  The tumor measured 4.5 cm.  The right inguinal node was not palpable at the time of surgery and was not excised per Dr. Lala.  She is planned to continue chemotherapy for 3 more cycles.  She is here today for recommendations regarding radiation given limited metastatic disease.    At present, patient denies vaginal bleeding, vaginal discharge, dysuria, hematuria, nausea, vomiting, or diarrhea. She has intentional weight loss of approximately 20 lb prior to her diagnosis but reports additional 40 lb  weight loss during chemotherapy.  She denies fever or night sweats.  She also reports dizziness after the recent surgery for which she underwent MRI of the brain which revealed possible pituitary microadenoma.  She reports some improvement in the dizziness after starting Antivert.     REVIEW OF SYSTEMS:  As above.  In addition, patient denies headaches, visual problems, dizziness, chest pain, shortness of breath, cough, nausea, vomiting, diarrhea, or any new bony pains. Patient also denies easy bruising, skin rashes, or numbness or tingling.    ECO-1    PAST MEDICAL HISTORY:  Past Medical History:   Diagnosis Date    Adenocarcinoma 3/24/2018    Diabetes mellitus, type 2     Endometrial cancer 2018    Glaucoma     Hypertension        PAST SURGICAL HISTORY:  Past Surgical History:   Procedure Laterality Date    [id number] N/A 2018    Performed by [facility] at [facility] OR [address] [place] [address]    BREAST LUMPECTOMY      R early 20's L at 13 years old    HYSTERECTOMY  2018    total abdominal    HYSTERECTOMY, TOTAL, ABDOMINAL, WITH BILATERAL SALPINGO-OOPHORECTOMY N/A 2018    Performed by Noelle Lala MD at [facility] OR [address] [place] [address]    [id number]-R-CATH N/A 2018    Performed by [facility] OR    NH REMOVAL OF OVARY/TUBE(S) Bilateral 2018    TOTAL ABDOMINAL HYSTERECTOMY W/ BILATERAL SALPINGOOPHORECTOMY N/A 2018    Procedure: HYSTERECTOMY, TOTAL, ABDOMINAL, WITH BILATERAL SALPINGO-OOPHORECTOMY;  Surgeon: Noelle Lala MD;  Location: [facility] OR [address] [place] [address];  Service: OB/GYN;  Laterality: N/A;       ALLERGIES:   Review of patient's allergies indicates:  No Known Allergies    MEDICATIONS:  Current Outpatient Medications   Medication Sig    amlodipine (NORVASC) 10 MG tablet Take 10 mg by mouth every morning.     bisoprolol (ZEBETA) 10 MG tablet Take 10 mg by mouth 2 (two) times daily.     irbesartan (AVAPRO) 150 MG tablet Take 150 mg by mouth once daily.      LUMIGAN 0.01 % Drop Place 1 drop into both eyes every evening.     magnesium oxide (MAG-OX) 400 mg tablet Take 1 tablet (400 mg total) by mouth once daily.    meclizine (ANTIVERT) 25 mg tablet Take 1 tablet (25 mg total) by mouth 3 (three) times daily as needed for Dizziness.    metformin (GLUCOPHAGE) 1000 MG tablet Take 1,000 mg by mouth 2 (two) times daily with meals.     ondansetron (ZOFRAN) 8 MG tablet Take 1 tablet (8 mg total) by mouth every 12 (twelve) hours as needed for Nausea.    prochlorperazine (COMPAZINE) 10 MG tablet Take 1 tablet (10 mg total) by mouth every 6 (six) hours as needed.    timolol maleate 0.5% (TIMOPTIC) 0.5 % Drop Place 1 drop into both eyes 2 (two) times daily.     ALPRAZolam (XANAX) 0.25 MG tablet Take 1 tablet (0.25 mg total) by mouth 3 (three) times daily as needed (dizziness).    hydroCHLOROthiazide (HYDRODIURIL) 25 MG tablet Take 25 mg by mouth every morning.     No current facility-administered medications for this visit.        SOCIAL HISTORY:  Social History     Socioeconomic History    Marital status: Single     Spouse name: Not on file    Number of children: Not on file    Years of education: Not on file    Highest education level: Not on file   Social Needs    Financial resource strain: Not on file    Food insecurity - worry: Not on file    Food insecurity - inability: Not on file    Transportation needs - medical: Not on file    Transportation needs - non-medical: Not on file   Occupational History    Not on file   Tobacco Use    Smoking status: Never Smoker    Smokeless tobacco: Never Used   Substance and Sexual Activity    Alcohol use: Yes     Comment: occassional use    Drug use: No    Sexual activity: No   Other Topics Concern    Not on file   Social History Narrative    Not on file       FAMILY HISTORY:  Family History   Problem Relation Age of Onset    Diabetes Mother     Hypertension Mother     Pancreatic cancer Mother     Hypertension  "Maternal Grandmother     Diabetes Maternal Grandmother     Hypertension Maternal Grandfather     Diabetes Maternal Grandfather     Breast cancer Neg Hx     Colon cancer Neg Hx     Ovarian cancer Neg Hx          PHYSICAL EXAMINATION:  Vitals:    09/19/18 0846   BP: (!) 168/74   Pulse: 70   Resp: 18   Temp: 97.9 °F (36.6 °C)   TempSrc: Oral   Weight: 103.7 kg (228 lb 11.2 oz)   Height: 5' 7" (1.702 m)   Body mass index is 35.82 kg/m².  GENERAL: Patient is alert and oriented, in no acute distress.  HEENT:Extraocular muscles are intact.  Oropharynx is clear without lesions.  There is no cervical or supraclavicular lymphadenopathy palpated.  No thyromegaly noted.  HEART: Regular rate and rhythm.  LUNGS: Clear to auscultation bilaterally.  ABDOMEN:  Well-healed scar secondary to TAHBSO.  Soft, nontender, nondistended, without hepatosplenomegaly.  Normoactive bowel sounds.  PELVIC EXAM:  A speculum examination reveals healing vaginal cuff with no suspicious lesions.  EXTREMITIES:  There is a palpable 3 cm right groin node.  No clubbing, cyanosis, or edema.  NEUROLOGICAL: Cranial nerve II through XII grossly intact.  Sensation is intact.  Strength is 5 out of 5 in the upper and lower extremities bilaterally.     ASSESSMENT:   This is a 57-year-old female with FIGO stage IVB, grade 3, carcinosarcoma of the uterus with involvement of a right inguinal node who completed neoadjuvant chemotherapy followed by TAHBSO on August 2, 2018 with invasion into less than 1/2 of the myometrium, now with enlarged right groin node.      PLAN:   After review of the images of the CT of the chest, abdomen and pelvis before chemotherapy and at the completion of chemotherapy, patient is noted to have good response to systemic treatment with decrease in the size of the right groin node.  However, after discussion with the patient, this has been growing since her surgery and currently measures approximately 3 cm.  I discussed the case with " Dr. Lala. The right groin node was not palpable at the time of surgery and was not excised.  However, it appears to have increased in size over the last 6 weeks.  Therefore, I recommend a repeat CT of the chest, abdomen, and pelvis to evaluate the status of her disease.  This was ordered today.  She is planning to see Dr. Lala on September 24, 2018.  After the CT scan is completed, a final recommendation can be made about how to proceed.  If the right groin is the only site of disease based on imaging, she may be a candidate for excision followed by adjuvant chemotherapy followed by radiation    The risks, benefits, and side effects of radiation were explained in detail to the patient.  All questions were answered.  I plan to see the patient back for follow-up and radiation planning CT based on her final treatment plan.    Psychosocial Distress screening score of Distress Score: 0 noted and reviewed. No intervention indicated.    I spent approximately 60 minutes reviewing the available records and evaluating the patient, out of which over 50% of the time was spent face to face with the patient in counseling and coordinating this patient's care.

## 2018-09-19 NOTE — PATIENT INSTRUCTIONS
Return for followup and CT/sim after chemo is completed.       Radiation Therapy Treatment  Radiation therapy can help you in your fight against cancer. It begins with a session to discuss treatment with your doctor. If you and your doctor decide on radiation, you will return for a simulation. The simulation is a planning session that helps the doctor target your cancer. He or she will design a radiation plan to protect normal tissues. When the simulation and plan are completed, you will begin your daily treatments. Treatment is usually once daily Monday to Friday. It takes less than a half an hour. Sometimes you may need radiation twice a day, with usually 6 hours between treatments. After the course of radiation is complete, you will be scheduled for follow-up appointments. This is to make sure the cancer is under control. The follow-ups will also make sure that any side effects from the treatment are taken care of.  Radiation therapy uses high-energy X-rays to kill cancer cells.   Your treatment planning visit: The simulation  Your radiation therapy team uses a special machine called a simulator to map out your treatment. The simulator is usually an X-ray machine (fluoroscopy), CT scanner, MRI scanner, or PET-CT scanner machine. Laser lights act as guides to help position your body accurately. During this visit:  · The team figures out the best position for your body. They make notes in your chart so youll be placed the same way each time.  · You may use special devices to keep your body correctly positioned and still during treatment. These may include molds, masks, rests, and blocks.  · The team makes ink marks on your skin. These will help you get in the same position for each treatment. Tiny permanent tattoos may also be used.   · Markers such as metal balls or wires may be put on or in your body. Sometime these are taped to the skin to help with the imaging process. These work with the X-rays to position  your body. The markers are removed when the visit is over.  After the team has the imaging and data, the information is sent into the computer planning system. Your doctor and the team of physicists and dosimetrists design a treatment field. The field will best target your cancer and how it might spread. It will also help limit radiation to nearby normal tissues.  Your treatments  Each treatment usually takes 10 to 30 minutes. You may need to change into a hospital gown. The radiation therapist puts you in the correct position on the treatment table, then leaves the room. Sometimes you may need more imaging before each treatment. The machine may take digital X-rays or a CT scan to help make sure you are lined up correctly. During treatment, lie as still as you can and breathe normally. You will hear noises coming from the machine. You can talk to the radiation therapist, who watches you from the control room on a TV monitor. After treatment, the therapist will help you off the table. You can then get dressed and go back to your normal activities.  Date Last Reviewed: 1/14/2016 © 2000-2017 [employer]. [address] [place] [address]. All rights reserved. This information is not intended as a substitute for professional medical care. Always follow your healthcare professional's instructions.        Discharge Instructions for Radiation Therapy  Radiation therapy uses high-energy X-rays to kill cancer cells and help you in your fight against cancer. Radiation destroys cancer cells gradually, over time. The goal of therapy is to focus on and kill as many cancer cells as possible. Radiation can also damage or kill some of the normal cells that are closest to the tumor. Damaged normal cells can repair themselves, often within a few days.  Caring for your skin  You should ask your healthcare provider for specific products that he or she recommends for washing and bathing. In general, use a mild  "nondetergent soap and warm (not hot) water to clean the area receiving radiation. Pat the region dry rather than rubbing.  Your healthcare provider may give you products to moisturize the skin and prevent infection. The goal is to prevent cracks or breaks in the skin that may be sensitive from treatment:   · Dont be surprised if your treatment causes skin redness, and a type of "sunburn" over time. Some radiation treatments can cause this.   · Ask your therapy team what lotion to use. Also ask for directions about when and how to apply it.  · Avoid prolonged or direct sunlight on the treated area. Ask your therapy team about using a sunscreen. You do not have to avoid going outside altogether, but must take appropriate precautions.  · Dont remove ink marks unless your radiation therapist says its OK. Dont scrub or use soap on the marks when you wash. Let water run over them and pat them dry.  · Protect your skin from heat or cold. Avoid hot tubs, saunas, heating pads, or ice packs.  · Avoid clothing that causes friction or rubbing on the skin.  Fighting fatigue  Radiation therapy may cause you to feel tired. Your body is working hard to heal and repair itself. To feel better, try these things:  · Do light exercise each day. Take short walks.  · Plan tasks for the times when you tend to have the most energy. Ask for help when you need it.  · Relax before you go to bed. This will help you sleep better. Try reading or listening to soothing music.  Coping with appetite changes  Here are ways to cope:  · Tell your therapy team if you find it hard to eat or you have no appetite. You may be referred to a nutritionist to help you with meal planning.  · Radiation to certain internal sites can cause nausea, depending on the location of treatment. This can affect your appetite. Think of healthy eating as part of your treatment. Try these tips:  ¨ Eat slowly.  ¨ Eat small meals several times a day.  ¨ Eat more food when " youre feeling better.  ¨ Ask others to keep you company when you eat.  ¨ Stock up on easy-to-prepare foods.  ¨ Eat foods high in protein and calories. Your healthcare provider may recommend liquid meal supplements.  ¨ Drink plenty of water and other fluids.  ¨ Ask your healthcare provider before taking any vitamins or over-the-counter supplements. Such products are not regulated by the FDA and can sometimes interfere with your treatments.   Dealing with other side effects  Here are suggestions to deal with other side effects:   · Be prepared for hair loss in the area being treated. The hair loss may be permanent. Be sure to discuss this with your healthcare provider.  · Sip cool water if your mouth or throat becomes dry or sore. Ice chips may also help.  · Tell your healthcare provider if you have diarrhea or constipation. You may be given a special diet.  · If you have trouble swallowing liquids, tell your healthcare provider.  Follow-up  Make a follow-up appointment as directed by your healthcare provider.     When to call your healthcare provider  Call your healthcare provider right away if you have any of the following:  · Unexpected headaches  · Trouble concentrating  · Ongoing fatigue  · Wheezing, shortness of breath, or trouble breathing  · Pain that doesnt go away, especially if its always in the same place  · New or unusual lumps, bumps, or swelling  · Dizziness or lightheadedness  · Unusual rashes, bruises, or bleeding  · Fever of 100.4°F (38°C) or higher, or chills  · Nausea and vomiting  · Diarrhea that doesnt improve with time  · Skin breakdown; significant pain due to skin irritation   Date Last Reviewed: 1/13/2016  © 1165-3127 [employer]. [address] [place] [address], [place] [address]. All rights reserved. This information is not intended as a substitute for professional medical care. Always follow your healthcare professional's instructions.        Radiation Therapy: Managing Short-Term  Side Effects     Take short walks daily.   Radiation therapy uses high-energy X-rays or particles to kill cancer cells. Some normal cells can also be affected. This causes side effects such as dry skin, tiredness (fatigue), or changes in your appetite. Most side effects go away when your radiation therapy is over.  Having side effects of radiation therapy does not mean that your cancer is getting worse or that therapy isnt working.   Caring for your skin  Skin problems may happen where your body gets radiation. Your skin may become dry, itchy, red, and peeling. It may darken in that spot, like a tan. To care for your skin:  · Dont scrub on the treatment area. Clean that area of the skin every day. Use warm water and mild soap, or as your healthcare provider advises. Pat the skin afterward or let it air dry.  · Ask your therapy team what lotion to use and when to use it.  · Keep the treated area out of the sun. Ask your team about using a sunscreen.  · Don't remove ink marks unless your radiation therapist says you can. Dont scrub the marks when you wash. Let water run over them and pat them dry.  · Protect your skin from heat or cold. Avoid hot tubs, saunas, hot pads, and ice packs.  · Wear soft, loose clothing to avoid rubbing skin.  Fighting tiredness  The cancer itself or the radiation therapy may cause you to feel tired. Your body is working hard to heal and repair itself. To feel better:  · Try light exercise each day. Take short walks.  · Plan tasks for the times when you tend to have the most energy. Ask for help when you need it.  · Relax before you go to bed to sleep better. Try reading or listening to soothing music.  · Be sure to let your cancer care team know if you continue to have fatigue that is not getting better. They may be able to offer ways to help.   Coping with appetite changes  Tell your therapy team if you find it hard to eat or have no appetite. You may need to see a nutritionist. This is  a healthcare provider with special training in meal planning. To keep your strength up, you need to eat well and maintain your weight. Think of healthy eating as part of your treatment. Try these tips:  · Eat slowly.  · Eat small meals several times a day.  · Eat more food when youre feeling better, even if it is not mealtime.  · Ask others to keep you company when you eat.  · Stock up on easy-to-prepare foods.  · Eat foods high in protein and calories.  · Drink plenty of water and other fluids.  · Ask your healthcare provider before taking any vitamins.  Site-specific side effects  These side effects include the following:   · You may lose hair in the area being treated. The hair often grows back after treatment.  · Your mouth or throat can become dry or sore if your head or neck is being treated. Sip cool water to help ease discomfort.  · Nausea and bowel changes can happen with radiation to the pelvic region. Tell your healthcare provider if you have nausea, diarrhea, or constipation. You may need to take medicine or follow a special diet.  Talk with your healthcare team  Radiation therapy can also have other side effects, including some that might not show up until years later. Be sure to talk with your healthcare team about what to expect with the type of radiation therapy you are getting, including when you should call them with concerns.   Date Last Reviewed: 5/1/2016  © 3627-5028 [employer]. [address] [place] [address], [place] [address]. All rights reserved. This information is not intended as a substitute for professional medical care. Always follow your healthcare professional's instructions.

## 2018-09-19 NOTE — LETTER
September 19, 2018      Noelle Lala MD  [address]  [facility] [address]           [facility] - Radiation Oncology  [address]  [facility] [address]  Phone: [phone]          Patient: Saba Driver   MR Number: [id number]   YOB: 1960   Date of Visit: 9/19/2018       Dear Dr. Noelle Lala:    Thank you for referring Saba Driver to me for evaluation. Attached you will find relevant portions of my assessment and plan of care.    If you have questions, please do not hesitate to call me. I look forward to following Saba Driver along with you.    Sincerely,    Tavia Robles MD    Enclosure  CC:  No Recipients    If you would like to receive this communication electronically, please contact [email or web address] or [phone] to request more information on [facility] Link access.    For providers and/or their staff who would like to refer a patient to [facility], please contact us through our one-stop-shop provider referral line, [facility] [place], at [phone].    If you feel you have received this communication in error or would no longer like to receive these types of communications, please e-mail [email or web address]

## 2018-09-20 ENCOUNTER — INFUSION (OUTPATIENT)
Dept: INFUSION THERAPY | Facility: HOSPITAL | Age: 58
End: 2018-09-20
Attending: INTERNAL MEDICINE
Payer: COMMERCIAL

## 2018-09-20 VITALS
RESPIRATION RATE: 16 BRPM | SYSTOLIC BLOOD PRESSURE: 142 MMHG | DIASTOLIC BLOOD PRESSURE: 64 MMHG | TEMPERATURE: 98 F | OXYGEN SATURATION: 97 % | HEART RATE: 63 BPM

## 2018-09-20 DIAGNOSIS — Z09 CHEMOTHERAPY FOLLOW-UP EXAMINATION: Primary | ICD-10-CM

## 2018-09-20 DIAGNOSIS — D49.89 NEOPLASM OF PELVIS: Primary | ICD-10-CM

## 2018-09-20 PROCEDURE — 63600175 PHARM REV CODE 636 W HCPCS: Mod: JG | Performed by: INTERNAL MEDICINE

## 2018-09-20 PROCEDURE — S0028 INJECTION, FAMOTIDINE, 20 MG: HCPCS | Performed by: INTERNAL MEDICINE

## 2018-09-20 PROCEDURE — 25000003 PHARM REV CODE 250: Performed by: INTERNAL MEDICINE

## 2018-09-20 PROCEDURE — 96367 TX/PROPH/DG ADDL SEQ IV INF: CPT

## 2018-09-20 PROCEDURE — 96415 CHEMO IV INFUSION ADDL HR: CPT

## 2018-09-20 PROCEDURE — A4216 STERILE WATER/SALINE, 10 ML: HCPCS | Performed by: INTERNAL MEDICINE

## 2018-09-20 PROCEDURE — 96417 CHEMO IV INFUS EACH ADDL SEQ: CPT

## 2018-09-20 PROCEDURE — 96375 TX/PRO/DX INJ NEW DRUG ADDON: CPT

## 2018-09-20 PROCEDURE — 96413 CHEMO IV INFUSION 1 HR: CPT

## 2018-09-20 RX ORDER — SODIUM CHLORIDE 0.9 % (FLUSH) 0.9 %
10 SYRINGE (ML) INJECTION
Status: DISCONTINUED | OUTPATIENT
Start: 2018-09-20 | End: 2018-09-20 | Stop reason: HOSPADM

## 2018-09-20 RX ORDER — HEPARIN 100 UNIT/ML
500 SYRINGE INTRAVENOUS
Status: DISCONTINUED | OUTPATIENT
Start: 2018-09-20 | End: 2018-09-20 | Stop reason: HOSPADM

## 2018-09-20 RX ORDER — FAMOTIDINE 10 MG/ML
20 INJECTION INTRAVENOUS
Status: COMPLETED | OUTPATIENT
Start: 2018-09-20 | End: 2018-09-20

## 2018-09-20 RX ORDER — DEXAMETHASONE 4 MG/1
8 TABLET ORAL EVERY 12 HOURS
Qty: 20 TABLET | Refills: 0 | Status: SHIPPED | OUTPATIENT
Start: 2018-09-20 | End: 2018-09-30

## 2018-09-20 RX ADMIN — FAMOTIDINE 20 MG: 10 INJECTION, SOLUTION INTRAVENOUS at 09:09

## 2018-09-20 RX ADMIN — PACLITAXEL 402 MG: 300 INJECTION, SOLUTION INTRAVENOUS at 09:09

## 2018-09-20 RX ADMIN — Medication 10 ML: at 02:09

## 2018-09-20 RX ADMIN — PALONOSETRON HYDROCHLORIDE: 0.25 INJECTION, SOLUTION INTRAVENOUS at 09:09

## 2018-09-20 RX ADMIN — SODIUM CHLORIDE: 0.9 INJECTION, SOLUTION INTRAVENOUS at 09:09

## 2018-09-20 RX ADMIN — HEPARIN 500 UNITS: 100 SYRINGE at 02:09

## 2018-09-20 RX ADMIN — CARBOPLATIN 900 MG: 10 INJECTION, SOLUTION INTRAVENOUS at 12:09

## 2018-09-20 RX ADMIN — DIPHENHYDRAMINE HYDROCHLORIDE 50 MG: 50 INJECTION INTRAMUSCULAR; INTRAVENOUS at 09:09

## 2018-09-20 NOTE — PLAN OF CARE
Problem: Patient Care Overview  Goal: Plan of Care Review  Outcome: Ongoing (interventions implemented as appropriate)  Patient returned for first chemo after hysterectomy. Recovering well. No current complaints/concerns. Received Taxol and Carboplatin. Tolerated well. VSS. Received discharge instructions and verbalized understanding.

## 2018-09-21 ENCOUNTER — HOSPITAL ENCOUNTER (OUTPATIENT)
Dept: RADIOLOGY | Facility: HOSPITAL | Age: 58
Discharge: HOME | End: 2018-09-21
Attending: RADIOLOGY
Payer: COMMERCIAL

## 2018-09-21 DIAGNOSIS — C54.1 ENDOMETRIAL CANCER: ICD-10-CM

## 2018-09-21 DIAGNOSIS — D49.59 ENDOMETRIAL NEOPLASM: ICD-10-CM

## 2018-09-21 DIAGNOSIS — C77.4: ICD-10-CM

## 2018-09-21 PROCEDURE — 74177 CT ABD & PELVIS W/CONTRAST: CPT | Mod: TC

## 2018-09-21 PROCEDURE — 71260 CT THORAX DX C+: CPT | Mod: 26,,, | Performed by: RADIOLOGY

## 2018-09-21 PROCEDURE — 71260 CT THORAX DX C+: CPT | Mod: TC

## 2018-09-21 PROCEDURE — 25500020 PHARM REV CODE 255: Performed by: RADIOLOGY

## 2018-09-21 PROCEDURE — 74177 CT ABD & PELVIS W/CONTRAST: CPT | Mod: 26,,, | Performed by: RADIOLOGY

## 2018-09-21 RX ADMIN — IOHEXOL 100 ML: 350 INJECTION, SOLUTION INTRAVENOUS at 02:09

## 2018-09-21 RX ADMIN — IOHEXOL 15 ML: 300 INJECTION, SOLUTION INTRAVENOUS at 02:09

## 2018-09-24 ENCOUNTER — TELEPHONE (OUTPATIENT)
Dept: GYNECOLOGIC ONCOLOGY | Facility: CLINIC | Age: 58
End: 2018-09-24

## 2018-09-24 ENCOUNTER — OFFICE VISIT (OUTPATIENT)
Dept: GYNECOLOGIC ONCOLOGY | Facility: CLINIC | Age: 58
End: 2018-09-24
Payer: COMMERCIAL

## 2018-09-24 VITALS
BODY MASS INDEX: 35.09 KG/M2 | WEIGHT: 223.56 LBS | HEIGHT: 67 IN | HEART RATE: 70 BPM | DIASTOLIC BLOOD PRESSURE: 67 MMHG | SYSTOLIC BLOOD PRESSURE: 143 MMHG

## 2018-09-24 DIAGNOSIS — C55 MALIGNANT NEOPLASM OF UTERUS, UNSPECIFIED SITE: Primary | ICD-10-CM

## 2018-09-24 DIAGNOSIS — C54.1 ENDOMETRIAL CANCER: Primary | ICD-10-CM

## 2018-09-24 PROCEDURE — 3077F SYST BP >= 140 MM HG: CPT | Mod: CPTII,S$GLB,, | Performed by: OBSTETRICS & GYNECOLOGY

## 2018-09-24 PROCEDURE — 3008F BODY MASS INDEX DOCD: CPT | Mod: CPTII,S$GLB,, | Performed by: OBSTETRICS & GYNECOLOGY

## 2018-09-24 PROCEDURE — 99999 PR PBB SHADOW E&M-EST. PATIENT-LVL III: CPT | Mod: PBBFAC,,, | Performed by: OBSTETRICS & GYNECOLOGY

## 2018-09-24 PROCEDURE — 99214 OFFICE O/P EST MOD 30 MIN: CPT | Mod: 24,S$GLB,, | Performed by: OBSTETRICS & GYNECOLOGY

## 2018-09-24 PROCEDURE — 3078F DIAST BP <80 MM HG: CPT | Mod: CPTII,S$GLB,, | Performed by: OBSTETRICS & GYNECOLOGY

## 2018-09-24 RX ORDER — AMLODIPINE BESYLATE 5 MG/1
5 TABLET ORAL DAILY
COMMUNITY
Start: 2018-09-19 | End: 2018-12-24

## 2018-09-24 RX ORDER — MECLIZINE HYDROCHLORIDE 25 MG/1
TABLET ORAL
COMMUNITY
Start: 2018-09-04 | End: 2018-09-24 | Stop reason: SDUPTHER

## 2018-09-24 NOTE — LETTER
September 30, 2018        Kate Boyd MD  [address] [facility] [address]  [address]  [place] [address]             [facility] - GYN Oncology  [address]  [place] [address]  Phone: [phone]   Patient: Saba Driver   MR Number: [id number]   YOB: 1960   Date of Visit: 9/24/2018       Dear Dr. Boyd:    Thank you for referring Saba Driver to me for evaluation. Below are the relevant portions of my assessment and plan of care.            If you have questions, please do not hesitate to call me. I look forward to following Saba along with you.    Sincerely,      MD HERB Crowley MD

## 2018-09-30 DIAGNOSIS — C54.1 ENDOMETRIAL CANCER: Primary | ICD-10-CM

## 2018-09-30 RX ORDER — SODIUM CHLORIDE 9 MG/ML
INJECTION, SOLUTION INTRAVENOUS CONTINUOUS
Status: CANCELLED | OUTPATIENT
Start: 2018-09-30

## 2018-09-30 RX ORDER — LIDOCAINE HYDROCHLORIDE 10 MG/ML
1 INJECTION, SOLUTION EPIDURAL; INFILTRATION; INTRACAUDAL; PERINEURAL ONCE
Status: CANCELLED | OUTPATIENT
Start: 2018-09-30 | End: 2018-09-30

## 2018-09-30 NOTE — H&P (VIEW-ONLY)
Subjective:      Patient ID: Saba Driver is a 57 y.o. female.    Chief Complaint: Post-op Evaluation (BRIAN/BSO)      HPI  S/p BRIAN/BSO 8/2/18. Uncomplicated post operative course. 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     Presents today for post operative visit. Previously much small right inguinal adenopathy has grown significantly over the last few week.    Oncologic history:  Referred by Dr. Shakir Alexis for postmenopausal bleeding. P0. On pelvic uterus is enlarged and fixed, unable to perform EMB due to altered anatomy of the cervix. She had been admitted to  for vaginal bleeding and anemia requiring transfusion. Fixed sore R inguinal node. Blind pap returned adenocarcinoma.      Pelvic US 3/12/18  UT enlarged 13.3cm with 5.1cm fibroid anterior. Limited visualization of EMS.  LOV 2.9cm  ROV 3.6cm     Medical co-morbidities include DM, HTN. No anticoagulation.      No prior abdominal surgeries     Family history significant for mom with pancreatic cancer, denies history of breast, ovarian, uterine, or colon cancer.      MMG last year normal per patient.      Seen in consult with me 3/21/18. Constellation of findings thus far consistent with an advanced gynecologic malignancy. Likely uterine in origin. Inguinal adenopathy would make this Stage IVB. Pap cytology showing adenocarcinoma but need tissue diagnosis. Will plan IR for biopsy of inguinal node. Pelvic disease is surgically unresectable at present. Recommended neoadjuvant chemotherapy based on tissue diagnosis.      IR biopsy 4/4/18  FINAL PATHOLOGIC DIAGNOSIS  RIGHT INGUINAL LYMPH NODE, CT GUIDED BIOPSY WITH ONSITE ADEQUACY (CYTOLOGY AND CELL BLOCK):  - Adenocarcinoma. See note.  Note: The carcinoma is positive for CK7, p53, vimentin and ER (patchy). It is negative for CDX2, CEA, CK20 and WT1. This immunoprofile is compatible with an endometrial origin. All stains have appropriate controls.     Initiate neoadjuvant  chemotherapy with Dr. Boyd. Carbo/taxol 4/19/18. IV port.   S/p 3 cycles neoadjuvant chemotherapy carbo/taxol with Dr. Boyd.      CT CAP 6/25/18  Impression   Significant decrease in size in the right inguinal node.  Lobulated enlarged uterus.  RECIST SUMMARY:  Date of prior examination for comparison: 03/22/2018  Lesion 1: Right inguinal node  1.4 cm(smallest diameter ).  Series 2 image 118.  Prior measurement 4.0 cm.      Elected for interval cytoreductive surgery.    Review of Systems   Constitutional: Negative for appetite change, chills, fatigue and fever.   HENT: Negative for mouth sores.    Respiratory: Negative for cough and shortness of breath.    Cardiovascular: Negative for leg swelling.   Gastrointestinal: Negative for abdominal pain, blood in stool, constipation and diarrhea.   Endocrine: Negative for cold intolerance.   Genitourinary: Negative for dysuria and vaginal bleeding.   Musculoskeletal: Negative for myalgias.   Skin: Negative for rash.   Allergic/Immunologic: Negative.    Neurological: Negative for weakness and numbness.   Hematological: Negative for adenopathy. Does not bruise/bleed easily.   Psychiatric/Behavioral: Negative for confusion.       Objective:   Physical Exam:   Constitutional: She is oriented to person, place, and time. She appears well-developed and well-nourished.    HENT:   Head: Normocephalic and atraumatic.    Eyes: EOM are normal. Pupils are equal, round, and reactive to light.    Neck: Normal range of motion. Neck supple. No thyromegaly present.    Cardiovascular: Normal rate, regular rhythm and intact distal pulses.     Pulmonary/Chest: Effort normal and breath sounds normal. No respiratory distress. She has no wheezes.        Abdominal: Soft. Bowel sounds are normal. She exhibits no distension, no ascites and no mass. There is no tenderness.     Genitourinary: Rectum normal and vagina normal. Pelvic exam was performed with patient supine. There is no lesion on the  right labia. There is no lesion on the left labia. Uterus is absent. There is an absent adnexa. Right adnexum displays no mass. Left adnexum displays no mass. Vaginal cuff normal.Cervix exhibits absence.   Genitourinary Comments: Vaginal cuff well healed.            Musculoskeletal: Normal range of motion and moves all extremeties.      Lymphadenopathy:     She has no cervical adenopathy.        Right: Inguinal adenopathy present. No supraclavicular adenopathy present.        Left: No inguinal and no supraclavicular adenopathy present.    Neurological: She is alert and oriented to person, place, and time.    Skin: Skin is warm and dry. No rash noted.    Psychiatric: She has a normal mood and affect.       Assessment:     1. Endometrial cancer        Plan:   No orders of the defined types were placed in this encounter.    CT and physical exam show significant growth of right inguinal adenopathy since surgery. Discussed with rad onc and feel she would benefit from resection prior to RT. Will plan for inguinal lynph node resection. Will copy Dr. Boyd to hold chemotherapy for surgery planned for 10/11/18.     The risks, benefits, and indications of the procedure were discussed with the patient and her family members if present.  These included bleeding, transfusion, infection, damage to surrounding tissues (bowel, bladder, ureter), wound separation, perioperative cardiac events, VTE, pneumonia, and possible death.  She voiced understanding, all questions were answered and consents were signed.    I spent approximately 25 minutes reviewing the available records and evaluating the patient, out of which over 50% of the time was spent face to face with the patient in counseling and coordinating this patient's care.

## 2018-10-03 ENCOUNTER — TELEPHONE (OUTPATIENT)
Dept: HEMATOLOGY/ONCOLOGY | Facility: CLINIC | Age: 58
End: 2018-10-03

## 2018-10-03 ENCOUNTER — ANESTHESIA EVENT (OUTPATIENT)
Dept: SURGERY | Facility: HOSPITAL | Age: 58
End: 2018-10-03
Payer: COMMERCIAL

## 2018-10-03 NOTE — TELEPHONE ENCOUNTER
Pt called to report that she is going to surgery with  next Thursday & is inquiring if she needs to come see  this Friday. Discussed with , then informed pt we are going to cancel our appts until after surgery. She voiced understanding.

## 2018-10-03 NOTE — ANESTHESIA PREPROCEDURE EVALUATION
Justine Her, RN   Registered Nurse      Pre Admission Screening   Signed                             []Hide copied text    []Tezver for details      Anesthesia Assessment: Preoperative EQUATION     Planned Procedure: Procedure(s) (LRB):  LYMPHADENECTOMY (Right)  Requested Anesthesia Type:General  Surgeon: Noelle Lala MD  Service: OB/GYN  Known or anticipated Date of Surgery:10/11/2018     Surgeon notes: reviewed     Electronic QUestionnaire Assessment completed via nurse interview with patient.         No AQ     Triage considerations:      The patient has no apparent active cardiac condition (No unstable coronary Syndrome such as severe unstable angina or recent [<1 month] myocardial infarction, decompensated CHF, severe valvular   disease or significant arrhythmia)     Previous anesthesia records:GETA, LMA General and No problems    08/02/18 Placement Time: 1401 Method of Intubation: Video Laryngoscopy Inserted by: Anesthesia Resident Airway Device: Endotracheal Tube Mask Ventilation: Easy Intubated: Postinduction Blade: Patti #3 , [employer]  Airway Device Size: 7.0 Style: Cuffed Cuff Inflation: Minimal occlusive pressure Placement Verified By: Auscultation;Capnometry;ETT Condensation Grade: Grade I Complicating Factors: Anterior larynx;Large/Floppy epiglottis Findings Post-Intubation: Positive EtCO2;Bilateral breath sounds;Atraumatic/Condition of teeth unchanged Depth of Insertion (cm): 22 Secured at: Lips Complications: None Breath Sounds: Equal Bilateral Insertion attempts (enter comment if more than 2 attempts): 1  Airway/Jaw/Neck:  Airway Findings: Mouth Opening: Normal Tongue: Normal  General Airway Assessment: Adult  Jaw/Neck Findings:     Neck ROM: Normal ROM         Last PCP note: outside [facility]   Subspecialty notes: Hematology/Oncology     Other important co-morbidities: endometrial cancer, HTN, DM2, anemia, obesity     Tests already available:  Available tests,  within 1 month , within  [facility] . 9/13/18 CMP, CBC. 8/29/18 EKG. In media: A1c 3/9/18 & 7/30/18.     Instructions given. (See in Nurse's note)     Optimization:  Anesthesia Preop Clinic Assessment  Indicated-not required for this procedure                Plan:    Testing:  none                           Patient  has previously scheduled Medical Appointment: none     Navigation:                          Straight Line to surgery.                          No tests, anesthesia preop clinic visit, or consult required.                                                          Electronically signed by Justine Her RN at 10/3/2018  2:44 PM       Pre-admit on 10/11/2018            Detailed Report                                                                                                                     10/03/2018  Saba Driver is a 57 y.o., female.    Anesthesia Evaluation    I have reviewed the Patient Summary Reports.    I have reviewed the Nursing Notes.   I have reviewed the Medications.     Review of Systems  Anesthesia Hx:  No problems with previous Anesthesia  History of prior surgery of interest to airway management or planning: Previous anesthesia: General 8/2/18 hysterectomy with general anesthesia.  Procedure performed at an [facility]. Denies Family Hx of Anesthesia complications.   Denies Personal Hx of Anesthesia complications.   Hematology/Oncology:         -- Anemia: improved, s/p transfusion and s/p chemotherapy Endometrial has subcutaneous implanted intravenous port, s/p chemotherapy and s/p surgery    -- Transfusion: -- Transfusion Hx (no complications):  Current/Recent Cancer.   Cardiovascular:   Hypertension  Functional Capacity good / => 4 METS  Hypertension , Well Controlled on Rx    Pulmonary:  Pulmonary Normal  Denies Shortness of breath.  Denies Recent URI.    Renal/:  Renal/ Normal     Hepatic/GI:  Hepatic/GI Normal    Musculoskeletal:  Musculoskeletal Normal    Neurological:   HX pituitary adenoma    Endocrine:  Diabetes , controlled by diet. , most recent HgA1c value was 6.5 on 7/30.    Psych:  Psychiatric Normal           Physical Exam  General:  Obesity    Airway/Jaw/Neck:  Airway Findings: Mouth Opening: Normal Tongue: Normal  General Airway Assessment: Adult  Mallampati: II  TM Distance: 4 - 6 cm  Jaw/Neck Findings:  Neck ROM: Normal ROM     Eyes/Ears/Nose:  Eyes/Ears/Nose Findings:    Dental:  Dental Findings: In tact        Mental Status:  Mental Status Findings:  Cooperative, Alert and Oriented         Anesthesia Plan  Type of Anesthesia, risks & benefits discussed:  Anesthesia Type:  general  Patient's Preference:   Intra-op Monitoring Plan: standard ASA monitors  Intra-op Monitoring Plan Comments:   Post Op Pain Control Plan: per primary service following discharge from PACU and multimodal analgesia  Post Op Pain Control Plan Comments:   Induction:   IV  Beta Blocker:         Informed Consent: Patient understands risks and agrees with Anesthesia plan.  Questions answered. Anesthesia consent signed with patient.  ASA Score: 2     Day of Surgery Review of History & Physical:    H&P update referred to the surgeon.     Anesthesia Plan Notes: Have video laryngoscope readily available in OR        Ready For Surgery From Anesthesia Perspective.

## 2018-10-03 NOTE — PRE ADMISSION SCREENING
Anesthesia Assessment: Preoperative EQUATION    Planned Procedure: Procedure(s) (LRB):  LYMPHADENECTOMY (Right)  Requested Anesthesia Type:General  Surgeon: Noelle Lala MD  Service: OB/GYN  Known or anticipated Date of Surgery:10/11/2018    Surgeon notes: reviewed    Electronic QUestionnaire Assessment completed via nurse interview with patient.        No AQ    Triage considerations:     The patient has no apparent active cardiac condition (No unstable coronary Syndrome such as severe unstable angina or recent [<1 month] myocardial infarction, decompensated CHF, severe valvular   disease or significant arrhythmia)    Previous anesthesia records:GETA, LMA General and No problems    08/02/18 Placement Time: 1401 Method of Intubation: Video Laryngoscopy Inserted by: Anesthesia Resident Airway Device: Endotracheal Tube Mask Ventilation: Easy Intubated: Postinduction Blade: Patti #3 , [employer]  Airway Device Size: 7.0 Style: Cuffed Cuff Inflation: Minimal occlusive pressure Placement Verified By: Auscultation;Capnometry;ETT Condensation Grade: Grade I Complicating Factors: Anterior larynx;Large/Floppy epiglottis Findings Post-Intubation: Positive EtCO2;Bilateral breath sounds;Atraumatic/Condition of teeth unchanged Depth of Insertion (cm): 22 Secured at: Lips Complications: None Breath Sounds: Equal Bilateral Insertion attempts (enter comment if more than 2 attempts): 1  Airway/Jaw/Neck:  Airway Findings: Mouth Opening: Normal Tongue: Normal  General Airway Assessment: Adult  Jaw/Neck Findings:     Neck ROM: Normal ROM        Last PCP note: outside [facility]   Subspecialty notes: Hematology/Oncology    Other important co-morbidities: endometrial cancer, HTN, DM2, anemia, obesity     Tests already available:  Available tests,  within 1 month , within [facility] . 9/13/18 CMP, CBC. 8/29/18 EKG. In media: A1c 3/9/18 & 7/30/18.    Instructions given. (See in Nurse's note)    Optimization:  Anesthesia Preop Clinic  Assessment  Indicated-not required for this procedure       Plan:    Testing:  none      Patient  has previously scheduled Medical Appointment: 10/4 lab, 10/5 [facility] onc    Navigation:                Straight Line to surgery.               No tests, anesthesia preop clinic visit, or consult required.

## 2018-10-04 ENCOUNTER — LAB VISIT (OUTPATIENT)
Dept: LAB | Facility: HOSPITAL | Age: 58
End: 2018-10-04
Attending: INTERNAL MEDICINE
Payer: COMMERCIAL

## 2018-10-04 DIAGNOSIS — C54.1 ENDOMETRIAL CANCER: ICD-10-CM

## 2018-10-04 LAB
ALBUMIN SERPL BCP-MCNC: 3.4 G/DL
ALP SERPL-CCNC: 83 U/L
ALT SERPL W/O P-5'-P-CCNC: 12 U/L
ANION GAP SERPL CALC-SCNC: 6 MMOL/L
AST SERPL-CCNC: 14 U/L
BASOPHILS # BLD AUTO: 0.03 K/UL
BASOPHILS NFR BLD: 0.9 %
BILIRUB SERPL-MCNC: 0.2 MG/DL
BUN SERPL-MCNC: 11 MG/DL
CALCIUM SERPL-MCNC: 9.5 MG/DL
CHLORIDE SERPL-SCNC: 104 MMOL/L
CO2 SERPL-SCNC: 31 MMOL/L
CREAT SERPL-MCNC: 0.8 MG/DL
DIFFERENTIAL METHOD: ABNORMAL
EOSINOPHIL # BLD AUTO: 0.1 K/UL
EOSINOPHIL NFR BLD: 2.7 %
ERYTHROCYTE [DISTWIDTH] IN BLOOD BY AUTOMATED COUNT: 13.4 %
EST. GFR  (AFRICAN AMERICAN): >60 ML/MIN/1.73 M^2
EST. GFR  (NON AFRICAN AMERICAN): >60 ML/MIN/1.73 M^2
GLUCOSE SERPL-MCNC: 123 MG/DL
HCT VFR BLD AUTO: 38.1 %
HGB BLD-MCNC: 12 G/DL
IMM GRANULOCYTES # BLD AUTO: 0.04 K/UL
IMM GRANULOCYTES NFR BLD AUTO: 1.2 %
LYMPHOCYTES # BLD AUTO: 1.8 K/UL
LYMPHOCYTES NFR BLD: 53.6 %
MAGNESIUM SERPL-MCNC: 1.4 MG/DL
MCH RBC QN AUTO: 27.6 PG
MCHC RBC AUTO-ENTMCNC: 31.5 G/DL
MCV RBC AUTO: 88 FL
MONOCYTES # BLD AUTO: 0.7 K/UL
MONOCYTES NFR BLD: 20.5 %
NEUTROPHILS # BLD AUTO: 0.7 K/UL
NEUTROPHILS NFR BLD: 21.1 %
NRBC BLD-RTO: 0 /100 WBC
PLATELET # BLD AUTO: 220 K/UL
PMV BLD AUTO: 10.2 FL
POTASSIUM SERPL-SCNC: 4.2 MMOL/L
PROT SERPL-MCNC: 6.6 G/DL
RBC # BLD AUTO: 4.34 M/UL
SODIUM SERPL-SCNC: 141 MMOL/L
WBC # BLD AUTO: 3.32 K/UL

## 2018-10-04 PROCEDURE — 80053 COMPREHEN METABOLIC PANEL: CPT

## 2018-10-04 PROCEDURE — 85025 COMPLETE CBC W/AUTO DIFF WBC: CPT

## 2018-10-04 PROCEDURE — 36415 COLL VENOUS BLD VENIPUNCTURE: CPT | Mod: PO

## 2018-10-04 PROCEDURE — 83735 ASSAY OF MAGNESIUM: CPT

## 2018-10-10 ENCOUNTER — TELEPHONE (OUTPATIENT)
Dept: GYNECOLOGIC ONCOLOGY | Facility: CLINIC | Age: 58
End: 2018-10-10

## 2018-10-11 ENCOUNTER — ANESTHESIA (OUTPATIENT)
Dept: SURGERY | Facility: HOSPITAL | Age: 58
End: 2018-10-11
Payer: COMMERCIAL

## 2018-10-11 ENCOUNTER — HOSPITAL ENCOUNTER (OUTPATIENT)
Facility: HOSPITAL | Age: 58
Discharge: HOME | End: 2018-10-11
Attending: OBSTETRICS & GYNECOLOGY | Admitting: OBSTETRICS & GYNECOLOGY
Payer: COMMERCIAL

## 2018-10-11 VITALS
HEART RATE: 49 BPM | SYSTOLIC BLOOD PRESSURE: 116 MMHG | HEIGHT: 67 IN | TEMPERATURE: 98 F | WEIGHT: 225 LBS | BODY MASS INDEX: 35.31 KG/M2 | DIASTOLIC BLOOD PRESSURE: 58 MMHG | OXYGEN SATURATION: 99 % | RESPIRATION RATE: 16 BRPM

## 2018-10-11 DIAGNOSIS — C54.1 ENDOMETRIAL CANCER: ICD-10-CM

## 2018-10-11 DIAGNOSIS — R59.0 LYMPHADENOPATHY, INGUINAL: Primary | ICD-10-CM

## 2018-10-11 LAB
ABO + RH BLD: NORMAL
BLD GP AB SCN CELLS X3 SERPL QL: NORMAL
POCT GLUCOSE: 115 MG/DL (ref 70–110)

## 2018-10-11 PROCEDURE — 63600175 PHARM REV CODE 636 W HCPCS: Performed by: STUDENT IN AN ORGANIZED HEALTH CARE EDUCATION/TRAINING PROGRAM

## 2018-10-11 PROCEDURE — 63600175 PHARM REV CODE 636 W HCPCS: Performed by: OBSTETRICS & GYNECOLOGY

## 2018-10-11 PROCEDURE — 71000039 HC RECOVERY, EACH ADD'L HOUR: Performed by: OBSTETRICS & GYNECOLOGY

## 2018-10-11 PROCEDURE — 25000003 PHARM REV CODE 250: Performed by: STUDENT IN AN ORGANIZED HEALTH CARE EDUCATION/TRAINING PROGRAM

## 2018-10-11 PROCEDURE — 63600175 PHARM REV CODE 636 W HCPCS: Performed by: NURSE ANESTHETIST, CERTIFIED REGISTERED

## 2018-10-11 PROCEDURE — 88307 TISSUE EXAM BY PATHOLOGIST: CPT | Performed by: PATHOLOGY

## 2018-10-11 PROCEDURE — D9220A PRA ANESTHESIA: Mod: CRNA,,, | Performed by: NURSE ANESTHETIST, CERTIFIED REGISTERED

## 2018-10-11 PROCEDURE — 25000003 PHARM REV CODE 250: Performed by: OBSTETRICS & GYNECOLOGY

## 2018-10-11 PROCEDURE — 38760 REMOVE GROIN LYMPH NODES: CPT | Mod: 58,RT,, | Performed by: OBSTETRICS & GYNECOLOGY

## 2018-10-11 PROCEDURE — 37000009 HC ANESTHESIA EA ADD 15 MINS: Performed by: OBSTETRICS & GYNECOLOGY

## 2018-10-11 PROCEDURE — 36000707: Performed by: OBSTETRICS & GYNECOLOGY

## 2018-10-11 PROCEDURE — 27201423 OPTIME MED/SURG SUP & DEVICES STERILE SUPPLY: Performed by: OBSTETRICS & GYNECOLOGY

## 2018-10-11 PROCEDURE — 25000003 PHARM REV CODE 250: Performed by: NURSE ANESTHETIST, CERTIFIED REGISTERED

## 2018-10-11 PROCEDURE — 86850 RBC ANTIBODY SCREEN: CPT

## 2018-10-11 PROCEDURE — 94761 N-INVAS EAR/PLS OXIMETRY MLT: CPT

## 2018-10-11 PROCEDURE — 82962 GLUCOSE BLOOD TEST: CPT | Performed by: OBSTETRICS & GYNECOLOGY

## 2018-10-11 PROCEDURE — 36000706: Performed by: OBSTETRICS & GYNECOLOGY

## 2018-10-11 PROCEDURE — 37000008 HC ANESTHESIA 1ST 15 MINUTES: Performed by: OBSTETRICS & GYNECOLOGY

## 2018-10-11 PROCEDURE — 88307 TISSUE EXAM BY PATHOLOGIST: CPT | Mod: 26,,, | Performed by: PATHOLOGY

## 2018-10-11 PROCEDURE — D9220A PRA ANESTHESIA: Mod: ANES,,, | Performed by: ANESTHESIOLOGY

## 2018-10-11 PROCEDURE — 71000015 HC POSTOP RECOV 1ST HR: Performed by: OBSTETRICS & GYNECOLOGY

## 2018-10-11 PROCEDURE — 71000033 HC RECOVERY, INTIAL HOUR: Performed by: OBSTETRICS & GYNECOLOGY

## 2018-10-11 RX ORDER — PHENYLEPHRINE HYDROCHLORIDE 10 MG/ML
INJECTION INTRAVENOUS
Status: DISCONTINUED | OUTPATIENT
Start: 2018-10-11 | End: 2018-10-11

## 2018-10-11 RX ORDER — OXYCODONE AND ACETAMINOPHEN 10; 325 MG/1; MG/1
1 TABLET ORAL EVERY 4 HOURS PRN
Status: DISCONTINUED | OUTPATIENT
Start: 2018-10-11 | End: 2018-10-11 | Stop reason: HOSPADM

## 2018-10-11 RX ORDER — OXYCODONE HYDROCHLORIDE 5 MG/1
5 TABLET ORAL EVERY 4 HOURS PRN
Status: DISCONTINUED | OUTPATIENT
Start: 2018-10-11 | End: 2018-10-11 | Stop reason: HOSPADM

## 2018-10-11 RX ORDER — CEFAZOLIN SODIUM 1 G/3ML
2 INJECTION, POWDER, FOR SOLUTION INTRAMUSCULAR; INTRAVENOUS
Status: COMPLETED | OUTPATIENT
Start: 2018-10-11 | End: 2018-10-11

## 2018-10-11 RX ORDER — ROCURONIUM BROMIDE 10 MG/ML
INJECTION, SOLUTION INTRAVENOUS
Status: DISCONTINUED | OUTPATIENT
Start: 2018-10-11 | End: 2018-10-11

## 2018-10-11 RX ORDER — FENTANYL CITRATE 50 UG/ML
INJECTION, SOLUTION INTRAMUSCULAR; INTRAVENOUS
Status: DISCONTINUED | OUTPATIENT
Start: 2018-10-11 | End: 2018-10-11

## 2018-10-11 RX ORDER — OXYCODONE AND ACETAMINOPHEN 5; 325 MG/1; MG/1
1 TABLET ORAL EVERY 4 HOURS PRN
Qty: 20 TABLET | Refills: 0 | Status: SHIPPED | OUTPATIENT
Start: 2018-10-11 | End: 2019-03-20

## 2018-10-11 RX ORDER — ACETAMINOPHEN 10 MG/ML
INJECTION, SOLUTION INTRAVENOUS
Status: DISCONTINUED | OUTPATIENT
Start: 2018-10-11 | End: 2018-10-11

## 2018-10-11 RX ORDER — LIDOCAINE HCL/PF 100 MG/5ML
SYRINGE (ML) INTRAVENOUS
Status: DISCONTINUED | OUTPATIENT
Start: 2018-10-11 | End: 2018-10-11

## 2018-10-11 RX ORDER — DIPHENHYDRAMINE HYDROCHLORIDE 50 MG/ML
25 INJECTION INTRAMUSCULAR; INTRAVENOUS EVERY 4 HOURS PRN
Status: DISCONTINUED | OUTPATIENT
Start: 2018-10-11 | End: 2018-10-11 | Stop reason: HOSPADM

## 2018-10-11 RX ORDER — ONDANSETRON 2 MG/ML
INJECTION INTRAMUSCULAR; INTRAVENOUS
Status: DISCONTINUED | OUTPATIENT
Start: 2018-10-11 | End: 2018-10-11

## 2018-10-11 RX ORDER — ONDANSETRON 2 MG/ML
4 INJECTION INTRAMUSCULAR; INTRAVENOUS ONCE AS NEEDED
Status: DISCONTINUED | OUTPATIENT
Start: 2018-10-11 | End: 2018-10-11 | Stop reason: HOSPADM

## 2018-10-11 RX ORDER — NEOSTIGMINE METHYLSULFATE 1 MG/ML
INJECTION, SOLUTION INTRAVENOUS
Status: DISCONTINUED | OUTPATIENT
Start: 2018-10-11 | End: 2018-10-11

## 2018-10-11 RX ORDER — SODIUM CHLORIDE 9 MG/ML
INJECTION, SOLUTION INTRAVENOUS CONTINUOUS
Status: DISCONTINUED | OUTPATIENT
Start: 2018-10-11 | End: 2018-10-11 | Stop reason: HOSPADM

## 2018-10-11 RX ORDER — IBUPROFEN 200 MG
600 TABLET ORAL
Status: DISCONTINUED | OUTPATIENT
Start: 2018-10-11 | End: 2018-10-11 | Stop reason: HOSPADM

## 2018-10-11 RX ORDER — IBUPROFEN 600 MG/1
600 TABLET ORAL EVERY 6 HOURS PRN
Qty: 30 TABLET | Refills: 0 | Status: SHIPPED | OUTPATIENT
Start: 2018-10-11 | End: 2020-01-01

## 2018-10-11 RX ORDER — GLYCOPYRROLATE 0.2 MG/ML
INJECTION INTRAMUSCULAR; INTRAVENOUS
Status: DISCONTINUED | OUTPATIENT
Start: 2018-10-11 | End: 2018-10-11

## 2018-10-11 RX ORDER — PROPOFOL 10 MG/ML
VIAL (ML) INTRAVENOUS
Status: DISCONTINUED | OUTPATIENT
Start: 2018-10-11 | End: 2018-10-11

## 2018-10-11 RX ORDER — HYDROMORPHONE HYDROCHLORIDE 1 MG/ML
0.2 INJECTION, SOLUTION INTRAMUSCULAR; INTRAVENOUS; SUBCUTANEOUS EVERY 5 MIN PRN
Status: DISCONTINUED | OUTPATIENT
Start: 2018-10-11 | End: 2018-10-11 | Stop reason: HOSPADM

## 2018-10-11 RX ORDER — BISOPROLOL FUMARATE 10 MG/1
10 TABLET, FILM COATED ORAL DAILY
COMMUNITY

## 2018-10-11 RX ORDER — LIDOCAINE HYDROCHLORIDE 10 MG/ML
1 INJECTION, SOLUTION EPIDURAL; INFILTRATION; INTRACAUDAL; PERINEURAL ONCE
Status: COMPLETED | OUTPATIENT
Start: 2018-10-11 | End: 2018-10-11

## 2018-10-11 RX ORDER — SODIUM CHLORIDE 0.9 % (FLUSH) 0.9 %
3 SYRINGE (ML) INJECTION
Status: DISCONTINUED | OUTPATIENT
Start: 2018-10-11 | End: 2018-10-11 | Stop reason: HOSPADM

## 2018-10-11 RX ORDER — ONDANSETRON 8 MG/1
8 TABLET, ORALLY DISINTEGRATING ORAL EVERY 8 HOURS PRN
Status: DISCONTINUED | OUTPATIENT
Start: 2018-10-11 | End: 2018-10-11 | Stop reason: HOSPADM

## 2018-10-11 RX ORDER — DIPHENHYDRAMINE HCL 25 MG
25 CAPSULE ORAL EVERY 4 HOURS PRN
Status: DISCONTINUED | OUTPATIENT
Start: 2018-10-11 | End: 2018-10-11 | Stop reason: HOSPADM

## 2018-10-11 RX ORDER — HALOPERIDOL 5 MG/ML
0.5 INJECTION INTRAMUSCULAR ONCE
Status: DISCONTINUED | OUTPATIENT
Start: 2018-10-11 | End: 2018-10-11 | Stop reason: HOSPADM

## 2018-10-11 RX ORDER — MIDAZOLAM HYDROCHLORIDE 1 MG/ML
INJECTION, SOLUTION INTRAMUSCULAR; INTRAVENOUS
Status: DISCONTINUED | OUTPATIENT
Start: 2018-10-11 | End: 2018-10-11

## 2018-10-11 RX ADMIN — IBUPROFEN 600 MG: 200 TABLET, FILM COATED ORAL at 03:10

## 2018-10-11 RX ADMIN — FENTANYL CITRATE 100 MCG: 50 INJECTION, SOLUTION INTRAMUSCULAR; INTRAVENOUS at 12:10

## 2018-10-11 RX ADMIN — GLYCOPYRROLATE 0.6 MG: 0.2 INJECTION, SOLUTION INTRAMUSCULAR; INTRAVENOUS at 01:10

## 2018-10-11 RX ADMIN — MIDAZOLAM HYDROCHLORIDE 2 MG: 1 INJECTION, SOLUTION INTRAMUSCULAR; INTRAVENOUS at 11:10

## 2018-10-11 RX ADMIN — SODIUM CHLORIDE: 0.9 INJECTION, SOLUTION INTRAVENOUS at 09:10

## 2018-10-11 RX ADMIN — PHENYLEPHRINE HYDROCHLORIDE 200 MCG: 10 INJECTION INTRAVENOUS at 12:10

## 2018-10-11 RX ADMIN — OXYCODONE HYDROCHLORIDE 5 MG: 5 TABLET ORAL at 03:10

## 2018-10-11 RX ADMIN — ONDANSETRON 4 MG: 2 INJECTION INTRAMUSCULAR; INTRAVENOUS at 11:10

## 2018-10-11 RX ADMIN — CEFAZOLIN 2 G: 330 INJECTION, POWDER, FOR SOLUTION INTRAMUSCULAR; INTRAVENOUS at 11:10

## 2018-10-11 RX ADMIN — ACETAMINOPHEN 1000 MG: 10 INJECTION, SOLUTION INTRAVENOUS at 11:10

## 2018-10-11 RX ADMIN — ROCURONIUM BROMIDE 50 MG: 10 INJECTION, SOLUTION INTRAVENOUS at 11:10

## 2018-10-11 RX ADMIN — ROCURONIUM BROMIDE 20 MG: 10 INJECTION, SOLUTION INTRAVENOUS at 12:10

## 2018-10-11 RX ADMIN — GLYCOPYRROLATE 0.2 MG: 0.2 INJECTION, SOLUTION INTRAMUSCULAR; INTRAVENOUS at 12:10

## 2018-10-11 RX ADMIN — PROPOFOL 150 MG: 10 INJECTION, EMULSION INTRAVENOUS at 11:10

## 2018-10-11 RX ADMIN — PHENYLEPHRINE HYDROCHLORIDE 200 MCG: 10 INJECTION INTRAVENOUS at 11:10

## 2018-10-11 RX ADMIN — ONDANSETRON 8 MG: 8 TABLET, ORALLY DISINTEGRATING ORAL at 01:10

## 2018-10-11 RX ADMIN — PROMETHAZINE HYDROCHLORIDE 12.5 MG: 25 INJECTION INTRAMUSCULAR; INTRAVENOUS at 02:10

## 2018-10-11 RX ADMIN — NEOSTIGMINE METHYLSULFATE 5 MG: 1 INJECTION INTRAVENOUS at 01:10

## 2018-10-11 RX ADMIN — LIDOCAINE HYDROCHLORIDE 0.2 MG: 10 INJECTION, SOLUTION EPIDURAL; INFILTRATION; INTRACAUDAL; PERINEURAL at 09:10

## 2018-10-11 RX ADMIN — LIDOCAINE HYDROCHLORIDE 100 MG: 20 INJECTION, SOLUTION INTRAVENOUS at 11:10

## 2018-10-11 RX ADMIN — FENTANYL CITRATE 100 MCG: 50 INJECTION, SOLUTION INTRAMUSCULAR; INTRAVENOUS at 11:10

## 2018-10-11 NOTE — PLAN OF CARE
Discharge instructions given, patient verbalized understanding. Consents in chart, Vitals stable, no complaints. Prescription given to patient. Patient states nausea has resolved. Patient due to void

## 2018-10-11 NOTE — TRANSFER OF CARE
"Anesthesia Transfer of Care Note    Patient: Saba Driver    Procedure(s) Performed: Procedure(s) (LRB):  LYMPHADENECTOMY (Right)    Patient location: PACU    Anesthesia Type: general    Transport from OR: Transported from OR on room air with adequate spontaneous ventilation. Transported from OR on 6-10 L/min O2 by face mask with adequate spontaneous ventilation    Post pain: adequate analgesia    Post assessment: no apparent anesthetic complications and tolerated procedure well    Post vital signs: stable    Level of consciousness: awake and alert    Nausea/Vomiting: no nausea/vomiting    Complications: none    Transfer of care protocol was followed      Last vitals:   Visit Vitals  BP (!) 178/71   Pulse 60   Temp 36.7 °C (98 °F) (Axillary)   Resp 18   Ht 5' 7" (1.702 m)   Wt 102.1 kg (225 lb)   LMP 08/10/2018   SpO2 100%   Breastfeeding? No   BMI 35.24 kg/m²     "

## 2018-10-11 NOTE — INTERVAL H&P NOTE
The patient has been examined and the H&P has been reviewed:    I concur with the findings and no changes have occurred since H&P was written.    Right groin marked by Dr. Lala. To OR for right inguinal LND as planned.    Active Hospital Problems    Diagnosis  POA    Endometrial cancer [C54.1]  Yes      Resolved Hospital Problems   No resolved problems to display.     Sally Villalta MD  OBGYN - PGY 4

## 2018-10-11 NOTE — DISCHARGE SUMMARY
[facility]  Brief Op Note/Discharge Note  Short Stay    Admit Date: 10/11/2018    Discharge Date: 10/11/2018    Attending Physician: Noelle Lala MD     Surgery Date: 10/11/2018     Surgeon(s) and Role:     * Noelle Lala MD - Primary     * Sally Villalta MD - Resident - Assisting       Kerry Tarango MD -Resident -Assisting      Pre-op Diagnosis:  Endometrial cancer   Right inguinal lymph node metastasis    Post-op Diagnosis:  same    Procedure(s) (LRB):  LYMPHADENECTOMY (Right)    Anesthesia: General    Findings/Key Components:   - 4-5 cm group of firm lymph nodes in right inguinal area, removed    Estimated Blood Loss: 10 ml         Specimens:   Specimen (12h ago, onward)    Start     Ordered    10/11/18 1303  Specimen to Pathology - Surgery  Once     Comments:  1. Right inguinal nodes- perm     Start Status   10/11/18 1303 Collected (10/11/18 1302)       10/11/18 1302          Discharge Provider: Sally Villalta    Diagnoses:  Active Hospital Problems    Diagnosis  POA    *Lymphadenopathy, right inguinal -s/p LND [R59.0]  Unknown    Endometrial cancer [C54.1]  Yes      Resolved Hospital Problems   No resolved problems to display.       Discharged Condition: good    Hospital Course:   Patient was admitted for outpatient procedure as above, and tolerated the procedure well with no complications. Please see operative report for further details. Following the procedure, the patient was awakened from anesthesia and transferred to the recovery area in stable condition. She was discharged to home once ambulating, voiding, tolerating PO intake, and pain was well-controlled. Patient was given routine post-op instructions and prescriptions for pain medication to take as needed. Patient instructed to follow up with Dr. Lala in 2 weeks.    Final Diagnoses: Same as principal problem.    Disposition: Home or Self Care    Follow up/Patient Instructions:    Medications:  Reconciled Home Medications:       Medication List      START taking these medications    ibuprofen 600 MG tablet  Commonly known as:  ADVIL,MOTRIN  Take 1 tablet (600 mg total) by mouth every 6 (six) hours as needed for Pain.     oxyCODONE-acetaminophen 5-325 mg per tablet  Commonly known as:  PERCOCET  Take 1 tablet by mouth every 4 (four) hours as needed for Pain.        CONTINUE taking these medications    amLODIPine 5 MG tablet  Commonly known as:  NORVASC     bisoprolol 10 MG tablet  Commonly known as:  ZEBETA  Take 10 mg by mouth once daily.     LUMIGAN 0.01 % Drop  Generic drug:  bimatoprost  Place 1 drop into both eyes every evening.     magnesium oxide 400 mg tablet  Commonly known as:  MAG-OX  Take 1 tablet (400 mg total) by mouth once daily.     meclizine 25 mg tablet  Commonly known as:  ANTIVERT  Take 1 tablet (25 mg total) by mouth 3 (three) times daily as needed for Dizziness.     ondansetron 8 MG tablet  Commonly known as:  ZOFRAN  Take 1 tablet (8 mg total) by mouth every 12 (twelve) hours as needed for Nausea.     prochlorperazine 10 MG tablet  Commonly known as:  COMPAZINE  Take 1 tablet (10 mg total) by mouth every 6 (six) hours as needed.     timolol maleate 0.5% 0.5 % Drop  Commonly known as:  TIMOPTIC  Place 1 drop into both eyes 2 (two) times daily.          Discharge Procedure Orders   Diet general     Call MD for:  temperature >100.4     Call MD for:  persistent nausea and vomiting     Call MD for:  severe uncontrolled pain     Call MD for:  redness, tenderness, or signs of infection (pain, swelling, redness, odor or green/yellow discharge around incision site)     No dressing needed   Order Comments: Wash area with soap and water daily as normal. Keep area clean and dry. Skin glue will fall off on its own in 2-3 weeks. Stitches are under the skin and will dissolve on their own, nothing to get removed.     Activity as tolerated     Follow-up Information     Noelle Lala MD. Schedule an appointment as soon as possible  for a visit in 2 weeks.    Specialty:  Gynecologic Oncology  Why:  postoperative visit  Contact information:  [address] [facility] [address]  [phone]                 Sally Villalta MD  OBGYN - PGY 4

## 2018-10-14 NOTE — OP NOTE
DATE OF PROCEDURE:  10/11/2018     SURGEON:  Noelle Lala M.D.     ASSISTANTS: Sally Villalta MD (RES) and Kerry Tarango MD (RES).      PREOPERATIVE DIAGNOSIS:   1. Advanced endometrial cancer        POSTOPERATIVE DIAGNOSES:    1. Advanced endometrial      PROCEDURE PERFORMED:  Right inguinal lymphadenectomy      ANESTHESIA:  General endotracheal anesthesia.     SPECIMENS REMOVED:  1. Right inguinal lymph nodes     ESTIMATED BLOOD LOSS:  20cc     COMPLICATIONS:  None.     FINDINGS: 3cm palpable adenopathy in the right inguinal region.       PROCEDURE IN DETAIL:  The patient was taken to the Operating Room.  Informed consent had been obtained.  She underwent general endotracheal anesthesia without difficulty, was prepped and draped in the normal sterile fashion in a dorsal lithotomy position.  Timeout was performed.  All parties agreed to the planned procedure.  Perioperative antibiotics were administered.  Merchant catheter was placed under sterile conditions.       The right femoral triangle was marked and a skin incision was made parallel to the inguinal ligament approximately 2 cm below the ligament. Camper's fascia was divided and skin flap was elevated with sharp dissection and ligation of vessels where necessary. The lymph node bundle was mobilized by incising the loose areolar tissue attachments to the fascia of the rectus abdominis. The fascia around the sartorius muscle was divided and the specimen was reflected from lateral to medial. The medial lymph node bundle was isolated, and Denver's node was clamped, divided, and ligated. The saphenous vessel was preserved. The inferior margin of the specimen was then ligated, divided, and removed. Inguinal node site was irrigated and excellent hemostasis was noted. Camper's fascia was approximated with 2-0 vicryl suture in a running fashion. The skin was closed with running subcuticular stitches using 4-0 Monocryl suture and topped with dermabond.    At this point  the procedure was deemed complete. The patient tolerated the procedure well.  Sponge, lap, needle and instrument counts were correct x 2 as reported by the circulating nurse.  She was awakened from anesthesia and taken to recovery in stable condition.

## 2018-10-15 NOTE — ANESTHESIA POSTPROCEDURE EVALUATION
"Anesthesia Post Evaluation    Patient: Saba Driver    Procedure(s) Performed: Procedure(s) (LRB):  LYMPHADENECTOMY (Right)    Final Anesthesia Type: general  Patient location during evaluation: PACU  Patient participation: Yes- Able to Participate  Level of consciousness: awake and alert and oriented  Post-procedure vital signs: reviewed and stable  Pain management: adequate  Airway patency: patent  PONV status at discharge: No PONV  Anesthetic complications: no      Cardiovascular status: hemodynamically stable  Respiratory status: unassisted and spontaneous ventilation  Hydration status: euvolemic  Follow-up not needed.        Visit Vitals  BP (!) 116/58   Pulse (!) 49   Temp 36.4 °C (97.6 °F) (Temporal)   Resp 16   Ht 5' 7" (1.702 m)   Wt 102.1 kg (225 lb)   LMP 08/10/2018   SpO2 99%   Breastfeeding? No   BMI 35.24 kg/m²       Pain/Perla Score: No Data Recorded      "

## 2018-10-31 ENCOUNTER — OFFICE VISIT (OUTPATIENT)
Dept: GYNECOLOGIC ONCOLOGY | Facility: CLINIC | Age: 58
End: 2018-10-31
Payer: COMMERCIAL

## 2018-10-31 VITALS
HEART RATE: 64 BPM | DIASTOLIC BLOOD PRESSURE: 87 MMHG | HEIGHT: 67 IN | SYSTOLIC BLOOD PRESSURE: 193 MMHG | WEIGHT: 235.69 LBS | BODY MASS INDEX: 36.99 KG/M2

## 2018-10-31 DIAGNOSIS — R59.9 ADENOPATHY: ICD-10-CM

## 2018-10-31 DIAGNOSIS — C54.1 ENDOMETRIAL CANCER: Primary | ICD-10-CM

## 2018-10-31 DIAGNOSIS — Z98.890 S/P LYMPH NODE BIOPSY: ICD-10-CM

## 2018-10-31 PROCEDURE — 99999 PR PBB SHADOW E&M-EST. PATIENT-LVL III: CPT | Mod: PBBFAC,,, | Performed by: OBSTETRICS & GYNECOLOGY

## 2018-10-31 PROCEDURE — 99024 POSTOP FOLLOW-UP VISIT: CPT | Mod: S$GLB,,, | Performed by: OBSTETRICS & GYNECOLOGY

## 2018-10-31 NOTE — PROGRESS NOTES
Subjective:      Patient ID: Saba Driver is a 57 y.o. female.    Chief Complaint: Post-op Evaluation (3 weeks)      HPI    Presents today for post operative follow up s/p right LND on 10/11/2018. At prior post op visit, previously much smaller right inguinal adenopathy had grown significantly.    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 No other complaints.    Oncologic history:  Referred by Dr. Shakir Alexis for postmenopausal bleeding. P0. On pelvic uterus is enlarged and fixed, unable to perform EMB due to altered anatomy of the cervix. She had been admitted to  for vaginal bleeding and anemia requiring transfusion. Fixed sore R inguinal node. Blind pap returned adenocarcinoma.      Pelvic US 3/12/18  UT enlarged 13.3cm with 5.1cm fibroid anterior. Limited visualization of EMS.  LOV 2.9cm  ROV 3.6cm     Medical co-morbidities include DM, HTN. No anticoagulation.      No prior abdominal surgeries     Family history significant for mom with pancreatic cancer, denies history of breast, ovarian, uterine, or colon cancer.      MMG last year normal per patient.      Seen in consult with me 3/21/18. Constellation of findings thus far consistent with an advanced gynecologic malignancy. Likely uterine in origin. Inguinal adenopathy would make this Stage IVB. Pap cytology showing adenocarcinoma but need tissue diagnosis. Will plan IR for biopsy of inguinal node. Pelvic disease is surgically unresectable at present. Recommended neoadjuvant chemotherapy based on tissue diagnosis.      IR biopsy 4/4/18  FINAL PATHOLOGIC DIAGNOSIS  RIGHT INGUINAL LYMPH NODE, CT GUIDED BIOPSY WITH ONSITE ADEQUACY (CYTOLOGY AND CELL BLOCK):  - Adenocarcinoma. See note.  Note: The carcinoma is positive for CK7, p53, vimentin and ER (patchy). It is  negative for CDX2, CEA, CK20 and WT1. This immunoprofile is compatible with an endometrial origin. All stains have appropriate controls.     Initiate neoadjuvant chemotherapy with Dr. Boyd. Carbo/taxol 4/19/18. IV port.   S/p 3 cycles neoadjuvant chemotherapy carbo/taxol with Dr. Boyd.      CT CAP 6/25/18  Impression   Significant decrease in size in the right inguinal node.  Lobulated enlarged uterus.  RECIST SUMMARY:  Date of prior examination for comparison: 03/22/2018  Lesion 1: Right inguinal node  1.4 cm(smallest diameter ).  Series 2 image 118.  Prior measurement 4.0 cm.      Elected for interval cytoreductive surgery.    S/p BRIAN/BSO 8/2/18. Uncomplicated post operative course. 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  At post op visit, previously much smaller right inguinal adenopathy had grown significantly over the last few week.    Review of Systems   Constitutional: Negative for appetite change, chills, fatigue and fever.   HENT: Negative for mouth sores.    Respiratory: Negative for cough and shortness of breath.    Cardiovascular: Negative for leg swelling.   Gastrointestinal: Negative for abdominal pain, blood in stool, constipation and diarrhea.   Endocrine: Negative for cold intolerance.   Genitourinary: Negative for dysuria and vaginal bleeding.   Musculoskeletal: Negative for myalgias.   Skin: Positive for wound. Negative for rash.   Allergic/Immunologic: Negative.    Neurological: Negative for weakness and numbness.   Hematological: Negative for adenopathy. Does not bruise/bleed easily.   Psychiatric/Behavioral: Negative for confusion.       Objective:   Physical Exam:   Constitutional: She is oriented to person, place, and time. She appears well-developed and well-nourished. No distress.    HENT:   Head: Normocephalic and atraumatic.     Neck: Normal range of motion.    Cardiovascular: Intact distal pulses.  Exam reveals no cyanosis and no edema.      Pulmonary/Chest: Effort normal. No respiratory distress.        Abdominal: Soft. She exhibits abdominal incision (R groin). She exhibits no distension, no ascites and no mass. There is no tenderness.       R groin incision well approximated C/D  1 cm opening below incision draining clear lymph fluid, no erythema or signs of infection     Genitourinary: Rectum normal. Pelvic exam was performed with patient supine. There is no lesion on the right labia. There is no lesion on the left labia. Uterus is absent. There is an absent adnexa. Right adnexum displays no mass. Left adnexum displays no mass. Vaginal cuff normal.Cervix exhibits absence.   Genitourinary Comments: Vaginal cuff well healed.            Musculoskeletal: Normal range of motion and moves all extremeties. She exhibits no edema.      Lymphadenopathy:        Right: Inguinal adenopathy present. No supraclavicular adenopathy present.        Left: No inguinal and no supraclavicular adenopathy present.    Neurological: She is alert and oriented to person, place, and time.    Skin: Skin is warm and dry. No rash noted. No cyanosis. No pallor.    Psychiatric: She has a normal mood and affect.       Assessment:     1. Endometrial cancer    2. Adenopathy    3. S/P right lymph node dissection        Plan:     - continue to monitor. Keep dressing in place and otherwise clean and dry.  - RTC in 3 weeks for post op evaluation and treatment planning

## 2018-11-12 ENCOUNTER — OFFICE VISIT (OUTPATIENT)
Dept: GYNECOLOGIC ONCOLOGY | Facility: CLINIC | Age: 58
End: 2018-11-12
Payer: COMMERCIAL

## 2018-11-12 VITALS
HEART RATE: 69 BPM | BODY MASS INDEX: 36.72 KG/M2 | SYSTOLIC BLOOD PRESSURE: 137 MMHG | DIASTOLIC BLOOD PRESSURE: 64 MMHG | HEIGHT: 67 IN | WEIGHT: 233.94 LBS

## 2018-11-12 DIAGNOSIS — R59.0 LYMPHADENOPATHY, INGUINAL: Primary | ICD-10-CM

## 2018-11-12 DIAGNOSIS — C54.1 ENDOMETRIAL CANCER: ICD-10-CM

## 2018-11-12 PROCEDURE — 99999 PR PBB SHADOW E&M-EST. PATIENT-LVL III: CPT | Mod: PBBFAC,,, | Performed by: OBSTETRICS & GYNECOLOGY

## 2018-11-12 PROCEDURE — 99024 POSTOP FOLLOW-UP VISIT: CPT | Mod: S$GLB,,, | Performed by: OBSTETRICS & GYNECOLOGY

## 2018-11-14 ENCOUNTER — LAB VISIT (OUTPATIENT)
Dept: LAB | Facility: HOSPITAL | Age: 58
End: 2018-11-14
Attending: INTERNAL MEDICINE
Payer: COMMERCIAL

## 2018-11-14 DIAGNOSIS — C80.1 ADENOCARCINOMA: ICD-10-CM

## 2018-11-14 LAB
ALBUMIN SERPL BCP-MCNC: 3.3 G/DL
ALP SERPL-CCNC: 92 U/L
ALT SERPL W/O P-5'-P-CCNC: 12 U/L
ANION GAP SERPL CALC-SCNC: 8 MMOL/L
AST SERPL-CCNC: 16 U/L
BASOPHILS # BLD AUTO: 0.03 K/UL
BASOPHILS NFR BLD: 0.6 %
BILIRUB SERPL-MCNC: 0.4 MG/DL
BUN SERPL-MCNC: 10 MG/DL
CALCIUM SERPL-MCNC: 9.5 MG/DL
CHLORIDE SERPL-SCNC: 104 MMOL/L
CO2 SERPL-SCNC: 31 MMOL/L
CREAT SERPL-MCNC: 0.9 MG/DL
DIFFERENTIAL METHOD: ABNORMAL
EOSINOPHIL # BLD AUTO: 0.1 K/UL
EOSINOPHIL NFR BLD: 1.8 %
ERYTHROCYTE [DISTWIDTH] IN BLOOD BY AUTOMATED COUNT: 14 %
EST. GFR  (AFRICAN AMERICAN): >60 ML/MIN/1.73 M^2
EST. GFR  (NON AFRICAN AMERICAN): >60 ML/MIN/1.73 M^2
FERRITIN SERPL-MCNC: 253 NG/ML
GLUCOSE SERPL-MCNC: 137 MG/DL
HCT VFR BLD AUTO: 40.3 %
HGB BLD-MCNC: 12.3 G/DL
IMM GRANULOCYTES # BLD AUTO: 0.01 K/UL
IMM GRANULOCYTES NFR BLD AUTO: 0.2 %
IRON SERPL-MCNC: 77 UG/DL
LYMPHOCYTES # BLD AUTO: 1.6 K/UL
LYMPHOCYTES NFR BLD: 32.9 %
MCH RBC QN AUTO: 26 PG
MCHC RBC AUTO-ENTMCNC: 30.5 G/DL
MCV RBC AUTO: 85 FL
MONOCYTES # BLD AUTO: 0.6 K/UL
MONOCYTES NFR BLD: 11.2 %
NEUTROPHILS # BLD AUTO: 2.6 K/UL
NEUTROPHILS NFR BLD: 53.3 %
NRBC BLD-RTO: 0 /100 WBC
PLATELET # BLD AUTO: 286 K/UL
PMV BLD AUTO: 10.8 FL
POTASSIUM SERPL-SCNC: 4 MMOL/L
PROT SERPL-MCNC: 6.7 G/DL
RBC # BLD AUTO: 4.73 M/UL
SATURATED IRON: 25 %
SODIUM SERPL-SCNC: 143 MMOL/L
TOTAL IRON BINDING CAPACITY: 311 UG/DL
TRANSFERRIN SERPL-MCNC: 210 MG/DL
WBC # BLD AUTO: 4.89 K/UL

## 2018-11-14 PROCEDURE — 85025 COMPLETE CBC W/AUTO DIFF WBC: CPT

## 2018-11-14 PROCEDURE — 83540 ASSAY OF IRON: CPT

## 2018-11-14 PROCEDURE — 36415 COLL VENOUS BLD VENIPUNCTURE: CPT | Mod: PO

## 2018-11-14 PROCEDURE — 80053 COMPREHEN METABOLIC PANEL: CPT

## 2018-11-14 PROCEDURE — 82728 ASSAY OF FERRITIN: CPT

## 2018-11-15 ENCOUNTER — OFFICE VISIT (OUTPATIENT)
Dept: HEMATOLOGY/ONCOLOGY | Facility: CLINIC | Age: 58
End: 2018-11-15
Payer: COMMERCIAL

## 2018-11-15 VITALS
TEMPERATURE: 99 F | BODY MASS INDEX: 35.48 KG/M2 | OXYGEN SATURATION: 97 % | SYSTOLIC BLOOD PRESSURE: 136 MMHG | HEART RATE: 72 BPM | HEIGHT: 68 IN | DIASTOLIC BLOOD PRESSURE: 82 MMHG | WEIGHT: 234.13 LBS

## 2018-11-15 DIAGNOSIS — C54.1 ENDOMETRIAL CANCER: Primary | ICD-10-CM

## 2018-11-15 DIAGNOSIS — C77.4: ICD-10-CM

## 2018-11-15 DIAGNOSIS — I10 HYPERTENSION, UNSPECIFIED TYPE: ICD-10-CM

## 2018-11-15 DIAGNOSIS — R59.0 LYMPHADENOPATHY, INGUINAL: ICD-10-CM

## 2018-11-15 DIAGNOSIS — G62.0 CHEMOTHERAPY-INDUCED NEUROPATHY: ICD-10-CM

## 2018-11-15 DIAGNOSIS — T45.1X5A CHEMOTHERAPY-INDUCED NEUROPATHY: ICD-10-CM

## 2018-11-15 PROCEDURE — 99215 OFFICE O/P EST HI 40 MIN: CPT | Mod: S$GLB,,, | Performed by: INTERNAL MEDICINE

## 2018-11-15 PROCEDURE — 3008F BODY MASS INDEX DOCD: CPT | Mod: CPTII,S$GLB,, | Performed by: INTERNAL MEDICINE

## 2018-11-15 PROCEDURE — 3079F DIAST BP 80-89 MM HG: CPT | Mod: CPTII,S$GLB,, | Performed by: INTERNAL MEDICINE

## 2018-11-15 PROCEDURE — 3075F SYST BP GE 130 - 139MM HG: CPT | Mod: CPTII,S$GLB,, | Performed by: INTERNAL MEDICINE

## 2018-11-15 PROCEDURE — 99999 PR PBB SHADOW E&M-EST. PATIENT-LVL IV: CPT | Mod: PBBFAC,,, | Performed by: INTERNAL MEDICINE

## 2018-11-15 RX ORDER — GABAPENTIN 300 MG/1
300 CAPSULE ORAL NIGHTLY
Qty: 90 CAPSULE | Refills: 2 | Status: SHIPPED | OUTPATIENT
Start: 2018-11-15 | End: 2018-12-06

## 2018-11-15 NOTE — PROGRESS NOTES
Subjective:       Patient ID: Saba Driver is a 57 y.o. female.    Chief Complaint: Follow-up    HPI   Diagnosis: Stage IVB (inguinal mets) Endometrial CA    Pt transferred  care to  for therapy     HPI ( per Dr. Lala) Referred by Dr. Shakir Alexis for postmenopausal bleeding. I have reviewed Dr. Alexis's notes. P0. On pelvic uterus is enlarged and fixed, unable to perform EMB due to altered anatomy of the cervix. She had been admitted to  for vaginal bleeding and anemia requiring transfusion. Fixed sore R inguinal node. Blind pap returned adenocarcinoma.      Seen in consult with Dr. Lala  3/21/18. Constellation of findings thus far consistent with an advanced gynecologic malignancy. Likely uterine in origin. Inguinal adenopathy would make this Stage IVB. Pap cytology showing adenocarcinoma but need tissue diagnosis. Will plan IR for biopsy of inguinal node. Pelvic disease is surgically unresectable at present. Recommended neoadjuvant chemotherapy based on tissue diagnosis.      IR biopsy 4/4/18  FINAL PATHOLOGIC DIAGNOSIS  RIGHT INGUINAL LYMPH NODE, CT GUIDED BIOPSY WITH ONSITE ADEQUACY (CYTOLOGY AND CELL BLOCK):  - Adenocarcinoma. See note.  Note: The carcinoma is positive for CK7, p53, vimentin and ER (patchy). It is negative for CDX2, CEA, CK20 and WT1. This immunoprofile is compatible with an endometrial origin. All stains have appropriate controls.    S/p cycle 4 of neoadjuvant  chemo completed 6/21/2018       Pt s/p Total abdominal hysterectomy, bilateral salpingo-oophorectomy 8/2/2018 with invasion into less than 1/2 of the myometrium,    Pt then developed enlarging right groin node  s/p right LND on 10/11/2018.       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  She has been diagnosed with vertigo and prescribed medication       CBC reveals wbc 4090/mm3 Hb 12.3 g/dl Hct 40.3 % MCV 85  plt ct 296k        Oncologic history:  Referred by Dr. Shakir Alexis for postmenopausal bleeding. P0. On pelvic uterus is enlarged and fixed, unable to perform EMB due to altered anatomy of the cervix. She had been admitted to  for vaginal bleeding and anemia requiring transfusion. Fixed sore R inguinal node. Blind pap returned adenocarcinoma.      Pelvic US 3/12/18  UT enlarged 13.3cm with 5.1cm fibroid anterior. Limited visualization of EMS.  LOV 2.9cm  ROV 3.6cm         Past Medical History:   Diagnosis Date    Adenocarcinoma 3/24/2018    Diabetes mellitus, type 2     Endometrial cancer 4/14/2018    Glaucoma     Hypertension          Review of Systems   Constitutional: Negative for appetite change, fatigue, fever and unexpected weight change.   HENT: Negative for mouth sores.    Eyes: Negative for visual disturbance.   Respiratory: Negative for cough and shortness of breath.    Cardiovascular: Negative for chest pain.   Gastrointestinal: Negative for abdominal pain and diarrhea.   Genitourinary: Negative for frequency and vaginal bleeding.   Musculoskeletal: Negative for back pain.   Skin: Negative for rash.   Neurological: Positive for dizziness (improved). Negative for headaches.        Intermittent tingling   Hematological: Negative for adenopathy.   Psychiatric/Behavioral: The patient is not nervous/anxious.        Objective:       Vitals:    11/15/18 0848 11/15/18  "0850   BP: (!) 188/88 136/82   BP Location: Right arm Right arm   Patient Position: Sitting Sitting   BP Method: Medium (Automatic) Medium (Manual)   Pulse: 72    Temp: 98.7 °F (37.1 °C)    TempSrc: Oral    SpO2: 97%    Weight: 106.2 kg (234 lb 2.1 oz)    Height: 5' 7.5" (1.715 m)          Physical Exam   Constitutional: She is oriented to person, place, and time. She appears well-developed and well-nourished.   HENT:   Head: Normocephalic.   Mouth/Throat: Oropharynx is clear and moist. No oropharyngeal exudate.   Eyes: Conjunctivae and lids are normal. Pupils are equal, round, and reactive to light. No scleral icterus.   Neck: Normal range of motion. Neck supple. No thyromegaly present.   Cardiovascular: Normal rate, regular rhythm and normal heart sounds.   No murmur heard.  Pulmonary/Chest: Breath sounds normal. She has no wheezes. She has no rales.   Abdominal: Soft. Bowel sounds are normal. She exhibits no distension and no mass. There is no hepatosplenomegaly. There is no tenderness. There is no rebound and no guarding.   Musculoskeletal: Normal range of motion. She exhibits no edema or tenderness.   Lymphadenopathy:     She has no cervical adenopathy.     She has no axillary adenopathy.        Right: No supraclavicular adenopathy present.        Left: No supraclavicular adenopathy present.   Neurological: She is alert and oriented to person, place, and time. No cranial nerve deficit. Coordination normal.   Skin: Skin is warm and dry. No ecchymosis, no petechiae and no rash noted. No erythema.   Psychiatric: She has a normal mood and affect.         CT a/p 3/23/2018   1. Right groin mass, differential diagnosis includes lymphoma.  2. Large uterus, multiple fibroids.  3. Breast nodules and calcifications discussed above.  4. Additional remote findings above.    CT head w/out contrast 8/29/2018   No acute intracranial abnormality identified.    MRI brain w w/o contrast 8/30/2018   No acute intracranial " abnormalities identified.  No evidence of acute infarction.    Hypoenhancing round 6 mm pituitary lesion, possible pituitary microadenoma.  No prior studies are available for comparison.    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 Endometrial cancer    2. Chemotherapy-induced neuropathy    3. Lymphadenopathy, right inguinal -s/p LND    4. Cancer of inguinal/lower limb lymph nodes, secondary    5. Hypertension, unspecified type        Plan:   ECOG 0  1-5 Pt with Stage IVB (inguinal mets) endometrial cancer.   It has been  recommended neoadjuvant systemic chemotherapy with Carbo AUC6/Taxol 175mg/m2 IV [id number]  Will plan for 4 cycles and repeat imaging/clinical exam for consideration of surgical debulking.  S/p cycle 4 of neoadjuvant  chemo completed 6/21/2018    Pt s/p Total abdominal hysterectomy, bilateral salpingo-oophorectomy 8/12/2018  Pt completed adjuvant carbo taxol cycle 1 on 9/20/2018   Pt then developed enlarging right groin node  s/p right LND on 10/11/2018.  Pt followed by Dr. Llaa   It is recommended she resume adjuvant chemo with  2  cycles carbo/taxol Followed by  adjuvant RT (pelvis and groin).   Plan to begin adjuvant chemo with carbo AUC 6/Taxol 175mg/m2 q 21days  Rx for gabpentin 300mg 1 po nightly prescribed  Orders completed   Cont BP meds    >35 minutes spent during this visit of which greater than 50% devoted to counseling and coordination of care regarding diagnosis and management plan.    Follow-up 3 wks with cbc,cmp prior to follow-up     Cc: MD Noelle Hicks MD

## 2018-11-16 RX ORDER — HEPARIN 100 UNIT/ML
500 SYRINGE INTRAVENOUS
Status: CANCELLED | OUTPATIENT
Start: 2018-11-16

## 2018-11-16 RX ORDER — DIPHENHYDRAMINE HYDROCHLORIDE 50 MG/ML
50 INJECTION INTRAMUSCULAR; INTRAVENOUS ONCE AS NEEDED
Status: CANCELLED | OUTPATIENT
Start: 2018-11-16 | End: 2018-11-16

## 2018-11-16 RX ORDER — EPINEPHRINE 0.3 MG/.3ML
0.3 INJECTION SUBCUTANEOUS ONCE AS NEEDED
Status: CANCELLED | OUTPATIENT
Start: 2018-11-16 | End: 2018-11-16

## 2018-11-16 RX ORDER — FAMOTIDINE 10 MG/ML
20 INJECTION INTRAVENOUS
Status: CANCELLED | OUTPATIENT
Start: 2018-11-16

## 2018-11-16 RX ORDER — SODIUM CHLORIDE 0.9 % (FLUSH) 0.9 %
10 SYRINGE (ML) INJECTION
Status: CANCELLED | OUTPATIENT
Start: 2018-11-16

## 2018-11-19 ENCOUNTER — INFUSION (OUTPATIENT)
Dept: INFUSION THERAPY | Facility: HOSPITAL | Age: 58
End: 2018-11-19
Attending: INTERNAL MEDICINE
Payer: COMMERCIAL

## 2018-11-19 ENCOUNTER — TELEPHONE (OUTPATIENT)
Dept: GYNECOLOGIC ONCOLOGY | Facility: CLINIC | Age: 58
End: 2018-11-19

## 2018-11-19 VITALS
HEART RATE: 69 BPM | RESPIRATION RATE: 16 BRPM | DIASTOLIC BLOOD PRESSURE: 72 MMHG | OXYGEN SATURATION: 100 % | SYSTOLIC BLOOD PRESSURE: 151 MMHG | TEMPERATURE: 98 F

## 2018-11-19 DIAGNOSIS — D49.89 NEOPLASM OF PELVIS: Primary | ICD-10-CM

## 2018-11-19 PROCEDURE — 96413 CHEMO IV INFUSION 1 HR: CPT

## 2018-11-19 PROCEDURE — 63600175 PHARM REV CODE 636 W HCPCS: Performed by: INTERNAL MEDICINE

## 2018-11-19 PROCEDURE — 25000003 PHARM REV CODE 250: Performed by: INTERNAL MEDICINE

## 2018-11-19 PROCEDURE — 96375 TX/PRO/DX INJ NEW DRUG ADDON: CPT

## 2018-11-19 PROCEDURE — 96415 CHEMO IV INFUSION ADDL HR: CPT

## 2018-11-19 PROCEDURE — 96417 CHEMO IV INFUS EACH ADDL SEQ: CPT

## 2018-11-19 PROCEDURE — 96361 HYDRATE IV INFUSION ADD-ON: CPT

## 2018-11-19 PROCEDURE — S0028 INJECTION, FAMOTIDINE, 20 MG: HCPCS | Performed by: INTERNAL MEDICINE

## 2018-11-19 PROCEDURE — 96367 TX/PROPH/DG ADDL SEQ IV INF: CPT

## 2018-11-19 PROCEDURE — A4216 STERILE WATER/SALINE, 10 ML: HCPCS | Performed by: INTERNAL MEDICINE

## 2018-11-19 RX ORDER — HEPARIN 100 UNIT/ML
500 SYRINGE INTRAVENOUS
Status: DISCONTINUED | OUTPATIENT
Start: 2018-11-19 | End: 2018-11-19 | Stop reason: HOSPADM

## 2018-11-19 RX ORDER — SODIUM CHLORIDE 0.9 % (FLUSH) 0.9 %
10 SYRINGE (ML) INJECTION
Status: DISCONTINUED | OUTPATIENT
Start: 2018-11-19 | End: 2018-11-19 | Stop reason: HOSPADM

## 2018-11-19 RX ORDER — FAMOTIDINE 10 MG/ML
20 INJECTION INTRAVENOUS
Status: COMPLETED | OUTPATIENT
Start: 2018-11-19 | End: 2018-11-19

## 2018-11-19 RX ADMIN — HEPARIN 500 UNITS: 100 SYRINGE at 01:11

## 2018-11-19 RX ADMIN — CARBOPLATIN 875 MG: 10 INJECTION, SOLUTION INTRAVENOUS at 12:11

## 2018-11-19 RX ADMIN — Medication 10 ML: at 01:11

## 2018-11-19 RX ADMIN — DIPHENHYDRAMINE HYDROCHLORIDE 50 MG: 50 INJECTION, SOLUTION INTRAMUSCULAR; INTRAVENOUS at 08:11

## 2018-11-19 RX ADMIN — PALONOSETRON HYDROCHLORIDE: 0.25 INJECTION, SOLUTION INTRAVENOUS at 08:11

## 2018-11-19 RX ADMIN — FAMOTIDINE 20 MG: 10 INJECTION, SOLUTION INTRAVENOUS at 08:11

## 2018-11-19 RX ADMIN — PACLITAXEL 402 MG: 300 INJECTION, SOLUTION INTRAVENOUS at 09:11

## 2018-11-19 RX ADMIN — SODIUM CHLORIDE: 0.9 INJECTION, SOLUTION INTRAVENOUS at 12:11

## 2018-11-19 NOTE — PLAN OF CARE
Problem: Patient Care Overview  Goal: Plan of Care Review  Outcome: Ongoing (interventions implemented as appropriate)  Patient received Carboplatin and Taxol. Tolerated well. VSS. Received discharge instructions and verbalized understanding.

## 2018-11-19 NOTE — TELEPHONE ENCOUNTER
----- Message from Ami Weinstein sent at 11/19/2018  2:07 PM CST -----  DINORAH Persaud [[id number]] wants to speak with medical assistant regarding radiation/  pt can be reached at [phone]

## 2018-11-19 NOTE — TELEPHONE ENCOUNTER
Spoke with pt. Pt states she will like to speak with Dr. Lala and get her thoughts on doing radiation with Dr. Solis at the [place] location which is 10 minutes away from her home. Pt informed Dr. Lala is not in clinic this week, returning Monday 11/26. Pt voiced understanding and states she will wait until Dr. Lala returns.

## 2018-11-22 NOTE — PROGRESS NOTES
Subjective:      Patient ID: Saba Driver is a 57 y.o. female.    Chief Complaint: Post-op Evaluation      HPI  Presents today for post operative follow up s/p right LND on 10/11/2018.   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 Shakir Alexis for postmenopausal bleeding. P0. On pelvic uterus is enlarged and fixed, unable to perform EMB due to altered anatomy of the cervix. She had been admitted to  for vaginal bleeding and anemia requiring transfusion. Fixed sore R inguinal node. Blind pap returned adenocarcinoma.      Pelvic US 3/12/18  UT enlarged 13.3cm with 5.1cm fibroid anterior. Limited visualization of EMS.  LOV 2.9cm  ROV 3.6cm     Medical co-morbidities include DM, HTN. No anticoagulation.      No prior abdominal surgeries     Family history significant for mom with pancreatic cancer, denies history of breast, ovarian, uterine, or colon cancer.      MMG last year normal per patient.      Seen in consult with me 3/21/18. Constellation of findings thus far consistent with an advanced gynecologic malignancy. Likely uterine in origin. Inguinal adenopathy would make this Stage IVB. Pap cytology showing adenocarcinoma but need tissue diagnosis. Will plan IR for biopsy of inguinal node. Pelvic disease is surgically unresectable at present. Recommended neoadjuvant chemotherapy based on tissue diagnosis.      IR biopsy 4/4/18  FINAL PATHOLOGIC DIAGNOSIS  RIGHT INGUINAL LYMPH NODE, CT GUIDED BIOPSY WITH ONSITE ADEQUACY (CYTOLOGY AND CELL BLOCK):  - Adenocarcinoma. See note.  Note: The carcinoma is positive for CK7, p53, vimentin and ER (patchy). It is negative for CDX2, CEA, CK20 and WT1. This immunoprofile is compatible with an endometrial origin. All stains have appropriate controls.     Initiate neoadjuvant chemotherapy with Dr. Boyd.  Carbo/taxol 4/19/18. IV port.   S/p 3 cycles neoadjuvant chemotherapy carbo/taxol with Dr. Boyd.      CT CAP 6/25/18  Impression   Significant decrease in size in the right inguinal node.  Lobulated enlarged uterus.  RECIST SUMMARY:  Date of prior examination for comparison: 03/22/2018  Lesion 1: Right inguinal node  1.4 cm(smallest diameter ).  Series 2 image 118.  Prior measurement 4.0 cm.      Elected for interval cytoreductive surgery.     S/p BRIAN/BSO 8/2/18. Uncomplicated post operative course. 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  At post op visit, previously much smaller right inguinal adenopathy had grown significantly over the last few week.       Review of Systems   Constitutional: Negative for appetite change, chills, fatigue and fever.   HENT: Negative for mouth sores.    Respiratory: Negative for cough and shortness of breath.    Cardiovascular: Negative for leg swelling.   Gastrointestinal: Negative for abdominal pain, blood in stool, constipation and diarrhea.   Endocrine: Negative for cold intolerance.   Genitourinary: Negative for dysuria and vaginal bleeding.   Musculoskeletal: Negative for myalgias.   Skin: Negative for rash.   Allergic/Immunologic: Negative.    Neurological: Negative for weakness and numbness.   Hematological: Negative for adenopathy. Does not bruise/bleed easily.   Psychiatric/Behavioral: Negative for confusion.       Objective:   Physical Exam:   Constitutional: She is oriented to person, place, and time. She appears well-developed and well-nourished.    HENT:   Head: Normocephalic and atraumatic.    Eyes: EOM are normal. Pupils are equal, round, and reactive to light.    Neck: Normal range of motion. Neck supple. No thyromegaly present.    Cardiovascular: Normal rate, regular rhythm and intact distal pulses.     Pulmonary/Chest: Effort normal and breath sounds normal. No respiratory distress. She has no wheezes.        Abdominal:  Soft. Bowel sounds are normal. She exhibits abdominal incision. She exhibits no distension, no ascites and no mass. There is no tenderness.             Musculoskeletal: Normal range of motion and moves all extremeties.      Lymphadenopathy:     She has no cervical adenopathy.        Right: No supraclavicular adenopathy present.        Left: No supraclavicular adenopathy present.    Neurological: She is alert and oriented to person, place, and time.    Skin: Skin is warm and dry. No rash noted.    Psychiatric: She has a normal mood and affect.       Assessment:     1. Lymphadenopathy, right inguinal -s/p LND    2. Endometrial cancer        Plan:   No orders of the defined types were placed in this encounter.    Recovering appropriately post op.   Will resume adjuvant chemotherapy with Dr. Boyd.

## 2018-11-26 ENCOUNTER — TELEPHONE (OUTPATIENT)
Dept: GYNECOLOGIC ONCOLOGY | Facility: CLINIC | Age: 58
End: 2018-11-26

## 2018-11-26 NOTE — TELEPHONE ENCOUNTER
----- Message from Apple Esqueda sent at 11/26/2018 10:57 AM CST -----  Contact: pt            Name of Who is Calling: DINORAH FRIED [[id number]]      What is the request in detail: pt returning call.. Please advise    Can the clinic reply by [facility]:no      What Number [facility] Back [facility]: [phone]

## 2018-11-26 NOTE — TELEPHONE ENCOUNTER
Spoke with pt. Pt calling to check on status of doing chemo at [place] with Dr. Solis. Pt informed Dr. Lala is in clinic, message will be forward to Dr. Lala. She voiced understanding

## 2018-11-26 NOTE — TELEPHONE ENCOUNTER
----- Message from Abril uCrry sent at 11/26/2018 12:14 PM CST -----  Contact: pt   Name of Who is Calling: DINORAH FRIED [[id number]]       What is the request in detail: patient is returning a call ......Please contact to further discuss and advise.       Can the clinic reply by [facility]: no       What [facility]: [phone]

## 2018-12-04 ENCOUNTER — LAB VISIT (OUTPATIENT)
Dept: LAB | Facility: HOSPITAL | Age: 58
End: 2018-12-04
Attending: INTERNAL MEDICINE
Payer: COMMERCIAL

## 2018-12-04 DIAGNOSIS — C80.1 ADENOCARCINOMA: ICD-10-CM

## 2018-12-04 LAB
ALBUMIN SERPL BCP-MCNC: 3.2 G/DL
ALP SERPL-CCNC: 88 U/L
ALT SERPL W/O P-5'-P-CCNC: 18 U/L
ANION GAP SERPL CALC-SCNC: 8 MMOL/L
AST SERPL-CCNC: 18 U/L
BASOPHILS # BLD AUTO: 0.02 K/UL
BASOPHILS NFR BLD: 0.9 %
BILIRUB SERPL-MCNC: 0.2 MG/DL
BUN SERPL-MCNC: 12 MG/DL
CALCIUM SERPL-MCNC: 8.9 MG/DL
CHLORIDE SERPL-SCNC: 104 MMOL/L
CO2 SERPL-SCNC: 31 MMOL/L
CREAT SERPL-MCNC: 0.8 MG/DL
DIFFERENTIAL METHOD: ABNORMAL
EOSINOPHIL # BLD AUTO: 0 K/UL
EOSINOPHIL NFR BLD: 1.7 %
ERYTHROCYTE [DISTWIDTH] IN BLOOD BY AUTOMATED COUNT: 14.4 %
EST. GFR  (AFRICAN AMERICAN): >60 ML/MIN/1.73 M^2
EST. GFR  (NON AFRICAN AMERICAN): >60 ML/MIN/1.73 M^2
GLUCOSE SERPL-MCNC: 133 MG/DL
HCT VFR BLD AUTO: 37.1 %
HGB BLD-MCNC: 11.3 G/DL
IMM GRANULOCYTES # BLD AUTO: 0.04 K/UL
IMM GRANULOCYTES NFR BLD AUTO: 1.7 %
LYMPHOCYTES # BLD AUTO: 1.6 K/UL
LYMPHOCYTES NFR BLD: 69.8 %
MCH RBC QN AUTO: 25.6 PG
MCHC RBC AUTO-ENTMCNC: 30.5 G/DL
MCV RBC AUTO: 84 FL
MONOCYTES # BLD AUTO: 0.4 K/UL
MONOCYTES NFR BLD: 18.3 %
NEUTROPHILS # BLD AUTO: 0.2 K/UL
NEUTROPHILS NFR BLD: 7.6 %
NRBC BLD-RTO: 0 /100 WBC
PLATELET # BLD AUTO: 206 K/UL
PMV BLD AUTO: 10.3 FL
POTASSIUM SERPL-SCNC: 3.6 MMOL/L
PROT SERPL-MCNC: 6.4 G/DL
RBC # BLD AUTO: 4.41 M/UL
SODIUM SERPL-SCNC: 143 MMOL/L
WBC # BLD AUTO: 2.35 K/UL

## 2018-12-04 PROCEDURE — 36415 COLL VENOUS BLD VENIPUNCTURE: CPT | Mod: PO

## 2018-12-04 PROCEDURE — 80053 COMPREHEN METABOLIC PANEL: CPT

## 2018-12-04 PROCEDURE — 85025 COMPLETE CBC W/AUTO DIFF WBC: CPT

## 2018-12-06 ENCOUNTER — OFFICE VISIT (OUTPATIENT)
Dept: HEMATOLOGY/ONCOLOGY | Facility: CLINIC | Age: 58
End: 2018-12-06
Payer: COMMERCIAL

## 2018-12-06 VITALS
DIASTOLIC BLOOD PRESSURE: 81 MMHG | TEMPERATURE: 99 F | HEART RATE: 64 BPM | HEIGHT: 68 IN | SYSTOLIC BLOOD PRESSURE: 163 MMHG | BODY MASS INDEX: 35.95 KG/M2 | WEIGHT: 237.19 LBS | OXYGEN SATURATION: 99 %

## 2018-12-06 DIAGNOSIS — R59.0 LYMPHADENOPATHY, INGUINAL: ICD-10-CM

## 2018-12-06 DIAGNOSIS — T45.1X5A CHEMOTHERAPY INDUCED NEUTROPENIA: ICD-10-CM

## 2018-12-06 DIAGNOSIS — G62.0 CHEMOTHERAPY-INDUCED NEUROPATHY: ICD-10-CM

## 2018-12-06 DIAGNOSIS — T45.1X5A CHEMOTHERAPY-INDUCED NEUROPATHY: ICD-10-CM

## 2018-12-06 DIAGNOSIS — Z09 CHEMOTHERAPY FOLLOW-UP EXAMINATION: ICD-10-CM

## 2018-12-06 DIAGNOSIS — D70.1 CHEMOTHERAPY INDUCED NEUTROPENIA: ICD-10-CM

## 2018-12-06 DIAGNOSIS — C54.1 ENDOMETRIAL CANCER: Primary | ICD-10-CM

## 2018-12-06 PROCEDURE — 99215 OFFICE O/P EST HI 40 MIN: CPT | Mod: S$GLB,,, | Performed by: INTERNAL MEDICINE

## 2018-12-06 PROCEDURE — 3079F DIAST BP 80-89 MM HG: CPT | Mod: CPTII,S$GLB,, | Performed by: INTERNAL MEDICINE

## 2018-12-06 PROCEDURE — 3077F SYST BP >= 140 MM HG: CPT | Mod: CPTII,S$GLB,, | Performed by: INTERNAL MEDICINE

## 2018-12-06 PROCEDURE — 99999 PR PBB SHADOW E&M-EST. PATIENT-LVL IV: CPT | Mod: PBBFAC,,, | Performed by: INTERNAL MEDICINE

## 2018-12-06 PROCEDURE — 3008F BODY MASS INDEX DOCD: CPT | Mod: CPTII,S$GLB,, | Performed by: INTERNAL MEDICINE

## 2018-12-06 RX ORDER — PREGABALIN 25 MG/1
25 CAPSULE ORAL 3 TIMES DAILY
Qty: 90 CAPSULE | Refills: 2 | Status: SHIPPED | OUTPATIENT
Start: 2018-12-06 | End: 2019-01-01

## 2018-12-06 NOTE — PROGRESS NOTES
Subjective:       Patient ID: Saba Driver is a 57 y.o. female.    Chief Complaint: Follow-up    HPI   Diagnosis: Stage IVB (inguinal mets) Endometrial CA    Pt transferred  care to  for therapy     HPI ( per Dr. Lala) Referred by Dr. Shakir Alexis for postmenopausal bleeding. I have reviewed Dr. Alexis's notes. P0. On pelvic uterus is enlarged and fixed, unable to perform EMB due to altered anatomy of the cervix. She had been admitted to  for vaginal bleeding and anemia requiring transfusion. Fixed sore R inguinal node. Blind pap returned adenocarcinoma.      Seen in consult with Dr. Lala  3/21/18. Constellation of findings thus far consistent with an advanced gynecologic malignancy. Likely uterine in origin. Inguinal adenopathy would make this Stage IVB. Pap cytology showing adenocarcinoma but need tissue diagnosis. Will plan IR for biopsy of inguinal node. Pelvic disease is surgically unresectable at present. Recommended neoadjuvant chemotherapy based on tissue diagnosis.      IR biopsy 4/4/18  FINAL PATHOLOGIC DIAGNOSIS  RIGHT INGUINAL LYMPH NODE, CT GUIDED BIOPSY WITH ONSITE ADEQUACY (CYTOLOGY AND CELL BLOCK):  - Adenocarcinoma. See note.  Note: The carcinoma is positive for CK7, p53, vimentin and ER (patchy). It is negative for CDX2, CEA, CK20 and WT1. This immunoprofile is compatible with an endometrial origin. All stains have appropriate controls.    S/p cycle 4 of neoadjuvant  chemo completed 6/21/2018       Pt s/p Total abdominal hysterectomy, bilateral salpingo-oophorectomy 8/2/2018 with invasion into less than 1/2 of the myometrium,    Pt then developed enlarging right groin node  s/p right LND on 10/11/2018.       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  She has been diagnosed with vertigo and prescribed medication       CBC reveals wbc 2350/mm3 Hb 11.3 g/dl Hct 37.1  % MCV 84  plt ct 206k        Oncologic history:  Referred by Dr. Shakir Alexis for postmenopausal bleeding. P0. On pelvic uterus is enlarged and fixed, unable to perform EMB due to altered anatomy of the cervix. She had been admitted to  for vaginal bleeding and anemia requiring transfusion. Fixed sore R inguinal node. Blind pap returned adenocarcinoma.      Pelvic US 3/12/18  UT enlarged 13.3cm with 5.1cm fibroid anterior. Limited visualization of EMS.  LOV 2.9cm  ROV 3.6cm         Past Medical History:   Diagnosis Date    Adenocarcinoma 3/24/2018    Diabetes mellitus, type 2     Endometrial cancer 4/14/2018    Glaucoma     Hypertension          Review of Systems   Constitutional: Negative for appetite change, fatigue, fever and unexpected weight change.   HENT: Negative for mouth sores.    Eyes: Negative for visual disturbance.   Respiratory: Negative for cough and shortness of breath.    Cardiovascular: Negative for chest pain.   Gastrointestinal: Negative for abdominal pain and diarrhea.   Genitourinary: Negative for frequency and vaginal bleeding.   Musculoskeletal: Negative for back pain.   Skin: Negative for rash.   Neurological: Negative for dizziness and headaches.        Intermittent tingling   Hematological: Negative for adenopathy.   Psychiatric/Behavioral: The patient is not nervous/anxious.        Objective:       Vitals:    12/06/18 0918   BP:  "(!) 163/81   BP Location: Right arm   Patient Position: Sitting   BP Method: Large (Automatic)   Pulse: 64   Temp: 98.6 °F (37 °C)   TempSrc: Oral   SpO2: 99%   Weight: 107.6 kg (237 lb 3.4 oz)   Height: 5' 7.5" (1.715 m)         Physical Exam   Constitutional: She is oriented to person, place, and time. She appears well-developed and well-nourished.   HENT:   Head: Normocephalic.   Mouth/Throat: Oropharynx is clear and moist. No oropharyngeal exudate.   Eyes: Conjunctivae and lids are normal. Pupils are equal, round, and reactive to light. No scleral icterus.   Neck: Normal range of motion. Neck supple. No thyromegaly present.   Cardiovascular: Normal rate, regular rhythm and normal heart sounds.   No murmur heard.  Pulmonary/Chest: Breath sounds normal. She has no wheezes. She has no rales.   Abdominal: Soft. Bowel sounds are normal. She exhibits no distension and no mass. There is no hepatosplenomegaly. There is no tenderness. There is no rebound and no guarding.   Musculoskeletal: Normal range of motion. She exhibits no edema or tenderness.   Lymphadenopathy:     She has no cervical adenopathy.     She has no axillary adenopathy.        Right: No supraclavicular adenopathy present.        Left: No supraclavicular adenopathy present.   Neurological: She is alert and oriented to person, place, and time. No cranial nerve deficit. Coordination normal.   Skin: Skin is warm and dry. No ecchymosis, no petechiae and no rash noted. No erythema.   Psychiatric: She has a normal mood and affect.         CT a/p 3/23/2018   1. Right groin mass, differential diagnosis includes lymphoma.  2. Large uterus, multiple fibroids.  3. Breast nodules and calcifications discussed above.  4. Additional remote findings above.    CT head w/out contrast 8/29/2018   No acute intracranial abnormality identified.    MRI brain w w/o contrast 8/30/2018   No acute intracranial abnormalities identified.  No evidence of acute " infarction.    Hypoenhancing round 6 mm pituitary lesion, possible pituitary microadenoma.  No prior studies are available for comparison.    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 Endometrial cancer    2. Chemotherapy-induced neuropathy    3. Lymphadenopathy, right inguinal -s/p LND    4. Chemotherapy follow-up examination    5. Chemotherapy induced neutropenia        Plan:   ECOG 0  1-5 Pt with Stage IVB (inguinal mets) endometrial cancer.   It has been  recommended neoadjuvant systemic chemotherapy with Carbo AUC6/Taxol 175mg/m2 IV [id number]  Will plan for 4 cycles and repeat imaging/clinical exam for consideration of surgical debulking.  S/p cycle 4 of neoadjuvant  chemo completed 6/21/2018    Pt s/p Total abdominal hysterectomy, bilateral salpingo-oophorectomy 8/12/2018  Pt completed adjuvant carbo taxol cycle 1 on 9/20/2018   Pt then developed enlarging right groin node  s/p right LND on 10/11/2018.  Pt followed by Dr. Lala   It is recommended she resume adjuvant chemo with  2  cycles carbo/taxol Followed by  adjuvant RT (pelvis and groin).   She will undergo cycle2  adjuvant chemo with carbo AUC 6/Taxol 175mg/m2 q 21days next week ( pending lab parameters)  She will follow-up with Rad/Onc  Pt requesting f/u with Rad[facility]Onc at [facility] due to facility location  Plan follow-up with Dr. Solis  Plan imaging studies post chemo   Rx for Lyrica provided to pt       REPEAT CBC MON  Pt counseled on Neutropenic precautions   CHEMO MON ( PENDING LAB PARAMETERS)        >35 minutes spent during this visit of which greater than 50% devoted to counseling and coordination of care regarding diagnosis and management plan.    Follow-up 6 wks with cbc,cmp prior to  follow-up     Cc: MD Noelle Hicks MD Mini Elnaggar, MD Raymond Gould, MD

## 2018-12-06 NOTE — Clinical Note
REPEAT CBC MONCHEMO MON ( PENDING LAB PARAMETERS)CT IMAGING week of 17th and then f/u with dr. Anderson 2-4 days following CT FORWARD DR ANDERSON RECENT NOTES FROM DR. ROY AND DR. GARCIA AND RECENT CT REPORTS Schedule f/u with dr. Anderson 2 wks ( after ct )

## 2018-12-10 ENCOUNTER — INFUSION (OUTPATIENT)
Dept: INFUSION THERAPY | Facility: HOSPITAL | Age: 58
End: 2018-12-10
Attending: INTERNAL MEDICINE
Payer: COMMERCIAL

## 2018-12-10 VITALS
HEART RATE: 78 BPM | RESPIRATION RATE: 17 BRPM | OXYGEN SATURATION: 100 % | DIASTOLIC BLOOD PRESSURE: 75 MMHG | SYSTOLIC BLOOD PRESSURE: 177 MMHG | TEMPERATURE: 98 F

## 2018-12-10 DIAGNOSIS — D49.89 NEOPLASM OF PELVIS: Primary | ICD-10-CM

## 2018-12-10 PROCEDURE — 25000003 PHARM REV CODE 250: Performed by: INTERNAL MEDICINE

## 2018-12-10 PROCEDURE — 96415 CHEMO IV INFUSION ADDL HR: CPT

## 2018-12-10 PROCEDURE — 96375 TX/PRO/DX INJ NEW DRUG ADDON: CPT

## 2018-12-10 PROCEDURE — A4216 STERILE WATER/SALINE, 10 ML: HCPCS | Performed by: INTERNAL MEDICINE

## 2018-12-10 PROCEDURE — 96361 HYDRATE IV INFUSION ADD-ON: CPT

## 2018-12-10 PROCEDURE — 96417 CHEMO IV INFUS EACH ADDL SEQ: CPT

## 2018-12-10 PROCEDURE — S0028 INJECTION, FAMOTIDINE, 20 MG: HCPCS | Performed by: INTERNAL MEDICINE

## 2018-12-10 PROCEDURE — 96413 CHEMO IV INFUSION 1 HR: CPT

## 2018-12-10 PROCEDURE — 96367 TX/PROPH/DG ADDL SEQ IV INF: CPT

## 2018-12-10 PROCEDURE — 63600175 PHARM REV CODE 636 W HCPCS: Performed by: INTERNAL MEDICINE

## 2018-12-10 RX ORDER — EPINEPHRINE 0.3 MG/.3ML
0.3 INJECTION SUBCUTANEOUS ONCE AS NEEDED
Status: CANCELLED | OUTPATIENT
Start: 2019-01-04 | End: 2019-01-04

## 2018-12-10 RX ORDER — FAMOTIDINE 10 MG/ML
20 INJECTION INTRAVENOUS
Status: COMPLETED | OUTPATIENT
Start: 2018-12-10 | End: 2018-12-10

## 2018-12-10 RX ORDER — SODIUM CHLORIDE 0.9 % (FLUSH) 0.9 %
10 SYRINGE (ML) INJECTION
Status: DISCONTINUED | OUTPATIENT
Start: 2018-12-10 | End: 2018-12-10 | Stop reason: HOSPADM

## 2018-12-10 RX ORDER — HEPARIN 100 UNIT/ML
500 SYRINGE INTRAVENOUS
Status: CANCELLED | OUTPATIENT
Start: 2019-01-04

## 2018-12-10 RX ORDER — DIPHENHYDRAMINE HYDROCHLORIDE 50 MG/ML
50 INJECTION INTRAMUSCULAR; INTRAVENOUS ONCE AS NEEDED
Status: CANCELLED | OUTPATIENT
Start: 2019-01-04 | End: 2019-01-04

## 2018-12-10 RX ORDER — FAMOTIDINE 10 MG/ML
20 INJECTION INTRAVENOUS
Status: CANCELLED | OUTPATIENT
Start: 2019-01-04

## 2018-12-10 RX ORDER — SODIUM CHLORIDE 0.9 % (FLUSH) 0.9 %
10 SYRINGE (ML) INJECTION
Status: CANCELLED | OUTPATIENT
Start: 2019-01-04

## 2018-12-10 RX ORDER — HEPARIN 100 UNIT/ML
500 SYRINGE INTRAVENOUS
Status: DISCONTINUED | OUTPATIENT
Start: 2018-12-10 | End: 2018-12-10 | Stop reason: HOSPADM

## 2018-12-10 RX ADMIN — CARBOPLATIN 900 MG: 10 INJECTION, SOLUTION INTRAVENOUS at 12:12

## 2018-12-10 RX ADMIN — Medication 10 ML: at 02:12

## 2018-12-10 RX ADMIN — SODIUM CHLORIDE: 0.9 INJECTION, SOLUTION INTRAVENOUS at 12:12

## 2018-12-10 RX ADMIN — FAMOTIDINE 20 MG: 10 INJECTION, SOLUTION INTRAVENOUS at 09:12

## 2018-12-10 RX ADMIN — HEPARIN 500 UNITS: 100 SYRINGE at 02:12

## 2018-12-10 RX ADMIN — PALONOSETRON HYDROCHLORIDE: 0.25 INJECTION INTRAVENOUS at 09:12

## 2018-12-10 RX ADMIN — PACLITAXEL 402 MG: 300 INJECTION, SOLUTION INTRAVENOUS at 09:12

## 2018-12-10 RX ADMIN — DIPHENHYDRAMINE HYDROCHLORIDE 50 MG: 50 INJECTION INTRAMUSCULAR; INTRAVENOUS at 08:12

## 2018-12-10 NOTE — PLAN OF CARE
Problem: Patient Care Overview  Goal: Plan of Care Review  Outcome: Ongoing (interventions implemented as appropriate)  Patient received Taxol and Carboplatin. Tolerated well. VSS. Received discharge instructions and verbalized understanding. Ambulated unassisted off unit.

## 2018-12-11 ENCOUNTER — TELEPHONE (OUTPATIENT)
Dept: GYNECOLOGIC ONCOLOGY | Facility: CLINIC | Age: 58
End: 2018-12-11

## 2018-12-11 NOTE — TELEPHONE ENCOUNTER
----- Message from Luz Marina Dejesus sent at 12/11/2018 11:10 AM CST -----  Contact: pt            Name of Who is Calling: pt      What is the request in detail: is needing to schedule a appointment to discuss the results of her Scan after December 17. Pt is asking to be seen before 12/21/18      Can the clinic reply by [facility]: no      What Number to Call Back if not in [facility]: [phone]

## 2018-12-17 ENCOUNTER — HOSPITAL ENCOUNTER (OUTPATIENT)
Dept: RADIOLOGY | Facility: HOSPITAL | Age: 58
Discharge: HOME | End: 2018-12-17
Attending: INTERNAL MEDICINE
Payer: COMMERCIAL

## 2018-12-17 DIAGNOSIS — C54.1 ENDOMETRIAL CANCER: ICD-10-CM

## 2018-12-17 PROCEDURE — 74177 CT ABD & PELVIS W/CONTRAST: CPT | Mod: 26,,, | Performed by: RADIOLOGY

## 2018-12-17 PROCEDURE — 74177 CT ABD & PELVIS W/CONTRAST: CPT | Mod: TC

## 2018-12-17 PROCEDURE — 25500020 PHARM REV CODE 255: Performed by: INTERNAL MEDICINE

## 2018-12-17 PROCEDURE — 71260 CT THORAX DX C+: CPT | Mod: 26,,, | Performed by: RADIOLOGY

## 2018-12-17 RX ADMIN — IOHEXOL 85 ML: 350 INJECTION, SOLUTION INTRAVENOUS at 12:12

## 2018-12-17 RX ADMIN — IOHEXOL 15 ML: 300 INJECTION, SOLUTION INTRAVENOUS at 12:12

## 2018-12-21 ENCOUNTER — TELEPHONE (OUTPATIENT)
Dept: GYNECOLOGIC ONCOLOGY | Facility: CLINIC | Age: 58
End: 2018-12-21

## 2018-12-24 ENCOUNTER — OFFICE VISIT (OUTPATIENT)
Dept: GYNECOLOGIC ONCOLOGY | Facility: CLINIC | Age: 58
End: 2018-12-24
Payer: COMMERCIAL

## 2018-12-24 VITALS
HEART RATE: 112 BPM | SYSTOLIC BLOOD PRESSURE: 199 MMHG | WEIGHT: 242.75 LBS | DIASTOLIC BLOOD PRESSURE: 96 MMHG | BODY MASS INDEX: 37.46 KG/M2

## 2018-12-24 DIAGNOSIS — C54.1 ENDOMETRIAL CANCER: Primary | ICD-10-CM

## 2018-12-24 PROCEDURE — 3008F BODY MASS INDEX DOCD: CPT | Mod: CPTII,S$GLB,, | Performed by: OBSTETRICS & GYNECOLOGY

## 2018-12-24 PROCEDURE — 99999 PR PBB SHADOW E&M-EST. PATIENT-LVL II: CPT | Mod: PBBFAC,,, | Performed by: OBSTETRICS & GYNECOLOGY

## 2018-12-24 PROCEDURE — 99024 POSTOP FOLLOW-UP VISIT: CPT | Mod: S$GLB,,, | Performed by: OBSTETRICS & GYNECOLOGY

## 2018-12-24 PROCEDURE — 3080F DIAST BP >= 90 MM HG: CPT | Mod: CPTII,S$GLB,, | Performed by: OBSTETRICS & GYNECOLOGY

## 2018-12-24 PROCEDURE — 3077F SYST BP >= 140 MM HG: CPT | Mod: CPTII,S$GLB,, | Performed by: OBSTETRICS & GYNECOLOGY

## 2018-12-24 NOTE — LETTER
December 27, 2018        Kate Boyd MD  [address]  [address]  [facility] [address]             [facility] - Gynecologic Oncology  [address], [address]  [facility] [address]  Phone: [phone]  Fax: [phone]   Patient: Saba Driver   MR Number: [id number]   YOB: 1960   Date of Visit: 12/24/2018       Dear Dr. Boyd:    Thank you for referring Saba Driver to me for evaluation. Below are the relevant portions of my assessment and plan of care.            If you have questions, please do not hesitate to call me. I look forward to following Saba along with you.    Sincerely,      Noelle Lala MD           CC  Jimmy Solis MD

## 2018-12-27 NOTE — PROGRESS NOTES
Subjective:      Patient ID: Saba Driver is a 58 y.o. female.    Chief Complaint: Endometrial Cancer and Results      HPI  Presents today for follow up.     Has completed chemotherapy with Dr. Boyd, s/p C6/6 carbo/taxol. Completed 12/10/18  CT CAP 12/17/18- essentially shows no evidence of disease.   Impression     Prior hysterectomy.  Interval right inguinal lymph node dissection.  Previous large necrotic lymph node has been resected. There are few residual nonenlarged lymph nodes at this site which are slightly asymmetric to their contralateral counterparts.  Stable 3 mm right upper lobe pulmonary nodule.  No new soft tissue mass or pathologic lymph node enlargement.  Continued follow-up is advised.     Planning for adjuvant radiation with Dr. Solis.     Oncologic history:  Referred by Dr. Shakir Alexis for postmenopausal bleeding. P0. On pelvic uterus is enlarged and fixed, unable to perform EMB due to altered anatomy of the cervix. She had been admitted to  for vaginal bleeding and anemia requiring transfusion. Fixed sore R inguinal node. Blind pap returned adenocarcinoma.      Pelvic US 3/12/18  UT enlarged 13.3cm with 5.1cm fibroid anterior. Limited visualization of EMS.  LOV 2.9cm  ROV 3.6cm     Medical co-morbidities include DM, HTN. No anticoagulation.      No prior abdominal surgeries     Family history significant for mom with pancreatic cancer, denies history of breast, ovarian, uterine, or colon cancer.      MMG last year normal per patient.      Seen in consult with me 3/21/18. Constellation of findings thus far consistent with an advanced gynecologic malignancy. Likely uterine in origin. Inguinal adenopathy would make this Stage IVB. Pap cytology showing adenocarcinoma but need tissue diagnosis. Will plan IR for biopsy of inguinal node. Pelvic disease is surgically unresectable at present. Recommended neoadjuvant chemotherapy based on tissue diagnosis.      IR biopsy 4/4/18  FINAL PATHOLOGIC  DIAGNOSIS  RIGHT INGUINAL LYMPH NODE, CT GUIDED BIOPSY WITH ONSITE ADEQUACY (CYTOLOGY AND CELL BLOCK):  - Adenocarcinoma. See note.  Note: The carcinoma is positive for CK7, p53, vimentin and ER (patchy). It is negative for CDX2, CEA, CK20 and WT1. This immunoprofile is compatible with an endometrial origin. All stains have appropriate controls.     Initiate neoadjuvant chemotherapy with Dr. Boyd. Carbo/taxol 4/19/18. IV port.   S/p 3 cycles neoadjuvant chemotherapy carbo/taxol with Dr. Boyd.      CT CAP 6/25/18  Impression   Significant decrease in size in the right inguinal node.  Lobulated enlarged uterus.  RECIST SUMMARY:  Date of prior examination for comparison: 03/22/2018  Lesion 1: Right inguinal node  1.4 cm(smallest diameter ).  Series 2 image 118.  Prior measurement 4.0 cm.      Elected for interval cytoreductive surgery.     S/p BRIAN/BSO 8/2/18. Uncomplicated post operative course. 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  At post op visit, previously much smaller right inguinal adenopathy had grown significantly over the last few week.    s/p right LND on 10/11/2018.   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 Boyd, completed C6/6 carbo/taxol 12/10/18.     Review of Systems   Constitutional: Negative for appetite change, chills, fatigue and fever.   HENT: Negative for mouth sores.    Respiratory: Negative for cough and shortness of breath.    Cardiovascular: Negative for leg swelling.   Gastrointestinal: Negative for abdominal pain, blood in stool, constipation and diarrhea.   Endocrine: Negative for cold intolerance.   Genitourinary: Negative for dysuria and vaginal bleeding.   Musculoskeletal: Negative for myalgias.   Skin: Negative for rash.   Allergic/Immunologic: Negative.     Neurological: Negative for weakness and numbness.   Hematological: Negative for adenopathy. Does not bruise/bleed easily.   Psychiatric/Behavioral: Negative for confusion.       Objective:   Physical Exam:   Constitutional: She is oriented to person, place, and time. She appears well-developed and well-nourished.    HENT:   Head: Normocephalic and atraumatic.    Eyes: EOM are normal. Pupils are equal, round, and reactive to light.    Neck: Normal range of motion. Neck supple. No thyromegaly present.    Cardiovascular: Normal rate, regular rhythm and intact distal pulses.     Pulmonary/Chest: Effort normal and breath sounds normal. No respiratory distress. She has no wheezes.        Abdominal: Soft. Bowel sounds are normal. She exhibits no distension, no ascites and no mass. There is no tenderness.             Musculoskeletal: Normal range of motion and moves all extremeties.      Lymphadenopathy:     She has no cervical adenopathy.        Right: No supraclavicular adenopathy present.        Left: No supraclavicular adenopathy present.    Neurological: She is alert and oriented to person, place, and time.    Skin: Skin is warm and dry. No rash noted.    Psychiatric: She has a normal mood and affect.       Assessment:     1. Endometrial cancer        Plan:   No orders of the defined types were placed in this encounter.    Stage IVB uterine carcinosarcoma.  S/p surgical debulking  S/p chemotherapy  CT no evidence of disease    She will now complete adjuvant radiation with Dr. Solis, specifically to include the groin.     RTC 3 months or sooner if needed.

## 2019-01-01 ENCOUNTER — LAB VISIT (OUTPATIENT)
Dept: LAB | Facility: HOSPITAL | Age: 59
End: 2019-01-01
Attending: OBSTETRICS & GYNECOLOGY
Payer: COMMERCIAL

## 2019-01-01 ENCOUNTER — LAB VISIT (OUTPATIENT)
Dept: LAB | Facility: OTHER | Age: 59
End: 2019-01-01
Attending: OBSTETRICS & GYNECOLOGY
Payer: COMMERCIAL

## 2019-01-01 ENCOUNTER — INFUSION (OUTPATIENT)
Dept: INFUSION THERAPY | Facility: HOSPITAL | Age: 59
End: 2019-01-01
Attending: INTERNAL MEDICINE
Payer: COMMERCIAL

## 2019-01-01 ENCOUNTER — TELEPHONE (OUTPATIENT)
Dept: GYNECOLOGIC ONCOLOGY | Facility: CLINIC | Age: 59
End: 2019-01-01

## 2019-01-01 ENCOUNTER — OFFICE VISIT (OUTPATIENT)
Dept: GYNECOLOGIC ONCOLOGY | Facility: CLINIC | Age: 59
End: 2019-01-01
Payer: COMMERCIAL

## 2019-01-01 VITALS
TEMPERATURE: 98 F | OXYGEN SATURATION: 97 % | HEART RATE: 62 BPM | RESPIRATION RATE: 17 BRPM | SYSTOLIC BLOOD PRESSURE: 150 MMHG | DIASTOLIC BLOOD PRESSURE: 71 MMHG

## 2019-01-01 VITALS
SYSTOLIC BLOOD PRESSURE: 167 MMHG | OXYGEN SATURATION: 98 % | HEART RATE: 66 BPM | RESPIRATION RATE: 17 BRPM | TEMPERATURE: 99 F | DIASTOLIC BLOOD PRESSURE: 71 MMHG

## 2019-01-01 VITALS
HEART RATE: 64 BPM | DIASTOLIC BLOOD PRESSURE: 80 MMHG | WEIGHT: 266 LBS | BODY MASS INDEX: 41.66 KG/M2 | SYSTOLIC BLOOD PRESSURE: 194 MMHG

## 2019-01-01 VITALS
SYSTOLIC BLOOD PRESSURE: 180 MMHG | DIASTOLIC BLOOD PRESSURE: 72 MMHG | TEMPERATURE: 98 F | HEART RATE: 84 BPM | RESPIRATION RATE: 18 BRPM | OXYGEN SATURATION: 100 %

## 2019-01-01 VITALS
RESPIRATION RATE: 18 BRPM | HEART RATE: 60 BPM | OXYGEN SATURATION: 99 % | SYSTOLIC BLOOD PRESSURE: 187 MMHG | TEMPERATURE: 97 F | DIASTOLIC BLOOD PRESSURE: 75 MMHG

## 2019-01-01 VITALS
HEART RATE: 62 BPM | TEMPERATURE: 98 F | RESPIRATION RATE: 17 BRPM | SYSTOLIC BLOOD PRESSURE: 176 MMHG | DIASTOLIC BLOOD PRESSURE: 77 MMHG | OXYGEN SATURATION: 98 %

## 2019-01-01 VITALS
BODY MASS INDEX: 46.87 KG/M2 | WEIGHT: 281.31 LBS | DIASTOLIC BLOOD PRESSURE: 90 MMHG | HEART RATE: 80 BPM | SYSTOLIC BLOOD PRESSURE: 180 MMHG | HEIGHT: 65 IN

## 2019-01-01 DIAGNOSIS — C54.1 ENDOMETRIAL CANCER: Primary | ICD-10-CM

## 2019-01-01 DIAGNOSIS — C54.1 CARCINOSARCOMA OF ENDOMETRIUM: ICD-10-CM

## 2019-01-01 LAB
CANCER AG125 SERPL-ACNC: 5 U/ML (ref 0–30)
CANCER AG125 SERPL-ACNC: 5 U/ML (ref 0–30)

## 2019-01-01 PROCEDURE — 63600175 PHARM REV CODE 636 W HCPCS: Performed by: INTERNAL MEDICINE

## 2019-01-01 PROCEDURE — 96523 IRRIG DRUG DELIVERY DEVICE: CPT

## 2019-01-01 PROCEDURE — 99999 PR PBB SHADOW E&M-EST. PATIENT-LVL II: CPT | Mod: PBBFAC,,, | Performed by: OBSTETRICS & GYNECOLOGY

## 2019-01-01 PROCEDURE — 3079F PR MOST RECENT DIASTOLIC BLOOD PRESSURE 80-89 MM HG: ICD-10-PCS | Mod: CPTII,S$GLB,, | Performed by: OBSTETRICS & GYNECOLOGY

## 2019-01-01 PROCEDURE — 3079F DIAST BP 80-89 MM HG: CPT | Mod: CPTII,S$GLB,, | Performed by: OBSTETRICS & GYNECOLOGY

## 2019-01-01 PROCEDURE — 3077F SYST BP >= 140 MM HG: CPT | Mod: CPTII,S$GLB,, | Performed by: OBSTETRICS & GYNECOLOGY

## 2019-01-01 PROCEDURE — 3008F PR BODY MASS INDEX (BMI) DOCUMENTED: ICD-10-PCS | Mod: CPTII,S$GLB,, | Performed by: OBSTETRICS & GYNECOLOGY

## 2019-01-01 PROCEDURE — 3080F DIAST BP >= 90 MM HG: CPT | Mod: CPTII,S$GLB,, | Performed by: OBSTETRICS & GYNECOLOGY

## 2019-01-01 PROCEDURE — 25000003 PHARM REV CODE 250: Performed by: INTERNAL MEDICINE

## 2019-01-01 PROCEDURE — 3080F PR MOST RECENT DIASTOLIC BLOOD PRESSURE >= 90 MM HG: ICD-10-PCS | Mod: CPTII,S$GLB,, | Performed by: OBSTETRICS & GYNECOLOGY

## 2019-01-01 PROCEDURE — 36415 COLL VENOUS BLD VENIPUNCTURE: CPT | Mod: PO

## 2019-01-01 PROCEDURE — 99214 OFFICE O/P EST MOD 30 MIN: CPT | Mod: S$GLB,,, | Performed by: OBSTETRICS & GYNECOLOGY

## 2019-01-01 PROCEDURE — 86304 IMMUNOASSAY TUMOR CA 125: CPT

## 2019-01-01 PROCEDURE — 3077F PR MOST RECENT SYSTOLIC BLOOD PRESSURE >= 140 MM HG: ICD-10-PCS | Mod: CPTII,S$GLB,, | Performed by: OBSTETRICS & GYNECOLOGY

## 2019-01-01 PROCEDURE — A4216 STERILE WATER/SALINE, 10 ML: HCPCS | Performed by: INTERNAL MEDICINE

## 2019-01-01 PROCEDURE — 99999 PR PBB SHADOW E&M-EST. PATIENT-LVL II: ICD-10-PCS | Mod: PBBFAC,,, | Performed by: OBSTETRICS & GYNECOLOGY

## 2019-01-01 PROCEDURE — 36415 COLL VENOUS BLD VENIPUNCTURE: CPT

## 2019-01-01 PROCEDURE — 3008F BODY MASS INDEX DOCD: CPT | Mod: CPTII,S$GLB,, | Performed by: OBSTETRICS & GYNECOLOGY

## 2019-01-01 PROCEDURE — 99214 PR OFFICE/OUTPT VISIT, EST, LEVL IV, 30-39 MIN: ICD-10-PCS | Mod: S$GLB,,, | Performed by: OBSTETRICS & GYNECOLOGY

## 2019-01-01 PROCEDURE — 99999 PR PBB SHADOW E&M-EST. PATIENT-LVL III: CPT | Mod: PBBFAC,,, | Performed by: OBSTETRICS & GYNECOLOGY

## 2019-01-01 PROCEDURE — 99999 PR PBB SHADOW E&M-EST. PATIENT-LVL III: ICD-10-PCS | Mod: PBBFAC,,, | Performed by: OBSTETRICS & GYNECOLOGY

## 2019-01-01 RX ORDER — SODIUM CHLORIDE 0.9 % (FLUSH) 0.9 %
10 SYRINGE (ML) INJECTION
Status: CANCELLED | OUTPATIENT
Start: 2019-01-01

## 2019-01-01 RX ORDER — HEPARIN 100 UNIT/ML
500 SYRINGE INTRAVENOUS
Status: COMPLETED | OUTPATIENT
Start: 2019-01-01 | End: 2019-01-01

## 2019-01-01 RX ORDER — HEPARIN 100 UNIT/ML
500 SYRINGE INTRAVENOUS
Status: CANCELLED | OUTPATIENT
Start: 2019-01-01

## 2019-01-01 RX ORDER — SODIUM CHLORIDE 0.9 % (FLUSH) 0.9 %
10 SYRINGE (ML) INJECTION
Status: DISCONTINUED | OUTPATIENT
Start: 2019-01-01 | End: 2019-01-01 | Stop reason: HOSPADM

## 2019-01-01 RX ORDER — SODIUM CHLORIDE 0.9 % (FLUSH) 0.9 %
10 SYRINGE (ML) INJECTION
Status: COMPLETED | OUTPATIENT
Start: 2019-01-01 | End: 2019-01-01

## 2019-01-01 RX ORDER — SODIUM CHLORIDE 0.9 % (FLUSH) 0.9 %
10 SYRINGE (ML) INJECTION
Status: CANCELLED | OUTPATIENT
Start: 2020-01-01

## 2019-01-01 RX ORDER — HEPARIN 100 UNIT/ML
500 SYRINGE INTRAVENOUS
Status: CANCELLED | OUTPATIENT
Start: 2020-01-01

## 2019-01-01 RX ADMIN — HEPARIN 500 UNITS: 100 SYRINGE at 11:08

## 2019-01-01 RX ADMIN — Medication 10 ML: at 10:09

## 2019-01-01 RX ADMIN — HEPARIN 500 UNITS: 100 SYRINGE at 11:07

## 2019-01-01 RX ADMIN — Medication 10 ML: at 11:07

## 2019-01-01 RX ADMIN — Medication 10 ML: at 10:11

## 2019-01-01 RX ADMIN — Medication 10 ML: at 10:06

## 2019-01-01 RX ADMIN — HEPARIN 500 UNITS: 100 SYRINGE at 10:12

## 2019-01-01 RX ADMIN — HEPARIN 500 UNITS: 100 SYRINGE at 10:09

## 2019-01-01 RX ADMIN — Medication 10 ML: at 11:08

## 2019-01-01 RX ADMIN — HEPARIN SODIUM (PORCINE) LOCK FLUSH IV SOLN 100 UNIT/ML 500 UNITS: 100 SOLUTION at 10:06

## 2019-01-01 RX ADMIN — Medication 10 ML: at 11:10

## 2019-01-01 RX ADMIN — HEPARIN 500 UNITS: 100 SYRINGE at 10:11

## 2019-01-01 RX ADMIN — Medication 10 ML: at 10:12

## 2019-01-01 RX ADMIN — HEPARIN 500 UNITS: 100 SYRINGE at 11:10

## 2019-01-15 ENCOUNTER — LAB VISIT (OUTPATIENT)
Dept: LAB | Facility: HOSPITAL | Age: 59
End: 2019-01-15
Attending: INTERNAL MEDICINE
Payer: COMMERCIAL

## 2019-01-15 DIAGNOSIS — C80.1 ADENOCARCINOMA: ICD-10-CM

## 2019-01-15 LAB
ALBUMIN SERPL BCP-MCNC: 3.5 G/DL
ALP SERPL-CCNC: 81 U/L
ALT SERPL W/O P-5'-P-CCNC: 15 U/L
ANION GAP SERPL CALC-SCNC: 9 MMOL/L
AST SERPL-CCNC: 19 U/L
BASOPHILS # BLD AUTO: 0.02 K/UL
BASOPHILS NFR BLD: 0.6 %
BILIRUB SERPL-MCNC: 0.5 MG/DL
BUN SERPL-MCNC: 13 MG/DL
CALCIUM SERPL-MCNC: 9.7 MG/DL
CHLORIDE SERPL-SCNC: 103 MMOL/L
CO2 SERPL-SCNC: 29 MMOL/L
CREAT SERPL-MCNC: 0.8 MG/DL
DIFFERENTIAL METHOD: ABNORMAL
EOSINOPHIL # BLD AUTO: 0.1 K/UL
EOSINOPHIL NFR BLD: 1.6 %
ERYTHROCYTE [DISTWIDTH] IN BLOOD BY AUTOMATED COUNT: 15.7 %
EST. GFR  (AFRICAN AMERICAN): >60 ML/MIN/1.73 M^2
EST. GFR  (NON AFRICAN AMERICAN): >60 ML/MIN/1.73 M^2
GLUCOSE SERPL-MCNC: 123 MG/DL
HCT VFR BLD AUTO: 38.3 %
HGB BLD-MCNC: 11.6 G/DL
IMM GRANULOCYTES # BLD AUTO: 0.01 K/UL
IMM GRANULOCYTES NFR BLD AUTO: 0.3 %
LYMPHOCYTES # BLD AUTO: 1.3 K/UL
LYMPHOCYTES NFR BLD: 39.8 %
MCH RBC QN AUTO: 26.6 PG
MCHC RBC AUTO-ENTMCNC: 30.3 G/DL
MCV RBC AUTO: 88 FL
MONOCYTES # BLD AUTO: 0.4 K/UL
MONOCYTES NFR BLD: 13 %
NEUTROPHILS # BLD AUTO: 1.4 K/UL
NEUTROPHILS NFR BLD: 44.7 %
NRBC BLD-RTO: 0 /100 WBC
PLATELET # BLD AUTO: 187 K/UL
PMV BLD AUTO: 11.2 FL
POTASSIUM SERPL-SCNC: 3.6 MMOL/L
PROT SERPL-MCNC: 6.8 G/DL
RBC # BLD AUTO: 4.36 M/UL
SODIUM SERPL-SCNC: 141 MMOL/L
WBC # BLD AUTO: 3.22 K/UL

## 2019-01-15 PROCEDURE — 80053 COMPREHEN METABOLIC PANEL: CPT

## 2019-01-15 PROCEDURE — 36415 COLL VENOUS BLD VENIPUNCTURE: CPT | Mod: PO

## 2019-01-15 PROCEDURE — 85025 COMPLETE CBC W/AUTO DIFF WBC: CPT

## 2019-01-18 ENCOUNTER — OFFICE VISIT (OUTPATIENT)
Dept: HEMATOLOGY/ONCOLOGY | Facility: CLINIC | Age: 59
End: 2019-01-18
Payer: COMMERCIAL

## 2019-01-18 ENCOUNTER — INFUSION (OUTPATIENT)
Dept: INFUSION THERAPY | Facility: HOSPITAL | Age: 59
End: 2019-01-18
Attending: INTERNAL MEDICINE
Payer: COMMERCIAL

## 2019-01-18 VITALS
BODY MASS INDEX: 36.45 KG/M2 | TEMPERATURE: 99 F | WEIGHT: 240.5 LBS | OXYGEN SATURATION: 99 % | SYSTOLIC BLOOD PRESSURE: 154 MMHG | HEART RATE: 69 BPM | DIASTOLIC BLOOD PRESSURE: 84 MMHG | HEIGHT: 68 IN

## 2019-01-18 DIAGNOSIS — C77.4: Primary | ICD-10-CM

## 2019-01-18 DIAGNOSIS — C54.1 ENDOMETRIAL CANCER: ICD-10-CM

## 2019-01-18 DIAGNOSIS — T45.1X5A CHEMOTHERAPY-INDUCED NEUROPATHY: ICD-10-CM

## 2019-01-18 DIAGNOSIS — C54.1 ENDOMETRIAL CANCER: Primary | ICD-10-CM

## 2019-01-18 DIAGNOSIS — G62.0 CHEMOTHERAPY-INDUCED NEUROPATHY: ICD-10-CM

## 2019-01-18 PROCEDURE — 99214 OFFICE O/P EST MOD 30 MIN: CPT | Mod: S$GLB,,, | Performed by: INTERNAL MEDICINE

## 2019-01-18 PROCEDURE — 3008F BODY MASS INDEX DOCD: CPT | Mod: CPTII,S$GLB,, | Performed by: INTERNAL MEDICINE

## 2019-01-18 PROCEDURE — 25000003 PHARM REV CODE 250: Performed by: INTERNAL MEDICINE

## 2019-01-18 PROCEDURE — 99999 PR PBB SHADOW E&M-EST. PATIENT-LVL IV: CPT | Mod: PBBFAC,,, | Performed by: INTERNAL MEDICINE

## 2019-01-18 PROCEDURE — 3079F PR MOST RECENT DIASTOLIC BLOOD PRESSURE 80-89 MM HG: ICD-10-PCS | Mod: CPTII,S$GLB,, | Performed by: INTERNAL MEDICINE

## 2019-01-18 PROCEDURE — 99214 PR OFFICE/OUTPT VISIT, EST, LEVL IV, 30-39 MIN: ICD-10-PCS | Mod: S$GLB,,, | Performed by: INTERNAL MEDICINE

## 2019-01-18 PROCEDURE — 3077F PR MOST RECENT SYSTOLIC BLOOD PRESSURE >= 140 MM HG: ICD-10-PCS | Mod: CPTII,S$GLB,, | Performed by: INTERNAL MEDICINE

## 2019-01-18 PROCEDURE — 3077F SYST BP >= 140 MM HG: CPT | Mod: CPTII,S$GLB,, | Performed by: INTERNAL MEDICINE

## 2019-01-18 PROCEDURE — 99999 PR PBB SHADOW E&M-EST. PATIENT-LVL IV: ICD-10-PCS | Mod: PBBFAC,,, | Performed by: INTERNAL MEDICINE

## 2019-01-18 PROCEDURE — 3079F DIAST BP 80-89 MM HG: CPT | Mod: CPTII,S$GLB,, | Performed by: INTERNAL MEDICINE

## 2019-01-18 PROCEDURE — 96523 IRRIG DRUG DELIVERY DEVICE: CPT

## 2019-01-18 PROCEDURE — 63600175 PHARM REV CODE 636 W HCPCS: Performed by: INTERNAL MEDICINE

## 2019-01-18 PROCEDURE — 3008F PR BODY MASS INDEX (BMI) DOCUMENTED: ICD-10-PCS | Mod: CPTII,S$GLB,, | Performed by: INTERNAL MEDICINE

## 2019-01-18 PROCEDURE — A4216 STERILE WATER/SALINE, 10 ML: HCPCS | Performed by: INTERNAL MEDICINE

## 2019-01-18 RX ORDER — HEPARIN 100 UNIT/ML
500 SYRINGE INTRAVENOUS
Status: COMPLETED | OUTPATIENT
Start: 2019-01-18 | End: 2019-01-18

## 2019-01-18 RX ORDER — SODIUM CHLORIDE 0.9 % (FLUSH) 0.9 %
10 SYRINGE (ML) INJECTION
Status: DISCONTINUED | OUTPATIENT
Start: 2019-01-18 | End: 2019-01-18 | Stop reason: HOSPADM

## 2019-01-18 RX ADMIN — Medication 10 ML: at 11:01

## 2019-01-18 RX ADMIN — HEPARIN SODIUM (PORCINE) LOCK FLUSH IV SOLN 100 UNIT/ML 500 UNITS: 100 SOLUTION at 11:01

## 2019-01-18 NOTE — PROGRESS NOTES
Subjective:       Patient ID: Saba Driver is a 58 y.o. female.    Chief Complaint: Follow-up    HPI   Diagnosis: Stage IVB (inguinal mets) Endometrial CA    Pt transferred  care to  for therapy     HPI ( per Dr. Lala) Referred by Dr. Shakir Alexis for postmenopausal bleeding. I have reviewed Dr. Alexis's notes. P0. On pelvic uterus is enlarged and fixed, unable to perform EMB due to altered anatomy of the cervix. She had been admitted to  for vaginal bleeding and anemia requiring transfusion. Fixed sore R inguinal node. Blind pap returned adenocarcinoma.      Seen in consult with Dr. Lala  3/21/18. Constellation of findings thus far consistent with an advanced gynecologic malignancy. Likely uterine in origin. Inguinal adenopathy would make this Stage IVB. Pap cytology showing adenocarcinoma but need tissue diagnosis. Will plan IR for biopsy of inguinal node. Pelvic disease is surgically unresectable at present. Recommended neoadjuvant chemotherapy based on tissue diagnosis.      IR biopsy 4/4/18  FINAL PATHOLOGIC DIAGNOSIS  RIGHT INGUINAL LYMPH NODE, CT GUIDED BIOPSY WITH ONSITE ADEQUACY (CYTOLOGY AND CELL BLOCK):  - Adenocarcinoma. See note.  Note: The carcinoma is positive for CK7, p53, vimentin and ER (patchy). It is negative for CDX2, CEA, CK20 and WT1. This immunoprofile is compatible with an endometrial origin. All stains have appropriate controls.    S/p cycle 4 of neoadjuvant  chemo completed 6/21/2018       Pt s/p Total abdominal hysterectomy, bilateral salpingo-oophorectomy 8/2/2018 with invasion into less than 1/2 of the myometrium,    Pt then developed enlarging right groin node  s/p right LND on 10/11/2018.       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  Evidence of extranodal extension is focally present  Favor metastatic from gynecologic primary (immunostains performed on previous biopsy)     She is  followed by Rad/Onc  Plan is for adjuvant chemotherapy followed by Radiation therapy   She is undergoing adjuvant RT   She has completed week 1 of planned XRT  Completion date tentative end of Feb    Today, she is doing well  Appetite and weight has been stable  She reports improvement in minor intermittent tingling /feet  Pt dc'd gabapentin (ASE)  Pt taking Lyrica   No fevers/night sweats   No abd pain         She underwent MRI of the brain 8/2018 which revealed possible pituitary microadenoma.  She has been diagnosed with vertigo and prescribed medication prn  Pt reports no further episodes of dizziness      CBC reveals wbc 3220/mm3 Hb 11.6 g/dl Hct 30.3  % MCV 84  plt ct 187k        Oncologic history:  Referred by Dr. Shakir Alexis for postmenopausal bleeding. P0. On pelvic uterus is enlarged and fixed, unable to perform EMB due to altered anatomy of the cervix. She had been admitted to  for vaginal bleeding and anemia requiring transfusion. Fixed sore R inguinal node. Blind pap returned adenocarcinoma.      Pelvic US 3/12/18  UT enlarged 13.3cm with 5.1cm fibroid anterior. Limited visualization of EMS.  LOV 2.9cm  ROV 3.6cm         Past Medical History:   Diagnosis Date    Adenocarcinoma 3/24/2018    Diabetes mellitus, type 2     Endometrial cancer 4/14/2018    Glaucoma     Hypertension          Review of Systems   Constitutional: Negative for appetite change, fatigue, fever and unexpected weight change.   HENT: Negative for mouth sores.    Eyes: Negative for visual disturbance.   Respiratory: Negative for cough and shortness of breath.    Cardiovascular: Negative for chest pain.   Gastrointestinal: Negative for abdominal pain and diarrhea.   Genitourinary: Negative for frequency and vaginal bleeding.   Musculoskeletal: Negative for back pain.   Skin: Negative for rash.   Neurological: Negative for dizziness and headaches.        Intermittent tingling   Hematological: Negative for adenopathy.  "  Psychiatric/Behavioral: The patient is not nervous/anxious.        Objective:       Vitals:    01/18/19 1049 01/18/19 1050   BP: (!) 190/86 (!) 154/84   BP Location: Left arm Right arm   Patient Position: Sitting Sitting   BP Method: Large (Automatic) Large (Manual)   Pulse: 69    Temp: 98.6 °F (37 °C)    TempSrc: Oral    SpO2: 99%    Weight: 109.1 kg (240 lb 8.4 oz)    Height: 5' 7.5" (1.715 m)          Physical Exam   Constitutional: She is oriented to person, place, and time. She appears well-developed and well-nourished.   HENT:   Head: Normocephalic.   Mouth/Throat: Oropharynx is clear and moist. No oropharyngeal exudate.   Eyes: Conjunctivae and lids are normal. Pupils are equal, round, and reactive to light. No scleral icterus.   Neck: Normal range of motion. Neck supple. No thyromegaly present.   Cardiovascular: Normal rate, regular rhythm and normal heart sounds.   No murmur heard.  Pulmonary/Chest: Breath sounds normal. She has no wheezes. She has no rales.   Abdominal: Soft. Bowel sounds are normal. She exhibits no distension and no mass. There is no hepatosplenomegaly. There is no tenderness. There is no rebound and no guarding.   Musculoskeletal: Normal range of motion. She exhibits no edema or tenderness.   Lymphadenopathy:     She has no cervical adenopathy.     She has no axillary adenopathy.        Right: No supraclavicular adenopathy present.        Left: No supraclavicular adenopathy present.   Neurological: She is alert and oriented to person, place, and time. No cranial nerve deficit. Coordination normal.   Skin: Skin is warm and dry. No ecchymosis, no petechiae and no rash noted. No erythema.   Psychiatric: She has a normal mood and affect.         CT a/p 3/23/2018   1. Right groin mass, differential diagnosis includes lymphoma.  2. Large uterus, multiple fibroids.  3. Breast nodules and calcifications discussed above.  4. Additional remote findings above.    CT head w/out contrast 8/29/2018 "   No acute intracranial abnormality identified.    MRI brain w w/o contrast 8/30/2018   No acute intracranial abnormalities identified.  No evidence of acute infarction.    Hypoenhancing round 6 mm pituitary lesion, possible pituitary microadenoma.  No prior studies are available for comparison.    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    Interval right inguinal lymph node dissection.  Previous large necrotic lymph node has been resected.  There are few residual nonenlarged lymph nodes at this site which are slightly asymmetric to their contralateral counterparts.    Stable 3 mm right upper lobe pulmonary nodule.    No new soft tissue mass or pathologic lymph node enlargement.    Continued follow-up is advised    Assessment:       1. Cancer of inguinal/lower limb lymph nodes, secondary    2. Endometrial cancer    3. Chemotherapy-induced neuropathy        Plan:   ECOG 0  1-3 Pt with Stage IVB (inguinal mets) endometrial cancer.   It has been  recommended neoadjuvant systemic chemotherapy with Carbo AUC6/Taxol 175mg/m2 IV [id number]. [id number]  S/p cycle 4 of neoadjuvant  chemo completed 6/21/2018    Pt s/p Total abdominal hysterectomy, bilateral salpingo-oophorectomy 8/12/2018  Pt completed adjuvant carbo taxol cycle 1 on 9/20/2018   Pt then developed enlarging right groin node  s/p right LND on 10/11/2018.  Pt followed by Dr. Lala   It was recommended she resume adjuvant chemo with  2  cycles carbo/taxol Followed by  adjuvant RT (pelvis and groin).   s/p cycle2  adjuvant chemo with carbo AUC 6/Taxol 175mg/m2 q 21days 12/10/2018  CT no evidence of disease  Pt requested f/u with Rad/Onc at [facility] due to facility location  She will complete  adjuvant radiation with Dr. Solis, specifically to include the groin.       Cont Lyrica     Pt will resume care with Dr. Lala  Follow-up with Dr. Lala planned March 2018       She will follow-up this clinic 5/2019 (unless pt needs to be seen sooner in the interim)     Cc: MD Noelle Hicks MD Raymond Gould, MD

## 2019-01-18 NOTE — PLAN OF CARE
Problem: Adult Inpatient Plan of Care  Goal: Plan of Care Review  Outcome: Ongoing (interventions implemented as appropriate)  Patient tolerated monthly portacath flush. Received discharge instructions and follow up appointments. Verbalized understanding and ambulated unassisted off unit.

## 2019-02-22 ENCOUNTER — INFUSION (OUTPATIENT)
Dept: INFUSION THERAPY | Facility: HOSPITAL | Age: 59
End: 2019-02-22
Attending: INTERNAL MEDICINE
Payer: COMMERCIAL

## 2019-02-22 DIAGNOSIS — C54.1 ENDOMETRIAL CANCER: Primary | ICD-10-CM

## 2019-02-22 PROCEDURE — 25000003 PHARM REV CODE 250: Performed by: INTERNAL MEDICINE

## 2019-02-22 PROCEDURE — 96523 IRRIG DRUG DELIVERY DEVICE: CPT

## 2019-02-22 PROCEDURE — 63600175 PHARM REV CODE 636 W HCPCS: Performed by: INTERNAL MEDICINE

## 2019-02-22 PROCEDURE — A4216 STERILE WATER/SALINE, 10 ML: HCPCS | Performed by: INTERNAL MEDICINE

## 2019-02-22 RX ORDER — HEPARIN 100 UNIT/ML
500 SYRINGE INTRAVENOUS
Status: COMPLETED | OUTPATIENT
Start: 2019-02-22 | End: 2019-02-22

## 2019-02-22 RX ORDER — SODIUM CHLORIDE 0.9 % (FLUSH) 0.9 %
10 SYRINGE (ML) INJECTION
Status: DISCONTINUED | OUTPATIENT
Start: 2019-02-22 | End: 2019-02-22 | Stop reason: HOSPADM

## 2019-02-22 RX ADMIN — HEPARIN SODIUM (PORCINE) LOCK FLUSH IV SOLN 100 UNIT/ML 500 UNITS: 100 SOLUTION at 09:02

## 2019-02-22 RX ADMIN — Medication 10 ML: at 09:02

## 2019-02-22 NOTE — PLAN OF CARE
"Problem: Adult Inpatient Plan of Care  Goal: Plan of Care Review  Outcome: Ongoing (interventions implemented as appropriate)  Pt presented for monthly port flush. Good blood return noted from port. Pt tolerated port flush well. Pt reported that she just finished radiation on Tuesday; pt did not report pain, just "some discomfort." Pt also reported some numbness/tingling. Ambulatory into and out of unit. Distress screening tool completed. Future appt information reviewed with pt.       "

## 2019-03-04 ENCOUNTER — TELEPHONE (OUTPATIENT)
Dept: GYNECOLOGIC ONCOLOGY | Facility: CLINIC | Age: 59
End: 2019-03-04

## 2019-03-19 ENCOUNTER — TELEPHONE (OUTPATIENT)
Dept: GYNECOLOGIC ONCOLOGY | Facility: CLINIC | Age: 59
End: 2019-03-19

## 2019-03-20 ENCOUNTER — OFFICE VISIT (OUTPATIENT)
Dept: GYNECOLOGIC ONCOLOGY | Facility: CLINIC | Age: 59
End: 2019-03-20
Payer: COMMERCIAL

## 2019-03-20 ENCOUNTER — LAB VISIT (OUTPATIENT)
Dept: LAB | Facility: HOSPITAL | Age: 59
End: 2019-03-20
Attending: OBSTETRICS & GYNECOLOGY
Payer: COMMERCIAL

## 2019-03-20 VITALS
BODY MASS INDEX: 38.73 KG/M2 | HEART RATE: 63 BPM | SYSTOLIC BLOOD PRESSURE: 188 MMHG | WEIGHT: 251 LBS | DIASTOLIC BLOOD PRESSURE: 76 MMHG

## 2019-03-20 DIAGNOSIS — C54.1 CARCINOSARCOMA OF ENDOMETRIUM: ICD-10-CM

## 2019-03-20 DIAGNOSIS — C54.1 CARCINOSARCOMA OF ENDOMETRIUM: Primary | ICD-10-CM

## 2019-03-20 LAB — CANCER AG125 SERPL-ACNC: 4 U/ML

## 2019-03-20 PROCEDURE — 99214 OFFICE O/P EST MOD 30 MIN: CPT | Mod: S$GLB,,, | Performed by: OBSTETRICS & GYNECOLOGY

## 2019-03-20 PROCEDURE — 86304 IMMUNOASSAY TUMOR CA 125: CPT

## 2019-03-20 PROCEDURE — 3077F PR MOST RECENT SYSTOLIC BLOOD PRESSURE >= 140 MM HG: ICD-10-PCS | Mod: CPTII,S$GLB,, | Performed by: OBSTETRICS & GYNECOLOGY

## 2019-03-20 PROCEDURE — 99999 PR PBB SHADOW E&M-EST. PATIENT-LVL III: ICD-10-PCS | Mod: PBBFAC,,, | Performed by: OBSTETRICS & GYNECOLOGY

## 2019-03-20 PROCEDURE — 99999 PR PBB SHADOW E&M-EST. PATIENT-LVL III: CPT | Mod: PBBFAC,,, | Performed by: OBSTETRICS & GYNECOLOGY

## 2019-03-20 PROCEDURE — 3078F PR MOST RECENT DIASTOLIC BLOOD PRESSURE < 80 MM HG: ICD-10-PCS | Mod: CPTII,S$GLB,, | Performed by: OBSTETRICS & GYNECOLOGY

## 2019-03-20 PROCEDURE — 99214 PR OFFICE/OUTPT VISIT, EST, LEVL IV, 30-39 MIN: ICD-10-PCS | Mod: S$GLB,,, | Performed by: OBSTETRICS & GYNECOLOGY

## 2019-03-20 PROCEDURE — 3078F DIAST BP <80 MM HG: CPT | Mod: CPTII,S$GLB,, | Performed by: OBSTETRICS & GYNECOLOGY

## 2019-03-20 PROCEDURE — 3008F BODY MASS INDEX DOCD: CPT | Mod: CPTII,S$GLB,, | Performed by: OBSTETRICS & GYNECOLOGY

## 2019-03-20 PROCEDURE — 36415 COLL VENOUS BLD VENIPUNCTURE: CPT

## 2019-03-20 PROCEDURE — 3008F PR BODY MASS INDEX (BMI) DOCUMENTED: ICD-10-PCS | Mod: CPTII,S$GLB,, | Performed by: OBSTETRICS & GYNECOLOGY

## 2019-03-20 PROCEDURE — 3077F SYST BP >= 140 MM HG: CPT | Mod: CPTII,S$GLB,, | Performed by: OBSTETRICS & GYNECOLOGY

## 2019-03-20 RX ORDER — TRAMADOL HYDROCHLORIDE 50 MG/1
TABLET ORAL
Refills: 0 | COMMUNITY
Start: 2019-01-31 | End: 2019-03-20

## 2019-03-20 RX ORDER — AMLODIPINE BESYLATE 5 MG/1
TABLET ORAL
Status: ON HOLD | COMMUNITY
Start: 2019-01-31 | End: 2020-01-01 | Stop reason: HOSPADM

## 2019-03-20 NOTE — PROGRESS NOTES
Subjective:      Patient ID: Saba Driver is a 58 y.o. female.    Chief Complaint: Endometrial Cancer (3mth f/u)      HPI  Presents today for end of treatment visit.   Without complaints. Feels well.    normal 4      Oncologic history:  Referred by Dr. Shakir Alexis for postmenopausal bleeding. P0. On pelvic uterus is enlarged and fixed, unable to perform EMB due to altered anatomy of the cervix. She had been admitted to  for vaginal bleeding and anemia requiring transfusion. Fixed sore R inguinal node. Blind pap returned adenocarcinoma.      Pelvic US 3/12/18  UT enlarged 13.3cm with 5.1cm fibroid anterior. Limited visualization of EMS.  LOV 2.9cm  ROV 3.6cm     Medical co-morbidities include DM, HTN. No anticoagulation.      No prior abdominal surgeries     Family history significant for mom with pancreatic cancer, denies history of breast, ovarian, uterine, or colon cancer.      MMG last year normal per patient.      Seen in consult with me 3/21/18. Constellation of findings thus far consistent with an advanced gynecologic malignancy. Likely uterine in origin. Inguinal adenopathy would make this Stage IVB. Pap cytology showing adenocarcinoma but need tissue diagnosis. Will plan IR for biopsy of inguinal node. Pelvic disease is surgically unresectable at present. Recommended neoadjuvant chemotherapy based on tissue diagnosis.      IR biopsy 4/4/18  FINAL PATHOLOGIC DIAGNOSIS  RIGHT INGUINAL LYMPH NODE, CT GUIDED BIOPSY WITH ONSITE ADEQUACY (CYTOLOGY AND CELL BLOCK):  - Adenocarcinoma. See note.  Note: The carcinoma is positive for CK7, p53, vimentin and ER (patchy). It is negative for CDX2, CEA, CK20 and WT1. This immunoprofile is compatible with an endometrial origin. All stains have appropriate controls.     Initiate neoadjuvant chemotherapy with Dr. Boyd. Carbo/taxol 4/19/18. IV port.   S/p 3 cycles neoadjuvant chemotherapy carbo/taxol with Dr. Boyd.      CT CAP 6/25/18  Impression   Significant  decrease in size in the right inguinal node.  Lobulated enlarged uterus.  RECIST SUMMARY:  Date of prior examination for comparison: 03/22/2018  Lesion 1: Right inguinal node  1.4 cm(smallest diameter ).  Series 2 image 118.  Prior measurement 4.0 cm.      Elected for interval cytoreductive surgery.     S/p BRIAN/BSO 8/2/18. Uncomplicated post operative course. 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  At post op visit, previously much smaller right inguinal adenopathy had grown significantly over the last few week.     s/p right LND on 10/11/2018.   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 Boyd, completed C6/6 carbo/taxol 12/10/18.   CT CAP 12/17/18- essentially shows no evidence of disease.   Impression       Prior hysterectomy.  Interval right inguinal lymph node dissection.  Previous large necrotic lymph node has been resected. There are few residual nonenlarged lymph nodes at this site which are slightly asymmetric to their contralateral counterparts.  Stable 3 mm right upper lobe pulmonary nodule.  No new soft tissue mass or pathologic lymph node enlargement.  Continued follow-up is advised.      Adjuvant radiation with Dr. Solis completed 50.40Gy to the pelvis and bilateral inguinal regions 1/10/19-2/19/19.     Review of Systems   Constitutional: Negative for appetite change, chills, fatigue and fever.   HENT: Negative for mouth sores.    Respiratory: Negative for cough and shortness of breath.    Cardiovascular: Negative for leg swelling.   Gastrointestinal: Negative for abdominal pain, blood in stool, constipation and diarrhea.   Endocrine: Negative for cold intolerance.   Genitourinary: Negative for dysuria and vaginal bleeding.   Musculoskeletal: Negative for myalgias.   Skin: Negative for  rash.   Allergic/Immunologic: Negative.    Neurological: Negative for weakness and numbness.   Hematological: Negative for adenopathy. Does not bruise/bleed easily.   Psychiatric/Behavioral: Negative for confusion.       Objective:   Physical Exam:   Constitutional: She is oriented to person, place, and time. She appears well-developed and well-nourished.    HENT:   Head: Normocephalic and atraumatic.    Eyes: EOM are normal. Pupils are equal, round, and reactive to light.    Neck: Normal range of motion. Neck supple. No thyromegaly present.    Cardiovascular: Normal rate, regular rhythm and intact distal pulses.     Pulmonary/Chest: Effort normal and breath sounds normal. No respiratory distress. She has no wheezes.        Abdominal: Soft. Bowel sounds are normal. She exhibits no distension, no ascites and no mass. There is no tenderness.     Genitourinary: Rectum normal and vagina normal. Pelvic exam was performed with patient supine. There is no lesion on the right labia. There is no lesion on the left labia. Uterus is absent. There is an absent adnexa. Right adnexum displays no mass. Left adnexum displays no mass. Vaginal cuff normal.Cervix exhibits absence.           Musculoskeletal: Normal range of motion and moves all extremeties.      Lymphadenopathy:     She has no cervical adenopathy.        Right: No inguinal and no supraclavicular adenopathy present.        Left: No inguinal and no supraclavicular adenopathy present.    Neurological: She is alert and oriented to person, place, and time.    Skin: Skin is warm and dry. No rash noted.    Psychiatric: She has a normal mood and affect.       Assessment:     1. Carcinosarcoma of endometrium        Plan:     Orders Placed This Encounter   Procedures         Completion of treatment.   No evidence of disease.   Discussed surveillance.   Will plan for follow up in 3 months with  and exam. Imaging as needed based on signs or symptoms of recurrence.

## 2019-03-20 NOTE — LETTER
March 20, 2019        Kate Boyd MD  [address]  [address]  [facility] [address]             [facility] - GYN Oncology  [address]  [place] [address]  Phone: [phone]   Patient: Saba Driver   MR Number: [id number]   YOB: 1960   Date of Visit: 3/20/2019     Dear Dr. Boyd,     Please find attached progress notes.     Sincerely,      Noelle Lala MD            CC  MD Shakir Rod MD    Enclosure

## 2019-03-21 ENCOUNTER — TELEPHONE (OUTPATIENT)
Dept: GYNECOLOGIC ONCOLOGY | Facility: CLINIC | Age: 59
End: 2019-03-21

## 2019-03-21 NOTE — TELEPHONE ENCOUNTER
----- Message from Noelle Lala MD sent at 3/20/2019  4:09 PM CDT -----  Please let patient know normal

## 2019-03-22 ENCOUNTER — INFUSION (OUTPATIENT)
Dept: INFUSION THERAPY | Facility: HOSPITAL | Age: 59
End: 2019-03-22
Attending: INTERNAL MEDICINE
Payer: COMMERCIAL

## 2019-03-22 DIAGNOSIS — C54.1 ENDOMETRIAL CANCER: Primary | ICD-10-CM

## 2019-03-22 PROCEDURE — 63600175 PHARM REV CODE 636 W HCPCS: Performed by: INTERNAL MEDICINE

## 2019-03-22 PROCEDURE — 96523 IRRIG DRUG DELIVERY DEVICE: CPT

## 2019-03-22 PROCEDURE — A4216 STERILE WATER/SALINE, 10 ML: HCPCS | Performed by: INTERNAL MEDICINE

## 2019-03-22 PROCEDURE — 25000003 PHARM REV CODE 250: Performed by: INTERNAL MEDICINE

## 2019-03-22 RX ORDER — HEPARIN 100 UNIT/ML
500 SYRINGE INTRAVENOUS
Status: COMPLETED | OUTPATIENT
Start: 2019-03-22 | End: 2019-03-22

## 2019-03-22 RX ORDER — SODIUM CHLORIDE 0.9 % (FLUSH) 0.9 %
10 SYRINGE (ML) INJECTION
Status: DISCONTINUED | OUTPATIENT
Start: 2019-03-22 | End: 2019-03-22 | Stop reason: HOSPADM

## 2019-03-22 RX ADMIN — Medication 10 ML: at 10:03

## 2019-03-22 RX ADMIN — HEPARIN SODIUM (PORCINE) LOCK FLUSH IV SOLN 100 UNIT/ML 500 UNITS: 100 SOLUTION at 10:03

## 2019-03-22 NOTE — PLAN OF CARE
Problem: Adult Inpatient Plan of Care  Goal: Plan of Care Review  Outcome: Ongoing (interventions implemented as appropriate)  Arrived to unit. Reports sinus congestion, no other complaints voiced. Tolerated port flush. Pt has next appt. Pt ambulatory, discharged from unit.

## 2019-03-25 ENCOUNTER — OFFICE VISIT (OUTPATIENT)
Dept: URGENT CARE | Facility: CLINIC | Age: 59
End: 2019-03-25
Payer: COMMERCIAL

## 2019-03-25 VITALS
WEIGHT: 251 LBS | BODY MASS INDEX: 39.39 KG/M2 | DIASTOLIC BLOOD PRESSURE: 70 MMHG | OXYGEN SATURATION: 98 % | HEIGHT: 67 IN | TEMPERATURE: 100 F | HEART RATE: 60 BPM | SYSTOLIC BLOOD PRESSURE: 132 MMHG

## 2019-03-25 DIAGNOSIS — R05.8 PRODUCTIVE COUGH: ICD-10-CM

## 2019-03-25 DIAGNOSIS — R09.81 NASAL CONGESTION: ICD-10-CM

## 2019-03-25 DIAGNOSIS — B96.89 BACTERIAL SINUSITIS: Primary | ICD-10-CM

## 2019-03-25 DIAGNOSIS — R09.82 POST-NASAL DRIP: ICD-10-CM

## 2019-03-25 DIAGNOSIS — J32.9 BACTERIAL SINUSITIS: Primary | ICD-10-CM

## 2019-03-25 PROCEDURE — 3078F PR MOST RECENT DIASTOLIC BLOOD PRESSURE < 80 MM HG: ICD-10-PCS | Mod: CPTII,S$GLB,, | Performed by: NURSE PRACTITIONER

## 2019-03-25 PROCEDURE — 3078F DIAST BP <80 MM HG: CPT | Mod: CPTII,S$GLB,, | Performed by: NURSE PRACTITIONER

## 2019-03-25 PROCEDURE — 3075F PR MOST RECENT SYSTOLIC BLOOD PRESS GE 130-139MM HG: ICD-10-PCS | Mod: CPTII,S$GLB,, | Performed by: NURSE PRACTITIONER

## 2019-03-25 PROCEDURE — 99214 OFFICE O/P EST MOD 30 MIN: CPT | Mod: S$GLB,,, | Performed by: NURSE PRACTITIONER

## 2019-03-25 PROCEDURE — 3008F BODY MASS INDEX DOCD: CPT | Mod: CPTII,S$GLB,, | Performed by: NURSE PRACTITIONER

## 2019-03-25 PROCEDURE — 3075F SYST BP GE 130 - 139MM HG: CPT | Mod: CPTII,S$GLB,, | Performed by: NURSE PRACTITIONER

## 2019-03-25 PROCEDURE — 3008F PR BODY MASS INDEX (BMI) DOCUMENTED: ICD-10-PCS | Mod: CPTII,S$GLB,, | Performed by: NURSE PRACTITIONER

## 2019-03-25 PROCEDURE — 99214 PR OFFICE/OUTPT VISIT, EST, LEVL IV, 30-39 MIN: ICD-10-PCS | Mod: S$GLB,,, | Performed by: NURSE PRACTITIONER

## 2019-03-25 RX ORDER — AMOXICILLIN AND CLAVULANATE POTASSIUM 875; 125 MG/1; MG/1
1 TABLET, FILM COATED ORAL 2 TIMES DAILY
Qty: 20 TABLET | Refills: 0 | Status: SHIPPED | OUTPATIENT
Start: 2019-03-25 | End: 2019-04-04

## 2019-03-25 RX ORDER — GUAIFENESIN 600 MG/1
600 TABLET, EXTENDED RELEASE ORAL 2 TIMES DAILY
Qty: 30 TABLET | Refills: 0 | Status: SHIPPED | OUTPATIENT
Start: 2019-03-25 | End: 2019-04-09

## 2019-03-25 NOTE — PATIENT INSTRUCTIONS
Please drink plenty of fluids.  Please get plenty of rest.  Please return here or go to the Emergency Department for any concerns or worsening of condition.  If you were given wait & see antibiotics, please wait 3-5 days before taking them, and only take them if your symptoms have worsened or not improved.  If you do begin taking the antibiotics, please take them to completion.  If you were prescribed antibiotics, please take them to completion.  If you were prescribed a narcotic medication, do not drive or operate heavy equipment or machinery while taking these medications.  If you do not have Hypertension or any history of palpitations, it is ok to take over the counter Sudafed or Mucinex D or Allegra-D or Claritin-D or Zyrtec-D.  If you do take one of the above, it is ok to combine that with plain over the counter Mucinex or Allegra or Claritin or Zyrtec.  If for example you are taking Zyrtec -D, you can combine that with Mucinex, but not Mucinex-D.  If you are taking Mucinex-D, you can combine that with plain Allegra or Claritin or Zyrtec.   If you do have Hypertension or palpitations, it is safe to take Coricidin HBP for relief of sinus symptoms.  We recommend you take over the counter Flonase (Fluticasone) or another nasally inhaled steroid unless you are already taking one.  Nasal irrigation with a saline spray or Netti Pot like device per their directions is also recommended.  If not allergic, please take over the counter Tylenol (Acetaminophen) and/or Motrin (Ibuprofen) as directed for control of pain and/or fever.  Please follow up with your primary care doctor or specialist as needed.    If you  smoke, please stop smoking.    Sinusitis (Antibiotic Treatment)    The sinuses are air-filled spaces within the bones of the face. They connect to the inside of the nose. Sinusitis is an inflammation of the tissue lining the sinus cavity. Sinus inflammation can occur during a cold. It can also be due to  allergies to pollens and other particles in the air. Sinusitis can cause symptoms of sinus congestion and fullness. A sinus infection causes fever, headache and facial pain. There is often green or yellow drainage from the nose or into the back of the throat (post-nasal drip). You have been given antibiotics to treat this condition.  Home care:  · Take the full course of antibiotics as instructed. Do not stop taking them, even if you feel better.  · Drink plenty of water, hot tea, and other liquids. This may help thin mucus. It also may promote sinus drainage.  · Heat may help soothe painful areas of the face. Use a towel soaked in hot water. Or,  the shower and direct the hot spray onto your face. Using a vaporizer along with a menthol rub at night may also help.   · An expectorant containing guaifenesin may help thin the mucus and promote drainage from the sinuses.  · Over-the-counter decongestants may be used unless a similar medicine was prescribed. Nasal sprays work the fastest. Use one that contains phenylephrine or oxymetazoline. First blow the nose gently. Then use the spray. Do not use these medicines more often than directed on the label or symptoms may get worse. You may also use tablets containing pseudoephedrine. Avoid products that combine ingredients, because side effects may be increased. Read labels. You can also ask the pharmacist for help. (NOTE: Persons with high blood pressure should not use decongestants. They can raise blood pressure.)  · Over-the-counter antihistamines may help if allergies contributed to your sinusitis.    · Do not use nasal rinses or irrigation during an acute sinus infection, unless told to by your health care provider. Rinsing may spread the infection to other sinuses.  · Use acetaminophen or ibuprofen to control pain, unless another pain medicine was prescribed. (If you have chronic liver or kidney disease or ever had a stomach ulcer, talk with your doctor before  using these medicines. Aspirin should never be used in anyone under 18 years of age who is ill with a fever. It may cause severe liver damage.)  · Don't smoke. This can worsen symptoms.  Follow-up care  Follow up with your healthcare provider or our staff if you are not improving within the next week.  When to seek medical advice  Call your healthcare provider if any of these occur:  · Facial pain or headache becoming more severe  · Stiff neck  · Unusual drowsiness or confusion  · Swelling of the forehead or eyelids  · Vision problems, including blurred or double vision  · Fever of 100.4ºF (38ºC) or higher, or as directed by your healthcare provider  · Seizure  · Breathing problems  · Symptoms not resolving within 10 days  Date Last Reviewed: 4/13/2015  © 4547-8968 [employer]. [address] [place] [address], [place] [address]. All rights reserved. This information is not intended as a substitute for professional medical care. Always follow your healthcare professional's instructions.

## 2019-03-25 NOTE — PROGRESS NOTES
"Subjective:       Patient ID: Saba Driver is a 58 y.o. female.    Vitals:  height is 5' 7" (1.702 m) and weight is 113.9 kg (251 lb). Her temperature is 100 °F (37.8 °C). Her blood pressure is 132/70 and her pulse is 60. Her oxygen saturation is 98%.     Chief Complaint: URI    Pt reports having a cough with yellowish mucus, post nasal drip and congestion with fever and sinus headache , sneezing.  Patient just recently finished radiation.      URI    This is a new problem. The current episode started in the past 7 days. The problem has been gradually worsening. The maximum temperature recorded prior to her arrival was 100.4 - 100.9 F. The fever has been present for less than 1 day. Associated symptoms include congestion, coughing, headaches (Behind the eyes), a plugged ear sensation, sinus pain, sneezing and a sore throat. Pertinent negatives include no ear pain, nausea, rash, vomiting or wheezing. Treatments tried: otc throat spray. The treatment provided mild relief.       Constitution: Positive for fever. Negative for chills, sweating and fatigue.   HENT: Positive for congestion, postnasal drip, sinus pain, sinus pressure and sore throat. Negative for ear pain and voice change.    Neck: Negative for painful lymph nodes.   Eyes: Negative for eye redness.   Respiratory: Positive for cough and sputum production. Negative for chest tightness, bloody sputum, COPD, shortness of breath, stridor, wheezing and asthma.    Gastrointestinal: Negative for nausea and vomiting.   Musculoskeletal: Negative for muscle ache.   Skin: Negative for rash.   Allergic/Immunologic: Positive for sneezing. Negative for seasonal allergies and asthma.   Neurological: Positive for headaches (Behind the eyes).   Hematologic/Lymphatic: Negative for swollen lymph nodes.       Objective:      Physical Exam   Constitutional: She is oriented to person, place, and time. She appears well-developed and well-nourished. She is cooperative.  Non-toxic " appearance. She appears ill. No distress.   HENT:   Head: Normocephalic and atraumatic.   Right Ear: Hearing, external ear and ear canal normal. A middle ear effusion is present.   Left Ear: Hearing, external ear and ear canal normal. A middle ear effusion is present.   Nose: Mucosal edema and rhinorrhea present. No nasal deformity. No epistaxis. Right sinus exhibits maxillary sinus tenderness. Right sinus exhibits no frontal sinus tenderness. Left sinus exhibits maxillary sinus tenderness. Left sinus exhibits no frontal sinus tenderness.   Mouth/Throat: Uvula is midline and mucous membranes are normal. No trismus in the jaw. Normal dentition. No uvula swelling. Posterior oropharyngeal erythema (With cobblestoning) present.   Eyes: Pupils are equal, round, and reactive to light. EOM and lids are normal. Right conjunctiva is injected. Left conjunctiva is injected. No scleral icterus.   Neck: Trachea normal, normal range of motion, full passive range of motion without pain and phonation normal. Neck supple. No spinous process tenderness and no muscular tenderness present. No neck rigidity. Normal range of motion present.   Cardiovascular: Normal rate, regular rhythm, normal heart sounds and normal pulses.   Pulmonary/Chest: Effort normal and breath sounds normal. No respiratory distress.   Abdominal: Soft. Normal appearance and bowel sounds are normal. She exhibits no distension. There is no tenderness.   Musculoskeletal: Normal range of motion. She exhibits no edema or deformity.   Lymphadenopathy:     She has cervical adenopathy.        Right cervical: Superficial cervical adenopathy present.        Left cervical: Superficial cervical adenopathy present.   Neurological: She is alert and oriented to person, place, and time. She exhibits normal muscle tone. Coordination normal.   Skin: Skin is warm, dry and intact. Capillary refill takes less than 2 seconds. She is not diaphoretic. No pallor.   Psychiatric: She has a  normal mood and affect. Her speech is normal and behavior is normal. Judgment and thought content normal. Cognition and memory are normal.   Nursing note and vitals reviewed.      Assessment:       1. Bacterial sinusitis    2. Nasal congestion    3. Productive cough    4. Post-nasal drip        Plan:       Advised patient to continue using Flonase and to take allergy medication also.  Voiced understanding.  Bacterial sinusitis  -     amoxicillin-clavulanate 875-125mg (AUGMENTIN) 875-125 mg per tablet; Take 1 tablet by mouth 2 (two) times daily. for 10 days  Dispense: 20 tablet; Refill: 0  -     guaiFENesin (MUCINEX) 600 mg 12 hr tablet; Take 1 tablet (600 mg total) by mouth 2 (two) times daily. for 15 days  Dispense: 30 tablet; Refill: 0    Nasal congestion  -     guaiFENesin (MUCINEX) 600 mg 12 hr tablet; Take 1 tablet (600 mg total) by mouth 2 (two) times daily. for 15 days  Dispense: 30 tablet; Refill: 0    Productive cough  -     guaiFENesin (MUCINEX) 600 mg 12 hr tablet; Take 1 tablet (600 mg total) by mouth 2 (two) times daily. for 15 days  Dispense: 30 tablet; Refill: 0    Post-nasal drip  -     guaiFENesin (MUCINEX) 600 mg 12 hr tablet; Take 1 tablet (600 mg total) by mouth 2 (two) times daily. for 15 days  Dispense: 30 tablet; Refill: 0      Patient Instructions     Please drink plenty of fluids.  Please get plenty of rest.  Please return here or go to the Emergency Department for any concerns or worsening of condition.  If you were given wait & see antibiotics, please wait 3-5 days before taking them, and only take them if your symptoms have worsened or not improved.  If you do begin taking the antibiotics, please take them to completion.  If you were prescribed antibiotics, please take them to completion.  If you were prescribed a narcotic medication, do not drive or operate heavy equipment or machinery while taking these medications.  If you do not have Hypertension or any history of palpitations, it is  ok to take over the counter Sudafed or Mucinex D or Allegra-D or Claritin-D or Zyrtec-D.  If you do take one of the above, it is ok to combine that with plain over the counter Mucinex or Allegra or Claritin or Zyrtec.  If for example you are taking Zyrtec -D, you can combine that with Mucinex, but not Mucinex-D.  If you are taking Mucinex-D, you can combine that with plain Allegra or Claritin or Zyrtec.   If you do have Hypertension or palpitations, it is safe to take Coricidin HBP for relief of sinus symptoms.  We recommend you take over the counter Flonase (Fluticasone) or another nasally inhaled steroid unless you are already taking one.  Nasal irrigation with a saline spray or Netti Pot like device per their directions is also recommended.  If not allergic, please take over the counter Tylenol (Acetaminophen) and/or Motrin (Ibuprofen) as directed for control of pain and/or fever.  Please follow up with your primary care doctor or specialist as needed.    If you  smoke, please stop smoking.    Sinusitis (Antibiotic Treatment)    The sinuses are air-filled spaces within the bones of the face. They connect to the inside of the nose. Sinusitis is an inflammation of the tissue lining the sinus cavity. Sinus inflammation can occur during a cold. It can also be due to allergies to pollens and other particles in the air. Sinusitis can cause symptoms of sinus congestion and fullness. A sinus infection causes fever, headache and facial pain. There is often green or yellow drainage from the nose or into the back of the throat (post-nasal drip). You have been given antibiotics to treat this condition.  Home care:  · Take the full course of antibiotics as instructed. Do not stop taking them, even if you feel better.  · Drink plenty of water, hot tea, and other liquids. This may help thin mucus. It also may promote sinus drainage.  · Heat may help soothe painful areas of the face. Use a towel soaked in hot water. Or,   the shower and direct the hot spray onto your face. Using a vaporizer along with a menthol rub at night may also help.   · An expectorant containing guaifenesin may help thin the mucus and promote drainage from the sinuses.  · Over-the-counter decongestants may be used unless a similar medicine was prescribed. Nasal sprays work the fastest. Use one that contains phenylephrine or oxymetazoline. First blow the nose gently. Then use the spray. Do not use these medicines more often than directed on the label or symptoms may get worse. You may also use tablets containing pseudoephedrine. Avoid products that combine ingredients, because side effects may be increased. Read labels. You can also ask the pharmacist for help. (NOTE: Persons with high blood pressure should not use decongestants. They can raise blood pressure.)  · Over-the-counter antihistamines may help if allergies contributed to your sinusitis.    · Do not use nasal rinses or irrigation during an acute sinus infection, unless told to by your health care provider. Rinsing may spread the infection to other sinuses.  · Use acetaminophen or ibuprofen to control pain, unless another pain medicine was prescribed. (If you have chronic liver or kidney disease or ever had a stomach ulcer, talk with your doctor before using these medicines. Aspirin should never be used in anyone under 18 years of age who is ill with a fever. It may cause severe liver damage.)  · Don't smoke. This can worsen symptoms.  Follow-up care  Follow up with your healthcare provider or our staff if you are not improving within the next week.  When to seek medical advice  Call your healthcare provider if any of these occur:  · Facial pain or headache becoming more severe  · Stiff neck  · Unusual drowsiness or confusion  · Swelling of the forehead or eyelids  · Vision problems, including blurred or double vision  · Fever of 100.4ºF (38ºC) or higher, or as directed by your healthcare  provider  · Seizure  · Breathing problems  · Symptoms not resolving within 10 days  Date Last Reviewed: 4/13/2015  © 8730-6072 [employer]. [address] [place] [address], [place] [address]. All rights reserved. This information is not intended as a substitute for professional medical care. Always follow your healthcare professional's instructions.

## 2019-03-25 NOTE — LETTER
March 25, 2019      [facility] [address]  [place] [address]  Phone: [phone]  Fax: [phone]       Patient: Saba Driver   YOB: 1960  Date of Visit: 03/25/2019    To Whom It May Concern:    Korin Driver  was at [facility] on 03/25/2019. She may return to work/school on 3/26/19 with no restrictions. If you have any questions or concerns, or if I can be of further assistance, please do not hesitate to contact me.    Sincerely,    Michelle Queen NP

## 2019-04-26 ENCOUNTER — INFUSION (OUTPATIENT)
Dept: INFUSION THERAPY | Facility: HOSPITAL | Age: 59
End: 2019-04-26
Attending: INTERNAL MEDICINE
Payer: COMMERCIAL

## 2019-04-26 DIAGNOSIS — C54.1 ENDOMETRIAL CANCER: Primary | ICD-10-CM

## 2019-04-26 PROCEDURE — 63600175 PHARM REV CODE 636 W HCPCS: Performed by: INTERNAL MEDICINE

## 2019-04-26 PROCEDURE — 25000003 PHARM REV CODE 250: Performed by: INTERNAL MEDICINE

## 2019-04-26 PROCEDURE — 96523 IRRIG DRUG DELIVERY DEVICE: CPT

## 2019-04-26 PROCEDURE — A4216 STERILE WATER/SALINE, 10 ML: HCPCS | Performed by: INTERNAL MEDICINE

## 2019-04-26 RX ORDER — HEPARIN 100 UNIT/ML
500 SYRINGE INTRAVENOUS
Status: COMPLETED | OUTPATIENT
Start: 2019-04-26 | End: 2019-04-26

## 2019-04-26 RX ORDER — SODIUM CHLORIDE 0.9 % (FLUSH) 0.9 %
10 SYRINGE (ML) INJECTION
Status: DISCONTINUED | OUTPATIENT
Start: 2019-04-26 | End: 2019-04-26 | Stop reason: HOSPADM

## 2019-04-26 RX ADMIN — HEPARIN SODIUM (PORCINE) LOCK FLUSH IV SOLN 100 UNIT/ML 500 UNITS: 100 SOLUTION at 09:04

## 2019-04-26 RX ADMIN — Medication 10 ML: at 09:04

## 2019-04-26 NOTE — PLAN OF CARE
Problem: Adult Inpatient Plan of Care  Goal: Plan of Care Review  Outcome: Ongoing (interventions implemented as appropriate)  Pt presented for monthly port flush. Pt reported numbness/tingling in hands/feet. She was recently prescribed Lyrica TID and reported that this has been helping. Pt reported going back to work 2 days a week and has enjoyed getting back into a routine. Pt tolerated port flush well. Good blood return noted from port. Ambulatory into and out of unit. Distress screening tool completed. Future appt information reviewed with pt.

## 2019-05-24 ENCOUNTER — INFUSION (OUTPATIENT)
Dept: INFUSION THERAPY | Facility: HOSPITAL | Age: 59
End: 2019-05-24
Attending: INTERNAL MEDICINE
Payer: COMMERCIAL

## 2019-05-24 DIAGNOSIS — C54.1 ENDOMETRIAL CANCER: Primary | ICD-10-CM

## 2019-05-24 PROCEDURE — 25000003 PHARM REV CODE 250: Performed by: INTERNAL MEDICINE

## 2019-05-24 PROCEDURE — 96523 IRRIG DRUG DELIVERY DEVICE: CPT

## 2019-05-24 PROCEDURE — 63600175 PHARM REV CODE 636 W HCPCS: Performed by: INTERNAL MEDICINE

## 2019-05-24 PROCEDURE — A4216 STERILE WATER/SALINE, 10 ML: HCPCS | Performed by: INTERNAL MEDICINE

## 2019-05-24 RX ORDER — SODIUM CHLORIDE 0.9 % (FLUSH) 0.9 %
10 SYRINGE (ML) INJECTION
Status: DISCONTINUED | OUTPATIENT
Start: 2019-05-24 | End: 2019-05-24 | Stop reason: HOSPADM

## 2019-05-24 RX ORDER — HEPARIN 100 UNIT/ML
500 SYRINGE INTRAVENOUS
Status: COMPLETED | OUTPATIENT
Start: 2019-05-24 | End: 2019-05-24

## 2019-05-24 RX ADMIN — Medication 10 ML: at 10:05

## 2019-05-24 RX ADMIN — HEPARIN SODIUM (PORCINE) LOCK FLUSH IV SOLN 100 UNIT/ML 500 UNITS: 100 SOLUTION at 10:05

## 2019-05-24 NOTE — PLAN OF CARE
Problem: Adult Inpatient Plan of Care  Goal: Plan of Care Review  Outcome: Ongoing (interventions implemented as appropriate)  Patient tolerated monthly portacath flush. No current concerns/complaints. Received follow up appointments and verbalized understanding. Patient ambulated unassisted off unit.

## 2019-06-03 ENCOUNTER — TELEPHONE (OUTPATIENT)
Dept: GYNECOLOGIC ONCOLOGY | Facility: CLINIC | Age: 59
End: 2019-06-03

## 2019-06-21 NOTE — PLAN OF CARE
Problem: Adult Inpatient Plan of Care  Goal: Plan of Care Review  Outcome: Ongoing (interventions implemented as appropriate)  Arrived to unit. No complaints voiced. Tolerated port flush. Next appt given to pt. Pt ambulated off unit.

## 2019-06-27 NOTE — TELEPHONE ENCOUNTER
----- Message from Myesha Alston sent at 6/27/2019 10:22 AM CDT -----  Contact: DINORAH FRIED   Name of Who is Calling: DINORAH FRIED       What is the request in detail: Patient was returning a missed call back from the staff       Can the clinic reply by [facility]: no      What Number to Call Back if not in [facility]: [phone]

## 2019-07-02 NOTE — LETTER
July 6, 2019        Shakir Alexis MD  [address] [place] [address]  [address]  [place] [address]             [facility] [address]  [address], [address]  [facility] [address]  Phone: [phone]  Fax: [phone]   Patient: Saba Driver   MR Number: [id number]   YOB: 1960   Date of Visit: 7/2/2019       Dear Dr. Alexis:    Thank you for referring Saba Driver to me for evaluation. Below are the relevant portions of my assessment and plan of care.            If you have questions, please do not hesitate to call me. I look forward to following Saba along with you.    Sincerely,      MD HERB Crowley MD Sophy Ann Jancich, MD

## 2019-07-02 NOTE — PROGRESS NOTES
Subjective:      Patient ID: Saba Driver is a 58 y.o. female.    Chief Complaint: Endometrial Cancer (3mth f/u)      HPI  Presents today for surveillance visit.  Without complaints. Feels well.    normal 4>5.    Disease free interval 5 months.      Oncologic history:  Referred by Dr. Shakir Alexis for postmenopausal bleeding. P0. On pelvic uterus is enlarged and fixed, unable to perform EMB due to altered anatomy of the cervix. She had been admitted to  for vaginal bleeding and anemia requiring transfusion. Fixed sore R inguinal node. Blind pap returned adenocarcinoma.      Pelvic US 3/12/18  UT enlarged 13.3cm with 5.1cm fibroid anterior. Limited visualization of EMS.  LOV 2.9cm  ROV 3.6cm     Medical co-morbidities include DM, HTN. No anticoagulation.      No prior abdominal surgeries     Family history significant for mom with pancreatic cancer, denies history of breast, ovarian, uterine, or colon cancer.      MMG last year normal per patient.      Seen in consult with me 3/21/18. Constellation of findings thus far consistent with an advanced gynecologic malignancy. Likely uterine in origin. Inguinal adenopathy would make this Stage IVB. Pap cytology showing adenocarcinoma but need tissue diagnosis. Will plan IR for biopsy of inguinal node. Pelvic disease is surgically unresectable at present. Recommended neoadjuvant chemotherapy based on tissue diagnosis.      IR biopsy 4/4/18  FINAL PATHOLOGIC DIAGNOSIS  RIGHT INGUINAL LYMPH NODE, CT GUIDED BIOPSY WITH ONSITE ADEQUACY (CYTOLOGY AND CELL BLOCK):  - Adenocarcinoma. See note.  Note: The carcinoma is positive for CK7, p53, vimentin and ER (patchy). It is negative for CDX2, CEA, CK20 and WT1. This immunoprofile is compatible with an endometrial origin. All stains have appropriate controls.     Initiate neoadjuvant chemotherapy with Dr. Boyd. Carbo/taxol 4/19/18. IV port.   S/p 3 cycles neoadjuvant chemotherapy carbo/taxol with Dr. Boyd.      CT CAP  6/25/18  Impression   Significant decrease in size in the right inguinal node.  Lobulated enlarged uterus.  RECIST SUMMARY:  Date of prior examination for comparison: 03/22/2018  Lesion 1: Right inguinal node  1.4 cm(smallest diameter ).  Series 2 image 118.  Prior measurement 4.0 cm.      Elected for interval cytoreductive surgery.     S/p BRIAN/BSO 8/2/18. Uncomplicated post operative course. 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  At post op visit, previously much smaller right inguinal adenopathy had grown significantly over the last few week.     s/p right LND on 10/11/2018.   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 Boyd, completed C6/6 carbo/taxol 12/10/18.   CT CAP 12/17/18- essentially shows no evidence of disease.   Impression       Prior hysterectomy.  Interval right inguinal lymph node dissection.  Previous large necrotic lymph node has been resected. There are few residual nonenlarged lymph nodes at this site which are slightly asymmetric to their contralateral counterparts.  Stable 3 mm right upper lobe pulmonary nodule.  No new soft tissue mass or pathologic lymph node enlargement.  Continued follow-up is advised.      Adjuvant radiation with Dr. Solis completed 50.40Gy to the pelvis and bilateral inguinal regions 1/10/19-2/19/19.     Review of Systems   Constitutional: Negative for appetite change, chills, fatigue and fever.   HENT: Negative for mouth sores.    Respiratory: Negative for cough and shortness of breath.    Cardiovascular: Negative for leg swelling.   Gastrointestinal: Negative for abdominal pain, blood in stool, constipation and diarrhea.   Endocrine: Negative for cold intolerance.   Genitourinary: Negative for dysuria and vaginal bleeding.   Musculoskeletal: Negative for  myalgias.   Skin: Negative for rash.   Allergic/Immunologic: Negative.    Neurological: Negative for weakness and numbness.   Hematological: Negative for adenopathy. Does not bruise/bleed easily.   Psychiatric/Behavioral: Negative for confusion.       Objective:   Physical Exam:   Constitutional: She is oriented to person, place, and time. She appears well-developed and well-nourished.    HENT:   Head: Normocephalic and atraumatic.    Eyes: Pupils are equal, round, and reactive to light. EOM are normal.    Neck: Normal range of motion. Neck supple. No thyromegaly present.    Cardiovascular: Normal rate, regular rhythm and intact distal pulses.     Pulmonary/Chest: Effort normal and breath sounds normal. No respiratory distress. She has no wheezes.        Abdominal: Soft. Bowel sounds are normal. She exhibits no distension, no ascites and no mass. There is no tenderness.     Genitourinary: Rectum normal and vagina normal. Pelvic exam was performed with patient supine. There is no lesion on the right labia. There is no lesion on the left labia. Uterus is absent. There is an absent adnexa. Right adnexum displays no mass. Left adnexum displays no mass. Vaginal cuff normal.Cervix exhibits absence.           Musculoskeletal: Normal range of motion and moves all extremeties.      Lymphadenopathy:     She has no cervical adenopathy.        Right: No inguinal and no supraclavicular adenopathy present.        Left: No inguinal and no supraclavicular adenopathy present.    Neurological: She is alert and oriented to person, place, and time.    Skin: Skin is warm and dry. No rash noted.    Psychiatric: She has a normal mood and affect.       Assessment:     1. Endometrial cancer        Plan:   No orders of the defined types were placed in this encounter.    Without evidence of disease on today's exam.   Feels well. No new symptoms.    normal.   Disease free interval 5 months.   Recommend continued surveillance.   RTC 3  months with  or sooner if needed.

## 2019-07-03 NOTE — TELEPHONE ENCOUNTER
----- Message from Noelle Lala MD sent at 7/2/2019  8:45 PM CDT -----  Please let patient know  was normal. Thank you.

## 2019-07-19 NOTE — PLAN OF CARE
Problem: Adult Inpatient Plan of Care  Goal: Plan of Care Review  Outcome: Ongoing (interventions implemented as appropriate)  Pt presented for monthly port flush. Tolerated well. Reported numbness/tingling to bilateral feet and fatigue. Good blood return noted from port. Port flushed and heparinized. Ambulatory into and out of unit. Distress screening tool completed. AVS provided.

## 2019-08-16 NOTE — PLAN OF CARE
Problem: Adult Inpatient Plan of Care  Goal: Patient-Specific Goal (Individualization)  Outcome: Ongoing (interventions implemented as appropriate)  Pt tolerated port flush without issue. VSS. AVS given. Pt d.c home in stable condition with instructions to return 9/13/19. In interim, pt knows to call clinic with any issues.

## 2019-09-13 NOTE — PLAN OF CARE
Problem: Adult Inpatient Plan of Care  Goal: Patient-Specific Goal (Individualization)  Outcome: Ongoing (interventions implemented as appropriate)  Pt tolerated port flush without issue. VSS. AVS given. Pt d.c home in stable condition with instructions to return 10/11/19. In interim, pt knows to call clinic with any issues.

## 2019-10-08 NOTE — TELEPHONE ENCOUNTER
Left message for pt informing her that we need to reschedule her appt from 3:15pm. Dr Lala is in an emergency surgery case. apologized for the inconvenience. Office number left for pt to call and confirm this message was received.

## 2019-10-11 NOTE — PLAN OF CARE
Patient tolerated monthly portacath flush. VSS. Denies any new or worsening symptoms at this time. Patient received discharge instructions and follow up appointments. Verbalized understanding and ambulated unassisted off unit by self. Patient in NAD at time of discharge.

## 2019-10-15 NOTE — LETTER
October 15, 2019        Kate Boyd MD  [address]  [address]  [facility] [address]             [facility] [address] [facility] [address]  [address]  [facility] [address]  Phone: [phone]  Fax: [phone]   Patient: Saba Driver   MR Number: [id number]   YOB: 1960   Date of Visit: 10/15/2019     Dear Dr. Boyd,     Please find attached progress note.     Sincerely,      Noelle Lala MD            CC  Jim Solis    [facility]

## 2019-10-15 NOTE — PROGRESS NOTES
Subjective:      Patient ID: Saba Driver is a 58 y.o. female.    Chief Complaint: Endometrial Cancer      HPI  Presents today for surveillance visit.  Without complaints. Feels well.    normal 4>5>5.    Disease free interval 8 months.      Oncologic history:  Referred by Dr. Shakir Alexis for postmenopausal bleeding. P0. On pelvic uterus is enlarged and fixed, unable to perform EMB due to altered anatomy of the cervix. She had been admitted to  for vaginal bleeding and anemia requiring transfusion. Fixed sore R inguinal node. Blind pap returned adenocarcinoma.      Pelvic US 3/12/18  UT enlarged 13.3cm with 5.1cm fibroid anterior. Limited visualization of EMS.  LOV 2.9cm  ROV 3.6cm     Medical co-morbidities include DM, HTN. No anticoagulation.      No prior abdominal surgeries     Family history significant for mom with pancreatic cancer, denies history of breast, ovarian, uterine, or colon cancer.      MMG last year normal per patient.      Seen in consult with me 3/21/18. Constellation of findings thus far consistent with an advanced gynecologic malignancy. Likely uterine in origin. Inguinal adenopathy would make this Stage IVB. Pap cytology showing adenocarcinoma but need tissue diagnosis. Will plan IR for biopsy of inguinal node. Pelvic disease is surgically unresectable at present. Recommended neoadjuvant chemotherapy based on tissue diagnosis.      IR biopsy 4/4/18  FINAL PATHOLOGIC DIAGNOSIS  RIGHT INGUINAL LYMPH NODE, CT GUIDED BIOPSY WITH ONSITE ADEQUACY (CYTOLOGY AND CELL BLOCK):  - Adenocarcinoma. See note.  Note: The carcinoma is positive for CK7, p53, vimentin and ER (patchy). It is negative for CDX2, CEA, CK20 and WT1. This immunoprofile is compatible with an endometrial origin. All stains have appropriate controls.     Initiate neoadjuvant chemotherapy with Dr. Boyd. Carbo/taxol 4/19/18. IV port.   S/p 3 cycles neoadjuvant chemotherapy carbo/taxol with Dr. Boyd.      CT CAP  6/25/18  Impression   Significant decrease in size in the right inguinal node.  Lobulated enlarged uterus.  RECIST SUMMARY:  Date of prior examination for comparison: 03/22/2018  Lesion 1: Right inguinal node  1.4 cm(smallest diameter ).  Series 2 image 118.  Prior measurement 4.0 cm.      Elected for interval cytoreductive surgery.     S/p BRIAN/BSO 8/2/18. Uncomplicated post operative course. 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  At post op visit, previously much smaller right inguinal adenopathy had grown significantly over the last few week.     s/p right LND on 10/11/2018.   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 Boyd, completed C6/6 carbo/taxol 12/10/18.   CT CAP 12/17/18- essentially shows no evidence of disease.   Impression       Prior hysterectomy.  Interval right inguinal lymph node dissection.  Previous large necrotic lymph node has been resected. There are few residual nonenlarged lymph nodes at this site which are slightly asymmetric to their contralateral counterparts.  Stable 3 mm right upper lobe pulmonary nodule.  No new soft tissue mass or pathologic lymph node enlargement.  Continued follow-up is advised.      Adjuvant radiation with Dr. Solis completed 50.40Gy to the pelvis and bilateral inguinal regions 1/10/19-2/19/19.     Review of Systems   Constitutional: Negative for appetite change, chills, fatigue and fever.   HENT: Negative for mouth sores.    Respiratory: Negative for cough and shortness of breath.    Cardiovascular: Negative for leg swelling.   Gastrointestinal: Negative for abdominal pain, blood in stool, constipation and diarrhea.   Endocrine: Negative for cold intolerance.   Genitourinary: Negative for dysuria and vaginal bleeding.   Musculoskeletal: Negative for  myalgias.   Skin: Negative for rash.   Allergic/Immunologic: Negative.    Neurological: Negative for weakness and numbness.   Hematological: Negative for adenopathy. Does not bruise/bleed easily.   Psychiatric/Behavioral: Negative for confusion.       Objective:   Physical Exam:   Constitutional: She is oriented to person, place, and time. She appears well-developed and well-nourished.    HENT:   Head: Normocephalic and atraumatic.    Eyes: Pupils are equal, round, and reactive to light. EOM are normal.    Neck: Normal range of motion. Neck supple. No thyromegaly present.    Cardiovascular: Normal rate, regular rhythm and intact distal pulses.     Pulmonary/Chest: Effort normal and breath sounds normal. No respiratory distress. She has no wheezes.        Abdominal: Soft. Bowel sounds are normal. She exhibits no distension, no ascites and no mass. There is no tenderness.     Genitourinary: Rectum normal and vagina normal. Pelvic exam was performed with patient supine. There is no lesion on the right labia. There is no lesion on the left labia. Uterus is absent. There is an absent adnexa. Right adnexum displays no mass. Left adnexum displays no mass. Vaginal cuff normal.Cervix exhibits absence.           Musculoskeletal: Normal range of motion and moves all extremeties.      Lymphadenopathy:     She has no cervical adenopathy.        Right: No inguinal and no supraclavicular adenopathy present.        Left: No inguinal and no supraclavicular adenopathy present.    Neurological: She is alert and oriented to person, place, and time.    Skin: Skin is warm and dry. No rash noted.    Psychiatric: She has a normal mood and affect.       Assessment:     1. Endometrial cancer        Plan:   No orders of the defined types were placed in this encounter.    Without evidence of disease on today's exam.   Feels well. No new symptoms.    normal.   Disease free interval 8 months.   Recommend continued surveillance.   RTC 3  months with  or sooner if needed.

## 2019-11-08 NOTE — PLAN OF CARE
Patient tolerated monthly portacath flush. VSS. Denies any new or worsening symptoms at this time. She received discharge instructions and follow up appointments. Patient verbalized understanding and ambulated unassisted off unit by self. Patient in NAD at time of discharge.

## 2019-12-06 NOTE — PLAN OF CARE
Pt presented for monthly port flush. BP slightly elevated; pt said she had just taken her medicine 5 minutes ago. Pt reported that she is having problems with peripheral neuropathy. Pt has tried Gabapentin, Lyrica, and Cymbalta, but has been unable to tolerate these medications. Pt reported that she is currently waiting for a B complex vitamin, that her physician prescribed for the neuropathy, to come in. Port accessed using sterile technique. Good blood return noted from port. Port flushed and heparinized without issue. Distress screening tool completed. AVS provided and reviewed with pt. Pt ambulated unassisted off unit. Pt in NAD at time of discharge.

## 2020-01-01 ENCOUNTER — OFFICE VISIT (OUTPATIENT)
Dept: URGENT CARE | Facility: CLINIC | Age: 60
End: 2020-01-01
Payer: COMMERCIAL

## 2020-01-01 ENCOUNTER — HOSPITAL ENCOUNTER (EMERGENCY)
Facility: HOSPITAL | Age: 60
Discharge: HOME | End: 2020-05-04
Attending: EMERGENCY MEDICINE
Payer: COMMERCIAL

## 2020-01-01 ENCOUNTER — HOSPITAL ENCOUNTER (OUTPATIENT)
Dept: RADIOLOGY | Facility: HOSPITAL | Age: 60
Discharge: HOME | DRG: 375 | End: 2020-04-22
Attending: OBSTETRICS & GYNECOLOGY
Payer: COMMERCIAL

## 2020-01-01 ENCOUNTER — TELEPHONE (OUTPATIENT)
Dept: GYNECOLOGIC ONCOLOGY | Facility: CLINIC | Age: 60
End: 2020-01-01

## 2020-01-01 ENCOUNTER — TELEPHONE (OUTPATIENT)
Dept: HEMATOLOGY/ONCOLOGY | Facility: CLINIC | Age: 60
End: 2020-01-01

## 2020-01-01 ENCOUNTER — EXTERNAL HOME HEALTH (OUTPATIENT)
Dept: HOME HEALTH SERVICES | Facility: HOSPITAL | Age: 60
End: 2020-01-01
Payer: COMMERCIAL

## 2020-01-01 ENCOUNTER — LAB VISIT (OUTPATIENT)
Dept: LAB | Facility: OTHER | Age: 60
End: 2020-01-01
Attending: INTERNAL MEDICINE
Payer: COMMERCIAL

## 2020-01-01 ENCOUNTER — OFFICE VISIT (OUTPATIENT)
Dept: HEMATOLOGY/ONCOLOGY | Facility: CLINIC | Age: 60
End: 2020-01-01
Payer: COMMERCIAL

## 2020-01-01 ENCOUNTER — INFUSION (OUTPATIENT)
Dept: INFUSION THERAPY | Facility: HOSPITAL | Age: 60
End: 2020-01-01
Attending: INTERNAL MEDICINE
Payer: COMMERCIAL

## 2020-01-01 ENCOUNTER — HOSPITAL ENCOUNTER (INPATIENT)
Facility: HOSPITAL | Age: 60
LOS: 8 days | Discharge: HOME HEALTH | DRG: 375 | End: 2020-05-02
Attending: EMERGENCY MEDICINE | Admitting: OBSTETRICS & GYNECOLOGY
Payer: COMMERCIAL

## 2020-01-01 ENCOUNTER — PATIENT MESSAGE (OUTPATIENT)
Dept: HEMATOLOGY/ONCOLOGY | Facility: CLINIC | Age: 60
End: 2020-01-01

## 2020-01-01 ENCOUNTER — TELEPHONE (OUTPATIENT)
Dept: INTERVENTIONAL RADIOLOGY/VASCULAR | Facility: CLINIC | Age: 60
End: 2020-01-01

## 2020-01-01 ENCOUNTER — HOSPITAL ENCOUNTER (OUTPATIENT)
Facility: HOSPITAL | Age: 60
Discharge: HOME | End: 2020-04-20
Attending: RADIOLOGY | Admitting: RADIOLOGY
Payer: COMMERCIAL

## 2020-01-01 ENCOUNTER — OFFICE VISIT (OUTPATIENT)
Dept: GYNECOLOGIC ONCOLOGY | Facility: CLINIC | Age: 60
End: 2020-01-01
Payer: COMMERCIAL

## 2020-01-01 ENCOUNTER — HOSPITAL ENCOUNTER (EMERGENCY)
Facility: HOSPITAL | Age: 60
Discharge: HOME | End: 2020-04-05
Attending: EMERGENCY MEDICINE
Payer: COMMERCIAL

## 2020-01-01 ENCOUNTER — OFFICE VISIT (OUTPATIENT)
Dept: INTERVENTIONAL RADIOLOGY/VASCULAR | Facility: CLINIC | Age: 60
End: 2020-01-01
Payer: COMMERCIAL

## 2020-01-01 ENCOUNTER — LAB VISIT (OUTPATIENT)
Dept: FAMILY MEDICINE | Facility: CLINIC | Age: 60
End: 2020-01-01
Payer: COMMERCIAL

## 2020-01-01 ENCOUNTER — DOCUMENT SCAN (OUTPATIENT)
Dept: HOME HEALTH SERVICES | Facility: HOSPITAL | Age: 60
End: 2020-01-01
Payer: COMMERCIAL

## 2020-01-01 ENCOUNTER — TELEPHONE (OUTPATIENT)
Dept: INTERVENTIONAL RADIOLOGY/VASCULAR | Facility: HOSPITAL | Age: 60
End: 2020-01-01

## 2020-01-01 VITALS
HEART RATE: 90 BPM | HEIGHT: 67 IN | TEMPERATURE: 99 F | SYSTOLIC BLOOD PRESSURE: 150 MMHG | RESPIRATION RATE: 18 BRPM | BODY MASS INDEX: 43.95 KG/M2 | OXYGEN SATURATION: 99 % | DIASTOLIC BLOOD PRESSURE: 69 MMHG | WEIGHT: 280 LBS

## 2020-01-01 VITALS
WEIGHT: 281 LBS | TEMPERATURE: 99 F | SYSTOLIC BLOOD PRESSURE: 164 MMHG | BODY MASS INDEX: 46.76 KG/M2 | OXYGEN SATURATION: 98 % | HEART RATE: 64 BPM | DIASTOLIC BLOOD PRESSURE: 75 MMHG

## 2020-01-01 VITALS
TEMPERATURE: 99 F | OXYGEN SATURATION: 100 % | WEIGHT: 280 LBS | DIASTOLIC BLOOD PRESSURE: 45 MMHG | HEART RATE: 76 BPM | RESPIRATION RATE: 18 BRPM | HEIGHT: 67 IN | BODY MASS INDEX: 43.95 KG/M2 | SYSTOLIC BLOOD PRESSURE: 142 MMHG

## 2020-01-01 VITALS
RESPIRATION RATE: 18 BRPM | TEMPERATURE: 98 F | HEART RATE: 82 BPM | DIASTOLIC BLOOD PRESSURE: 63 MMHG | OXYGEN SATURATION: 100 % | SYSTOLIC BLOOD PRESSURE: 156 MMHG

## 2020-01-01 VITALS
SYSTOLIC BLOOD PRESSURE: 181 MMHG | HEART RATE: 72 BPM | DIASTOLIC BLOOD PRESSURE: 83 MMHG | WEIGHT: 280 LBS | BODY MASS INDEX: 43.85 KG/M2

## 2020-01-01 VITALS
DIASTOLIC BLOOD PRESSURE: 74 MMHG | RESPIRATION RATE: 16 BRPM | OXYGEN SATURATION: 96 % | SYSTOLIC BLOOD PRESSURE: 176 MMHG | TEMPERATURE: 98 F | HEART RATE: 63 BPM

## 2020-01-01 VITALS
RESPIRATION RATE: 17 BRPM | SYSTOLIC BLOOD PRESSURE: 159 MMHG | TEMPERATURE: 98 F | OXYGEN SATURATION: 98 % | HEART RATE: 70 BPM | DIASTOLIC BLOOD PRESSURE: 77 MMHG

## 2020-01-01 VITALS — DIASTOLIC BLOOD PRESSURE: 84 MMHG | SYSTOLIC BLOOD PRESSURE: 192 MMHG | HEART RATE: 64 BPM

## 2020-01-01 VITALS
TEMPERATURE: 98 F | HEIGHT: 67 IN | HEART RATE: 65 BPM | BODY MASS INDEX: 43.95 KG/M2 | DIASTOLIC BLOOD PRESSURE: 67 MMHG | WEIGHT: 280 LBS | SYSTOLIC BLOOD PRESSURE: 164 MMHG | OXYGEN SATURATION: 98 % | RESPIRATION RATE: 15 BRPM

## 2020-01-01 VITALS
HEART RATE: 99 BPM | OXYGEN SATURATION: 94 % | HEIGHT: 67 IN | DIASTOLIC BLOOD PRESSURE: 74 MMHG | SYSTOLIC BLOOD PRESSURE: 168 MMHG | TEMPERATURE: 98 F | RESPIRATION RATE: 16 BRPM | WEIGHT: 284.38 LBS | BODY MASS INDEX: 44.63 KG/M2

## 2020-01-01 DIAGNOSIS — U07.1 COVID-19: ICD-10-CM

## 2020-01-01 DIAGNOSIS — C54.1 ENDOMETRIAL CANCER: ICD-10-CM

## 2020-01-01 DIAGNOSIS — R19.7 DIARRHEA, UNSPECIFIED TYPE: ICD-10-CM

## 2020-01-01 DIAGNOSIS — C77.4: ICD-10-CM

## 2020-01-01 DIAGNOSIS — D49.89 NEOPLASM OF PELVIS: ICD-10-CM

## 2020-01-01 DIAGNOSIS — C54.1 ENDOMETRIAL CANCER: Primary | ICD-10-CM

## 2020-01-01 DIAGNOSIS — R53.83 FATIGUE: Primary | ICD-10-CM

## 2020-01-01 DIAGNOSIS — R10.9 ABDOMINAL PAIN, UNSPECIFIED ABDOMINAL LOCATION: Primary | ICD-10-CM

## 2020-01-01 DIAGNOSIS — Z51.5 PALLIATIVE CARE BY SPECIALIST: ICD-10-CM

## 2020-01-01 DIAGNOSIS — R11.2 NAUSEA AND VOMITING, INTRACTABILITY OF VOMITING NOT SPECIFIED, UNSPECIFIED VOMITING TYPE: Primary | ICD-10-CM

## 2020-01-01 DIAGNOSIS — R11.10 EMESIS: ICD-10-CM

## 2020-01-01 DIAGNOSIS — R10.9 ABDOMINAL PAIN, UNSPECIFIED ABDOMINAL LOCATION: ICD-10-CM

## 2020-01-01 DIAGNOSIS — R39.9 UTI SYMPTOMS: Primary | ICD-10-CM

## 2020-01-01 DIAGNOSIS — R11.0 NAUSEA: ICD-10-CM

## 2020-01-01 DIAGNOSIS — R11.2 NON-INTRACTABLE VOMITING WITH NAUSEA, UNSPECIFIED VOMITING TYPE: Primary | ICD-10-CM

## 2020-01-01 DIAGNOSIS — R53.83 FATIGUE, UNSPECIFIED TYPE: ICD-10-CM

## 2020-01-01 DIAGNOSIS — C54.9 UTERINE CANCER, SARCOMA: ICD-10-CM

## 2020-01-01 DIAGNOSIS — C54.1 CARCINOSARCOMA OF ENDOMETRIUM: ICD-10-CM

## 2020-01-01 DIAGNOSIS — K59.00 CONSTIPATION, UNSPECIFIED CONSTIPATION TYPE: ICD-10-CM

## 2020-01-01 DIAGNOSIS — G89.3 NEOPLASM RELATED PAIN: ICD-10-CM

## 2020-01-01 DIAGNOSIS — C54.1 CARCINOSARCOMA OF ENDOMETRIUM: Primary | ICD-10-CM

## 2020-01-01 DIAGNOSIS — K56.600 PARTIAL SMALL BOWEL OBSTRUCTION: ICD-10-CM

## 2020-01-01 DIAGNOSIS — R10.30 LOWER ABDOMINAL PAIN: ICD-10-CM

## 2020-01-01 LAB
ALBUMIN SERPL BCP-MCNC: 2.1 G/DL (ref 3.5–5.2)
ALBUMIN SERPL BCP-MCNC: 2.2 G/DL (ref 3.5–5.2)
ALBUMIN SERPL BCP-MCNC: 2.4 G/DL (ref 3.5–5.2)
ALBUMIN SERPL BCP-MCNC: 2.4 G/DL (ref 3.5–5.2)
ALBUMIN SERPL BCP-MCNC: 2.7 G/DL (ref 3.5–5.2)
ALBUMIN SERPL BCP-MCNC: 3.2 G/DL (ref 3.5–5.2)
ALP SERPL-CCNC: 197 U/L (ref 55–135)
ALP SERPL-CCNC: 265 U/L (ref 55–135)
ALP SERPL-CCNC: 273 U/L (ref 55–135)
ALP SERPL-CCNC: 278 U/L (ref 55–135)
ALP SERPL-CCNC: 297 U/L (ref 55–135)
ALP SERPL-CCNC: 304 U/L (ref 55–135)
ALP SERPL-CCNC: 311 U/L (ref 55–135)
ALP SERPL-CCNC: 312 U/L (ref 55–135)
ALP SERPL-CCNC: 328 U/L (ref 55–135)
ALT SERPL W/O P-5'-P-CCNC: 21 U/L (ref 10–44)
ALT SERPL W/O P-5'-P-CCNC: 35 U/L (ref 10–44)
ALT SERPL W/O P-5'-P-CCNC: 35 U/L (ref 10–44)
ALT SERPL W/O P-5'-P-CCNC: 36 U/L (ref 10–44)
ALT SERPL W/O P-5'-P-CCNC: 38 U/L (ref 10–44)
ALT SERPL W/O P-5'-P-CCNC: 53 U/L (ref 10–44)
ALT SERPL W/O P-5'-P-CCNC: 54 U/L (ref 10–44)
ALT SERPL W/O P-5'-P-CCNC: 57 U/L (ref 10–44)
ALT SERPL W/O P-5'-P-CCNC: 59 U/L (ref 10–44)
ANION GAP SERPL CALC-SCNC: 13 MMOL/L (ref 8–16)
ANION GAP SERPL CALC-SCNC: 13 MMOL/L (ref 8–16)
ANION GAP SERPL CALC-SCNC: 14 MMOL/L (ref 8–16)
ANION GAP SERPL CALC-SCNC: 15 MMOL/L (ref 8–16)
ANION GAP SERPL CALC-SCNC: 16 MMOL/L (ref 8–16)
ANION GAP SERPL CALC-SCNC: 17 MMOL/L (ref 8–16)
ANION GAP SERPL CALC-SCNC: 9 MMOL/L (ref 8–16)
APTT BLDCRRT: 26.9 SEC (ref 21–32)
AST SERPL-CCNC: 18 U/L (ref 10–40)
AST SERPL-CCNC: 20 U/L (ref 10–40)
AST SERPL-CCNC: 30 U/L (ref 10–40)
AST SERPL-CCNC: 33 U/L (ref 10–40)
AST SERPL-CCNC: 36 U/L (ref 10–40)
AST SERPL-CCNC: 40 U/L (ref 10–40)
AST SERPL-CCNC: 49 U/L (ref 10–40)
AST SERPL-CCNC: 50 U/L (ref 10–40)
AST SERPL-CCNC: 50 U/L (ref 10–40)
BACTERIA #/AREA URNS AUTO: ABNORMAL /HPF
BACTERIA #/AREA URNS HPF: NORMAL /HPF
BACTERIA UR CULT: ABNORMAL
BACTERIA UR CULT: NO GROWTH
BACTERIA UR CULT: NORMAL
BASOPHILS # BLD AUTO: 0.01 K/UL (ref 0–0.2)
BASOPHILS # BLD AUTO: 0.01 K/UL (ref 0–0.2)
BASOPHILS # BLD AUTO: 0.02 K/UL (ref 0–0.2)
BASOPHILS # BLD AUTO: 0.03 K/UL (ref 0–0.2)
BASOPHILS # BLD AUTO: 0.04 K/UL (ref 0–0.2)
BASOPHILS # BLD AUTO: 0.05 K/UL (ref 0–0.2)
BASOPHILS # BLD AUTO: 0.06 K/UL (ref 0–0.2)
BASOPHILS # BLD AUTO: 0.06 K/UL (ref 0–0.2)
BASOPHILS NFR BLD: 0.1 % (ref 0–1.9)
BASOPHILS NFR BLD: 0.1 % (ref 0–1.9)
BASOPHILS NFR BLD: 0.2 % (ref 0–1.9)
BASOPHILS NFR BLD: 0.3 % (ref 0–1.9)
BASOPHILS NFR BLD: 0.3 % (ref 0–1.9)
BASOPHILS NFR BLD: 0.4 % (ref 0–1.9)
BASOPHILS NFR BLD: 0.4 % (ref 0–1.9)
BASOPHILS NFR BLD: 0.5 % (ref 0–1.9)
BILIRUB SERPL-MCNC: 0.2 MG/DL (ref 0.1–1)
BILIRUB SERPL-MCNC: 0.2 MG/DL (ref 0.1–1)
BILIRUB SERPL-MCNC: 0.3 MG/DL (ref 0.1–1)
BILIRUB SERPL-MCNC: 0.4 MG/DL (ref 0.1–1)
BILIRUB SERPL-MCNC: 0.6 MG/DL (ref 0.1–1)
BILIRUB UR QL STRIP: NEGATIVE
BUN SERPL-MCNC: 10 MG/DL (ref 6–20)
BUN SERPL-MCNC: 10 MG/DL (ref 6–20)
BUN SERPL-MCNC: 11 MG/DL (ref 6–20)
BUN SERPL-MCNC: 13 MG/DL (ref 6–20)
BUN SERPL-MCNC: 14 MG/DL (ref 6–20)
BUN SERPL-MCNC: 16 MG/DL (ref 6–20)
BUN SERPL-MCNC: 8 MG/DL (ref 6–20)
BUN SERPL-MCNC: 9 MG/DL (ref 6–20)
BUN SERPL-MCNC: 9 MG/DL (ref 6–20)
CALCIUM SERPL-MCNC: 10 MG/DL (ref 8.7–10.5)
CALCIUM SERPL-MCNC: 8.7 MG/DL (ref 8.7–10.5)
CALCIUM SERPL-MCNC: 8.8 MG/DL (ref 8.7–10.5)
CALCIUM SERPL-MCNC: 9 MG/DL (ref 8.7–10.5)
CALCIUM SERPL-MCNC: 9.1 MG/DL (ref 8.7–10.5)
CALCIUM SERPL-MCNC: 9.2 MG/DL (ref 8.7–10.5)
CANCER AG125 SERPL-ACNC: 235 U/ML (ref 0–30)
CANCER AG125 SERPL-ACNC: 4 U/ML (ref 0–30)
CHLORIDE SERPL-SCNC: 100 MMOL/L (ref 95–110)
CHLORIDE SERPL-SCNC: 101 MMOL/L (ref 95–110)
CHLORIDE SERPL-SCNC: 97 MMOL/L (ref 95–110)
CHLORIDE SERPL-SCNC: 97 MMOL/L (ref 95–110)
CHLORIDE SERPL-SCNC: 98 MMOL/L (ref 95–110)
CHLORIDE SERPL-SCNC: 99 MMOL/L (ref 95–110)
CHLORIDE SERPL-SCNC: 99 MMOL/L (ref 95–110)
CLARITY UR REFRACT.AUTO: ABNORMAL
CLARITY UR REFRACT.AUTO: CLEAR
CLARITY UR: ABNORMAL
CO2 SERPL-SCNC: 22 MMOL/L (ref 23–29)
CO2 SERPL-SCNC: 22 MMOL/L (ref 23–29)
CO2 SERPL-SCNC: 23 MMOL/L (ref 23–29)
CO2 SERPL-SCNC: 24 MMOL/L (ref 23–29)
CO2 SERPL-SCNC: 24 MMOL/L (ref 23–29)
CO2 SERPL-SCNC: 27 MMOL/L (ref 23–29)
CO2 SERPL-SCNC: 28 MMOL/L (ref 23–29)
COLOR UR AUTO: ABNORMAL
COLOR UR AUTO: YELLOW
COLOR UR: ABNORMAL
COMMENT: NORMAL
CREAT SERPL-MCNC: 0.7 MG/DL (ref 0.5–1.4)
CREAT SERPL-MCNC: 0.8 MG/DL (ref 0.5–1.4)
DIFFERENTIAL METHOD: ABNORMAL
EOSINOPHIL # BLD AUTO: 0 K/UL (ref 0–0.5)
EOSINOPHIL # BLD AUTO: 0 K/UL (ref 0–0.5)
EOSINOPHIL # BLD AUTO: 0.3 K/UL (ref 0–0.5)
EOSINOPHIL # BLD AUTO: 0.7 K/UL (ref 0–0.5)
EOSINOPHIL # BLD AUTO: 0.8 K/UL (ref 0–0.5)
EOSINOPHIL # BLD AUTO: 1.3 K/UL (ref 0–0.5)
EOSINOPHIL # BLD AUTO: 1.5 K/UL (ref 0–0.5)
EOSINOPHIL # BLD AUTO: 1.7 K/UL (ref 0–0.5)
EOSINOPHIL NFR BLD: 0 % (ref 0–8)
EOSINOPHIL NFR BLD: 0.1 % (ref 0–8)
EOSINOPHIL NFR BLD: 10.6 % (ref 0–8)
EOSINOPHIL NFR BLD: 11.3 % (ref 0–8)
EOSINOPHIL NFR BLD: 11.5 % (ref 0–8)
EOSINOPHIL NFR BLD: 11.9 % (ref 0–8)
EOSINOPHIL NFR BLD: 2.2 % (ref 0–8)
EOSINOPHIL NFR BLD: 5.6 % (ref 0–8)
ERYTHROCYTE [DISTWIDTH] IN BLOOD BY AUTOMATED COUNT: 13.1 % (ref 11.5–14.5)
ERYTHROCYTE [DISTWIDTH] IN BLOOD BY AUTOMATED COUNT: 13.8 % (ref 11.5–14.5)
ERYTHROCYTE [DISTWIDTH] IN BLOOD BY AUTOMATED COUNT: 13.9 % (ref 11.5–14.5)
ERYTHROCYTE [DISTWIDTH] IN BLOOD BY AUTOMATED COUNT: 13.9 % (ref 11.5–14.5)
ERYTHROCYTE [DISTWIDTH] IN BLOOD BY AUTOMATED COUNT: 14.1 % (ref 11.5–14.5)
ERYTHROCYTE [DISTWIDTH] IN BLOOD BY AUTOMATED COUNT: 14.1 % (ref 11.5–14.5)
ERYTHROCYTE [DISTWIDTH] IN BLOOD BY AUTOMATED COUNT: 14.2 % (ref 11.5–14.5)
ERYTHROCYTE [DISTWIDTH] IN BLOOD BY AUTOMATED COUNT: 14.6 % (ref 11.5–14.5)
EST. GFR  (AFRICAN AMERICAN): >60 ML/MIN/1.73 M^2
EST. GFR  (NON AFRICAN AMERICAN): >60 ML/MIN/1.73 M^2
ESTIMATED AVG GLUCOSE: 194 MG/DL (ref 68–131)
FIBRINOGEN PPP-MCNC: 747 MG/DL (ref 182–366)
FINAL PATHOLOGIC DIAGNOSIS: NORMAL
GLUCOSE SERPL-MCNC: 137 MG/DL (ref 70–110)
GLUCOSE SERPL-MCNC: 146 MG/DL (ref 70–110)
GLUCOSE SERPL-MCNC: 149 MG/DL (ref 70–110)
GLUCOSE SERPL-MCNC: 193 MG/DL (ref 70–110)
GLUCOSE SERPL-MCNC: 194 MG/DL (ref 70–110)
GLUCOSE SERPL-MCNC: 204 MG/DL (ref 70–110)
GLUCOSE SERPL-MCNC: 204 MG/DL (ref 70–110)
GLUCOSE SERPL-MCNC: 231 MG/DL (ref 70–110)
GLUCOSE SERPL-MCNC: 231 MG/DL (ref 70–110)
GLUCOSE UR QL STRIP: ABNORMAL
GLUCOSE UR QL STRIP: NEGATIVE
GROSS: NORMAL
HBA1C MFR BLD HPLC: 8.4 % (ref 4–5.6)
HCT VFR BLD AUTO: 31.2 % (ref 37–48.5)
HCT VFR BLD AUTO: 33 % (ref 37–48.5)
HCT VFR BLD AUTO: 33.1 % (ref 37–48.5)
HCT VFR BLD AUTO: 33.6 % (ref 37–48.5)
HCT VFR BLD AUTO: 34.3 % (ref 37–48.5)
HCT VFR BLD AUTO: 36.7 % (ref 37–48.5)
HCT VFR BLD AUTO: 38.1 % (ref 37–48.5)
HCT VFR BLD AUTO: 40.1 % (ref 37–48.5)
HGB BLD-MCNC: 10 G/DL (ref 12–16)
HGB BLD-MCNC: 10.1 G/DL (ref 12–16)
HGB BLD-MCNC: 10.8 G/DL (ref 12–16)
HGB BLD-MCNC: 11.4 G/DL (ref 12–16)
HGB BLD-MCNC: 12.3 G/DL (ref 12–16)
HGB BLD-MCNC: 9.5 G/DL (ref 12–16)
HGB BLD-MCNC: 9.5 G/DL (ref 12–16)
HGB BLD-MCNC: 9.8 G/DL (ref 12–16)
HGB BLD-MCNC: 9.8 G/DL (ref 12–16)
HGB UR QL STRIP: ABNORMAL
HGB UR QL STRIP: NEGATIVE
HGB UR QL STRIP: NEGATIVE
HYALINE CASTS #/AREA URNS LPF: 0 /LPF
HYALINE CASTS UR QL AUTO: 0 /LPF
IMM GRANULOCYTES # BLD AUTO: 0.02 K/UL (ref 0–0.04)
IMM GRANULOCYTES # BLD AUTO: 0.05 K/UL (ref 0–0.04)
IMM GRANULOCYTES # BLD AUTO: 0.08 K/UL (ref 0–0.04)
IMM GRANULOCYTES # BLD AUTO: 0.09 K/UL (ref 0–0.04)
IMM GRANULOCYTES # BLD AUTO: 0.09 K/UL (ref 0–0.04)
IMM GRANULOCYTES # BLD AUTO: 0.1 K/UL (ref 0–0.04)
IMM GRANULOCYTES # BLD AUTO: 0.11 K/UL (ref 0–0.04)
IMM GRANULOCYTES # BLD AUTO: 0.14 K/UL (ref 0–0.04)
IMM GRANULOCYTES NFR BLD AUTO: 0.3 % (ref 0–0.5)
IMM GRANULOCYTES NFR BLD AUTO: 0.4 % (ref 0–0.5)
IMM GRANULOCYTES NFR BLD AUTO: 0.7 % (ref 0–0.5)
IMM GRANULOCYTES NFR BLD AUTO: 0.7 % (ref 0–0.5)
IMM GRANULOCYTES NFR BLD AUTO: 0.8 % (ref 0–0.5)
IMM GRANULOCYTES NFR BLD AUTO: 0.9 % (ref 0–0.5)
INR PPP: 1.1 (ref 0.8–1.2)
INR PPP: 1.2 (ref 0.8–1.2)
KETONES UR QL STRIP: ABNORMAL
KETONES UR QL STRIP: NEGATIVE
LACTATE SERPL-SCNC: 1 MMOL/L (ref 0.5–2.2)
LEUKOCYTE ESTERASE UR QL STRIP: ABNORMAL
LEUKOCYTE ESTERASE UR QL STRIP: NEGATIVE
LIPASE SERPL-CCNC: 31 U/L (ref 4–60)
LIPASE SERPL-CCNC: 6 U/L (ref 4–60)
LIPASE SERPL-CCNC: 8 U/L (ref 4–60)
LYMPHOCYTES # BLD AUTO: 0.5 K/UL (ref 1–4.8)
LYMPHOCYTES # BLD AUTO: 0.9 K/UL (ref 1–4.8)
LYMPHOCYTES # BLD AUTO: 0.9 K/UL (ref 1–4.8)
LYMPHOCYTES # BLD AUTO: 1.2 K/UL (ref 1–4.8)
LYMPHOCYTES # BLD AUTO: 1.5 K/UL (ref 1–4.8)
LYMPHOCYTES # BLD AUTO: 1.5 K/UL (ref 1–4.8)
LYMPHOCYTES # BLD AUTO: 1.6 K/UL (ref 1–4.8)
LYMPHOCYTES # BLD AUTO: 2 K/UL (ref 1–4.8)
LYMPHOCYTES NFR BLD: 10.3 % (ref 18–48)
LYMPHOCYTES NFR BLD: 11.6 % (ref 18–48)
LYMPHOCYTES NFR BLD: 11.9 % (ref 18–48)
LYMPHOCYTES NFR BLD: 14.4 % (ref 18–48)
LYMPHOCYTES NFR BLD: 15.8 % (ref 18–48)
LYMPHOCYTES NFR BLD: 4.7 % (ref 18–48)
LYMPHOCYTES NFR BLD: 5.7 % (ref 18–48)
LYMPHOCYTES NFR BLD: 9.8 % (ref 18–48)
Lab: NORMAL
MAGNESIUM SERPL-MCNC: 1.2 MG/DL (ref 1.6–2.6)
MAGNESIUM SERPL-MCNC: 1.3 MG/DL (ref 1.6–2.6)
MAGNESIUM SERPL-MCNC: 1.4 MG/DL (ref 1.6–2.6)
MAGNESIUM SERPL-MCNC: 1.5 MG/DL (ref 1.6–2.6)
MAGNESIUM SERPL-MCNC: 1.6 MG/DL (ref 1.6–2.6)
MAGNESIUM SERPL-MCNC: 1.7 MG/DL (ref 1.6–2.6)
MCH RBC QN AUTO: 24.4 PG (ref 27–31)
MCH RBC QN AUTO: 24.6 PG (ref 27–31)
MCH RBC QN AUTO: 24.7 PG (ref 27–31)
MCH RBC QN AUTO: 24.7 PG (ref 27–31)
MCH RBC QN AUTO: 24.8 PG (ref 27–31)
MCH RBC QN AUTO: 25.1 PG (ref 27–31)
MCH RBC QN AUTO: 25.2 PG (ref 27–31)
MCH RBC QN AUTO: 25.3 PG (ref 27–31)
MCHC RBC AUTO-ENTMCNC: 29.2 G/DL (ref 32–36)
MCHC RBC AUTO-ENTMCNC: 29.2 G/DL (ref 32–36)
MCHC RBC AUTO-ENTMCNC: 29.4 G/DL (ref 32–36)
MCHC RBC AUTO-ENTMCNC: 29.6 G/DL (ref 32–36)
MCHC RBC AUTO-ENTMCNC: 29.9 G/DL (ref 32–36)
MCHC RBC AUTO-ENTMCNC: 30.4 G/DL (ref 32–36)
MCHC RBC AUTO-ENTMCNC: 30.6 G/DL (ref 32–36)
MCHC RBC AUTO-ENTMCNC: 30.7 G/DL (ref 32–36)
MCV RBC AUTO: 81 FL (ref 82–98)
MCV RBC AUTO: 82 FL (ref 82–98)
MCV RBC AUTO: 82 FL (ref 82–98)
MCV RBC AUTO: 83 FL (ref 82–98)
MCV RBC AUTO: 84 FL (ref 82–98)
MCV RBC AUTO: 84 FL (ref 82–98)
MCV RBC AUTO: 85 FL (ref 82–98)
MCV RBC AUTO: 85 FL (ref 82–98)
MICROSCOPIC COMMENT: ABNORMAL
MICROSCOPIC COMMENT: NORMAL
MONOCYTES # BLD AUTO: 0.2 K/UL (ref 0.3–1)
MONOCYTES # BLD AUTO: 0.4 K/UL (ref 0.3–1)
MONOCYTES # BLD AUTO: 0.8 K/UL (ref 0.3–1)
MONOCYTES # BLD AUTO: 0.9 K/UL (ref 0.3–1)
MONOCYTES # BLD AUTO: 1.2 K/UL (ref 0.3–1)
MONOCYTES # BLD AUTO: 1.2 K/UL (ref 0.3–1)
MONOCYTES # BLD AUTO: 1.3 K/UL (ref 0.3–1)
MONOCYTES # BLD AUTO: 1.5 K/UL (ref 0.3–1)
MONOCYTES NFR BLD: 10.3 % (ref 4–15)
MONOCYTES NFR BLD: 10.4 % (ref 4–15)
MONOCYTES NFR BLD: 2.7 % (ref 4–15)
MONOCYTES NFR BLD: 3.9 % (ref 4–15)
MONOCYTES NFR BLD: 5.3 % (ref 4–15)
MONOCYTES NFR BLD: 7.1 % (ref 4–15)
MONOCYTES NFR BLD: 8.5 % (ref 4–15)
MONOCYTES NFR BLD: 9.6 % (ref 4–15)
NEUTROPHILS # BLD AUTO: 10.1 K/UL (ref 1.8–7.7)
NEUTROPHILS # BLD AUTO: 13 K/UL (ref 1.8–7.7)
NEUTROPHILS # BLD AUTO: 4.5 K/UL (ref 1.8–7.7)
NEUTROPHILS # BLD AUTO: 8.3 K/UL (ref 1.8–7.7)
NEUTROPHILS # BLD AUTO: 8.6 K/UL (ref 1.8–7.7)
NEUTROPHILS # BLD AUTO: 8.9 K/UL (ref 1.8–7.7)
NEUTROPHILS # BLD AUTO: 9.2 K/UL (ref 1.8–7.7)
NEUTROPHILS # BLD AUTO: 9.3 K/UL (ref 1.8–7.7)
NEUTROPHILS NFR BLD: 65.4 % (ref 38–73)
NEUTROPHILS NFR BLD: 66.3 % (ref 38–73)
NEUTROPHILS NFR BLD: 68.8 % (ref 38–73)
NEUTROPHILS NFR BLD: 71 % (ref 38–73)
NEUTROPHILS NFR BLD: 72.9 % (ref 38–73)
NEUTROPHILS NFR BLD: 74.3 % (ref 38–73)
NEUTROPHILS NFR BLD: 87.9 % (ref 38–73)
NEUTROPHILS NFR BLD: 90.5 % (ref 38–73)
NITRITE UR QL STRIP: NEGATIVE
NRBC BLD-RTO: 0 /100 WBC
PH UR STRIP: 5 [PH] (ref 5–8)
PH UR STRIP: 5 [PH] (ref 5–8)
PH UR STRIP: 6 [PH] (ref 5–8)
PH, POC UA: 6 (ref 5–8)
PHOSPHATE SERPL-MCNC: 3.1 MG/DL (ref 2.7–4.5)
PHOSPHATE SERPL-MCNC: 3.9 MG/DL (ref 2.7–4.5)
PLATELET # BLD AUTO: 295 K/UL (ref 150–350)
PLATELET # BLD AUTO: 345 K/UL (ref 150–350)
PLATELET # BLD AUTO: 356 K/UL (ref 150–350)
PLATELET # BLD AUTO: 409 K/UL (ref 150–350)
PLATELET # BLD AUTO: 432 K/UL (ref 150–350)
PLATELET # BLD AUTO: 467 K/UL (ref 150–350)
PLATELET # BLD AUTO: 468 K/UL (ref 150–350)
PLATELET # BLD AUTO: 533 K/UL (ref 150–350)
PMV BLD AUTO: 10.6 FL (ref 9.2–12.9)
PMV BLD AUTO: 10.8 FL (ref 9.2–12.9)
PMV BLD AUTO: 9.4 FL (ref 9.2–12.9)
PMV BLD AUTO: 9.5 FL (ref 9.2–12.9)
PMV BLD AUTO: 9.6 FL (ref 9.2–12.9)
PMV BLD AUTO: 9.6 FL (ref 9.2–12.9)
PMV BLD AUTO: 9.8 FL (ref 9.2–12.9)
PMV BLD AUTO: 9.8 FL (ref 9.2–12.9)
POC BLOOD, URINE: NEGATIVE
POC NITRATES, URINE: NEGATIVE
POCT GLUCOSE: 119 MG/DL (ref 70–110)
POCT GLUCOSE: 133 MG/DL (ref 70–110)
POCT GLUCOSE: 137 MG/DL (ref 70–110)
POCT GLUCOSE: 141 MG/DL (ref 70–110)
POCT GLUCOSE: 142 MG/DL (ref 70–110)
POCT GLUCOSE: 143 MG/DL (ref 70–110)
POCT GLUCOSE: 144 MG/DL (ref 70–110)
POCT GLUCOSE: 145 MG/DL (ref 70–110)
POCT GLUCOSE: 151 MG/DL (ref 70–110)
POCT GLUCOSE: 153 MG/DL (ref 70–110)
POCT GLUCOSE: 154 MG/DL (ref 70–110)
POCT GLUCOSE: 155 MG/DL (ref 70–110)
POCT GLUCOSE: 155 MG/DL (ref 70–110)
POCT GLUCOSE: 157 MG/DL (ref 70–110)
POCT GLUCOSE: 158 MG/DL (ref 70–110)
POCT GLUCOSE: 160 MG/DL (ref 70–110)
POCT GLUCOSE: 163 MG/DL (ref 70–110)
POCT GLUCOSE: 185 MG/DL (ref 70–110)
POCT GLUCOSE: 192 MG/DL (ref 70–110)
POCT GLUCOSE: 205 MG/DL (ref 70–110)
POCT GLUCOSE: 207 MG/DL (ref 70–110)
POCT GLUCOSE: 208 MG/DL (ref 70–110)
POCT GLUCOSE: 210 MG/DL (ref 70–110)
POCT GLUCOSE: 218 MG/DL (ref 70–110)
POCT GLUCOSE: 223 MG/DL (ref 70–110)
POCT GLUCOSE: 223 MG/DL (ref 70–110)
POCT GLUCOSE: 224 MG/DL (ref 70–110)
POCT GLUCOSE: 228 MG/DL (ref 70–110)
POCT GLUCOSE: 228 MG/DL (ref 70–110)
POCT GLUCOSE: 229 MG/DL (ref 70–110)
POCT GLUCOSE: 234 MG/DL (ref 70–110)
POCT GLUCOSE: 240 MG/DL (ref 70–110)
POCT GLUCOSE: 265 MG/DL (ref 70–110)
POCT GLUCOSE: 341 MG/DL (ref 70–110)
POCT GLUCOSE: 361 MG/DL (ref 70–110)
POTASSIUM SERPL-SCNC: 3.9 MMOL/L (ref 3.5–5.1)
POTASSIUM SERPL-SCNC: 4 MMOL/L (ref 3.5–5.1)
POTASSIUM SERPL-SCNC: 4.1 MMOL/L (ref 3.5–5.1)
POTASSIUM SERPL-SCNC: 4.1 MMOL/L (ref 3.5–5.1)
POTASSIUM SERPL-SCNC: 4.3 MMOL/L (ref 3.5–5.1)
POTASSIUM SERPL-SCNC: 4.4 MMOL/L (ref 3.5–5.1)
POTASSIUM SERPL-SCNC: 4.4 MMOL/L (ref 3.5–5.1)
POTASSIUM SERPL-SCNC: 4.5 MMOL/L (ref 3.5–5.1)
POTASSIUM SERPL-SCNC: 4.7 MMOL/L (ref 3.5–5.1)
PROT SERPL-MCNC: 6.5 G/DL (ref 6–8.4)
PROT SERPL-MCNC: 6.5 G/DL (ref 6–8.4)
PROT SERPL-MCNC: 6.6 G/DL (ref 6–8.4)
PROT SERPL-MCNC: 6.7 G/DL (ref 6–8.4)
PROT SERPL-MCNC: 6.7 G/DL (ref 6–8.4)
PROT SERPL-MCNC: 6.8 G/DL (ref 6–8.4)
PROT SERPL-MCNC: 6.8 G/DL (ref 6–8.4)
PROT SERPL-MCNC: 7.5 G/DL (ref 6–8.4)
PROT SERPL-MCNC: 7.6 G/DL (ref 6–8.4)
PROT UR QL STRIP: ABNORMAL
PROT UR QL STRIP: ABNORMAL
PROT UR QL STRIP: NEGATIVE
PROT UR QL STRIP: NEGATIVE
PROTHROMBIN TIME: 11.6 SEC (ref 9–12.5)
PROTHROMBIN TIME: 11.7 SEC (ref 9–12.5)
RBC # BLD AUTO: 3.79 M/UL (ref 4–5.4)
RBC # BLD AUTO: 3.98 M/UL (ref 4–5.4)
RBC # BLD AUTO: 4.01 M/UL (ref 4–5.4)
RBC # BLD AUTO: 4.05 M/UL (ref 4–5.4)
RBC # BLD AUTO: 4.09 M/UL (ref 4–5.4)
RBC # BLD AUTO: 4.35 M/UL (ref 4–5.4)
RBC # BLD AUTO: 4.51 M/UL (ref 4–5.4)
RBC # BLD AUTO: 4.88 M/UL (ref 4–5.4)
RBC #/AREA URNS AUTO: >100 /HPF (ref 0–4)
RBC #/AREA URNS HPF: 0 /HPF (ref 0–4)
SARS-COV-2 RDRP RESP QL NAA+PROBE: NEGATIVE
SARS-COV-2 RDRP RESP QL NAA+PROBE: NEGATIVE
SARS-COV-2 RNA AMPLIFICATION, QUAL: NEGATIVE
SARS-COV-2 RNA RESP QL NAA+PROBE: NOT DETECTED
SODIUM SERPL-SCNC: 135 MMOL/L (ref 136–145)
SODIUM SERPL-SCNC: 135 MMOL/L (ref 136–145)
SODIUM SERPL-SCNC: 136 MMOL/L (ref 136–145)
SODIUM SERPL-SCNC: 137 MMOL/L (ref 136–145)
SODIUM SERPL-SCNC: 138 MMOL/L (ref 136–145)
SODIUM SERPL-SCNC: 138 MMOL/L (ref 136–145)
SODIUM SERPL-SCNC: 139 MMOL/L (ref 136–145)
SP GR UR STRIP: 1.01 (ref 1–1.03)
SP GR UR STRIP: 1.02 (ref 1–1.03)
SP GR UR STRIP: 1.02 (ref 1–1.03)
SP GR UR STRIP: >=1.03 (ref 1–1.03)
SQUAMOUS #/AREA URNS HPF: NORMAL /HPF
SUPPLEMENTAL DIAGNOSIS: NORMAL
URN SPEC COLLECT METH UR: ABNORMAL
UROBILINOGEN UR STRIP-ACNC: ABNORMAL EU/DL
UROBILINOGEN UR STRIP-ACNC: NORMAL (ref 0.1–1.1)
WBC # BLD AUTO: 12.34 K/UL (ref 3.9–12.7)
WBC # BLD AUTO: 12.5 K/UL (ref 3.9–12.7)
WBC # BLD AUTO: 12.74 K/UL (ref 3.9–12.7)
WBC # BLD AUTO: 13.79 K/UL (ref 3.9–12.7)
WBC # BLD AUTO: 14.08 K/UL (ref 3.9–12.7)
WBC # BLD AUTO: 14.8 K/UL (ref 3.9–12.7)
WBC # BLD AUTO: 6.3 K/UL (ref 3.9–12.7)
WBC # BLD AUTO: 9.85 K/UL (ref 3.9–12.7)
WBC #/AREA URNS AUTO: 4 /HPF (ref 0–5)
WBC #/AREA URNS HPF: 2 /HPF (ref 0–5)

## 2020-01-01 PROCEDURE — 25000003 PHARM REV CODE 250: Performed by: STUDENT IN AN ORGANIZED HEALTH CARE EDUCATION/TRAINING PROGRAM

## 2020-01-01 PROCEDURE — 85025 COMPLETE CBC W/AUTO DIFF WBC: CPT

## 2020-01-01 PROCEDURE — 99233 SBSQ HOSP IP/OBS HIGH 50: CPT | Mod: ,,, | Performed by: OBSTETRICS & GYNECOLOGY

## 2020-01-01 PROCEDURE — 99213 OFFICE O/P EST LOW 20 MIN: CPT | Mod: 95,,, | Performed by: EMERGENCY MEDICINE

## 2020-01-01 PROCEDURE — 63600175 PHARM REV CODE 636 W HCPCS: Performed by: STUDENT IN AN ORGANIZED HEALTH CARE EDUCATION/TRAINING PROGRAM

## 2020-01-01 PROCEDURE — 80053 COMPREHEN METABOLIC PANEL: CPT

## 2020-01-01 PROCEDURE — 99233 PR SUBSEQUENT HOSPITAL CARE,LEVL III: ICD-10-PCS | Mod: ,,, | Performed by: OBSTETRICS & GYNECOLOGY

## 2020-01-01 PROCEDURE — 63600175 PHARM REV CODE 636 W HCPCS: Performed by: PHYSICIAN ASSISTANT

## 2020-01-01 PROCEDURE — 63600175 PHARM REV CODE 636 W HCPCS: Performed by: INTERNAL MEDICINE

## 2020-01-01 PROCEDURE — 83690 ASSAY OF LIPASE: CPT

## 2020-01-01 PROCEDURE — U0002 COVID-19 LAB TEST NON-CDC: HCPCS

## 2020-01-01 PROCEDURE — 36415 COLL VENOUS BLD VENIPUNCTURE: CPT

## 2020-01-01 PROCEDURE — 96523 IRRIG DRUG DELIVERY DEVICE: CPT

## 2020-01-01 PROCEDURE — 25500020 PHARM REV CODE 255: Performed by: OBSTETRICS & GYNECOLOGY

## 2020-01-01 PROCEDURE — 96365 THER/PROPH/DIAG IV INF INIT: CPT

## 2020-01-01 PROCEDURE — 97161 PT EVAL LOW COMPLEX 20 MIN: CPT

## 2020-01-01 PROCEDURE — 99233 SBSQ HOSP IP/OBS HIGH 50: CPT | Mod: ,,, | Performed by: EMERGENCY MEDICINE

## 2020-01-01 PROCEDURE — 99900035 HC TECH TIME PER 15 MIN (STAT)

## 2020-01-01 PROCEDURE — 96367 TX/PROPH/DG ADDL SEQ IV INF: CPT

## 2020-01-01 PROCEDURE — 99222 1ST HOSP IP/OBS MODERATE 55: CPT | Mod: ,,, | Performed by: INTERNAL MEDICINE

## 2020-01-01 PROCEDURE — 99223 1ST HOSP IP/OBS HIGH 75: CPT | Mod: ,,, | Performed by: OBSTETRICS & GYNECOLOGY

## 2020-01-01 PROCEDURE — 3079F DIAST BP 80-89 MM HG: CPT | Mod: CPTII,S$GLB,, | Performed by: OBSTETRICS & GYNECOLOGY

## 2020-01-01 PROCEDURE — 63600175 PHARM REV CODE 636 W HCPCS: Performed by: EMERGENCY MEDICINE

## 2020-01-01 PROCEDURE — 86304 IMMUNOASSAY TUMOR CA 125: CPT

## 2020-01-01 PROCEDURE — 94770 HC EXHALED C02 TEST: CPT

## 2020-01-01 PROCEDURE — 99231 SBSQ HOSP IP/OBS SF/LOW 25: CPT | Mod: ,,, | Performed by: INTERNAL MEDICINE

## 2020-01-01 PROCEDURE — 82962 GLUCOSE BLOOD TEST: CPT | Mod: 59

## 2020-01-01 PROCEDURE — 88305 TISSUE EXAM BY PATHOLOGIST: ICD-10-PCS | Mod: 26,,, | Performed by: PATHOLOGY

## 2020-01-01 PROCEDURE — 88333 PR  INTRAOPERATIVE CYTO PATH CONSULT, INITIAL SITE: ICD-10-PCS | Mod: 26,,, | Performed by: PATHOLOGY

## 2020-01-01 PROCEDURE — 96376 TX/PRO/DX INJ SAME DRUG ADON: CPT

## 2020-01-01 PROCEDURE — 97110 THERAPEUTIC EXERCISES: CPT

## 2020-01-01 PROCEDURE — U0003 INFECTIOUS AGENT DETECTION BY NUCLEIC ACID (DNA OR RNA); SEVERE ACUTE RESPIRATORY SYNDROME CORONAVIRUS 2 (SARS-COV-2) (CORONAVIRUS DISEASE [COVID-19]), AMPLIFIED PROBE TECHNIQUE, MAKING USE OF HIGH THROUGHPUT TECHNOLOGIES AS DESCRIBED BY CMS-2020-01-R: HCPCS

## 2020-01-01 PROCEDURE — 93005 ELECTROCARDIOGRAM TRACING: CPT

## 2020-01-01 PROCEDURE — 71260 CT THORAX DX C+: CPT | Mod: 26,,, | Performed by: RADIOLOGY

## 2020-01-01 PROCEDURE — 99214 OFFICE O/P EST MOD 30 MIN: CPT | Mod: 95,,, | Performed by: INTERNAL MEDICINE

## 2020-01-01 PROCEDURE — 99223 PR INITIAL HOSPITAL CARE,LEVL III: ICD-10-PCS | Mod: ,,, | Performed by: OBSTETRICS & GYNECOLOGY

## 2020-01-01 PROCEDURE — 88341 PR IHC OR ICC EACH ADD'L SINGLE ANTIBODY  STAINPR: ICD-10-PCS | Mod: 26,,, | Performed by: PATHOLOGY

## 2020-01-01 PROCEDURE — 99222 PR INITIAL HOSPITAL CARE,LEVL II: ICD-10-PCS | Mod: ,,, | Performed by: INTERNAL MEDICINE

## 2020-01-01 PROCEDURE — 99214 OFFICE O/P EST MOD 30 MIN: CPT | Mod: S$GLB,,, | Performed by: OBSTETRICS & GYNECOLOGY

## 2020-01-01 PROCEDURE — 84100 ASSAY OF PHOSPHORUS: CPT

## 2020-01-01 PROCEDURE — 99233 PR SUBSEQUENT HOSPITAL CARE,LEVL III: ICD-10-PCS | Mod: ,,, | Performed by: EMERGENCY MEDICINE

## 2020-01-01 PROCEDURE — 25000003 PHARM REV CODE 250: Performed by: EMERGENCY MEDICINE

## 2020-01-01 PROCEDURE — 96375 TX/PRO/DX INJ NEW DRUG ADDON: CPT

## 2020-01-01 PROCEDURE — 20600001 HC STEP DOWN PRIVATE ROOM

## 2020-01-01 PROCEDURE — 81003 URINALYSIS AUTO W/O SCOPE: CPT | Mod: QW,S$GLB,, | Performed by: NURSE PRACTITIONER

## 2020-01-01 PROCEDURE — 97116 GAIT TRAINING THERAPY: CPT

## 2020-01-01 PROCEDURE — 85730 THROMBOPLASTIN TIME PARTIAL: CPT

## 2020-01-01 PROCEDURE — 71260 CT THORAX DX C+: CPT | Mod: TC

## 2020-01-01 PROCEDURE — 83735 ASSAY OF MAGNESIUM: CPT

## 2020-01-01 PROCEDURE — A4216 STERILE WATER/SALINE, 10 ML: HCPCS | Performed by: INTERNAL MEDICINE

## 2020-01-01 PROCEDURE — 3077F SYST BP >= 140 MM HG: CPT | Mod: CPTII,S$GLB,, | Performed by: OBSTETRICS & GYNECOLOGY

## 2020-01-01 PROCEDURE — 99999 PR PBB SHADOW E&M-EST. PATIENT-LVL II: CPT | Mod: PBBFAC,,, | Performed by: OBSTETRICS & GYNECOLOGY

## 2020-01-01 PROCEDURE — 88342 IMHCHEM/IMCYTCHM 1ST ANTB: CPT | Performed by: PATHOLOGY

## 2020-01-01 PROCEDURE — 88305 TISSUE EXAM BY PATHOLOGIST: CPT | Performed by: PATHOLOGY

## 2020-01-01 PROCEDURE — 93010 ELECTROCARDIOGRAM REPORT: CPT | Mod: ,,, | Performed by: INTERNAL MEDICINE

## 2020-01-01 PROCEDURE — 99243 PR OFFICE CONSULTATION,LEVEL III: ICD-10-PCS | Mod: S$GLB,,, | Performed by: RADIOLOGY

## 2020-01-01 PROCEDURE — 81003 POCT URINALYSIS, DIPSTICK, AUTOMATED, W/O SCOPE: ICD-10-PCS | Mod: QW,S$GLB,, | Performed by: NURSE PRACTITIONER

## 2020-01-01 PROCEDURE — 88342 IMHCHEM/IMCYTCHM 1ST ANTB: CPT | Mod: 26,,, | Performed by: PATHOLOGY

## 2020-01-01 PROCEDURE — 87088 URINE BACTERIA CULTURE: CPT

## 2020-01-01 PROCEDURE — 99214 OFFICE O/P EST MOD 30 MIN: CPT | Mod: 25,S$GLB,, | Performed by: NURSE PRACTITIONER

## 2020-01-01 PROCEDURE — 88305 TISSUE EXAM BY PATHOLOGIST: CPT | Mod: 26,,, | Performed by: PATHOLOGY

## 2020-01-01 PROCEDURE — 99999 PR PBB SHADOW E&M-EST. PATIENT-LVL III: ICD-10-PCS | Mod: PBBFAC,,, | Performed by: OBSTETRICS & GYNECOLOGY

## 2020-01-01 PROCEDURE — 99214 PR OFFICE/OUTPT VISIT, EST, LEVL IV, 30-39 MIN: ICD-10-PCS | Mod: 25,S$GLB,, | Performed by: NURSE PRACTITIONER

## 2020-01-01 PROCEDURE — 81001 URINALYSIS AUTO W/SCOPE: CPT

## 2020-01-01 PROCEDURE — 96374 THER/PROPH/DIAG INJ IV PUSH: CPT | Mod: 59

## 2020-01-01 PROCEDURE — 94761 N-INVAS EAR/PLS OXIMETRY MLT: CPT

## 2020-01-01 PROCEDURE — 25000003 PHARM REV CODE 250: Performed by: INTERNAL MEDICINE

## 2020-01-01 PROCEDURE — 99285 EMERGENCY DEPT VISIT HI MDM: CPT | Mod: 25

## 2020-01-01 PROCEDURE — 97530 THERAPEUTIC ACTIVITIES: CPT | Mod: CQ

## 2020-01-01 PROCEDURE — 3079F PR MOST RECENT DIASTOLIC BLOOD PRESSURE 80-89 MM HG: ICD-10-PCS | Mod: CPTII,S$GLB,, | Performed by: OBSTETRICS & GYNECOLOGY

## 2020-01-01 PROCEDURE — 99285 EMERGENCY DEPT VISIT HI MDM: CPT | Mod: GV,,, | Performed by: PHYSICIAN ASSISTANT

## 2020-01-01 PROCEDURE — 63600175 PHARM REV CODE 636 W HCPCS: Performed by: OBSTETRICS & GYNECOLOGY

## 2020-01-01 PROCEDURE — 99213 PR OFFICE/OUTPT VISIT, EST, LEVL III, 20-29 MIN: ICD-10-PCS | Mod: 95,,, | Performed by: EMERGENCY MEDICINE

## 2020-01-01 PROCEDURE — 99497 PR ADVNCD CARE PLAN 30 MIN: ICD-10-PCS | Mod: ,,, | Performed by: EMERGENCY MEDICINE

## 2020-01-01 PROCEDURE — 96366 THER/PROPH/DIAG IV INF ADDON: CPT

## 2020-01-01 PROCEDURE — G0180 MD CERTIFICATION HHA PATIENT: HCPCS | Mod: ,,, | Performed by: OBSTETRICS & GYNECOLOGY

## 2020-01-01 PROCEDURE — 87086 URINE CULTURE/COLONY COUNT: CPT

## 2020-01-01 PROCEDURE — 81003 URINALYSIS AUTO W/O SCOPE: CPT

## 2020-01-01 PROCEDURE — 88342 CHG IMMUNOCYTOCHEMISTRY: ICD-10-PCS | Mod: 26,,, | Performed by: PATHOLOGY

## 2020-01-01 PROCEDURE — 99214 PR OFFICE/OUTPT VISIT, EST, LEVL IV, 30-39 MIN: ICD-10-PCS | Mod: S$GLB,,, | Performed by: OBSTETRICS & GYNECOLOGY

## 2020-01-01 PROCEDURE — 96361 HYDRATE IV INFUSION ADD-ON: CPT

## 2020-01-01 PROCEDURE — 99215 OFFICE O/P EST HI 40 MIN: CPT | Mod: 95,,, | Performed by: EMERGENCY MEDICINE

## 2020-01-01 PROCEDURE — 82962 GLUCOSE BLOOD TEST: CPT

## 2020-01-01 PROCEDURE — 85384 FIBRINOGEN ACTIVITY: CPT

## 2020-01-01 PROCEDURE — 93010 EKG 12-LEAD: ICD-10-PCS | Mod: ,,, | Performed by: INTERNAL MEDICINE

## 2020-01-01 PROCEDURE — 99285 PR EMERGENCY DEPT VISIT,LEVEL V: ICD-10-PCS | Mod: GV,,, | Performed by: PHYSICIAN ASSISTANT

## 2020-01-01 PROCEDURE — 3008F BODY MASS INDEX DOCD: CPT | Mod: CPTII,S$GLB,, | Performed by: OBSTETRICS & GYNECOLOGY

## 2020-01-01 PROCEDURE — 25500020 PHARM REV CODE 255: Performed by: PHYSICIAN ASSISTANT

## 2020-01-01 PROCEDURE — 83036 HEMOGLOBIN GLYCOSYLATED A1C: CPT

## 2020-01-01 PROCEDURE — 36591 DRAW BLOOD OFF VENOUS DEVICE: CPT

## 2020-01-01 PROCEDURE — 85610 PROTHROMBIN TIME: CPT

## 2020-01-01 PROCEDURE — 3077F PR MOST RECENT SYSTOLIC BLOOD PRESSURE >= 140 MM HG: ICD-10-PCS | Mod: CPTII,S$GLB,, | Performed by: OBSTETRICS & GYNECOLOGY

## 2020-01-01 PROCEDURE — 99999 PR PBB SHADOW E&M-EST. PATIENT-LVL III: CPT | Mod: PBBFAC,,, | Performed by: OBSTETRICS & GYNECOLOGY

## 2020-01-01 PROCEDURE — 99999 PR PBB SHADOW E&M-EST. PATIENT-LVL II: ICD-10-PCS | Mod: PBBFAC,,, | Performed by: OBSTETRICS & GYNECOLOGY

## 2020-01-01 PROCEDURE — 99231 PR SUBSEQUENT HOSPITAL CARE,LEVL I: ICD-10-PCS | Mod: ,,, | Performed by: INTERNAL MEDICINE

## 2020-01-01 PROCEDURE — 25000003 PHARM REV CODE 250: Performed by: PHYSICIAN ASSISTANT

## 2020-01-01 PROCEDURE — 63600175 PHARM REV CODE 636 W HCPCS: Performed by: RADIOLOGY

## 2020-01-01 PROCEDURE — 99999 PR PBB SHADOW E&M-EST. PATIENT-LVL III: CPT | Mod: PBBFAC,,,

## 2020-01-01 PROCEDURE — 3008F PR BODY MASS INDEX (BMI) DOCUMENTED: ICD-10-PCS | Mod: CPTII,S$GLB,, | Performed by: OBSTETRICS & GYNECOLOGY

## 2020-01-01 PROCEDURE — 71260 CT CHEST WITH CONTRAST: ICD-10-PCS | Mod: 26,,, | Performed by: RADIOLOGY

## 2020-01-01 PROCEDURE — 99215 PR OFFICE/OUTPT VISIT, EST, LEVL V, 40-54 MIN: ICD-10-PCS | Mod: 95,,, | Performed by: EMERGENCY MEDICINE

## 2020-01-01 PROCEDURE — 88341 IMHCHEM/IMCYTCHM EA ADD ANTB: CPT | Mod: 26,,, | Performed by: PATHOLOGY

## 2020-01-01 PROCEDURE — 83605 ASSAY OF LACTIC ACID: CPT

## 2020-01-01 PROCEDURE — 85018 HEMOGLOBIN: CPT

## 2020-01-01 PROCEDURE — 25500020 PHARM REV CODE 255: Performed by: EMERGENCY MEDICINE

## 2020-01-01 PROCEDURE — 97116 GAIT TRAINING THERAPY: CPT | Mod: CQ

## 2020-01-01 PROCEDURE — 99243 OFF/OP CNSLTJ NEW/EST LOW 30: CPT | Mod: S$GLB,,, | Performed by: RADIOLOGY

## 2020-01-01 PROCEDURE — 99214 PR OFFICE/OUTPT VISIT, EST, LEVL IV, 30-39 MIN: ICD-10-PCS | Mod: 95,,, | Performed by: INTERNAL MEDICINE

## 2020-01-01 PROCEDURE — G0180 PR HOME HEALTH MD CERTIFICATION: ICD-10-PCS | Mod: ,,, | Performed by: OBSTETRICS & GYNECOLOGY

## 2020-01-01 PROCEDURE — 25000003 PHARM REV CODE 250: Performed by: OBSTETRICS & GYNECOLOGY

## 2020-01-01 PROCEDURE — 99497 ADVNCD CARE PLAN 30 MIN: CPT | Mod: ,,, | Performed by: EMERGENCY MEDICINE

## 2020-01-01 PROCEDURE — 88341 IMHCHEM/IMCYTCHM EA ADD ANTB: CPT | Performed by: PATHOLOGY

## 2020-01-01 PROCEDURE — 81000 URINALYSIS NONAUTO W/SCOPE: CPT

## 2020-01-01 PROCEDURE — 87186 SC STD MICRODIL/AGAR DIL: CPT

## 2020-01-01 PROCEDURE — 88333 PATH CONSLTJ SURG CYTO XM 1: CPT | Mod: 26,,, | Performed by: PATHOLOGY

## 2020-01-01 PROCEDURE — 88333 PATH CONSLTJ SURG CYTO XM 1: CPT | Performed by: PATHOLOGY

## 2020-01-01 PROCEDURE — 99999 PR PBB SHADOW E&M-EST. PATIENT-LVL III: ICD-10-PCS | Mod: PBBFAC,,,

## 2020-01-01 PROCEDURE — 97530 THERAPEUTIC ACTIVITIES: CPT

## 2020-01-01 PROCEDURE — C9113 INJ PANTOPRAZOLE SODIUM, VIA: HCPCS | Performed by: STUDENT IN AN ORGANIZED HEALTH CARE EDUCATION/TRAINING PROGRAM

## 2020-01-01 PROCEDURE — S0028 INJECTION, FAMOTIDINE, 20 MG: HCPCS | Performed by: OBSTETRICS & GYNECOLOGY

## 2020-01-01 PROCEDURE — 87077 CULTURE AEROBIC IDENTIFY: CPT

## 2020-01-01 RX ORDER — PROCHLORPERAZINE MALEATE 10 MG
10 TABLET ORAL EVERY 6 HOURS
Qty: 120 TABLET | Refills: 3 | Status: SHIPPED | OUTPATIENT
Start: 2020-01-01 | End: 2020-08-29

## 2020-01-01 RX ORDER — SODIUM CHLORIDE 0.9 % (FLUSH) 0.9 %
10 SYRINGE (ML) INJECTION
Status: CANCELLED | OUTPATIENT
Start: 2020-01-01

## 2020-01-01 RX ORDER — SIMETHICONE 80 MG
1 TABLET,CHEWABLE ORAL 3 TIMES DAILY PRN
Status: DISCONTINUED | OUTPATIENT
Start: 2020-01-01 | End: 2020-01-01 | Stop reason: HOSPADM

## 2020-01-01 RX ORDER — MIDAZOLAM HYDROCHLORIDE 1 MG/ML
1 INJECTION INTRAMUSCULAR; INTRAVENOUS
Status: CANCELLED | OUTPATIENT
Start: 2020-01-01

## 2020-01-01 RX ORDER — DOCUSATE SODIUM 100 MG/1
200 CAPSULE, LIQUID FILLED ORAL 2 TIMES DAILY
Qty: 60 CAPSULE | Refills: 3 | Status: SHIPPED | OUTPATIENT
Start: 2020-01-01

## 2020-01-01 RX ORDER — OXYCODONE AND ACETAMINOPHEN 10; 325 MG/1; MG/1
1 TABLET ORAL EVERY 6 HOURS PRN
Status: DISCONTINUED | OUTPATIENT
Start: 2020-01-01 | End: 2020-01-01

## 2020-01-01 RX ORDER — SODIUM CHLORIDE 0.9 % (FLUSH) 0.9 %
10 SYRINGE (ML) INJECTION
Status: DISCONTINUED | OUTPATIENT
Start: 2020-01-01 | End: 2020-01-01 | Stop reason: HOSPADM

## 2020-01-01 RX ORDER — HYDROMORPHONE HCL IN 0.9% NACL 6 MG/30 ML
PATIENT CONTROLLED ANALGESIA SYRINGE INTRAVENOUS CONTINUOUS
Status: DISCONTINUED | OUTPATIENT
Start: 2020-01-01 | End: 2020-01-01

## 2020-01-01 RX ORDER — OXYCODONE HCL 20 MG/1
20 TABLET, FILM COATED, EXTENDED RELEASE ORAL EVERY 12 HOURS
Status: DISCONTINUED | OUTPATIENT
Start: 2020-01-01 | End: 2020-01-01 | Stop reason: HOSPADM

## 2020-01-01 RX ORDER — MORPHINE SULFATE 10 MG/ML
8 INJECTION INTRAMUSCULAR; INTRAVENOUS; SUBCUTANEOUS
Status: COMPLETED | OUTPATIENT
Start: 2020-01-01 | End: 2020-01-01

## 2020-01-01 RX ORDER — MAGNESIUM SULFATE HEPTAHYDRATE 40 MG/ML
2 INJECTION, SOLUTION INTRAVENOUS
Status: COMPLETED | OUTPATIENT
Start: 2020-01-01 | End: 2020-01-01

## 2020-01-01 RX ORDER — INSULIN ASPART 100 [IU]/ML
0-5 INJECTION, SOLUTION INTRAVENOUS; SUBCUTANEOUS
Status: DISCONTINUED | OUTPATIENT
Start: 2020-01-01 | End: 2020-01-01

## 2020-01-01 RX ORDER — SULFAMETHOXAZOLE AND TRIMETHOPRIM 800; 160 MG/1; MG/1
1 TABLET ORAL 2 TIMES DAILY
Qty: 14 TABLET | Refills: 0 | Status: SHIPPED | OUTPATIENT
Start: 2020-01-01 | End: 2020-01-01

## 2020-01-01 RX ORDER — PROCHLORPERAZINE MALEATE 5 MG
10 TABLET ORAL EVERY 6 HOURS
Status: DISCONTINUED | OUTPATIENT
Start: 2020-01-01 | End: 2020-01-01 | Stop reason: HOSPADM

## 2020-01-01 RX ORDER — IBUPROFEN 200 MG
24 TABLET ORAL
Status: DISCONTINUED | OUTPATIENT
Start: 2020-01-01 | End: 2020-01-01

## 2020-01-01 RX ORDER — HEPARIN 100 UNIT/ML
500 SYRINGE INTRAVENOUS
Status: COMPLETED | OUTPATIENT
Start: 2020-01-01 | End: 2020-01-01

## 2020-01-01 RX ORDER — OXYCODONE AND ACETAMINOPHEN 5; 325 MG/1; MG/1
1 TABLET ORAL
Status: COMPLETED | OUTPATIENT
Start: 2020-01-01 | End: 2020-01-01

## 2020-01-01 RX ORDER — HEPARIN 100 UNIT/ML
100 SYRINGE INTRAVENOUS ONCE
Status: COMPLETED | OUTPATIENT
Start: 2020-01-01 | End: 2020-01-01

## 2020-01-01 RX ORDER — DOCUSATE SODIUM 100 MG/1
200 CAPSULE, LIQUID FILLED ORAL 2 TIMES DAILY
Status: DISCONTINUED | OUTPATIENT
Start: 2020-01-01 | End: 2020-01-01 | Stop reason: HOSPADM

## 2020-01-01 RX ORDER — OXYCODONE AND ACETAMINOPHEN 10; 325 MG/1; MG/1
1 TABLET ORAL EVERY 6 HOURS PRN
Qty: 60 TABLET | Refills: 0 | Status: ON HOLD | OUTPATIENT
Start: 2020-01-01 | End: 2020-01-01 | Stop reason: HOSPADM

## 2020-01-01 RX ORDER — HYDROMORPHONE HYDROCHLORIDE 1 MG/ML
1 INJECTION, SOLUTION INTRAMUSCULAR; INTRAVENOUS; SUBCUTANEOUS
Status: DISCONTINUED | OUTPATIENT
Start: 2020-01-01 | End: 2020-01-01

## 2020-01-01 RX ORDER — ONDANSETRON 2 MG/ML
4 INJECTION INTRAMUSCULAR; INTRAVENOUS
Status: COMPLETED | OUTPATIENT
Start: 2020-01-01 | End: 2020-01-01

## 2020-01-01 RX ORDER — MORPHINE SULFATE 4 MG/ML
8 INJECTION, SOLUTION INTRAMUSCULAR; INTRAVENOUS
Status: COMPLETED | OUTPATIENT
Start: 2020-01-01 | End: 2020-01-01

## 2020-01-01 RX ORDER — OXYCODONE HYDROCHLORIDE 5 MG/1
5 TABLET ORAL
Qty: 60 TABLET | Refills: 0 | Status: SHIPPED | OUTPATIENT
Start: 2020-01-01 | End: 2020-01-01 | Stop reason: SDUPTHER

## 2020-01-01 RX ORDER — ENOXAPARIN SODIUM 100 MG/ML
40 INJECTION SUBCUTANEOUS EVERY 24 HOURS
Status: COMPLETED | OUTPATIENT
Start: 2020-01-01 | End: 2020-01-01

## 2020-01-01 RX ORDER — PROCHLORPERAZINE EDISYLATE 5 MG/ML
10 INJECTION INTRAMUSCULAR; INTRAVENOUS ONCE
Status: COMPLETED | OUTPATIENT
Start: 2020-01-01 | End: 2020-01-01

## 2020-01-01 RX ORDER — AMLODIPINE BESYLATE 10 MG/1
10 TABLET ORAL DAILY
Status: DISCONTINUED | OUTPATIENT
Start: 2020-01-01 | End: 2020-01-01 | Stop reason: HOSPADM

## 2020-01-01 RX ORDER — SODIUM CHLORIDE 0.9 % (FLUSH) 0.9 %
10 SYRINGE (ML) INJECTION
Status: COMPLETED | OUTPATIENT
Start: 2020-01-01 | End: 2020-01-01

## 2020-01-01 RX ORDER — OXYCODONE AND ACETAMINOPHEN 5; 325 MG/1; MG/1
1 TABLET ORAL EVERY 4 HOURS PRN
Qty: 30 TABLET | Refills: 0 | OUTPATIENT
Start: 2020-01-01

## 2020-01-01 RX ORDER — IBUPROFEN 200 MG
24 TABLET ORAL
Status: DISCONTINUED | OUTPATIENT
Start: 2020-01-01 | End: 2020-01-01 | Stop reason: HOSPADM

## 2020-01-01 RX ORDER — GLUCAGON 1 MG
1 KIT INJECTION
Status: DISCONTINUED | OUTPATIENT
Start: 2020-01-01 | End: 2020-01-01

## 2020-01-01 RX ORDER — TIMOLOL MALEATE 5 MG/ML
1 SOLUTION/ DROPS OPHTHALMIC 2 TIMES DAILY
Status: DISCONTINUED | OUTPATIENT
Start: 2020-01-01 | End: 2020-01-01 | Stop reason: HOSPADM

## 2020-01-01 RX ORDER — ONDANSETRON 8 MG/1
8 TABLET, ORALLY DISINTEGRATING ORAL EVERY 6 HOURS PRN
Status: DISCONTINUED | OUTPATIENT
Start: 2020-01-01 | End: 2020-01-01 | Stop reason: HOSPADM

## 2020-01-01 RX ORDER — ONDANSETRON 2 MG/ML
8 INJECTION INTRAMUSCULAR; INTRAVENOUS
Status: COMPLETED | OUTPATIENT
Start: 2020-01-01 | End: 2020-01-01

## 2020-01-01 RX ORDER — HEPARIN 100 UNIT/ML
500 SYRINGE INTRAVENOUS
Status: DISCONTINUED | OUTPATIENT
Start: 2020-01-01 | End: 2020-01-01 | Stop reason: HOSPADM

## 2020-01-01 RX ORDER — SODIUM CHLORIDE, SODIUM LACTATE, POTASSIUM CHLORIDE, CALCIUM CHLORIDE 600; 310; 30; 20 MG/100ML; MG/100ML; MG/100ML; MG/100ML
INJECTION, SOLUTION INTRAVENOUS CONTINUOUS
Status: DISCONTINUED | OUTPATIENT
Start: 2020-01-01 | End: 2020-01-01

## 2020-01-01 RX ORDER — SODIUM CHLORIDE 0.9 % (FLUSH) 0.9 %
10 SYRINGE (ML) INJECTION
Status: DISCONTINUED | OUTPATIENT
Start: 2020-01-01 | End: 2020-01-01

## 2020-01-01 RX ORDER — HEPARIN 100 UNIT/ML
500 SYRINGE INTRAVENOUS
Status: CANCELLED | OUTPATIENT
Start: 2020-01-01

## 2020-01-01 RX ORDER — EPINEPHRINE 0.3 MG/.3ML
0.3 INJECTION SUBCUTANEOUS ONCE AS NEEDED
Status: CANCELLED | OUTPATIENT
Start: 2020-01-01

## 2020-01-01 RX ORDER — IBUPROFEN 200 MG
16 TABLET ORAL
Status: DISCONTINUED | OUTPATIENT
Start: 2020-01-01 | End: 2020-01-01

## 2020-01-01 RX ORDER — MECLIZINE HYDROCHLORIDE 25 MG/1
25 TABLET ORAL 3 TIMES DAILY PRN
Status: DISCONTINUED | OUTPATIENT
Start: 2020-01-01 | End: 2020-01-01 | Stop reason: HOSPADM

## 2020-01-01 RX ORDER — DIPHENHYDRAMINE HYDROCHLORIDE 50 MG/ML
50 INJECTION INTRAMUSCULAR; INTRAVENOUS ONCE AS NEEDED
Status: CANCELLED | OUTPATIENT
Start: 2020-01-01

## 2020-01-01 RX ORDER — MORPHINE SULFATE 10 MG/ML
4 INJECTION INTRAMUSCULAR; INTRAVENOUS; SUBCUTANEOUS
Status: COMPLETED | OUTPATIENT
Start: 2020-01-01 | End: 2020-01-01

## 2020-01-01 RX ORDER — FAMOTIDINE 10 MG/ML
20 INJECTION INTRAVENOUS
Status: COMPLETED | OUTPATIENT
Start: 2020-01-01 | End: 2020-01-01

## 2020-01-01 RX ORDER — AMLODIPINE BESYLATE 10 MG/1
10 TABLET ORAL DAILY
Qty: 30 TABLET | Refills: 11 | Status: SHIPPED | OUTPATIENT
Start: 2020-01-01 | End: 2021-05-02

## 2020-01-01 RX ORDER — INSULIN ASPART 100 [IU]/ML
1-10 INJECTION, SOLUTION INTRAVENOUS; SUBCUTANEOUS
Status: DISCONTINUED | OUTPATIENT
Start: 2020-01-01 | End: 2020-01-01 | Stop reason: HOSPADM

## 2020-01-01 RX ORDER — AMLODIPINE BESYLATE 5 MG/1
5 TABLET ORAL DAILY
Status: DISCONTINUED | OUTPATIENT
Start: 2020-01-01 | End: 2020-01-01

## 2020-01-01 RX ORDER — DICYCLOMINE HYDROCHLORIDE 20 MG/1
20 TABLET ORAL 2 TIMES DAILY
Status: DISCONTINUED | OUTPATIENT
Start: 2020-01-01 | End: 2020-01-01 | Stop reason: HOSPADM

## 2020-01-01 RX ORDER — MAGNESIUM SULFATE 1 G/100ML
1 INJECTION INTRAVENOUS
Status: COMPLETED | OUTPATIENT
Start: 2020-01-01 | End: 2020-01-01

## 2020-01-01 RX ORDER — FAMOTIDINE 10 MG/ML
20 INJECTION INTRAVENOUS
Status: CANCELLED | OUTPATIENT
Start: 2020-01-01

## 2020-01-01 RX ORDER — OXYCODONE HYDROCHLORIDE 5 MG/1
5 TABLET ORAL
Qty: 60 TABLET | Refills: 0 | Status: SHIPPED | OUTPATIENT
Start: 2020-01-01

## 2020-01-01 RX ORDER — SODIUM CITRATE AND CITRIC ACID MONOHYDRATE 334; 500 MG/5ML; MG/5ML
30 SOLUTION ORAL ONCE AS NEEDED
Status: COMPLETED | OUTPATIENT
Start: 2020-01-01 | End: 2020-01-01

## 2020-01-01 RX ORDER — BISOPROLOL FUMARATE 5 MG/1
10 TABLET, FILM COATED ORAL DAILY
Status: DISCONTINUED | OUTPATIENT
Start: 2020-01-01 | End: 2020-01-01 | Stop reason: HOSPADM

## 2020-01-01 RX ORDER — POLYETHYLENE GLYCOL 3350 17 G/17G
17 POWDER, FOR SOLUTION ORAL DAILY
Qty: 30 EACH | Refills: 3 | Status: SHIPPED | OUTPATIENT
Start: 2020-01-01

## 2020-01-01 RX ORDER — ENOXAPARIN SODIUM 100 MG/ML
40 INJECTION SUBCUTANEOUS EVERY 24 HOURS
Status: DISCONTINUED | OUTPATIENT
Start: 2020-01-01 | End: 2020-01-01 | Stop reason: HOSPADM

## 2020-01-01 RX ORDER — FENTANYL CITRATE 50 UG/ML
50 INJECTION, SOLUTION INTRAMUSCULAR; INTRAVENOUS
Status: CANCELLED | OUTPATIENT
Start: 2020-01-01

## 2020-01-01 RX ORDER — OXYCODONE HYDROCHLORIDE 5 MG/1
5 TABLET ORAL
Status: DISCONTINUED | OUTPATIENT
Start: 2020-01-01 | End: 2020-01-01 | Stop reason: HOSPADM

## 2020-01-01 RX ORDER — IBUPROFEN 200 MG
16 TABLET ORAL
Status: DISCONTINUED | OUTPATIENT
Start: 2020-01-01 | End: 2020-01-01 | Stop reason: HOSPADM

## 2020-01-01 RX ORDER — GLUCAGON 1 MG
1 KIT INJECTION
Status: DISCONTINUED | OUTPATIENT
Start: 2020-01-01 | End: 2020-01-01 | Stop reason: HOSPADM

## 2020-01-01 RX ORDER — ONDANSETRON 8 MG/1
8 TABLET, ORALLY DISINTEGRATING ORAL EVERY 8 HOURS PRN
Status: DISCONTINUED | OUTPATIENT
Start: 2020-01-01 | End: 2020-01-01

## 2020-01-01 RX ORDER — PROMETHAZINE HYDROCHLORIDE 25 MG/1
25 SUPPOSITORY RECTAL EVERY 6 HOURS PRN
Qty: 12 SUPPOSITORY | Refills: 0 | Status: SHIPPED | OUTPATIENT
Start: 2020-01-01

## 2020-01-01 RX ORDER — PROCHLORPERAZINE MALEATE 5 MG
10 TABLET ORAL 4 TIMES DAILY
Status: DISCONTINUED | OUTPATIENT
Start: 2020-01-01 | End: 2020-01-01

## 2020-01-01 RX ORDER — EPINEPHRINE 0.3 MG/.3ML
0.3 INJECTION SUBCUTANEOUS ONCE AS NEEDED
Status: DISCONTINUED | OUTPATIENT
Start: 2020-01-01 | End: 2020-01-01 | Stop reason: HOSPADM

## 2020-01-01 RX ORDER — SULFAMETHOXAZOLE AND TRIMETHOPRIM 800; 160 MG/1; MG/1
1 TABLET ORAL 2 TIMES DAILY
Qty: 14 TABLET | Refills: 0 | Status: SHIPPED | OUTPATIENT
Start: 2020-01-01

## 2020-01-01 RX ORDER — ONDANSETRON 2 MG/ML
8 INJECTION INTRAMUSCULAR; INTRAVENOUS
Status: CANCELLED
Start: 2020-01-01

## 2020-01-01 RX ORDER — PANTOPRAZOLE SODIUM 40 MG/10ML
40 INJECTION, POWDER, LYOPHILIZED, FOR SOLUTION INTRAVENOUS ONCE
Status: COMPLETED | OUTPATIENT
Start: 2020-01-01 | End: 2020-01-01

## 2020-01-01 RX ORDER — SITAGLIPTIN 100 MG/1
TABLET, FILM COATED ORAL
COMMUNITY
Start: 2019-01-01

## 2020-01-01 RX ORDER — OXYCODONE AND ACETAMINOPHEN 5; 325 MG/1; MG/1
1 TABLET ORAL EVERY 4 HOURS PRN
Qty: 30 TABLET | Refills: 0 | Status: SHIPPED | OUTPATIENT
Start: 2020-01-01 | End: 2020-01-01

## 2020-01-01 RX ORDER — DIPHENHYDRAMINE HYDROCHLORIDE 50 MG/ML
50 INJECTION INTRAMUSCULAR; INTRAVENOUS ONCE AS NEEDED
Status: DISCONTINUED | OUTPATIENT
Start: 2020-01-01 | End: 2020-01-01 | Stop reason: HOSPADM

## 2020-01-01 RX ORDER — OXYCODONE AND ACETAMINOPHEN 5; 325 MG/1; MG/1
1 TABLET ORAL
Status: DISCONTINUED | OUTPATIENT
Start: 2020-01-01 | End: 2020-01-01

## 2020-01-01 RX ORDER — MIDAZOLAM HYDROCHLORIDE 1 MG/ML
INJECTION INTRAMUSCULAR; INTRAVENOUS CODE/TRAUMA/SEDATION MEDICATION
Status: COMPLETED | OUTPATIENT
Start: 2020-01-01 | End: 2020-01-01

## 2020-01-01 RX ORDER — OXYCODONE HCL 20 MG/1
20 TABLET, FILM COATED, EXTENDED RELEASE ORAL EVERY 12 HOURS
Qty: 60 TABLET | Refills: 0 | Status: SHIPPED | OUTPATIENT
Start: 2020-01-01 | End: 2020-01-01 | Stop reason: HOSPADM

## 2020-01-01 RX ORDER — DICYCLOMINE HYDROCHLORIDE 20 MG/1
20 TABLET ORAL 2 TIMES DAILY
Qty: 20 TABLET | Refills: 0 | Status: SHIPPED | OUTPATIENT
Start: 2020-01-01 | End: 2020-01-01

## 2020-01-01 RX ORDER — ONDANSETRON 8 MG/1
8 TABLET, ORALLY DISINTEGRATING ORAL EVERY 6 HOURS PRN
Qty: 6 TABLET | Refills: 3 | Status: SHIPPED | OUTPATIENT
Start: 2020-01-01

## 2020-01-01 RX ORDER — FENTANYL CITRATE 50 UG/ML
INJECTION, SOLUTION INTRAMUSCULAR; INTRAVENOUS CODE/TRAUMA/SEDATION MEDICATION
Status: COMPLETED | OUTPATIENT
Start: 2020-01-01 | End: 2020-01-01

## 2020-01-01 RX ORDER — ONDANSETRON 2 MG/ML
8 INJECTION INTRAMUSCULAR; INTRAVENOUS ONCE
Status: COMPLETED | OUTPATIENT
Start: 2020-01-01 | End: 2020-01-01

## 2020-01-01 RX ORDER — SODIUM CHLORIDE 9 MG/ML
INJECTION, SOLUTION INTRAVENOUS CONTINUOUS
Status: DISCONTINUED | OUTPATIENT
Start: 2020-01-01 | End: 2020-01-01 | Stop reason: HOSPADM

## 2020-01-01 RX ORDER — ONDANSETRON 4 MG/1
4 TABLET, FILM COATED ORAL EVERY 8 HOURS PRN
Qty: 12 TABLET | Refills: 0 | Status: ON HOLD | OUTPATIENT
Start: 2020-01-01 | End: 2020-01-01 | Stop reason: HOSPADM

## 2020-01-01 RX ORDER — NALOXONE HCL 0.4 MG/ML
0.02 VIAL (ML) INJECTION
Status: DISCONTINUED | OUTPATIENT
Start: 2020-01-01 | End: 2020-01-01

## 2020-01-01 RX ADMIN — ENOXAPARIN SODIUM 40 MG: 100 INJECTION SUBCUTANEOUS at 04:04

## 2020-01-01 RX ADMIN — ONDANSETRON 8 MG: 8 TABLET, ORALLY DISINTEGRATING ORAL at 08:04

## 2020-01-01 RX ADMIN — AMLODIPINE BESYLATE 10 MG: 10 TABLET ORAL at 08:04

## 2020-01-01 RX ADMIN — HYDROMORPHONE HYDROCHLORIDE 1 MG: 1 INJECTION, SOLUTION INTRAMUSCULAR; INTRAVENOUS; SUBCUTANEOUS at 01:04

## 2020-01-01 RX ADMIN — DICYCLOMINE HYDROCHLORIDE 20 MG: 20 TABLET ORAL at 07:04

## 2020-01-01 RX ADMIN — PROCHLORPERAZINE MALEATE 10 MG: 5 TABLET ORAL at 12:05

## 2020-01-01 RX ADMIN — BIMATOPROST 1 DROP: 0.1 SOLUTION/ DROPS OPHTHALMIC at 08:04

## 2020-01-01 RX ADMIN — PROCHLORPERAZINE MALEATE 10 MG: 5 TABLET ORAL at 05:05

## 2020-01-01 RX ADMIN — PROCHLORPERAZINE MALEATE 10 MG: 5 TABLET ORAL at 06:05

## 2020-01-01 RX ADMIN — IOHEXOL 15 ML: 350 INJECTION, SOLUTION INTRAVENOUS at 06:04

## 2020-01-01 RX ADMIN — TIMOLOL MALEATE 1 DROP: 5 SOLUTION/ DROPS OPHTHALMIC at 09:04

## 2020-01-01 RX ADMIN — DEXAMETHASONE SODIUM PHOSPHATE 10 MG: 10 INJECTION INTRAMUSCULAR; INTRAVENOUS at 02:04

## 2020-01-01 RX ADMIN — IOHEXOL 100 ML: 350 INJECTION, SOLUTION INTRAVENOUS at 07:04

## 2020-01-01 RX ADMIN — Medication 10 ML: at 09:02

## 2020-01-01 RX ADMIN — OXYCODONE HYDROCHLORIDE 5 MG: 5 TABLET ORAL at 11:05

## 2020-01-01 RX ADMIN — APREPITANT 130 MG: 130 INJECTION, EMULSION INTRAVENOUS at 02:04

## 2020-01-01 RX ADMIN — OXYCODONE HYDROCHLORIDE 20 MG: 20 TABLET, FILM COATED, EXTENDED RELEASE ORAL at 08:04

## 2020-01-01 RX ADMIN — FENTANYL CITRATE 100 MCG: 50 INJECTION, SOLUTION INTRAMUSCULAR; INTRAVENOUS at 11:04

## 2020-01-01 RX ADMIN — TIMOLOL MALEATE 1 DROP: 5 SOLUTION/ DROPS OPHTHALMIC at 08:04

## 2020-01-01 RX ADMIN — INSULIN ASPART 3 UNITS: 100 INJECTION, SOLUTION INTRAVENOUS; SUBCUTANEOUS at 01:04

## 2020-01-01 RX ADMIN — ONDANSETRON 8 MG: 8 TABLET, ORALLY DISINTEGRATING ORAL at 09:04

## 2020-01-01 RX ADMIN — OXYCODONE HYDROCHLORIDE 5 MG: 5 TABLET ORAL at 06:05

## 2020-01-01 RX ADMIN — OXYCODONE HYDROCHLORIDE 5 MG: 5 TABLET ORAL at 04:04

## 2020-01-01 RX ADMIN — OXYCODONE HYDROCHLORIDE 20 MG: 20 TABLET, FILM COATED, EXTENDED RELEASE ORAL at 08:05

## 2020-01-01 RX ADMIN — SITAGLIPTIN 100 MG: 100 TABLET, FILM COATED ORAL at 08:05

## 2020-01-01 RX ADMIN — PANTOPRAZOLE SODIUM 40 MG: 40 INJECTION, POWDER, FOR SOLUTION INTRAVENOUS at 10:04

## 2020-01-01 RX ADMIN — OXYCODONE HYDROCHLORIDE AND ACETAMINOPHEN 1 TABLET: 10; 325 TABLET ORAL at 09:04

## 2020-01-01 RX ADMIN — HYDROMORPHONE HYDROCHLORIDE 1 MG: 1 INJECTION, SOLUTION INTRAMUSCULAR; INTRAVENOUS; SUBCUTANEOUS at 05:04

## 2020-01-01 RX ADMIN — DICYCLOMINE HYDROCHLORIDE 20 MG: 20 TABLET ORAL at 08:04

## 2020-01-01 RX ADMIN — HEPARIN 100 UNITS: 100 SYRINGE at 10:05

## 2020-01-01 RX ADMIN — INSULIN ASPART 5 UNITS: 100 INJECTION, SOLUTION INTRAVENOUS; SUBCUTANEOUS at 10:04

## 2020-01-01 RX ADMIN — ONDANSETRON HYDROCHLORIDE 8 MG: 2 SOLUTION INTRAMUSCULAR; INTRAVENOUS at 12:05

## 2020-01-01 RX ADMIN — MORPHINE SULFATE 8 MG: 10 INJECTION INTRAVENOUS at 08:04

## 2020-01-01 RX ADMIN — HYDROMORPHONE HYDROCHLORIDE 1 MG: 1 INJECTION, SOLUTION INTRAMUSCULAR; INTRAVENOUS; SUBCUTANEOUS at 09:04

## 2020-01-01 RX ADMIN — OXYCODONE HYDROCHLORIDE 5 MG: 5 TABLET ORAL at 02:05

## 2020-01-01 RX ADMIN — BISOPROLOL FUMARATE 10 MG: 5 TABLET, FILM COATED ORAL at 08:04

## 2020-01-01 RX ADMIN — PACLITAXEL 426 MG: 6 INJECTION, SOLUTION INTRAVENOUS at 03:04

## 2020-01-01 RX ADMIN — ONDANSETRON 8 MG: 2 INJECTION, SOLUTION INTRAMUSCULAR; INTRAVENOUS at 02:04

## 2020-01-01 RX ADMIN — TIMOLOL MALEATE 1 DROP: 5 SOLUTION/ DROPS OPHTHALMIC at 08:05

## 2020-01-01 RX ADMIN — PROMETHAZINE HYDROCHLORIDE 12.5 MG: 25 INJECTION INTRAMUSCULAR; INTRAVENOUS at 02:05

## 2020-01-01 RX ADMIN — BIMATOPROST 1 DROP: 0.1 SOLUTION/ DROPS OPHTHALMIC at 09:04

## 2020-01-01 RX ADMIN — SODIUM CHLORIDE, SODIUM LACTATE, POTASSIUM CHLORIDE, AND CALCIUM CHLORIDE: .6; .31; .03; .02 INJECTION, SOLUTION INTRAVENOUS at 08:04

## 2020-01-01 RX ADMIN — Medication 10 ML: at 10:05

## 2020-01-01 RX ADMIN — OXYCODONE HYDROCHLORIDE 20 MG: 20 TABLET, FILM COATED, EXTENDED RELEASE ORAL at 01:04

## 2020-01-01 RX ADMIN — ONDANSETRON 8 MG: 8 TABLET, ORALLY DISINTEGRATING ORAL at 05:04

## 2020-01-01 RX ADMIN — BISOPROLOL FUMARATE 10 MG: 5 TABLET, FILM COATED ORAL at 10:04

## 2020-01-01 RX ADMIN — BISOPROLOL FUMARATE 10 MG: 5 TABLET, FILM COATED ORAL at 08:05

## 2020-01-01 RX ADMIN — HYDROMORPHONE HYDROCHLORIDE 1 MG: 1 INJECTION, SOLUTION INTRAMUSCULAR; INTRAVENOUS; SUBCUTANEOUS at 07:04

## 2020-01-01 RX ADMIN — MORPHINE SULFATE 4 MG: 10 INJECTION INTRAVENOUS at 12:05

## 2020-01-01 RX ADMIN — HYDROMORPHONE HYDROCHLORIDE 1 MG: 1 INJECTION, SOLUTION INTRAMUSCULAR; INTRAVENOUS; SUBCUTANEOUS at 08:04

## 2020-01-01 RX ADMIN — DICYCLOMINE HYDROCHLORIDE 20 MG: 20 TABLET ORAL at 09:04

## 2020-01-01 RX ADMIN — INSULIN ASPART 4 UNITS: 100 INJECTION, SOLUTION INTRAVENOUS; SUBCUTANEOUS at 08:05

## 2020-01-01 RX ADMIN — ONDANSETRON 8 MG: 2 INJECTION INTRAMUSCULAR; INTRAVENOUS at 11:04

## 2020-01-01 RX ADMIN — PROCHLORPERAZINE EDISYLATE 10 MG: 5 INJECTION INTRAMUSCULAR; INTRAVENOUS at 10:04

## 2020-01-01 RX ADMIN — INSULIN ASPART 4 UNITS: 100 INJECTION, SOLUTION INTRAVENOUS; SUBCUTANEOUS at 12:04

## 2020-01-01 RX ADMIN — SODIUM CHLORIDE, SODIUM LACTATE, POTASSIUM CHLORIDE, AND CALCIUM CHLORIDE: .6; .31; .03; .02 INJECTION, SOLUTION INTRAVENOUS at 04:04

## 2020-01-01 RX ADMIN — MORPHINE SULFATE 4 MG: 10 INJECTION INTRAVENOUS at 02:05

## 2020-01-01 RX ADMIN — INSULIN ASPART 2 UNITS: 100 INJECTION, SOLUTION INTRAVENOUS; SUBCUTANEOUS at 01:04

## 2020-01-01 RX ADMIN — MAGNESIUM SULFATE HEPTAHYDRATE 1 G: 500 INJECTION, SOLUTION INTRAMUSCULAR; INTRAVENOUS at 06:04

## 2020-01-01 RX ADMIN — SODIUM CHLORIDE, SODIUM LACTATE, POTASSIUM CHLORIDE, AND CALCIUM CHLORIDE: .6; .31; .03; .02 INJECTION, SOLUTION INTRAVENOUS at 07:04

## 2020-01-01 RX ADMIN — SITAGLIPTIN 100 MG: 100 TABLET, FILM COATED ORAL at 04:04

## 2020-01-01 RX ADMIN — AMLODIPINE BESYLATE 10 MG: 10 TABLET ORAL at 08:05

## 2020-01-01 RX ADMIN — Medication 10 ML: at 10:01

## 2020-01-01 RX ADMIN — AMLODIPINE BESYLATE 5 MG: 5 TABLET ORAL at 07:04

## 2020-01-01 RX ADMIN — AMLODIPINE BESYLATE 10 MG: 10 TABLET ORAL at 09:04

## 2020-01-01 RX ADMIN — OXYCODONE HYDROCHLORIDE 5 MG: 5 TABLET ORAL at 11:04

## 2020-01-01 RX ADMIN — DICYCLOMINE HYDROCHLORIDE 20 MG: 20 TABLET ORAL at 08:05

## 2020-01-01 RX ADMIN — SODIUM CHLORIDE, SODIUM LACTATE, POTASSIUM CHLORIDE, AND CALCIUM CHLORIDE: .6; .31; .03; .02 INJECTION, SOLUTION INTRAVENOUS at 06:04

## 2020-01-01 RX ADMIN — DEXAMETHASONE SODIUM PHOSPHATE 20 MG: 10 INJECTION INTRAMUSCULAR; INTRAVENOUS at 02:04

## 2020-01-01 RX ADMIN — MAGNESIUM SULFATE HEPTAHYDRATE 2 G: 40 INJECTION, SOLUTION INTRAVENOUS at 12:04

## 2020-01-01 RX ADMIN — ENOXAPARIN SODIUM 40 MG: 100 INJECTION SUBCUTANEOUS at 10:04

## 2020-01-01 RX ADMIN — SIMETHICONE CHEW TAB 80 MG 80 MG: 80 TABLET ORAL at 03:04

## 2020-01-01 RX ADMIN — SIMETHICONE CHEW TAB 80 MG 80 MG: 80 TABLET ORAL at 09:04

## 2020-01-01 RX ADMIN — OXYCODONE HYDROCHLORIDE 5 MG: 5 TABLET ORAL at 04:05

## 2020-01-01 RX ADMIN — OXYCODONE HYDROCHLORIDE 5 MG: 5 TABLET ORAL at 03:05

## 2020-01-01 RX ADMIN — OXYCODONE HYDROCHLORIDE 5 MG: 5 TABLET ORAL at 05:05

## 2020-01-01 RX ADMIN — SODIUM CHLORIDE, SODIUM LACTATE, POTASSIUM CHLORIDE, AND CALCIUM CHLORIDE: .6; .31; .03; .02 INJECTION, SOLUTION INTRAVENOUS at 10:04

## 2020-01-01 RX ADMIN — Medication: at 04:04

## 2020-01-01 RX ADMIN — CARBOPLATIN 750 MG: 10 INJECTION, SOLUTION INTRAVENOUS at 06:04

## 2020-01-01 RX ADMIN — BISOPROLOL FUMARATE 10 MG: 5 TABLET, FILM COATED ORAL at 09:04

## 2020-01-01 RX ADMIN — IOHEXOL 75 ML: 350 INJECTION, SOLUTION INTRAVENOUS at 06:04

## 2020-01-01 RX ADMIN — MAGNESIUM SULFATE 1 G: 1 INJECTION INTRAVENOUS at 01:05

## 2020-01-01 RX ADMIN — HEPARIN 500 UNITS: 100 SYRINGE at 09:02

## 2020-01-01 RX ADMIN — BISOPROLOL FUMARATE 10 MG: 5 TABLET, FILM COATED ORAL at 07:04

## 2020-01-01 RX ADMIN — DICYCLOMINE HYDROCHLORIDE 20 MG: 20 TABLET ORAL at 10:04

## 2020-01-01 RX ADMIN — MAGNESIUM SULFATE HEPTAHYDRATE 2 G: 40 INJECTION, SOLUTION INTRAVENOUS at 09:04

## 2020-01-01 RX ADMIN — IOHEXOL 75 ML: 350 INJECTION, SOLUTION INTRAVENOUS at 01:04

## 2020-01-01 RX ADMIN — PROCHLORPERAZINE MALEATE 10 MG: 5 TABLET ORAL at 11:05

## 2020-01-01 RX ADMIN — ONDANSETRON 8 MG: 8 TABLET, ORALLY DISINTEGRATING ORAL at 04:04

## 2020-01-01 RX ADMIN — HYDROMORPHONE HYDROCHLORIDE 1 MG: 1 INJECTION, SOLUTION INTRAMUSCULAR; INTRAVENOUS; SUBCUTANEOUS at 04:04

## 2020-01-01 RX ADMIN — OXYCODONE HYDROCHLORIDE 5 MG: 5 TABLET ORAL at 08:04

## 2020-01-01 RX ADMIN — HYDROCORTISONE SODIUM SUCCINATE 100 MG: 100 INJECTION, POWDER, FOR SOLUTION INTRAMUSCULAR; INTRAVENOUS at 08:05

## 2020-01-01 RX ADMIN — OXYCODONE HYDROCHLORIDE AND ACETAMINOPHEN 1 TABLET: 5; 325 TABLET ORAL at 08:05

## 2020-01-01 RX ADMIN — Medication 10 ML: at 08:05

## 2020-01-01 RX ADMIN — HEPARIN 500 UNITS: 100 SYRINGE at 01:04

## 2020-01-01 RX ADMIN — AMLODIPINE BESYLATE 5 MG: 5 TABLET ORAL at 08:04

## 2020-01-01 RX ADMIN — INSULIN ASPART 4 UNITS: 100 INJECTION, SOLUTION INTRAVENOUS; SUBCUTANEOUS at 12:05

## 2020-01-01 RX ADMIN — Medication 10 ML: at 09:04

## 2020-01-01 RX ADMIN — INSULIN ASPART 2 UNITS: 100 INJECTION, SOLUTION INTRAVENOUS; SUBCUTANEOUS at 10:04

## 2020-01-01 RX ADMIN — MIDAZOLAM HYDROCHLORIDE 2 MG: 1 INJECTION, SOLUTION INTRAMUSCULAR; INTRAVENOUS at 11:04

## 2020-01-01 RX ADMIN — BIMATOPROST 1 DROP: 0.1 SOLUTION/ DROPS OPHTHALMIC at 08:05

## 2020-01-01 RX ADMIN — OXYCODONE HYDROCHLORIDE 5 MG: 5 TABLET ORAL at 08:05

## 2020-01-01 RX ADMIN — SODIUM CHLORIDE 1000 ML: 0.9 INJECTION, SOLUTION INTRAVENOUS at 05:04

## 2020-01-01 RX ADMIN — ONDANSETRON 8 MG: 8 TABLET, ORALLY DISINTEGRATING ORAL at 12:04

## 2020-01-01 RX ADMIN — Medication: at 11:04

## 2020-01-01 RX ADMIN — SODIUM CITRATE AND CITRIC ACID MONOHYDRATE 30 ML: 500; 334 SOLUTION ORAL at 08:05

## 2020-01-01 RX ADMIN — MORPHINE SULFATE 8 MG: 4 INJECTION, SOLUTION INTRAMUSCULAR; INTRAVENOUS at 05:04

## 2020-01-01 RX ADMIN — PROCHLORPERAZINE MALEATE 10 MG: 5 TABLET ORAL at 04:04

## 2020-01-01 RX ADMIN — ENOXAPARIN SODIUM 40 MG: 100 INJECTION SUBCUTANEOUS at 05:05

## 2020-01-01 RX ADMIN — Medication 50 MG: at 02:04

## 2020-01-01 RX ADMIN — FAMOTIDINE 20 MG: 10 INJECTION INTRAVENOUS at 02:04

## 2020-01-01 RX ADMIN — IOHEXOL 15 ML: 350 INJECTION, SOLUTION INTRAVENOUS at 05:04

## 2020-01-01 RX ADMIN — ONDANSETRON 8 MG: 2 INJECTION, SOLUTION INTRAMUSCULAR; INTRAVENOUS at 05:04

## 2020-01-01 RX ADMIN — PROCHLORPERAZINE MALEATE 10 MG: 5 TABLET ORAL at 11:04

## 2020-01-01 RX ADMIN — Medication 10 ML: at 01:04

## 2020-01-01 RX ADMIN — TIMOLOL MALEATE 1 DROP: 5 SOLUTION/ DROPS OPHTHALMIC at 10:04

## 2020-01-01 RX ADMIN — ONDANSETRON 4 MG: 2 INJECTION, SOLUTION INTRAMUSCULAR; INTRAVENOUS at 05:04

## 2020-01-01 RX ADMIN — INSULIN ASPART 2 UNITS: 100 INJECTION, SOLUTION INTRAVENOUS; SUBCUTANEOUS at 04:04

## 2020-01-01 RX ADMIN — Medication 10 ML: at 07:04

## 2020-01-01 RX ADMIN — MORPHINE SULFATE 8 MG: 10 INJECTION INTRAVENOUS at 05:04

## 2020-01-01 RX ADMIN — OXYCODONE HYDROCHLORIDE 5 MG: 5 TABLET ORAL at 03:04

## 2020-01-01 RX ADMIN — Medication: at 02:04

## 2020-01-01 RX ADMIN — ONDANSETRON 8 MG: 8 TABLET, ORALLY DISINTEGRATING ORAL at 09:05

## 2020-01-01 RX ADMIN — Medication: at 09:04

## 2020-01-01 RX ADMIN — AMLODIPINE BESYLATE 10 MG: 10 TABLET ORAL at 10:04

## 2020-01-01 RX ADMIN — OXYCODONE HYDROCHLORIDE 20 MG: 20 TABLET, FILM COATED, EXTENDED RELEASE ORAL at 09:04

## 2020-01-01 RX ADMIN — INSULIN ASPART 1 UNITS: 100 INJECTION, SOLUTION INTRAVENOUS; SUBCUTANEOUS at 09:04

## 2020-01-01 RX ADMIN — INSULIN ASPART 4 UNITS: 100 INJECTION, SOLUTION INTRAVENOUS; SUBCUTANEOUS at 05:05

## 2020-01-01 RX ADMIN — OXYCODONE HYDROCHLORIDE 5 MG: 5 TABLET ORAL at 07:05

## 2020-01-01 RX ADMIN — DOCUSATE SODIUM 200 MG: 100 CAPSULE, LIQUID FILLED ORAL at 08:05

## 2020-01-01 RX ADMIN — HEPARIN 500 UNITS: 100 SYRINGE at 10:01

## 2020-01-17 NOTE — PLAN OF CARE
Pt presented for port flush. VSS. Pt reported fatigue and numbness/tingling to hands/feet. Port accessed using sterile technique. Good blood return noted from port.  drawn. Port flushed and heparinized without issue. Pt tolerated well. Distress screening tool completed. AVS provided. Pt ambulated unassisted off unit. Pt in NAD at time of discharge.

## 2020-01-21 NOTE — PROGRESS NOTES
Subjective:      Patient ID: Saba Driver is a 59 y.o. female.    Chief Complaint: Endometrial Cancer      HPI  Presents today for surveillance visit.  Without complaints. Feels well. Specifically denies N/V, pain, swelling, bleeding, unexpected weight change, SOB, problems with bowel or bladder function.     normal 4>5>5>4.    Disease free interval 11 months.      Oncologic history:  Referred by Dr. Shakir Alexis for postmenopausal bleeding. P0. On pelvic uterus is enlarged and fixed, unable to perform EMB due to altered anatomy of the cervix. She had been admitted to  for vaginal bleeding and anemia requiring transfusion. Fixed sore R inguinal node. Blind pap returned adenocarcinoma.      Pelvic US 3/12/18  UT enlarged 13.3cm with 5.1cm fibroid anterior. Limited visualization of EMS.  LOV 2.9cm  ROV 3.6cm     Medical co-morbidities include DM, HTN. No anticoagulation.      No prior abdominal surgeries     Family history significant for mom with pancreatic cancer, denies history of breast, ovarian, uterine, or colon cancer.      MMG last year normal per patient.      Seen in consult with me 3/21/18. Constellation of findings thus far consistent with an advanced gynecologic malignancy. Likely uterine in origin. Inguinal adenopathy would make this Stage IVB. Pap cytology showing adenocarcinoma but need tissue diagnosis. Will plan IR for biopsy of inguinal node. Pelvic disease is surgically unresectable at present. Recommended neoadjuvant chemotherapy based on tissue diagnosis.      IR biopsy 4/4/18  FINAL PATHOLOGIC DIAGNOSIS  RIGHT INGUINAL LYMPH NODE, CT GUIDED BIOPSY WITH ONSITE ADEQUACY (CYTOLOGY AND CELL BLOCK):  - Adenocarcinoma. See note.  Note: The carcinoma is positive for CK7, p53, vimentin and ER (patchy). It is negative for CDX2, CEA, CK20 and WT1. This immunoprofile is compatible with an endometrial origin. All stains have appropriate controls.     Initiate neoadjuvant chemotherapy with Dr. Boyd.  Carbo/taxol 4/19/18. IV port.   S/p 3 cycles neoadjuvant chemotherapy carbo/taxol with Dr. Boyd.      CT CAP 6/25/18  Impression   Significant decrease in size in the right inguinal node.  Lobulated enlarged uterus.  RECIST SUMMARY:  Date of prior examination for comparison: 03/22/2018  Lesion 1: Right inguinal node  1.4 cm(smallest diameter ).  Series 2 image 118.  Prior measurement 4.0 cm.      Elected for interval cytoreductive surgery.     S/p BRIAN/BSO 8/2/18. Uncomplicated post operative course. 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  At post op visit, previously much smaller right inguinal adenopathy had grown significantly over the last few week.     s/p right LND on 10/11/2018.   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 Boyd, completed C6/6 carbo/taxol 12/10/18.   CT CAP 12/17/18- essentially shows no evidence of disease.   Impression       Prior hysterectomy.  Interval right inguinal lymph node dissection.  Previous large necrotic lymph node has been resected. There are few residual nonenlarged lymph nodes at this site which are slightly asymmetric to their contralateral counterparts.  Stable 3 mm right upper lobe pulmonary nodule.  No new soft tissue mass or pathologic lymph node enlargement.  Continued follow-up is advised.      Adjuvant radiation with Dr. Solis completed 50.40Gy to the pelvis and bilateral inguinal regions 1/10/19-2/19/19.     Review of Systems   Constitutional: Negative for appetite change, chills, fatigue and fever.   HENT: Negative for mouth sores.    Respiratory: Negative for cough and shortness of breath.    Cardiovascular: Negative for leg swelling.   Gastrointestinal: Negative for abdominal pain, blood in stool, constipation and diarrhea.   Endocrine: Negative for  cold intolerance.   Genitourinary: Negative for dysuria and vaginal bleeding.   Musculoskeletal: Negative for myalgias.   Skin: Negative for rash.   Allergic/Immunologic: Negative.    Neurological: Negative for weakness and numbness.   Hematological: Negative for adenopathy. Does not bruise/bleed easily.   Psychiatric/Behavioral: Negative for confusion.       Objective:   Physical Exam:   Constitutional: She is oriented to person, place, and time. She appears well-developed and well-nourished.    HENT:   Head: Normocephalic and atraumatic.    Eyes: Pupils are equal, round, and reactive to light. EOM are normal.    Neck: Normal range of motion. Neck supple. No thyromegaly present.    Cardiovascular: Normal rate, regular rhythm and intact distal pulses.     Pulmonary/Chest: Effort normal and breath sounds normal. No respiratory distress. She has no wheezes.        Abdominal: Soft. Bowel sounds are normal. She exhibits no distension, no ascites and no mass. There is no tenderness.     Genitourinary: Rectum normal and vagina normal. Pelvic exam was performed with patient supine. There is no lesion on the right labia. There is no lesion on the left labia. Uterus is absent. There is an absent adnexa. Right adnexum displays no mass. Left adnexum displays no mass. Vaginal cuff normal.Cervix exhibits absence.           Musculoskeletal: Normal range of motion and moves all extremeties.      Lymphadenopathy:     She has no cervical adenopathy.        Right: No inguinal and no supraclavicular adenopathy present.        Left: No inguinal and no supraclavicular adenopathy present.    Neurological: She is alert and oriented to person, place, and time.    Skin: Skin is warm and dry. No rash noted.    Psychiatric: She has a normal mood and affect.       Assessment:     1. Endometrial cancer        Plan:   No orders of the defined types were placed in this encounter.    Without evidence of disease on today's exam.   Feels well. No  new symptoms.    normal.   Disease free interval 11 months.   Recommend continued surveillance.   RTC 3 months with  or sooner if needed.

## 2020-01-21 NOTE — LETTER
January 21, 2020        Kate Boyd MD  [address]  [address]  [facility] [address]             [facility] 2 [facility] [address]  [address], [address]  [facility] [address]  Phone: [phone]  Fax: [phone]   Patient: Saba Driver   MR Number: [id number]   YOB: 1960   Date of Visit: 1/21/2020       Dear Dr. Boyd:    Thank you for referring Saba Driver to me for evaluation. Below are the relevant portions of my assessment and plan of care.            If you have questions, please do not hesitate to call me. I look forward to following Saba along with you.    Sincerely,      Noelle Lala MD           CC  Shakir Alexis MD

## 2020-02-21 NOTE — PLAN OF CARE
Patient arrived to infusion center for monthly port flush. Tolerated well. VSS. Discharge instructions provided. Patient instructed to return 03/20 for next monthly portacath flush. Patient ambulated unassisted off unit by self, in NAD at time of discharge.

## 2020-03-16 NOTE — PATIENT INSTRUCTIONS
Someone from [facility] will call you in 2-3 days to discuss lab results.     If you were prescribed a narcotic or controlled medication, do not drive or operate heavy equipment or machinery while taking these medications.  You must understand that you've received an Urgent Care treatment only and that you may be released before all your medical problems are known or treated. You, the patient, will arrange for follow up care as instructed.  Follow up with your PCP or specialty clinic as directed within 2-5 days if not improved or as needed.  You can call [phone] to schedule an appointment with the appropriate provider.  If your condition worsens we recommend that you receive another evaluation at the emergency room immediately or contact your primary medical clinics after hours call service to discuss your concerns.  Please return here or go to the Emergency Department for any concerns or worsening of condition.      Urinary Tract Infections in Women    Urinary tract infections (UTIs) are most often caused by bacteria (germs). These bacteria enter the urinary tract. The bacteria may come from outside the body. Or they may travel from the skin outside the rectum or vagina into the urethra. Female anatomy makes it easy for bacteria from the bowel to enter a womans urinary tract, which is the most common source of UTI. This means women develop UTIs more often than men. Pain in or around the urinary tract is a common UTI symptom. But the only way to know for sure if you have a UTI for the healthcare provider to test your urine. The two tests that may be done are the urinalysis and urine culture.  Types of UTIs  · Cystitis: A bladder infection (cystitis) is the most common UTI in women. You may have urgent or frequent urination. You may also have pain, burning when you urinate, and bloody urine.  · Urethritis: This is an inflamed urethra, which is the tube that carries urine from the bladder to outside the body. You  may have lower stomach or back pain. You may also have urgent or frequent urination.  · Pyelonephritis: This is a kidney infection. If not treated, it can be serious and damage your kidneys. In severe cases, you may be hospitalized. You may have a fever and lower back pain.  Medicines to treat a UTI  Most UTIs are treated with antibiotics. These kill the bacteria. The length of time you need to take them depends on the type of infection. It may be as short as 3 days. If you have repeated UTIs, a low-dose antibiotic may be needed for several months. Take antibiotics exactly as directed. Dont stop taking them until all of the medicine is gone. If you stop taking the antibiotic too soon, the infection may not go away, and you may develop a resistance to the antibiotic. This can make it much harder to treat.  Lifestyle changes to treat and prevent UTIs  The lifestyle changes below will help get rid of your UTI. They may also help prevent future UTIs.  · Drink plenty of fluids. This includes water, juice, or other caffeine-free drinks. Fluids help flush bacteria out of your body.  · Empty your bladder. Always empty your bladder when you feel the urge to urinate. And always urinate before going to sleep. Urine that stays in your bladder can lead to infection. Try to urinate before and after sex as well.  · Practice good personal hygiene. Wipe yourself from front to back after using the toilet. This helps keep bacteria from getting into the urethra.  · Use condoms during sex. These help prevent UTIs caused by sexually transmitted bacteria. Also, avoid using spermicides during sex. These can increase the risk of UTIs. Choose other forms of birth control instead. For women who tend to get UTIs after sex, a low-dose of a preventive antibiotic may be used. Be sure to discuss this option with your healthcare provider.  · Follow up with your healthcare provider as directed. He or she may test to make sure the infection has  cleared. If needed, more treatment may be started.  Date Last Reviewed: 1/1/2017  © 6959-8371 [employer]. [address] [place] [address], [place] [address]. All rights reserved. This information is not intended as a substitute for professional medical care. Always follow your healthcare professional's instructions.         no

## 2020-03-16 NOTE — PROGRESS NOTES
Subjective:       Patient ID: Saba Driver is a 59 y.o. female.    Vitals:  weight is 127.5 kg (281 lb). Her temperature is 98.7 °F (37.1 °C). Her blood pressure is 164/75 (abnormal) and her pulse is 64. Her oxygen saturation is 98%.     Chief Complaint: Urinary Tract Infection    Pt states that she started with uti symptoms about a week ago and is taking azo otc  With no relief.    Urinary Tract Infection    This is a new problem. The current episode started in the past 7 days. The problem occurs every urination. The problem has been unchanged. The quality of the pain is described as aching. The patient is experiencing no pain. Associated symptoms include flank pain, frequency and urgency. Pertinent negatives include no chills, hematuria, nausea, vomiting or rash. Treatments tried: azo.       Constitution: Negative for chills and fever.   Neck: Negative for painful lymph nodes.   Gastrointestinal: Negative for abdominal pain, nausea and vomiting.   Genitourinary: Positive for frequency, urgency and flank pain. Negative for dysuria, urine decreased, hematuria, history of kidney stones, painful menstruation, irregular menstruation, missed menses, heavy menstrual bleeding, ovarian cysts, genital trauma, vaginal pain, vaginal discharge, vaginal bleeding, vaginal odor, painful intercourse, genital sore, painful ejaculation and pelvic pain.   Musculoskeletal: Negative for back pain.   Skin: Negative for rash and lesion.   Hematologic/Lymphatic: Negative for swollen lymph nodes.       Objective:      Physical Exam   Constitutional: She is oriented to person, place, and time. She appears well-developed and well-nourished. She is active and cooperative.  Non-toxic appearance. She does not appear ill. No distress.   HENT:   Head: Normocephalic and atraumatic.   Right Ear: Abnromal external ear normal.   Left Ear: Abnormal external ear normal.   Nose: Nose abnormal.   Mouth/Throat: Mucous membranes are normal. Tonsils are 2+  on the right. Tonsils are 2+ on the left.   Eyes: Conjunctivae and lids are normal.   Neck: Trachea normal and full passive range of motion without pain. Neck supple.   Cardiovascular: Normal rate, regular rhythm and normal heart sounds.   Pulses:       Radial pulses are 2+ on the right side, and 2+ on the left side.   Pulmonary/Chest: Effort normal and breath sounds normal. No respiratory distress.   Abdominal: Soft. Normal appearance and bowel sounds are normal. She exhibits no distension, no abdominal bruit, no pulsatile midline mass and no mass. There is no tenderness. There is no rigidity, no rebound, no guarding, no CVA tenderness, no tenderness at McBurney's point and negative Nielson's sign.   Musculoskeletal: Normal range of motion. She exhibits no edema.   Neurological: She is alert and oriented to person, place, and time. She has normal strength.   Skin: Skin is warm, dry, intact, not diaphoretic and not pale. Capillary refill takes less than 2 seconds.   Psychiatric: She has a normal mood and affect. Her speech is normal and behavior is normal. Judgment and thought content normal. Cognition and memory are normal.   Nursing note and vitals reviewed.        Assessment:       1. UTI symptoms        Results for orders placed or performed in visit on 03/16/20   POCT Urinalysis, Dipstick, Automated, W/O Scope   Result Value Ref Range    POC Blood, Urine Negative Negative    POC Bilirubin, Urine Negative Negative    POC Urobilinogen, Urine norm 0.1 - 1.1    POC Ketones, Urine Negative Negative    POC Protein, Urine Negative Negative    POC Nitrates, Urine Negative Negative    POC Glucose, Urine Negative Negative    pH, UA 6.0 5 - 8    POC Specific Gravity, Urine 1.010 1.003 - 1.029    POC Leukocytes, Urine Negative Negative       Plan:       Culture pending.     UTI symptoms  -     POCT Urinalysis, Dipstick, Automated, W/O Scope  -     sulfamethoxazole-trimethoprim 800-160mg (BACTRIM DS) 800-160 mg Tab; Take 1  tablet by mouth 2 (two) times daily. for 7 days  Dispense: 14 tablet; Refill: 0  -     Culture, Urine      Patient Instructions   Someone from [facility] will call you in 2-3 days to discuss lab results.     If you were prescribed a narcotic or controlled medication, do not drive or operate heavy equipment or machinery while taking these medications.  You must understand that you've received an Urgent Care treatment only and that you may be released before all your medical problems are known or treated. You, the patient, will arrange for follow up care as instructed.  Follow up with your PCP or specialty clinic as directed within 2-5 days if not improved or as needed.  You can call [phone] to schedule an appointment with the appropriate provider.  If your condition worsens we recommend that you receive another evaluation at the emergency room immediately or contact your primary medical clinics after hours call service to discuss your concerns.  Please return here or go to the Emergency Department for any concerns or worsening of condition.      Urinary Tract Infections in Women    Urinary tract infections (UTIs) are most often caused by bacteria (germs). These bacteria enter the urinary tract. The bacteria may come from outside the body. Or they may travel from the skin outside the rectum or vagina into the urethra. Female anatomy makes it easy for bacteria from the bowel to enter a womans urinary tract, which is the most common source of UTI. This means women develop UTIs more often than men. Pain in or around the urinary tract is a common UTI symptom. But the only way to know for sure if you have a UTI for the healthcare provider to test your urine. The two tests that may be done are the urinalysis and urine culture.  Types of UTIs  · Cystitis: A bladder infection (cystitis) is the most common UTI in women. You may have urgent or frequent urination. You may also have pain, burning when you urinate, and bloody  urine.  · Urethritis: This is an inflamed urethra, which is the tube that carries urine from the bladder to outside the body. You may have lower stomach or back pain. You may also have urgent or frequent urination.  · Pyelonephritis: This is a kidney infection. If not treated, it can be serious and damage your kidneys. In severe cases, you may be hospitalized. You may have a fever and lower back pain.  Medicines to treat a UTI  Most UTIs are treated with antibiotics. These kill the bacteria. The length of time you need to take them depends on the type of infection. It may be as short as 3 days. If you have repeated UTIs, a low-dose antibiotic may be needed for several months. Take antibiotics exactly as directed. Dont stop taking them until all of the medicine is gone. If you stop taking the antibiotic too soon, the infection may not go away, and you may develop a resistance to the antibiotic. This can make it much harder to treat.  Lifestyle changes to treat and prevent UTIs  The lifestyle changes below will help get rid of your UTI. They may also help prevent future UTIs.  · Drink plenty of fluids. This includes water, juice, or other caffeine-free drinks. Fluids help flush bacteria out of your body.  · Empty your bladder. Always empty your bladder when you feel the urge to urinate. And always urinate before going to sleep. Urine that stays in your bladder can lead to infection. Try to urinate before and after sex as well.  · Practice good personal hygiene. Wipe yourself from front to back after using the toilet. This helps keep bacteria from getting into the urethra.  · Use condoms during sex. These help prevent UTIs caused by sexually transmitted bacteria. Also, avoid using spermicides during sex. These can increase the risk of UTIs. Choose other forms of birth control instead. For women who tend to get UTIs after sex, a low-dose of a preventive antibiotic may be used. Be sure to discuss this option with your  healthcare provider.  · Follow up with your healthcare provider as directed. He or she may test to make sure the infection has cleared. If needed, more treatment may be started.  Date Last Reviewed: 1/1/2017  © 9045-2704 [employer]. [address] [place] [address], [place] [address]. All rights reserved. This information is not intended as a substitute for professional medical care. Always follow your healthcare professional's instructions.

## 2020-03-19 NOTE — TELEPHONE ENCOUNTER
Spoke with pt. Agreed to labs on the Thursday before appointment. appt reminder placed in the mail.   ----- Message from Giovany Nunez, Patient Care Assistant sent at 3/19/2020 11:51 AM CDT -----  Contact: DINORAH FRIED [[id number]]  Name of Who is Calling: DINORAH FRIED [[id number]]    What is the request in detail:Requesting orders for blood work.  Please contact to further discuss and advise      Can the clinic reply by [facility]: No    What Number to Call Back if not in [facility]:   [phone]

## 2020-04-05 NOTE — ED NOTES
LOC: The patient is awake, alert and aware of environment with an appropriate affect, the patient is oriented x 3 and speaking appropriately.  APPEARANCE: Patient resting comfortably and in no acute distress, patient is clean and well groomed, patient's clothing is properly fastened.  SKIN: The skin is warm and dry, color consistent with ethnicity, patient has normal skin turgor and moist mucus membranes, skin intact, no breakdown or bruising noted.  MUSCULOSKELETAL: Patient moving all extremities spontaneously, no obvious swelling or deformities noted.  RESPIRATORY: Airway is open and patent, respirations are spontaneous, patient has a normal effort and rate, no accessory muscle use noted, bilateral breath sounds equal and clear to auscultation. Patient reports no shortness of breath, no distress noted.  CARDIAC: Patient has a normal rate and regular rhythm, no periphreal edema noted, capillary refill < 3 seconds.  ABDOMEN: Soft and non tender to palpation, no distention noted, normoactive bowel sounds present in all four quadrants. Obese. Right lower quad tender  NEUROLOGIC: PERRL, 3 mm bilaterally, eyes open spontaneously, behavior appropriate to situation, follows commands, facial expression symmetrical, bilateral hand grasp equal and even, purposeful motor response noted, normal sensation in all extremities when touched with a finger.  PSCYH: Brief WNL

## 2020-04-05 NOTE — DISCHARGE INSTRUCTIONS
Please contact Dr. Lala's office tomorrow for consideration of moving your appointment sooner.  As discussed, your CT scan reveals findings that may represent some metastasis of your cancer but it is unclear at this time.  If you get to the point where the pain is out of control or if you develop intractable vomiting with inability to tolerate fluids by mouth or any severe weakness, please return to the emergency department immediately.

## 2020-04-05 NOTE — ED PROVIDER NOTES
Encounter Date: 4/5/2020       History     Chief Complaint   Patient presents with    Abdominal Pain     right sided, frequent uriniation, denies dysuira, reports hx of endometriosis      60 yo female presents via [facility] with acute severe R flank and RLQ pain for the last 3 weeks which has been near-constant and worsening (now 10/10). Patient denies nausea/vomiting. She started having diarrhea over the last few days and is urinating frequently but denies dysuria. Patient went to an urgent care about 3 weeks ago (3/16/20) and was rx'ed 7 days of Bactrim DS BID as empiric treatment for UTI (UA was benign), which she completed without relief. She reports a few days of elevated temperature, 99.7F max, for which she has been taking APAP. Pain is slightly better when patient sits up on edge of bed. Pain is questionably worse with eating. Patient reports feeling bloated sometimes.    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, DM2, HTN, glaucoma    PSH: BRIAN/BSO, breast lumpectomy        Review of patient's allergies indicates:   Allergen Reactions    Gabapentin      Swelling,musclespasm    Lyrica [pregabalin]      Swelling, musclespasm     Past Medical History:   Diagnosis Date    Adenocarcinoma 3/24/2018    Diabetes mellitus, type 2     Endometrial cancer 4/14/2018    Glaucoma     Hypertension      Past Surgical History:   Procedure Laterality Date    BREAST LUMPECTOMY      R early 20's L at 13 years old    HYSTERECTOMY  08/02/2018    total abdominal    AK REMOVAL OF OVARY/TUBE(S) Bilateral 08/02/2018    TOTAL ABDOMINAL HYSTERECTOMY W/ BILATERAL SALPINGOOPHORECTOMY N/A 8/2/2018    Procedure: HYSTERECTOMY, TOTAL, ABDOMINAL, WITH BILATERAL SALPINGO-OOPHORECTOMY;  Surgeon: Noelle Lala MD;  [facility]: [facility] OR [facility]  [facility];  Service: OB/GYN;  Laterality: N/A;     Family History   Problem Relation Age of Onset    Diabetes Mother     Hypertension Mother     Pancreatic cancer Mother     Hypertension Maternal Grandmother     Diabetes Maternal Grandmother     Hypertension Maternal Grandfather     Diabetes Maternal Grandfather     Breast cancer Neg Hx     Colon cancer Neg Hx     Ovarian cancer Neg Hx      Social History     Tobacco Use    Smoking status: Never Smoker    Smokeless tobacco: Never Used   Substance Use Topics    Alcohol use: Yes     Comment: occassional use    Drug use: No     Review of Systems   Constitutional: Positive for fever.   HENT: Negative for sore throat.    Eyes: Negative for photophobia.   Respiratory: Negative for shortness of breath.    Cardiovascular: Negative for chest pain.   Gastrointestinal: Positive for abdominal pain and diarrhea.   Genitourinary: Positive for flank pain and frequency. Negative for dysuria.   Musculoskeletal: Negative for neck stiffness.   Skin: Negative for rash.   Neurological: Negative for headaches.       Physical Exam     Initial Vitals [04/05/20 0524]   BP Pulse Resp Temp SpO2   (!) 188/90 68 18 98.4 °F (36.9 °C) 98 %      MAP       --         Physical Exam    Nursing note and vitals reviewed.  Constitutional: She appears well-developed and well-nourished. She is not diaphoretic.   Awake, alert, nontoxic middle-aged female.    HENT:   Head: Normocephalic and atraumatic.   Mouth/Throat: Oropharynx is clear and moist.   Eyes: Conjunctivae and EOM are normal. Pupils are equal, round, and reactive to light.   Neck: Normal range of motion. Neck supple.   Cardiovascular: Normal rate, regular rhythm, normal heart sounds and intact distal pulses.   No murmur heard.  Pulmonary/Chest: Breath sounds normal. No respiratory distress. She has no wheezes. She has no rhonchi. She has no rales.   Abdominal: Soft. There is tenderness. There is no rebound and no guarding.   Well-healed  scar from prior surgery. Suprapubic/RLQ/RUQ TTP. ?Mass.    Musculoskeletal: Normal range of motion. She exhibits no edema or tenderness.   Neurological: She is alert and oriented to person, place, and time. She has normal strength.   Moving all extremities.   Skin: Skin is warm and dry. No erythema. No pallor.   Psychiatric: She has a normal mood and affect.         ED Course   Procedures  Labs Reviewed   COMPREHENSIVE METABOLIC PANEL - Abnormal; Notable for the following components:       Result Value    Glucose 204 (*)     Albumin 3.2 (*)     Alkaline Phosphatase 197 (*)     All other components within normal limits   CBC W/ AUTO DIFFERENTIAL - Abnormal; Notable for the following components:    Mean Corpuscular Hemoglobin 25.2 (*)     Mean Corpuscular Hemoglobin Conc 30.7 (*)     Gran # (ANC) 9.2 (*)     Immature Grans (Abs) 0.05 (*)     Gran% 74.3 (*)     Lymph% 15.8 (*)     All other components within normal limits   LIPASE   SARS-COV-2 RNA AMPLIFICATION, QUAL          Imaging Results           CT Abdomen Pelvis With Contrast (Final result)  Result time 04/05/20 07:12:14    Final result by Femi Jenkins MD (04/05/20 07:12:14)                 Impression:      Abnormal small bowel loops involving the mid to lower abdomen and pelvis with prominent wall and fold thickening, edema/inflammatory appearance, as discussed above infectious/inflammatory and ischemic etiologies would be in the differential, possibility of inflammatory bowel disease or Crohn's disease would be in the differential as well.  Clinical and historical correlation and follow-up recommended.  Pattern is not typical of obstruction however if there is persistent or worsening symptomatology additional follow-up to exclude developing secondary obstruction is recommended.    There is soft tissue nodularity seen throughout the mesenteric fat planes and the omentum with prominent areas seen along the midline as discussed above, this raises concern  for mesenteric/omental metastatic disease for which clinical and historical correlation is needed.    Mild free fluid of the abdomen and pelvis, there is no evidence for free intraperitoneal air.    This report was flagged in Epic as abnormal.      Electronically signed by: Femi Jenkins  Date:    04/05/2020  Time:    07:12             Narrative:    EXAMINATION:  CT ABDOMEN PELVIS WITH CONTRAST    CLINICAL HISTORY:  RLQ pain, appendicitis suspected;    TECHNIQUE:  Low dose axial images, sagittal and coronal reformations were obtained from the lung bases to the pubic symphysis following the IV administration of 75 mL of Omnipaque 350 .  Oral contrast was not given.    COMPARISON:  CT examination December 17, 2018    FINDINGS:  Single-phase CT examination of the abdomen and pelvis was performed.  Axial imaging, sagittal and coronal reconstruction imaging is submitted.  Intravenous contrast was utilized, oral contrast was not utilized.    The lung bases demonstrate minimal motion artifact and atelectatic appearing change.  The stomach demonstrates nonspecific mild fluid and air within the gastric lumen.  There are abnormal appearing small bowel loops involving the mid to lower abdomen, appearing to involve the mid to distal small bowel loops, several small bowel loops demonstrating prominent circumferential wall and fold thickening, with mild prominence and fluid-filled lumen configuration, others demonstrate wall and fold thickening without significant distention, the appearance is most consistent with edematous inflammatory process of the small bowel, not a pattern typical of obstruction.  Infectious/inflammatory and ischemic etiologies would be in the differential, clinical and historical correlation is needed, the possibly of inflammatory bowel disease or Crohn's disease would be in the differential as well.  There is no evidence for pneumatosis, there is no evidence for portal venous air, mesenteric venous air or  free intraperitoneal.  The celiac artery, superior mesenteric artery, and inferior mesenteric artery demonstrate appropriate opacification.    The appendix is difficult to definitively identify there is a structure thought to represent the appendix it is not thought to be enlarged or inflamed, not seen in its entirety.  The colon is predominantly decompressed and therefore difficult to evaluate for wall thickening, there is suggestion of some wall thickening along the rectosigmoid colon that may relate to mild colitis, there is no colonic obstructive change seen.    There is mild free fluid of the abdomen and pelvis, there is no evidence for free intraperitoneal air and there is no finding to specifically suggest abscess collection.  There is however appearance of edema and inflammation throughout the mesenteric fat planes and there is nodularity of the mesenteric fat planes and omentum, multiple small nodular areas, some of which are heterogeneous, and a somewhat more prominent area of heterogeneous soft tissue attenuation seen within the midline anterior abdomen deep to the umbilicus as seen on axial image 99 measuring up to approximately 3.1 by 4.7 cm on axial imaging with similar area of less prominence just inferior to this, the possibly these represent areas of prominent induration and inflammatory changes considered although are somewhat atypical, and the possibility of mesenteric and omental metastatic disease cannot be excluded.  Clinical and historical correlation is needed.    Mild diminished attenuation of the liver may relate to mild fatty infiltrate, mild gallbladder distention noted without evidence for pericholecystic inflammatory change.  There is no peripancreatic inflammatory change there is no abnormal pancreatic or biliary ductal dilatation.  There is no evidence for acute process of the spleen or adrenal glands.  There is no evidence for hydronephrosis or obstructive uropathy or perinephric  inflammatory change bilaterally.  The abdominal aorta demonstrates appropriate opacification.  The urinary bladder is decompressed, not well evaluated.  Patient appears status post hysterectomy.  The visualized osseous structures demonstrate chronic change.                               X-Ray Chest AP Portable (Final result)  Result time 04/05/20 06:28:19    Final result by Femi Jenkins MD (04/05/20 06:28:19)                 Impression:      There is no radiographic evidence for acute intrathoracic process.      Electronically signed by: Femi Jenkins  Date:    04/05/2020  Time:    06:28             Narrative:    EXAMINATION:  XR CHEST AP PORTABLE    CLINICAL HISTORY:  fever;    TECHNIQUE:  Single frontal view of the chest was performed.    COMPARISON:  August 29, 2018    FINDINGS:  Single portable chest view is submitted.  Right-sided central venous catheter again noted.  The cardiomediastinal silhouette appears stable.  There is no evidence for confluent infiltrate or consolidation, significant pleural effusion or pneumothorax.  The osseous structures demonstrate chronic change.                                 Medical Decision Making:   History:   I obtained history from: EMS provider.  Old Medical Records: I decided to obtain old medical records.  Old Records Summarized: records from previous admission(s), records from another hospital and records from clinic visits.  Initial Assessment:   59 y.o. Female with R flank pain / RLQ pain x 3 weeks. Had GYN malignancy around 2 years ago, treated surgically and with chemo.  Differential Diagnosis:   Ddx includes recurrent malignancy, pyelonephritis, UTI, COVID-19, other.  Independently Interpreted Test(s):   I have ordered and independently interpreted X-rays - see prior notes.  Clinical Tests:   Lab Tests: Ordered and Reviewed  Radiological Study: Reviewed and Ordered  ED Management:  CBC, CMP, lipase reassuring. COVID-19 negative.    CXR NAD.    CT abdom/pelvis  pending. UA pending.    Patient had morphine 8mg IV and zofran 4mg IV with improvement in pain.    I discussed patient with oncoming ED physician, Dr. Chaidez, who will f/u UA and CT and dispo patient accordingly.      This is Dr. Chaidez:  I took over care of this patient at shift change.  This patient's abdomen continues to be soft with mild discomfort in the left lower quadrant only.  Patient's CT scan revealed  Abnormal small bowel loops involving the mid to lower abdomen and pelvis with prominent wall and fold thickening, edema/inflammatory appearance.  Additionally, there was nodularity in the mesentery no mental areas concerning for possible metastatic disease.  Given this patient's history of uterine sarcoma, this is higher up in the differential.  I do not suspect this patient has an infection requiring antibiotics.  I did speak with gynecologic Oncology at [facility].  The patient is normally followed by Dr. Lala.  I spoke with  and reviewed white patient's case and images.  He plans to speak with Dr. bobby Aguilar regarding moving this patient's appointment for due to possibility that she may require further treatment for recurrence of cancer.  I have explained all the above to the patient.  I have also advised to return if she develops any new or worsening symptoms such as inability to tolerate fluids by mouth or any intractable vomiting or severe worsening of abdominal pain.  Patient is able tolerate oral fluids at this time.  Vital signs are reassuring  Other:   I have discussed this case with another health care provider.                                 Clinical Impression:     Nausea, vomiting, left lower quadrant abdominal pain, inflammation of small bowel, abnormal abdominal CT scan        ED Disposition Condition    Discharge Stable        ED Prescriptions     Medication Sig Dispense Start Date End Date Auth. Provider    ondansetron (ZOFRAN) 4 MG tablet Take 1 tablet (4 mg total) by mouth  every 8 (eight) hours as needed for Nausea. 12 tablet 4/5/2020  Pasha Chaidez Jr., MD    dicyclomine (BENTYL) 20 mg tablet Take 1 tablet (20 mg total) by mouth 2 (two) times daily. 20 tablet 4/5/2020 5/5/2020 Pasha Chaidez Jr., MD        Follow-up Information     Follow up With Specialties Details Why Contact Info    Noelle Lala MD Gynecologic Oncology Call in 1 day  [address] [facility] [address]  [phone]                                       Pasha Chaidez Jr., MD  04/05/20 6024

## 2020-04-05 NOTE — ED NOTES
Assumed care regarding patient cc Abdominal pain, tenderness at RLQ, patient non-diaphoretic speaking calmly complete sentences without distress, denies SOB or cardiac chest pain, denies N/V, denying dysuria, CT indicates possibility of inflammatory bowel disease or Crohn's disease per MD. ED W/U and Tx to continue

## 2020-04-09 NOTE — TELEPHONE ENCOUNTER
----- Message from Adria Navarrete sent at 4/9/2020  4:30 PM CDT -----  Contact: DINORAH FRIED [[id number]]  Name of Who is Calling: DINORAH FRIED [[id number]]      What is the request in detail: Would like to speak with staff in regards to a pain medication that was suppose to be called into [facility] [place], [place] - [address] [place] [address]      Can the clinic reply by [facility]: no      What Number to Call Back if not in [facility]: [phone]

## 2020-04-09 NOTE — PROGRESS NOTES
Subjective:      Patient ID: Saba Driver is a 59 y.o. female.    Chief Complaint: Follow-up      HPI  Presents today for follow up from ED visit. She reports increasing abdominal pain recently in the lower pelvis and radiating to the right side.   CT A&P 4/5/2020- shows concern for recurrent disease.  Impression     Abnormal small bowel loops involving the mid to lower abdomen and pelvis with prominent wall and fold thickening, edema/inflammatory appearance, as discussed above infectious/inflammatory and ischemic etiologies would be in the differential, possibility of inflammatory bowel disease or Crohn's disease would be in the differential as well.  Clinical and historical correlation and follow-up recommended.  Pattern is not typical of obstruction however if there is persistent or worsening symptomatology additional follow-up to exclude developing secondary obstruction is recommended.    There is soft tissue nodularity seen throughout the mesenteric fat planes and the omentum with prominent areas seen along the midline as discussed above, this raises concern for mesenteric/omental metastatic disease for which clinical and historical correlation is needed.        Oncologic history:  Referred by Dr. Shakir Alexis for postmenopausal bleeding. P0. On pelvic uterus is enlarged and fixed, unable to perform EMB due to altered anatomy of the cervix. She had been admitted to  for vaginal bleeding and anemia requiring transfusion. Fixed sore R inguinal node. Blind pap returned adenocarcinoma.      Pelvic US 3/12/18  UT enlarged 13.3cm with 5.1cm fibroid anterior. Limited visualization of EMS.  LOV 2.9cm  ROV 3.6cm     Medical co-morbidities include DM, HTN. No anticoagulation.      No prior abdominal surgeries     Family history significant for mom with pancreatic cancer, denies history of breast, ovarian, uterine, or colon cancer.      MMG last year normal per patient.      Seen in consult with me 3/21/18. Constellation  of findings thus far consistent with an advanced gynecologic malignancy. Likely uterine in origin. Inguinal adenopathy would make this Stage IVB. Pap cytology showing adenocarcinoma but need tissue diagnosis. Will plan IR for biopsy of inguinal node. Pelvic disease is surgically unresectable at present. Recommended neoadjuvant chemotherapy based on tissue diagnosis.      IR biopsy 4/4/18  FINAL PATHOLOGIC DIAGNOSIS  RIGHT INGUINAL LYMPH NODE, CT GUIDED BIOPSY WITH ONSITE ADEQUACY (CYTOLOGY AND CELL BLOCK):  - Adenocarcinoma. See note.  Note: The carcinoma is positive for CK7, p53, vimentin and ER (patchy). It is negative for CDX2, CEA, CK20 and WT1. This immunoprofile is compatible with an endometrial origin. All stains have appropriate controls.     Initiate neoadjuvant chemotherapy with Dr. Boyd. Carbo/taxol 4/19/18. IV port.   S/p 3 cycles neoadjuvant chemotherapy carbo/taxol with Dr. Boyd.      CT CAP 6/25/18  Impression   Significant decrease in size in the right inguinal node.  Lobulated enlarged uterus.  RECIST SUMMARY:  Date of prior examination for comparison: 03/22/2018  Lesion 1: Right inguinal node  1.4 cm(smallest diameter ).  Series 2 image 118.  Prior measurement 4.0 cm.      Elected for interval cytoreductive surgery.     S/p BRIAN/BSO 8/2/18. Uncomplicated post operative course. 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  At post op visit, previously much smaller right inguinal adenopathy had grown significantly over the last few week.     s/p right LND on 10/11/2018.   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 Boyd, completed C6/6 carbo/taxol 12/10/18.   CT CAP 12/17/18- essentially shows no evidence of disease.   Impression       Prior hysterectomy.  Interval right inguinal lymph  node dissection.  Previous large necrotic lymph node has been resected. There are few residual nonenlarged lymph nodes at this site which are slightly asymmetric to their contralateral counterparts.  Stable 3 mm right upper lobe pulmonary nodule.  No new soft tissue mass or pathologic lymph node enlargement.  Continued follow-up is advised.      Adjuvant radiation with Dr. Solis completed 50.40Gy to the pelvis and bilateral inguinal regions 1/10/19-2/19/19.     Review of Systems   Constitutional: Negative for appetite change, chills, fatigue and fever.   HENT: Negative for mouth sores.    Respiratory: Negative for cough and shortness of breath.    Cardiovascular: Negative for leg swelling.   Gastrointestinal: Positive for abdominal pain. Negative for blood in stool, constipation and diarrhea.   Endocrine: Negative for cold intolerance.   Genitourinary: Positive for pelvic pain. Negative for dysuria and vaginal bleeding.   Musculoskeletal: Negative for myalgias.   Skin: Negative for rash.   Allergic/Immunologic: Negative.    Neurological: Negative for weakness and numbness.   Hematological: Negative for adenopathy. Does not bruise/bleed easily.   Psychiatric/Behavioral: Negative for confusion.       Objective:   Physical Exam:   Constitutional: She is oriented to person, place, and time. She appears well-developed and well-nourished.    HENT:   Head: Normocephalic and atraumatic.    Eyes: Pupils are equal, round, and reactive to light. EOM are normal.    Neck: Normal range of motion. Neck supple. No thyromegaly present.    Cardiovascular: Normal rate, regular rhythm and intact distal pulses.     Pulmonary/Chest: Effort normal and breath sounds normal. No respiratory distress. She has no wheezes.        Abdominal: Soft. Bowel sounds are normal. She exhibits no distension, no ascites and no mass. There is no tenderness.     Genitourinary: Rectum normal and vagina normal. Pelvic exam was performed with patient supine.  There is no lesion on the right labia. There is no lesion on the left labia. Uterus is absent. There is an absent adnexa. Right adnexum displays no mass. Left adnexum displays no mass. Vaginal cuff normal.Cervix exhibits absence.   Genitourinary Comments: Pain and firmness at the vaginal apex. No visible lesions.            Musculoskeletal: Normal range of motion and moves all extremeties.      Lymphadenopathy:     She has no cervical adenopathy.        Right: No inguinal and no supraclavicular adenopathy present.        Left: No inguinal and no supraclavicular adenopathy present.    Neurological: She is alert and oriented to person, place, and time.    Skin: Skin is warm and dry. No rash noted.    Psychiatric: She has a normal mood and affect.       Assessment:     1. Endometrial cancer        Plan:     Orders Placed This Encounter   Procedures    CT Chest With Contrast        Ambulatory referral/consult  to [facility] Interventional RAD    Ambulatory referral/consult to Palliative Care     We had a long discussion today regarding the constellation of findings which are highly suggestive of recurrent disease. Her initial presentation was Stage IV disease. I discussed with her that recurrent disease is not be curable but potentially treatable over a period of time. She is alone here in [place], her sister lives in [place]. I have recommended we proceed with IR biopsy to establish a tissue diagnosis for recurrent disease. Will initiate pain regimen with Percocet, she is narcotic naive. We obtain CT chest for full metastatic work up. And . I also made the recommendation she meet with palliative care. Further treatment planning pending the above.

## 2020-04-13 NOTE — TELEPHONE ENCOUNTER
----- Message from No Fitzgerald RN sent at 4/13/2020  9:40 AM CDT -----  Good morning,    Dr Lala has placed a palliative care referral for patient. Please call her to get her scheduled.     Thanks,  No

## 2020-04-15 NOTE — PROGRESS NOTES
"Subjective:       Patient ID: Saba Driver is a 59 y.o. female.    Chief Complaint: Lesion    59 year old referred by Dr. Noelle Lala of Gynecology Oncology for discussion of biopsy.  She has a history of endometrial cancer treated in 2018.  Surveillance visit January 2020 without evidence of recurrent disease. At that time, her disease free interval was 11 months and recommendations included continued surveillance in 3 months with .  In there interim, she presented to the ED for new/increased abdominal/pelvic pain.  At her follow up with Dr. Lala it was discussed that CT A&P 4/5/2020 completed in the ED- shows concern for recurrent disease.      Review of Systems   Constitutional: Negative for activity change, appetite change and fever.   HENT: Negative for congestion.    Respiratory: Negative for cough and shortness of breath.    Cardiovascular: Negative for chest pain.   Gastrointestinal: Positive for abdominal pain. Negative for abdominal distention.   Genitourinary: Positive for pelvic pain.   Musculoskeletal: Negative for arthralgias and back pain.       Objective:      Physical Exam   Constitutional: She appears well-developed and well-nourished.   HENT:   Head: Normocephalic and atraumatic.   Eyes: EOM are normal.   Neck: Normal range of motion.   Cardiovascular: Normal rate, regular rhythm and normal heart sounds.   Pulmonary/Chest: Effort normal and breath sounds normal. No respiratory distress. She has no wheezes. She has no rales.   Abdominal: Soft. She exhibits no distension.   Skin: Skin is warm and dry. No rash noted. No erythema.   Psychiatric: She has a normal mood and affect. Her behavior is normal. Judgment and thought content normal.       Imaging:    CT ABD/PELVIS with contrast 4/5/20   "There is soft tissue nodularity seen throughout the mesenteric fat planes and the omentum with prominent areas seen along the midline as discussed above, this raises concern for mesenteric/omental " "metastatic disease for which clinical and historical correlation is needed."      Assessment:       1. Endometrial cancer        Plan:       Patient discussed with Dr. George Walsh.  Plan is biopsy of periumbilical, peritoneal/omental mass with CT and ultrasound guidance.  Discussed how the procedure will be performed, risks (including, but not limited to, pain, bleeding, infection, damage to nearby structures, and the need for additional procedures), benefits, possible complications, pre-post procedure expectations, and alternatives. The patient voices understanding and all questions have been answered.  The patient agrees to proceed as planned. Pre-procedure handout with clinic phone number provided.     "

## 2020-04-16 NOTE — TELEPHONE ENCOUNTER
Call pt to schedule COVID-19 Pre screen test before her IR schedule procedure on Monday 4/20. Contact [place] testing site only open Mon - Friday. Pt need test 24 hrs before procedure. Pt unable to go to any other  testing site due to transportation want have anyone to bring her. Schedule pt to have COVID-19 prescreen test done day of procedure.

## 2020-04-17 NOTE — TELEPHONE ENCOUNTER
----- Message from No Fitzgerald RN sent at 4/17/2020  9:25 AM CDT -----  Contact: DINORAH FRIED [[id number]]  Spoke with patient. States she has been taking the percocet every 4 hours with no relief. Pt rates abdominal pain 10/10. Pt reports not sleeping at night due to the pain. Pt wondering if she could take something else. Informed pt I would send this to Dr Lala for review. Denies any other needs.     Is there something else patient can take to help manage her pain? If so, she would like it called into the [facility] pharmacy on file. Thanks  ----- Message -----  From: Lizzy Nunez  Sent: 4/17/2020   8:39 AM CDT  To: [facility] Staff    Name of Who is Calling: DINORAH FRIED [[id number]]      What is the request in detail: Pt is calling to speak to staff in regards to a pain medication.... Please call to further assist .       Can the clinic reply by [facility]: N      What Number to Call Back if not in [facility]: [phone]

## 2020-04-17 NOTE — TELEPHONE ENCOUNTER
Spoke with patient. States she has been taking the percocet every 4 hours with no relief. Pt rates abdominal pain 10/10. Pt reports not sleeping at night due to the pain. Pt wondering if she could take something else. Informed pt I would send this to Dr Lala for review. Denies any other needs.   ----- Message from Lizzy Nunez sent at 4/17/2020  8:39 AM CDT -----  Contact: DINORAH FRIED [[id number]]  Name of Who is Calling: DINORAH FRIED [[id number]]      What is the request in detail: Pt is calling to speak to staff in regards to a pain medication.... Please call to further assist .       Can the clinic reply by [facility]: N      What Number to Call Back if not in [facility]: [phone]

## 2020-04-20 NOTE — DISCHARGE SUMMARY
Radiology Discharge Summary      Hospital Course: No complications    Admit Date: 4/20/2020  Discharge Date: 04/20/2020     Instructions Given to Patient: Yes  Diet: Resume prior diet  Activity: activity as tolerated and no driving for today    Description of Condition on Discharge: Stable  Vital Signs (Most Recent): Temp: 98.5 °F (36.9 °C) (04/20/20 1215)  Pulse: 77 (04/20/20 1215)  Resp: 17 (04/20/20 1215)  BP: (!) 146/51 (04/20/20 1215)  SpO2: 100 % (04/20/20 1215)    Discharge Disposition: Home    Discharge Diagnosis: Endometrial cancer s/p periumbilical mass biopsy. Follow up as scheduled.    Lew Kay M.D.  Diagnostic and Interventional Radiologist  Department of Radiology  Pager: [phone]

## 2020-04-20 NOTE — PLAN OF CARE
Pt discharged to home.  Discharge instructions given, pt stated understanding.  Dressing to abdomen dry and intact, IV removed.  Pt left via wheelchair with friend to home.

## 2020-04-20 NOTE — SEDATION DOCUMENTATION
biopsy of the abnormal soft tissue complete. Pt tolerated well. VSS. No signs or symptoms of distress noted. Pt will be transferred to ROC[facility] bed 1 and bedside verbal report will be given to RN.

## 2020-04-20 NOTE — SEDATION DOCUMENTATION
Pt arrived to room 173for biopsy of the abnormal soft tissue . Pt awake, alert, and oriented. Allergies reviewed, patient ID'd using 2 identifiers.

## 2020-04-20 NOTE — H&P
Radiology History & Physical      SUBJECTIVE:     Chief Complaint: endometrial cancer     History of Present Illness:  Saba Driver is a 59 y.o. female with history of endometrial cancer and recent CT demonstrating omental and peritoneal soft tissue nodularity who presents to IR for a biopsy of the abnormal soft tissue.     Past Medical History:   Diagnosis Date    Adenocarcinoma 3/24/2018    Diabetes mellitus, type 2     Endometrial cancer 4/14/2018    Glaucoma     Hypertension      Past Surgical History:   Procedure Laterality Date    BREAST LUMPECTOMY      R early 20's L at 13 years old    HYSTERECTOMY  08/02/2018    total abdominal    SC REMOVAL OF OVARY/TUBE(S) Bilateral 08/02/2018    TOTAL ABDOMINAL HYSTERECTOMY W/ BILATERAL SALPINGOOPHORECTOMY N/A 8/2/2018    Procedure: HYSTERECTOMY, TOTAL, ABDOMINAL, WITH BILATERAL SALPINGO-OOPHORECTOMY;  Surgeon: Noelle Lala MD;  Location: [facility] OR [address] [place] [address];  Service: OB/GYN;  Laterality: N/A;       Home Meds:   Prior to Admission medications    Medication Sig Start Date End Date Taking? Authorizing Provider   amLODIPine (NORVASC) 5 MG tablet  1/31/19   Historical Provider, MD   bisoprolol (ZEBETA) 10 MG tablet Take 10 mg by mouth once daily.    Historical Provider, MD   dicyclomine (BENTYL) 20 mg tablet Take 1 tablet (20 mg total) by mouth 2 (two) times daily. 4/5/20 5/5/20  Pasha Chaidez Jr., MD   JANUVIA 100 mg Tab  11/13/19   Historical Provider, MD   LUMIGAN 0.01 % Drop Place 1 drop into both eyes every evening.  5/19/17   Historical Provider, MD   meclizine (ANTIVERT) 25 mg tablet Take 1 tablet (25 mg total) by mouth 3 (three) times daily as needed for Dizziness. 8/27/18   Noelle Lala MD   ondansetron (ZOFRAN) 4 MG tablet Take 1 tablet (4 mg total) by mouth every 8 (eight) hours as needed for Nausea. 4/5/20   Pasha Chaidez Jr., MD   oxyCODONE-acetaminophen (PERCOCET)  mg per tablet Take 1 tablet by mouth every 6 (six) hours as needed  for Pain. 4/17/20   Christine Vann, NP   timolol maleate 0.5% (TIMOPTIC) 0.5 % Drop Place 1 drop into both eyes 2 (two) times daily.  5/19/17   Historical Provider, MD     Anticoagulants/Antiplatelets: no anticoagulation    Allergies:   Review of patient's allergies indicates:   Allergen Reactions    Gabapentin      Swelling,musclespasm    Lyrica [pregabalin]      Swelling, musclespasm     Sedation History:  no adverse reactions    Review of Systems:   Hematological: no known coagulopathies  Respiratory: no shortness of breath  Cardiovascular: no chest pain  Gastrointestinal: no abdominal pain, + nausea  Genito-Urinary: no dysuria  Musculoskeletal: negative  Neurological: no TIA or stroke symptoms         OBJECTIVE:     Vital Signs (Most Recent)       Physical Exam:  ASA: 3  Mallampati: 3    General: no acute distress  Mental Status: alert and oriented to person, place and time  HEENT: normocephalic, atraumatic  Chest: unlabored breathing  Heart: regular heart rate  Abdomen: nondistended  Extremity: moves all extremities    Laboratory  Lab Results   Component Value Date    INR 0.9 04/04/2018       Lab Results   Component Value Date    WBC 12.34 04/05/2020    HGB 12.3 04/05/2020    HCT 40.1 04/05/2020    MCV 82 04/05/2020     04/05/2020      Lab Results   Component Value Date     (H) 04/05/2020     04/05/2020    K 4.0 04/05/2020     04/05/2020    CO2 24 04/05/2020    BUN 10 04/05/2020    CREATININE 0.8 04/05/2020    CALCIUM 9.2 04/05/2020    MG 1.4 (L) 10/04/2018    ALT 21 04/05/2020    AST 18 04/05/2020    ALBUMIN 3.2 (L) 04/05/2020    BILITOT 0.3 04/05/2020       ASSESSMENT/PLAN:     Sedation Plan: up to moderate    Patient will undergo an image guided biopsy of the peritoneal soft tissue mass.      Liliana Butler MD   Department of Radiology  PGY III Resident  Pager: [phone]

## 2020-04-20 NOTE — DISCHARGE INSTRUCTIONS
For questions or concerns call: NATALIE MON-FRI 8 AM- 5PM [phone]. Radiology resident on call [phone].    For immediate concerns that are not emergent, you may call our radiology clinic at: [phone]

## 2020-04-22 NOTE — PLAN OF CARE
Pt arrived to unit from CT. Tolerated port flush. VSS. Pt afebrile. AVS given to pt. Pt in wheelchair, accompanied by MA off unit. No distress noted.

## 2020-04-22 NOTE — TELEPHONE ENCOUNTER
CT chest shows new pulmonary nodule. Path from recent biopsy pending. I spoke with the patient and reviewed plans for treatment once pathology results. She voiced understanding and did not have further questions at this time.

## 2020-04-24 PROBLEM — C54.9: Status: ACTIVE | Noted: 2018-12-21

## 2020-04-24 PROBLEM — R10.9 ABDOMINAL PAIN: Status: ACTIVE | Noted: 2020-01-01

## 2020-04-24 PROBLEM — N95.0 PMB (POSTMENOPAUSAL BLEEDING): Status: RESOLVED | Noted: 2018-03-16 | Resolved: 2020-01-01

## 2020-04-24 PROBLEM — R53.83 FATIGUE: Status: ACTIVE | Noted: 2020-01-01

## 2020-04-24 PROBLEM — R11.2 NAUSEA AND VOMITING: Status: ACTIVE | Noted: 2020-01-01

## 2020-04-24 NOTE — HOSPITAL COURSE
"04/24/2020 - Admit for evaluation and management of diffuse abdominal pain and intractable n/v with emesis  04/25/2020 - Patient with nausea overnight. Has not passed flatus "in a while". Abdominal pain persistent. CT read showed likely distal small bowel obstruction with increased ascites.   04/26/2020 - Nausea x2 overnight, no emesis. Pain not controlled but patient very stoic. Reports "she'll be alright" but acknowledges increase in pain control warranted. Palliative care consulted and recommended PCA and clear liquid diet may be possible. Will attempt today. Not passing flatus. Up in chair during evaluation.   04/27/2020 - Patient reports improved pain with PCA. However, still had severe pain when attempting liquids. Not passing gas. Some watery stool. Afebrile. Stoic, laying in bed "comfortable."   04/28/2020 - Patient reports two episodes of significant pin overnight. The rest of the night at baseline compared to last night. Adjustments made to PCA settings this AM. Moderate HTN, stable. Afebrile. No n/v overnight. Denies flatus.  04/28/2020 - Patient started carbo/taxol treatment yesterday. No issues overnight. Tolerating CLD, minimal flatus, no BM. Pain still controlled with PCA pump. Will work to wean to PO meds today.  04/29/2020 - Patient tolerated chemotherapy well. Reports some fatigue after infusion. She continues to have pain q2-3 hours when sleeping at night, but well controlled during the day with PCA. BP continues to be elevated. IVF at 75 cc/h. BP meds reviewed and will discuss consulting with medicine for recommendations for better control. Pain may be contributing to elevate BP above her likely baseline on home meds.  04/30/2020 - Patient handling transition to PO pain meds very well. Overnight she was able to sleep and rest comfortably. HTN also noted to improve with new pain regimen. Slightly apprehensive about advancing diet but also open to trying Boost and soft diet as tolerated. Ambulating " in the room and to the bathroom well. Meeting appropriate milestones for discharge and goal for home today or tomorrow.   05/01/2020 - Patient with improving nausea overnight with addition of scheduled compazine. Tolerating PO medications and pain remains well controlled. This AM noted hematuria with AM void. Denies dysuria, foul odor. UA >UCx sent. Vitals stable with moderate HTN that has stabilized with pain mgmt. Dexamethasone course discontinued yesterday due to effect on BG control and poor antiemetic effect. SSI increased to moderate correction dosing and januvia restarted. BG overnight all < 300. Will slide with AM breakfast as needed. Soft diet and boost provided. Discharge planning today if able to tolerate PO.   05/02/2020 Seen by Urology yesterday for new gross hematuria. No further workup was recommended given multiple reasons for hematuria and ongoing chemotherapy. Tolerating PO and pain regimen and ambulatory to the the bathroom and in the room. Home health orders are in place for assistance upon discharge. Medically stable with plan for follow up for cancer treatment plan. Plan for discharge to home today.

## 2020-04-24 NOTE — ED PROVIDER NOTES
Encounter Date: 4/24/2020       History     Chief Complaint   Patient presents with    Fatigue     Patient with generalized weakness, recent stomach biopsy, negative covid test on Monday.     59-year-old female with DM2, uterine adenocarcinoma s/p BRIAN-BSO/chemotherapy/radiation and hypertension presents from palliative care office for abdominal pain and fatigue.  The symptoms have been progressively worsening over the past several months.  She reports that abdominal pain radiates throughout the abdomen and seems to be worse with any type of p.o. intake including water.  She takes Percocet at home which used to relief her pain but now pain is too severe.  Reports associated nausea, frequent emesis and diarrhea.  She had dysuria 2 months ago but this has since resolved.  Fatigue has become profound that she has difficulty getting to the bathroom from her bedroom.  She reports associated poor appetite and sensation of racing heartbeat after taking only a few steps.  She endorses subjective fever and chills 2 days ago.  She denies bloody or dark stool, hematemesis, flank pain, chest pain or shortness of breath.        Review of patient's allergies indicates:   Allergen Reactions    Gabapentin      Swelling,musclespasm    Lyrica [pregabalin]      Swelling, musclespasm     Past Medical History:   Diagnosis Date    Adenocarcinoma 3/24/2018    Diabetes mellitus, type 2     Endometrial cancer 4/14/2018    Glaucoma     Hypertension      Past Surgical History:   Procedure Laterality Date    BREAST LUMPECTOMY      R early 20's L at 13 years old    HYSTERECTOMY  08/02/2018    total abdominal    FL REMOVAL OF OVARY/TUBE(S) Bilateral 08/02/2018    TOTAL ABDOMINAL HYSTERECTOMY W/ BILATERAL SALPINGOOPHORECTOMY N/A 8/2/2018    Procedure: HYSTERECTOMY, TOTAL, ABDOMINAL, WITH BILATERAL SALPINGO-OOPHORECTOMY;  Surgeon: Noelle Lala MD;  Location: [facility] OR [address] [place] [address];  Service: OB/GYN;  Laterality: N/A;     Family History    Problem Relation Age of Onset    Diabetes Mother     Hypertension Mother     Pancreatic cancer Mother     Hypertension Maternal Grandmother     Diabetes Maternal Grandmother     Hypertension Maternal Grandfather     Diabetes Maternal Grandfather     Breast cancer Neg Hx     Colon cancer Neg Hx     Ovarian cancer Neg Hx      Social History     Tobacco Use    Smoking status: Never Smoker    Smokeless tobacco: Never Used   Substance Use Topics    Alcohol use: Yes     Comment: occassional use    Drug use: No     Review of Systems   Constitutional: Positive for activity change, appetite change, chills, fatigue, fever and unexpected weight change. Negative for diaphoresis.   HENT: Negative for sore throat.    Respiratory: Negative for cough and shortness of breath.    Cardiovascular: Positive for palpitations. Negative for chest pain and leg swelling.   Gastrointestinal: Positive for abdominal distention, abdominal pain, diarrhea, nausea and vomiting. Negative for anal bleeding, blood in stool, constipation and rectal pain.   Genitourinary: Positive for dysuria. Negative for hematuria and urgency.   Musculoskeletal: Negative for back pain.   Skin: Negative for rash.   Neurological: Positive for light-headedness. Negative for weakness and headaches.   Hematological: Does not bruise/bleed easily.       Physical Exam     Initial Vitals [04/24/20 1531]   BP Pulse Resp Temp SpO2   (!) 166/76 88 16 99.8 °F (37.7 °C) 95 %      MAP       --         Physical Exam    Nursing note and vitals reviewed.  Constitutional: She appears well-developed and well-nourished. She is not diaphoretic. No distress.   HENT:   Head: Normocephalic and atraumatic.   Eyes: EOM are normal. Pupils are equal, round, and reactive to light.   Neck: Normal range of motion.   Cardiovascular: Normal rate, regular rhythm, normal heart sounds and intact distal pulses. Exam reveals no gallop and no friction rub.    No murmur  heard.  Pulmonary/Chest: Breath sounds normal. No respiratory distress. She has no wheezes. She has no rhonchi. She has no rales. She exhibits no tenderness.   Abdominal: Soft. Bowel sounds are normal. She exhibits no distension, no ascites and no mass. There is generalized tenderness. There is no rebound and no guarding.   Abdomen diffusely tender to palpation.  There is some abdominal wall edema but no ascites   Musculoskeletal: Normal range of motion.   Neurological: She is alert and oriented to person, place, and time.   Skin: Skin is warm and dry.   Psychiatric: She has a normal mood and affect.         ED Course   Procedures  Labs Reviewed   CBC W/ AUTO DIFFERENTIAL - Abnormal; Notable for the following components:       Result Value    WBC 13.79 (*)     Hemoglobin 11.4 (*)     Mean Corpuscular Hemoglobin 25.3 (*)     Mean Corpuscular Hemoglobin Conc 29.9 (*)     Platelets 533 (*)     Immature Granulocytes 0.7 (*)     Gran # (ANC) 10.1 (*)     Immature Grans (Abs) 0.09 (*)     Mono # 1.2 (*)     Eos # 0.8 (*)     Lymph% 11.9 (*)     All other components within normal limits   COMPREHENSIVE METABOLIC PANEL - Abnormal; Notable for the following components:    Glucose 194 (*)     Albumin 2.7 (*)     Alkaline Phosphatase 304 (*)     All other components within normal limits   MAGNESIUM - Abnormal; Notable for the following components:    Magnesium 1.4 (*)     All other components within normal limits   HEMOGLOBIN A1C - Abnormal; Notable for the following components:    Hemoglobin A1C 8.4 (*)     Estimated Avg Glucose 194 (*)     All other components within normal limits    Narrative:     add on [facility] #[id number] per Ayan Sullivan MD @ 18:14  04/24/2020    LIPASE   LACTIC ACID, PLASMA   HEMOGLOBIN A1C   POCT GLUCOSE MONITORING CONTINUOUS          Imaging Results          CT Abdomen Pelvis With Contrast (Final result)  Result time 04/24/20 20:43:08    Final result by Niles Gilmore MD (04/24/20 20:43:08)                  Impression:      History of uterine cancer.  Findings suggest peritoneal carcinomatosis with interval increase in ascites and peritoneal disease compared to prior.  Distal partial small bowel obstruction suggested with likely transition in the distal small bowel in colonic loops in the pelvis not clearly demarcated as described above.    Remainder of the study appears similar to exam earlier this month.      Electronically signed by: Niles Gilmore MD  Date:    04/24/2020  Time:    20:43             Narrative:    EXAMINATION:  CT ABDOMEN PELVIS WITH CONTRAST    CLINICAL HISTORY:  Bowel obstruction, high-grade;Weight loss, unintended, non-localized abd pain;uterine CA;    TECHNIQUE:  Low dose axial images, sagittal and coronal reformations were obtained from the lung bases to the pubic symphysis following the IV administration of 100 mL of Omnipaque 350 and the oral administration of 30 mL of Omnipaque 350.    COMPARISON:  April 5, 2020.    FINDINGS:  Abdomen:    - Lower thorax:Unremarkable.    - Lung bases: Platelike atelectasis left base.    - Liver: No focal mass.    - Gallbladder: No calcified gallstones.    - Bile Ducts: No evidence of intra or extra hepatic biliary ductal dilation.    - Spleen: Negative.    - Kidneys: No mass or hydronephrosis.    - Adrenals: Unremarkable.    - Pancreas: No mass or peripancreatic fat stranding.    - Retroperitoneum:  No significant adenopathy.    - Vascular: No abdominal aortic aneurysm.    - Abdominal wall:  Unremarkable.    Bowel/Mesentery and peritoneal cavity:    Interval increase in ascites compared to prior.  Nodular soft tissue thickening involving the mesentery and omentum suggesting peritoneal carcinomatosis.  Associated wall thickening of the bowel, similar to prior, which may represent additional peritoneal disease.  There is mild distention of contrast filled proximal to mid small bowel loops with contrast tapering in the distal small bowel.  No contrast  identified in the colon.  There is clumping of unopacified small bowel loops in the pelvis with nodular wall thickening noted.  No definite zone of transition identified but findings suggest a distal partial small bowel obstruction.    Pelvis:    Uterus is absent.  Urinary bladder is nondistended and unremarkable.  No lymphadenopathy.    Bones:  Unchanged from prior.                                 Medical Decision Making:   History:   Old Medical Records: I decided to obtain old medical records.  Old Records Summarized: records from clinic visits.       <> Summary of Records: Patient had virtual visit earlier today with palliative care where she was advised to come to the ED for evaluation given concern for SBO/ascites/etc  Initial Assessment:   59-year-old female with recurrent uterine cancer presenting for fatigue and abdominal pain.  Upon arrival in the ED she is mildly hypertensive 166/76 with otherwise normal vitals.  She appears weak and ill but not acutely toxic.  Abdomen is diffusely tender with some abdominal wall edema.  Differential Diagnosis:   Recurrent malignancy  Malignant SBO  Blood loss anemia  Dehydration  Electrolyte derangement  Infection  Clinical Tests:   Lab Tests: Ordered and Reviewed  Radiological Study: Ordered and Reviewed  Medical Tests: Ordered and Reviewed  ED Management:  Check labs, give fluid bolus, morphine, Zofran, do CT abdomen pelvis and reassess.    Labs notable for hypomagnesemia, will replete IV. CT is pending. Patient will be admitted to oncology for further management.                   ED Course as of Apr 24 1639 Fri Apr 24, 2020   1632 Mild leukocytosis and thrombocytosis.   CBC auto differential(!) [CC]      ED Course User Index  [CC] Opal Yi PA-C                Clinical Impression:       ICD-10-CM ICD-9-CM   1. Fatigue R53.83 780.79         Disposition:   Disposition: Admitted  Condition: Fair                        Opal Yi PA-C  04/25/20  2232

## 2020-04-24 NOTE — TELEPHONE ENCOUNTER
Called patient after talking with Dr. Leigh with palliative care today. She does not seem to be doing well at home. Unable to get out of bed and sound audibly short of breath on the phone.     She has recent recurrence of uterine carcinosarcoma.   Ultimately would like to try and restart chemotherapy. I recommended she come to the hospital ED as it sounds like she is really struggling at home. Will provide supportive care and workup, may be developing ascites or malignant obstruction. Will consult palliative medicine as they would like to follow and assist as well while inpatient.    She voiced understanding.

## 2020-04-24 NOTE — PATIENT INSTRUCTIONS
Dr. Lala will contact you regarding instructions for returning to the hospital for further evaluation

## 2020-04-24 NOTE — HPI
59-year-old female with DM2, uterine adenocarcinoma s/p BRIAN-BSO/chemotherapy/radiation and hypertension presents after virtual visit with palliative care office for abdominal pain and fatigue.  The symptoms have been progressively worsening over the past month. Patient with CT 4/5 showing likely endometrial recurrence and then omental mass biopsy 4/20 with pathological confirmation. She reports that abdominal pain radiates throughout the abdomen and seems to be worse with any type of p.o. intake including water. She takes Percocet at home which used to relief her pain but now pain is too severe.  Reports associated nausea, frequent emesis and diarrhea.  She had dysuria 2 months ago but this has since resolved.  Fatigue has become profound that she has difficulty getting to the bathroom from her bedroom.  Denies sick contact, fever, dyspnea. Had negative COVID test 4 days ago.  She denies bloody or dark stool, hematemesis, flank pain, chest pain.

## 2020-04-24 NOTE — ED TRIAGE NOTES
Patient states weakness and fatigue x 2 months, saw GYN/ONC, sent to ED, positive abd pain ,n/v/d. Multiple concerns.

## 2020-04-24 NOTE — PROGRESS NOTES
Established Patient - Audio Only Telehealth Visit     The patient location is: home  The chief complaint leading to consultation is: goals of care in the setting of recurrent endometrial cancer  Visit type: Virtual visit with audio only (telephone)     The reason for the audio only service rather than synchronous audio and video virtual visit was related to technical difficulties or patient preference/necessity.     Each patient to whom I provide medical services by telemedicine is:  (1) informed of the relationship between the physician and patient and the respective role of any other health care provider with respect to management of the patient; and (2) notified that they may decline to receive medical services by telemedicine and may withdraw from such care at any time. Patient verbally consented to receive this service via voice-only telephone call.     This service was not originating from a related E/M service provided within the previous 7 days nor will  to an E/M service or procedure within the next 24 hours or my soonest available appointment.  Prevailing standard of care was able to be met in this audio-only visit.        Consult Note  Palliative Care      Consult Requested By: Dr. Noelle Lala  Reason for Consult: recurrent endometrial cancer      ASSESSMENT/PLAN:     Plan/Recommendations:  Diagnoses and all orders for this visit:    Abdominal pain, unspecified abdominal location    Endometrial cancer  -     Concern for malignant pathology causing symptoms and recent decline    Fatigue/Unintentional weight loss/Failure to thrive    Palliative care by specialist  Discussed with Dr. Lala who agrees the pt should be seen in the hospital for further work up of symptoms, rule out partial SBO, mesenteric ischemia, large volume ascites.  Will refer to ER        Ethical / Legal Advance Care Planning      - surrogate decision maker: pt's sister   Adela Palacios Sister [phone]    Also has a brother locally.  Voices she is closet with a niece that lives in [place]   - Code Status: FC   - [place]: none    - other advance directive: none     Will follow in house as an inpt pal care consult once admitted.  Please place a formal consult for palliative care consult.    SUBJECTIVE:     History of Present Illness:  59 year old referred by Dr. Noelle Lala of Gynecology Oncology for palliative care consultation.  She has a history of Stage 4 endometrial cancer treated in 2018.  Surveillance visit January 2020 without evidence of recurrent disease. At that time, her disease free interval was 11 months and recommendations included continued surveillance in 3 months with .  In there interim, she presented to the ED for new/increased abdominal/pelvic pain.  At her follow up with Dr. Lala it was discussed that CT A&P 4/5/2020 completed in the ED- shows concern for recurrent disease. She presented for biopsy by IR and awaiting results. Concern for metastatic disease to the lungs and mesenteric/bowel involvement on recent CT scan.    Pt is at home and complains of worsening but intermittent generalized abdominal pain.  She states that it is exacerbated by minimal oral intake and is characterized as sharp, stabbing, and achy.  She states that starts within 15-30 minutes after eating and can last anywhere up to 1 hr.  She states that it is debilitating and causes severe irais prandial anxiety.  She also states she has had persistent diarrhea for the past week up to 3-5 times per day.  Due to her inability to tolerate anything by mouth for the past several weeks, she also is experiencing severe fatigue.  The she also states that the size of her abdomen has nearly doubled in the past few weeks.    She is an she lives alone at home and has been spending the majority of any given day in bed and has to crawl to the bathroom.    Past Medical History:   Diagnosis Date    Adenocarcinoma 3/24/2018     Diabetes mellitus, type 2     Endometrial cancer 4/14/2018    Glaucoma     Hypertension      Past Surgical History:   Procedure Laterality Date    BREAST LUMPECTOMY      R early 20's L at 13 years old    HYSTERECTOMY  08/02/2018    total abdominal    ID REMOVAL OF OVARY/TUBE(S) Bilateral 08/02/2018    TOTAL ABDOMINAL HYSTERECTOMY W/ BILATERAL SALPINGOOPHORECTOMY N/A 8/2/2018    Procedure: HYSTERECTOMY, TOTAL, ABDOMINAL, WITH BILATERAL SALPINGO-OOPHORECTOMY;  Surgeon: Noelle Lala MD;  Location: [facility] OR [address] [place] [address];  Service: OB/GYN;  Laterality: N/A;     Family History   Problem Relation Age of Onset    Diabetes Mother     Hypertension Mother     Pancreatic cancer Mother     Hypertension Maternal Grandmother     Diabetes Maternal Grandmother     Hypertension Maternal Grandfather     Diabetes Maternal Grandfather     Breast cancer Neg Hx     Colon cancer Neg Hx     Ovarian cancer Neg Hx      Review of patient's allergies indicates:   Allergen Reactions    Gabapentin      Swelling,musclespasm    Lyrica [pregabalin]      Swelling, musclespasm       Medications:    Current Outpatient Medications:     amLODIPine (NORVASC) 5 MG tablet, , Disp: , Rfl:     bisoprolol (ZEBETA) 10 MG tablet, Take 10 mg by mouth once daily., Disp: , Rfl:     dicyclomine (BENTYL) 20 mg tablet, Take 1 tablet (20 mg total) by mouth 2 (two) times daily., Disp: 20 tablet, Rfl: 0    JANUVIA 100 mg Tab, , Disp: , Rfl:     LUMIGAN 0.01 % Drop, Place 1 drop into both eyes every evening. , Disp: , Rfl:     meclizine (ANTIVERT) 25 mg tablet, Take 1 tablet (25 mg total) by mouth 3 (three) times daily as needed for Dizziness., Disp: 30 tablet, Rfl: 0    ondansetron (ZOFRAN) 4 MG tablet, Take 1 tablet (4 mg total) by mouth every 8 (eight) hours as needed for Nausea., Disp: 12 tablet, Rfl: 0    oxyCODONE-acetaminophen (PERCOCET)  mg per tablet, Take 1 tablet by mouth every 6 (six) hours as needed for Pain., Disp: 60 tablet,  Rfl: 0    timolol maleate 0.5% (TIMOPTIC) 0.5 % Drop, Place 1 drop into both eyes 2 (two) times daily. , Disp: , Rfl:       24h Oral Morphine Equivalents (OME): I perco    OBJECTIVE:     Symptom Assessment (ESAS 0-10 scale)    ESAS 0 1 2 3 4 5 6 7 8 9 10   Pain []  []  []  []  []  []  []  []  []  []  []    Dyspnea []  []  []  []  []  []  []  []  []  []  []    Anxiety []  []  []  []  []  []  []  []  []  []  []    Nausea []  []  []  []  []  []  []  []  []  []  []    Depression  []  []  []  []  []  []  []  []  []  []  []    Anorexia []  []  []  []  []  []  []  []  []  []  []    Fatigue []  []  []  []  []  []  []  []  []  []  []    Insomnia []  []  []  []  []  []  []  []  []  []  []    Restlessness  []  []  []  []  []  []  []  []  []  []  []    Agitation []  []  []  []  []  []  []  []  []  []  []        Constipation    no  Bowel Management Plan (BMP): no  Diarrhea        yes  Comments:   Perfromance Status: PPS Score 50  ROS:  Review of Systems    Physical Exam:  Vitals:    Physical Exam    Labs:  CBC:   WBC   Date Value Ref Range Status   04/05/2020 12.34 3.90 - 12.70 K/uL Final     Hemoglobin   Date Value Ref Range Status   04/05/2020 12.3 12.0 - 16.0 g/dL Final     Hematocrit   Date Value Ref Range Status   04/05/2020 40.1 37.0 - 48.5 % Final     Mean Corpuscular Volume   Date Value Ref Range Status   04/05/2020 82 82 - 98 fL Final     Platelets   Date Value Ref Range Status   04/05/2020 345 150 - 350 K/uL Final           LFT:   Lab Results   Component Value Date    AST 18 04/05/2020    ALKPHOS 197 (H) 04/05/2020    BILITOT 0.3 04/05/2020       Albumin:   Albumin   Date Value Ref Range Status   04/05/2020 3.2 (L) 3.5 - 5.2 g/dL Final     Protein:   Total Protein   Date Value Ref Range Status   04/05/2020 7.5 6.0 - 8.4 g/dL Final       Radiology:I have reviewed all pertinent imaging results/findings within the past 24 hours.  CT abd pelv  Impression       Abnormal small bowel loops involving the mid to lower abdomen  and pelvis with prominent wall and fold thickening, edema/inflammatory appearance, as discussed above infectious/inflammatory and ischemic etiologies would be in the differential, possibility of inflammatory bowel disease or Crohn's disease would be in the differential as well.  Clinical and historical correlation and follow-up recommended.  Pattern is not typical of obstruction however if there is persistent or worsening symptomatology additional follow-up to exclude developing secondary obstruction is recommended.    There is soft tissue nodularity seen throughout the mesenteric fat planes and the omentum with prominent areas seen along the midline as discussed above, this raises concern for mesenteric/omental metastatic disease for which clinical and historical correlation is needed.    Mild free fluid of the abdomen and pelvis, there is no evidence for free intraperitoneal air.    This report was flagged in Epic as abnormal.         Psychosocial/Cultural/Spiritual: did not discuss due to severity of symptoms      > 50% of 45 min visit spent in chart review, face to face discussion of goals of care,  symptom assessment, coordination of care and emotional support.      Signature: Maikol Leigh MD

## 2020-04-24 NOTE — CONSULTS
[facility]  Gynecologic Oncology  Consult Note    Patient Name: Saba Driver  MRN: [id number]  Admission Date: 4/24/2020  Hospital Length of Stay: 0 days  Attending Provider: Ayan Sullivan MD  Primary Care Provider: Adela Verma MD  Principal Problem: <principal problem not specified>     Consults  Subjective:     Chief Complaint/Reason for Admission: N/V, abdominal pain    History of Present Illness:   59-year-old female with DM2, uterine adenocarcinoma s/p BRIAN-BSO/chemotherapy/radiation and hypertension presents after virtual visit with palliative care office for abdominal pain and fatigue.  The symptoms have been progressively worsening over the past month. Patient with CT 4/5 showing likely endometrial recurrence and then omental mass biopsy 4/20 with pathological confirmation. She reports that abdominal pain radiates throughout the abdomen and seems to be worse with any type of p.o. intake including water. She takes Percocet at home which used to relief her pain but now pain is too severe.  Reports associated nausea, frequent emesis and diarrhea.  She had dysuria 2 months ago but this has since resolved.  Fatigue has become profound that she has difficulty getting to the bathroom from her bedroom.   Denies sick contact, fever, dyspnea. Had negative COVID test 4 days ago.  She denies bloody or dark stool, hematemesis, flank pain, chest pain.    Oncology Treatment Plan:   OP GYN PACLITAXEL CARBOPLATIN (AUC 6) Q3W    Oncology History:     Oncologic history:  Referred by Dr. Shakir Alexis for postmenopausal bleeding. P0. On pelvic uterus is enlarged and fixed, unable to perform EMB due to altered anatomy of the cervix. She had been admitted to  for vaginal bleeding and anemia requiring transfusion. Fixed sore R inguinal node. Blind pap returned adenocarcinoma.      Pelvic US 3/12/18  UT enlarged 13.3cm with 5.1cm fibroid anterior. Limited visualization of EMS.  LOV 2.9cm  ROV  3.6cm     Medical co-morbidities include DM, HTN. No anticoagulation.      No prior abdominal surgeries     Family history significant for mom with pancreatic cancer, denies history of breast, ovarian, uterine, or colon cancer.      MMG last year normal per patient.      Seen in consult with me 3/21/18. Constellation of findings thus far consistent with an advanced gynecologic malignancy. Likely uterine in origin. Inguinal adenopathy would make this Stage IVB. Pap cytology showing adenocarcinoma but need tissue diagnosis. Will plan IR for biopsy of inguinal node. Pelvic disease is surgically unresectable at present. Recommended neoadjuvant chemotherapy based on tissue diagnosis.      IR biopsy 4/4/18  FINAL PATHOLOGIC DIAGNOSIS  RIGHT INGUINAL LYMPH NODE, CT GUIDED BIOPSY WITH ONSITE ADEQUACY (CYTOLOGY AND CELL BLOCK):  - Adenocarcinoma. See note.  Note: The carcinoma is positive for CK7, p53, vimentin and ER (patchy). It is negative for CDX2, CEA, CK20 and WT1. This immunoprofile is compatible with an endometrial origin. All stains have appropriate controls.     Initiate neoadjuvant chemotherapy with Dr. Boyd. Carbo/taxol 4/19/18. IV port.   S/p 3 cycles neoadjuvant chemotherapy carbo/taxol with Dr. Boyd.      CT CAP 6/25/18  Impression   Significant decrease in size in the right inguinal node.  Lobulated enlarged uterus.  RECIST SUMMARY:  Date of prior examination for comparison: 03/22/2018  Lesion 1: Right inguinal node  1.4 cm(smallest diameter ).  Series 2 image 118.  Prior measurement 4.0 cm.      Elected for interval cytoreductive surgery.     S/p BRIAN/BSO 8/2/18. Uncomplicated post operative course. 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  At post op visit, previously much smaller right inguinal adenopathy had grown significantly over the last few week.     s/p right LND on 10/11/2018.   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 Boyd, completed C6/6 carbo/taxol 12/10/18.   CT CAP 12/17/18- essentially shows no evidence of disease.   Impression       Prior hysterectomy.  Interval right inguinal lymph node dissection.  Previous large necrotic lymph node has been resected. There are few residual nonenlarged lymph nodes at this site which are slightly asymmetric to their contralateral counterparts.  Stable 3 mm right upper lobe pulmonary nodule.  No new soft tissue mass or pathologic lymph node enlargement.  Continued follow-up is advised.      Adjuvant radiation with Dr. Solis completed 50.40Gy to the pelvis and bilateral inguinal regions 1/10/19-2/19/19.       Past Medical History:   Diagnosis Date    Adenocarcinoma 3/24/2018    Diabetes mellitus, type 2     Endometrial cancer 4/14/2018    Glaucoma     Hypertension      Past Surgical History:   Procedure Laterality Date    BREAST LUMPECTOMY      R early 20's L at 13 years old    HYSTERECTOMY  08/02/2018    total abdominal    PA REMOVAL OF OVARY/TUBE(S) Bilateral 08/02/2018    TOTAL ABDOMINAL HYSTERECTOMY W/ BILATERAL SALPINGOOPHORECTOMY N/A 8/2/2018    Procedure: HYSTERECTOMY, TOTAL, ABDOMINAL, WITH BILATERAL SALPINGO-OOPHORECTOMY;  Surgeon: Noelle Lala MD;  Location: [facility] OR [address] [place] [address];  Service: OB/GYN;  Laterality: N/A;     Family History     Problem Relation (Age of Onset)    Diabetes Mother, Maternal Grandmother, Maternal Grandfather    Hypertension Mother, Maternal Grandmother, Maternal Grandfather    Pancreatic cancer Mother        Tobacco Use    Smoking status: Never Smoker    Smokeless tobacco: Never Used   Substance and Sexual Activity    Alcohol use: Yes     Comment: occassional use    Drug use: No    Sexual activity: Never         (Not in a hospital admission)    Review of patient's allergies indicates:   Allergen  Reactions    Gabapentin      Swelling,musclespasm    Lyrica [pregabalin]      Swelling, musclespasm       Review of Systems   Objective:     Vital Signs (Most Recent):  Temp: 99.8 °F (37.7 °C) (04/24/20 1531)  Pulse: 88 (04/24/20 1531)  Resp: 16 (04/24/20 1531)  BP: (!) 166/76 (04/24/20 1531)  SpO2: 95 % (04/24/20 1531) Vital Signs (24h Range):  Temp:  [99.8 °F (37.7 °C)] 99.8 °F (37.7 °C)  Pulse:  [88] 88  Resp:  [16] 16  SpO2:  [95 %] 95 %  BP: (166)/(76) 166/76        There is no height or weight on file to calculate BMI.    Physical Exam:   Constitutional: She is oriented to person, place, and time. She appears well-developed and well-nourished. No distress.    HENT:   Head: Normocephalic and atraumatic.    Eyes: Conjunctivae are normal. Right eye exhibits no discharge. Left eye exhibits no discharge.    Neck: Neck supple. No tracheal deviation present.    Cardiovascular: Normal rate and regular rhythm.     Pulmonary/Chest: Effort normal and breath sounds normal.        Abdominal: Soft. She exhibits abdominal incision. She exhibits no distension. There is tenderness (mild tenderness to deep palpation). There is no rebound and no guarding.                 Neurological: She is alert and oriented to person, place, and time.    Skin: She is not diaphoretic.        Laboratory:  Recent Lab Results       04/24/20  1615        Albumin 2.7     Alkaline Phosphatase 304     ALT 35     Anion Gap 15     AST 30     Baso # 0.05     Basophil% 0.4     BILIRUBIN TOTAL 0.3  Comment:  For infants and newborns, interpretation of results should be based  on gestational age, weight and in agreement with clinical  observations.  Premature Infant recommended reference ranges:  Up to 24 hours.............<8.0 mg/dL  Up to 48 hours............<12.0 mg/dL  3-5 days..................<15.0 mg/dL  6-29 days.................<15.0 mg/dL       BUN, Bld 11     Calcium 10.0     Chloride 97     CO2 27     Creatinine 0.8     Differential Method  Automated     eGFR if African American >60.0     eGFR if non  >60.0  Comment:  Calculation used to obtain the estimated glomerular filtration  rate (eGFR) is the CKD-EPI equation.        Eos # 0.8     Eosinophil% 5.6     Glucose 194     Gran # (ANC) 10.1     Gran% 72.9     Hematocrit 38.1     Hemoglobin 11.4     Immature Grans (Abs) 0.09  Comment:  Mild elevation in immature granulocytes is non specific and   can be seen in a variety of conditions including stress response,   acute inflammation, trauma and pregnancy. Correlation with other   laboratory and clinical findings is essential.       Immature Granulocytes 0.7     Lactate, Leander 1.0  Comment:  Falsely low lactic acid results can be found in samples   containing >=13.0 mg/dL total bilirubin and/or >=3.5 mg/dL   direct bilirubin.       Lipase 8     Lymph # 1.6     Lymph% 11.9     Magnesium 1.4     MCH 25.3     MCHC 29.9     MCV 85     Mono # 1.2     Mono% 8.5     MPV 9.4     nRBC 0     Platelets 533     Potassium 4.4     PROTEIN TOTAL 7.6     RBC 4.51     RDW 13.8     Sodium 139     WBC 13.79           Diagnostic Results:  Labs: Reviewed  CT: Reviewed - pending    Assessment/Plan:     Nausea and vomiting  - Lipase pending  - Zofran PRN  - IVF bolus given, IVF ordered  - Diet: NPO    Abdominal pain  - CT abdomen pelvis with IV and oral contrast pending  - Daily CBC, CMP  - Lipase pending  - UA/urine culture ordered    Fatigue  - IVF  - Will order PT/OT tomorrow  - Consider dietary consult to assess nutritional requirements    Cancer of inguinal/lower limb lymph nodes, secondary  - Recurrent endometrial cancer  - CT of abdomen pelvis to further elucidate recurrence  - Consider palliative chemotherapy inpatient  - Palliative care consult placed  - Will start lovenox tomorrow for chemoprophylaxis of VTE, SCD in the meantime    Diabetes mellitus, type 2  - hold januvia as patient not taking in PO  - SSI ordered with QID POCT glucose  check    Hypertension  - continue norvasc daily  - Vitals per floor protocol        Thank you for your consult. I will follow-up with patient. Please contact us if you have any additional questions.    Jose Underwood MD  Gynecologic Oncology  [facility]

## 2020-04-24 NOTE — SUBJECTIVE & OBJECTIVE
Oncology Treatment Plan:   OP GYN PACLITAXEL CARBOPLATIN (AUC 6) Q3W    Oncology History:     Oncologic history:  Referred by Dr. Shakir Alexis for postmenopausal bleeding. P0. On pelvic uterus is enlarged and fixed, unable to perform EMB due to altered anatomy of the cervix. She had been admitted to  for vaginal bleeding and anemia requiring transfusion. Fixed sore R inguinal node. Blind pap returned adenocarcinoma.      Pelvic US 3/12/18  UT enlarged 13.3cm with 5.1cm fibroid anterior. Limited visualization of EMS.  LOV 2.9cm  ROV 3.6cm     Medical co-morbidities include DM, HTN. No anticoagulation.      No prior abdominal surgeries     Family history significant for mom with pancreatic cancer, denies history of breast, ovarian, uterine, or colon cancer.      MMG last year normal per patient.      Seen in consult with me 3/21/18. Constellation of findings thus far consistent with an advanced gynecologic malignancy. Likely uterine in origin. Inguinal adenopathy would make this Stage IVB. Pap cytology showing adenocarcinoma but need tissue diagnosis. Will plan IR for biopsy of inguinal node. Pelvic disease is surgically unresectable at present. Recommended neoadjuvant chemotherapy based on tissue diagnosis.      IR biopsy 4/4/18  FINAL PATHOLOGIC DIAGNOSIS  RIGHT INGUINAL LYMPH NODE, CT GUIDED BIOPSY WITH ONSITE ADEQUACY (CYTOLOGY AND CELL BLOCK):  - Adenocarcinoma. See note.  Note: The carcinoma is positive for CK7, p53, vimentin and ER (patchy). It is negative for CDX2, CEA, CK20 and WT1. This immunoprofile is compatible with an endometrial origin. All stains have appropriate controls.     Initiate neoadjuvant chemotherapy with Dr. Boyd. Carbo/taxol 4/19/18. IV port.   S/p 3 cycles neoadjuvant chemotherapy carbo/taxol with Dr. Boyd.      CT CAP 6/25/18  Impression   Significant decrease in size in the right inguinal node.  Lobulated enlarged uterus.  RECIST SUMMARY:  Date of prior examination for comparison:  03/22/2018  Lesion 1: Right inguinal node  1.4 cm(smallest diameter ).  Series 2 image 118.  Prior measurement 4.0 cm.      Elected for interval cytoreductive surgery.     S/p BRIAN/BSO 8/2/18. Uncomplicated post operative course. 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  At post op visit, previously much smaller right inguinal adenopathy had grown significantly over the last few week.     s/p right LND on 10/11/2018.   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 Boyd, completed C6/6 carbo/taxol 12/10/18.   CT CAP 12/17/18- essentially shows no evidence of disease.   Impression       Prior hysterectomy.  Interval right inguinal lymph node dissection.  Previous large necrotic lymph node has been resected. There are few residual nonenlarged lymph nodes at this site which are slightly asymmetric to their contralateral counterparts.  Stable 3 mm right upper lobe pulmonary nodule.  No new soft tissue mass or pathologic lymph node enlargement.  Continued follow-up is advised.      Adjuvant radiation with Dr. Solis completed 50.40Gy to the pelvis and bilateral inguinal regions 1/10/19-2/19/19.       Past Medical History:   Diagnosis Date    Adenocarcinoma 3/24/2018    Diabetes mellitus, type 2     Endometrial cancer 4/14/2018    Glaucoma     Hypertension      Past Surgical History:   Procedure Laterality Date    BREAST LUMPECTOMY      R early 20's L at 13 years old    HYSTERECTOMY  08/02/2018    total abdominal    IL REMOVAL OF OVARY/TUBE(S) Bilateral 08/02/2018    TOTAL ABDOMINAL HYSTERECTOMY W/ BILATERAL SALPINGOOPHORECTOMY N/A 8/2/2018    Procedure: HYSTERECTOMY, TOTAL, ABDOMINAL, WITH BILATERAL SALPINGO-OOPHORECTOMY;  Surgeon: Noelle Lala MD;  Location: [facility] OR [address] [place] [address];  Service: OB/GYN;   Laterality: N/A;     Family History     Problem Relation (Age of Onset)    Diabetes Mother, Maternal Grandmother, Maternal Grandfather    Hypertension Mother, Maternal Grandmother, Maternal Grandfather    Pancreatic cancer Mother        Tobacco Use    Smoking status: Never Smoker    Smokeless tobacco: Never Used   Substance and Sexual Activity    Alcohol use: Yes     Comment: occassional use    Drug use: No    Sexual activity: Never         (Not in a hospital admission)    Review of patient's allergies indicates:   Allergen Reactions    Gabapentin      Swelling,musclespasm    Lyrica [pregabalin]      Swelling, musclespasm       Review of Systems   Objective:     Vital Signs (Most Recent):  Temp: 99.8 °F (37.7 °C) (04/24/20 1531)  Pulse: 88 (04/24/20 1531)  Resp: 16 (04/24/20 1531)  BP: (!) 166/76 (04/24/20 1531)  SpO2: 95 % (04/24/20 1531) Vital Signs (24h Range):  Temp:  [99.8 °F (37.7 °C)] 99.8 °F (37.7 °C)  Pulse:  [88] 88  Resp:  [16] 16  SpO2:  [95 %] 95 %  BP: (166)/(76) 166/76        There is no height or weight on file to calculate BMI.    Physical Exam:   Constitutional: She is oriented to person, place, and time. She appears well-developed and well-nourished. No distress.    HENT:   Head: Normocephalic and atraumatic.    Eyes: Conjunctivae are normal. Right eye exhibits no discharge. Left eye exhibits no discharge.    Neck: Neck supple. No tracheal deviation present.    Cardiovascular: Normal rate and regular rhythm.     Pulmonary/Chest: Effort normal and breath sounds normal.        Abdominal: Soft. She exhibits abdominal incision. She exhibits no distension. There is tenderness (mild tenderness to deep palpation). There is no rebound and no guarding.                 Neurological: She is alert and oriented to person, place, and time.    Skin: She is not diaphoretic.        Laboratory:  Recent Lab Results       04/24/20  1615        Albumin 2.7     Alkaline Phosphatase 304     ALT 35     Anion Gap  15     AST 30     Baso # 0.05     Basophil% 0.4     BILIRUBIN TOTAL 0.3  Comment:  For infants and newborns, interpretation of results should be based  on gestational age, weight and in agreement with clinical  observations.  Premature Infant recommended reference ranges:  Up to 24 hours.............<8.0 mg/dL  Up to 48 hours............<12.0 mg/dL  3-5 days..................<15.0 mg/dL  6-29 days.................<15.0 mg/dL       BUN, Bld 11     Calcium 10.0     Chloride 97     CO2 27     Creatinine 0.8     Differential Method Automated     eGFR if African American >60.0     eGFR if non  >60.0  Comment:  Calculation used to obtain the estimated glomerular filtration  rate (eGFR) is the CKD-EPI equation.        Eos # 0.8     Eosinophil% 5.6     Glucose 194     Gran # (ANC) 10.1     Gran% 72.9     Hematocrit 38.1     Hemoglobin 11.4     Immature Grans (Abs) 0.09  Comment:  Mild elevation in immature granulocytes is non specific and   can be seen in a variety of conditions including stress response,   acute inflammation, trauma and pregnancy. Correlation with other   laboratory and clinical findings is essential.       Immature Granulocytes 0.7     Lactate, Leander 1.0  Comment:  Falsely low lactic acid results can be found in samples   containing >=13.0 mg/dL total bilirubin and/or >=3.5 mg/dL   direct bilirubin.       Lipase 8     Lymph # 1.6     Lymph% 11.9     Magnesium 1.4     MCH 25.3     MCHC 29.9     MCV 85     Mono # 1.2     Mono% 8.5     MPV 9.4     nRBC 0     Platelets 533     Potassium 4.4     PROTEIN TOTAL 7.6     RBC 4.51     RDW 13.8     Sodium 139     WBC 13.79           Diagnostic Results:  Labs: Reviewed  CT: Reviewed - pending

## 2020-04-24 NOTE — ASSESSMENT & PLAN NOTE
- CT abdomen pelvis with IV and oral contrast pending  - Daily CBC, CMP  - Lipase pending  - UA/urine culture ordered

## 2020-04-24 NOTE — ED NOTES
Patient identifiers verified and correct for Ms Driver  C/C: Abd pain , weakness SEE NN  APPEARANCE: awake and alert in NAD.  SKIN: warm, dry and intact. No breakdown or bruising.  MUSCULOSKELETAL: Patient moving all extremities spontaneously, no obvious swelling or deformities noted. Ambulates independently.  RESPIRATORY: Denies shortness of breath.Respirations unlabored. Chills with temp to 99 last night  CARDIAC: Denies CP, 2+ distal pulses; no peripheral edema  ABDOMEN:Abdomen large, round, pain to radiates left to right , positive nausea, positive emesis, diarrhea x 4 since yesterday  : voids spontaneously, denies difficulty  Neurologic: AAO x 4; follows commands equal strength in all extremities; denies numbness/tingling. Denies dizziness Denies weakness

## 2020-04-24 NOTE — ASSESSMENT & PLAN NOTE
- Recurrent endometrial cancer  - CT of abdomen pelvis to further elucidate recurrence  - Consider palliative chemotherapy inpatient  - Palliative care consult placed  - Will start lovenox tomorrow for chemoprophylaxis of VTE, SCD in the meantime

## 2020-04-25 PROBLEM — Z51.5 PALLIATIVE CARE ENCOUNTER: Status: ACTIVE | Noted: 2020-01-01

## 2020-04-25 NOTE — ASSESSMENT & PLAN NOTE
59 year old woman with recurrent endometrial carcinoma/sarcoma. She presents with pain and intermittent SBO.  Work with her on pain and symptom management.  Goal is to be well enough to receive palliative chemotherapy to give her more time.  She asked that I put her niece's name/number in chart as her decision maker. Niece is a teacher.

## 2020-04-25 NOTE — SUBJECTIVE & OBJECTIVE
"Interval History: Patient with nausea overnight. Has not passed flatus "in a while". Abdominal pain persistent. CT read showed likely distal small bowel obstruction with increased ascites.     Scheduled Meds:   amLODIPine  5 mg Oral Daily    bimatoprost  1 drop Both Eyes QHS    bisoprolol  10 mg Oral Daily    dicyclomine  20 mg Oral BID    enoxaparin  40 mg Subcutaneous Daily    insulin aspart U-100  0-5 Units Subcutaneous TIDWM    timolol maleate 0.5%  1 drop Both Eyes BID     Continuous Infusions:   lactated ringers 75 mL/hr at 04/24/20 1844     PRN Meds:Dextrose 10% Bolus, glucagon (human recombinant), HYDROmorphone, meclizine, ondansetron, oxyCODONE-acetaminophen, promethazine (PHENERGAN) IVPB, sodium chloride 0.9%    Review of patient's allergies indicates:   Allergen Reactions    Gabapentin      Swelling,musclespasm    Lyrica [pregabalin]      Swelling, musclespasm       Objective:     Vital Signs (Most Recent):  Temp: 98.3 °F (36.8 °C) (04/25/20 0730)  Pulse: 76 (04/25/20 0730)  Resp: 18 (04/25/20 0730)  BP: (!) 169/62 (04/25/20 0730)  SpO2: 98 % (04/25/20 0730) Vital Signs (24h Range):  Temp:  [96.7 °F (35.9 °C)-99.8 °F (37.7 °C)] 98.3 °F (36.8 °C)  Pulse:  [73-91] 76  Resp:  [16-18] 18  SpO2:  [95 %-100 %] 98 %  BP: (149-188)/(62-81) 169/62        There is no height or weight on file to calculate BMI.    Intake/Output - Last 3 Shifts       04/23 0700 - 04/24 0659 04/24 0700 - 04/25 0659 04/25 0700 - 04/26 0659    I.V.  50     Total Intake  50     Net  +50                     Physical Exam:   Constitutional: She is oriented to person, place, and time. She appears well-developed and well-nourished. No distress.    HENT:   Head: Normocephalic and atraumatic.    Eyes: Conjunctivae are normal. Right eye exhibits no discharge. Left eye exhibits no discharge.    Neck: Neck supple. No tracheal deviation present.    Cardiovascular: Normal rate.     Pulmonary/Chest: Effort normal and breath sounds normal.   "      Abdominal: Soft. Bowel sounds are normal. There is tenderness. There is no guarding.                 Neurological: She is alert and oriented to person, place, and time.    Skin: She is not diaphoretic.        Lines/Drains/Airways     Peripheral Intravenous Line                 Peripheral IV - Single Lumen 04/24/20 1615 20 G Right Antecubital less than 1 day                Laboratory:  All pertinent labs from the last 24 hours have been reviewed.    Diagnostic Results:  Labs: Reviewed  CT: Reviewed     History of uterine cancer.  Findings suggest peritoneal carcinomatosis with interval increase in ascites and peritoneal disease compared to prior.  Distal partial small bowel obstruction suggested with likely transition in the distal small bowel in colonic loops in the pelvis not clearly demarcated as described above.

## 2020-04-25 NOTE — PLAN OF CARE
POC reviewed with patient, questions answered and concerns addressed. VSS, tolerating regular diet without difficulty. Voiding adequate amount cyu, had 2 BM'S today. Electrolytes replaced with po and IV. No signs of withdrawal today. In no acute distress; will continue to monitor.

## 2020-04-25 NOTE — CONSULTS
Palliative Care Consult.    Consult received. Dr. Cabello will see pt. and discuss with team prior to seeing patient. Thank you for the consult..    Rosita ZAPATA, ACNS-BC, ACHPN

## 2020-04-25 NOTE — CONSULTS
Consult received for paracentesis. Order placed. Discussed with GYN resident that paracentesis will be done on Monday, 4/27.

## 2020-04-25 NOTE — PLAN OF CARE
POC reviewed with patient, questions answered and concerns addressed. VS stable, voiding adequate amount cyu, abdomen with hypoactive bowel sound, no nausea or vomiting. Medicated x 2 with IV dilaudid with good relief obtained. CBG WNL. In no acute distress; will continue to monitor.

## 2020-04-25 NOTE — HPI
" 59-year-old female with DM2, uterine adenocarcinoma s/p BRIAN-BSO/chemotherapy/radiation and hypertension presents after virtual visit with palliative care office for abdominal pain and fatigue.  The symptoms have been progressively worsening over the past month. Patient with CT 4/5 showing likely endometrial recurrence and then omental mass biopsy 4/20 with pathological confirmation. She reports that abdominal pain radiates throughout the abdomen and seems to be worse with any type of p.o. intake including water. She takes Percocet at home which used to relief her pain but now pain is too severe.  Reports associated nausea, frequent emesis and diarrhea.  She had dysuria 2 months ago and an ED visit with negative urine culture/tx with AB.  Fatigue has become profound that she has difficulty getting to the bathroom from her bedroom.   Denies sick contact, fever, dyspnea. Had negative COVID test 4 days ago.  She denies bloody or dark stool, hematemesis, flank pain, chest pain. She reports that she sits on the side of the bed or chair and "doubles over" until the worst pain abates. She does not know how much weight she has lost.     In this setting, palliative medicine was consulted to help with medical decision making and aid in the formation of goals of care.     "

## 2020-04-25 NOTE — ASSESSMENT & PLAN NOTE
Due to recurrent carcinomatosis. Will work with symptom management on pain relief and nausea relief.  She has pain meds scheduled every two hours as needed and will follow dosing today - may need pca to get best idea of her pain needs.  She is NPO now but would consider clear liquids after pain controlled to see what happens.

## 2020-04-25 NOTE — PROGRESS NOTES
"[facility]  Gynecologic Oncology  Progress Note      Patient Name: Saba Driver  MRN: [id number]  Admission Date: 4/24/2020  Hospital Length of Stay: 1 days  Attending Provider: Noelle Lala MD  Primary Care Provider: Adela Verma MD  Principal Problem: <principal problem not specified>    Follow-up For: * No surgery found *  Post-Operative Day:    Subjective:      History of Present Illness:   59-year-old female with DM2, uterine adenocarcinoma s/p BRIAN-BSO/chemotherapy/radiation and hypertension presents after virtual visit with palliative care office for abdominal pain and fatigue.  The symptoms have been progressively worsening over the past month. Patient with CT 4/5 showing likely endometrial recurrence and then omental mass biopsy 4/20 with pathological confirmation. She reports that abdominal pain radiates throughout the abdomen and seems to be worse with any type of p.o. intake including water. She takes Percocet at home which used to relief her pain but now pain is too severe.  Reports associated nausea, frequent emesis and diarrhea.  She had dysuria 2 months ago but this has since resolved.  Fatigue has become profound that she has difficulty getting to the bathroom from her bedroom.   Denies sick contact, fever, dyspnea. Had negative COVID test 4 days ago.  She denies bloody or dark stool, hematemesis, flank pain, chest pain.    Hospital Course:  04/24/2020 - Admit for evaluation and management of diffuse abdominal pain and intractable n/v with emesis  04/25/2020 - Patient with nausea overnight. Has not passed flatus "in a while". Abdominal pain persistent. CT read showed likely distal small bowel obstruction with increased ascites.     Interval History: Patient with nausea overnight. Has not passed flatus "in a while". Abdominal pain persistent. CT read showed likely distal small bowel obstruction with increased ascites.     Scheduled Meds:   amLODIPine  5 mg Oral Daily    " bimatoprost  1 drop Both Eyes QHS    bisoprolol  10 mg Oral Daily    dicyclomine  20 mg Oral BID    enoxaparin  40 mg Subcutaneous Daily    insulin aspart U-100  0-5 Units Subcutaneous TIDWM    timolol maleate 0.5%  1 drop Both Eyes BID     Continuous Infusions:   lactated ringers 75 mL/hr at 04/24/20 1844     PRN Meds:Dextrose 10% Bolus, glucagon (human recombinant), HYDROmorphone, meclizine, ondansetron, oxyCODONE-acetaminophen, promethazine (PHENERGAN) IVPB, sodium chloride 0.9%    Review of patient's allergies indicates:   Allergen Reactions    Gabapentin      Swelling,musclespasm    Lyrica [pregabalin]      Swelling, musclespasm       Objective:     Vital Signs (Most Recent):  Temp: 98.3 °F (36.8 °C) (04/25/20 0730)  Pulse: 76 (04/25/20 0730)  Resp: 18 (04/25/20 0730)  BP: (!) 169/62 (04/25/20 0730)  SpO2: 98 % (04/25/20 0730) Vital Signs (24h Range):  Temp:  [96.7 °F (35.9 °C)-99.8 °F (37.7 °C)] 98.3 °F (36.8 °C)  Pulse:  [73-91] 76  Resp:  [16-18] 18  SpO2:  [95 %-100 %] 98 %  BP: (149-188)/(62-81) 169/62        There is no height or weight on file to calculate BMI.    Intake/Output - Last 3 Shifts       04/23 0700 - 04/24 0659 04/24 0700 - 04/25 0659 04/25 0700 - 04/26 0659    I.V.  50     Total Intake  50     Net  +50                     Physical Exam:   Constitutional: She is oriented to person, place, and time. She appears well-developed and well-nourished. No distress.    HENT:   Head: Normocephalic and atraumatic.    Eyes: Conjunctivae are normal. Right eye exhibits no discharge. Left eye exhibits no discharge.    Neck: Neck supple. No tracheal deviation present.    Cardiovascular: Normal rate.     Pulmonary/Chest: Effort normal and breath sounds normal.        Abdominal: Soft. Bowel sounds are normal. There is tenderness. There is no guarding.                 Neurological: She is alert and oriented to person, place, and time.    Skin: She is not diaphoretic.        Lines/Drains/Airways      Peripheral Intravenous Line                 Peripheral IV - Single Lumen 04/24/20 1615 20 G Right Antecubital less than 1 day                Laboratory:  All pertinent labs from the last 24 hours have been reviewed.    Diagnostic Results:  Labs: Reviewed  CT: Reviewed     History of uterine cancer.  Findings suggest peritoneal carcinomatosis with interval increase in ascites and peritoneal disease compared to prior.  Distal partial small bowel obstruction suggested with likely transition in the distal small bowel in colonic loops in the pelvis not clearly demarcated as described above.    Assessment/Plan:     Nausea and vomiting  - Zofran PRN  - IVF bolus given, IVF ordered  - Diet: NPO    Abdominal pain  - Likely SBO  - NGT ordered  - Daily CBC, CMP  - NPO  - IVF  - Zofran PRN  - IV diladudid PRN    Fatigue  - IVF  - Will order PT/OT tomorrow  - Consider dietary consult to assess nutritional requirements    Cancer of inguinal/lower limb lymph nodes, secondary  - Recurrent endometrial cancer  - CT of abdomen pelvis to further elucidate recurrence  - Consider palliative chemotherapy inpatient  - Palliative care consult placed  - Will start lovenox tomorrow for chemoprophylaxis of VTE, SCD in the meantime    Diabetes mellitus, type 2  - hold januvia as patient not taking in PO  - SSI ordered with QID POCT glucose check    Hypertension  - continue norvasc daily  - Vitals per floor protocol      VTE Risk Mitigation (From admission, onward)         Ordered     enoxaparin injection 40 mg  Daily      04/24/20 1724     IP VTE HIGH RISK PATIENT  Once      04/24/20 1724     Place sequential compression device  Until discontinued      04/24/20 1724                  Jose Underwood MD  Gynecologic Oncology  [facility]

## 2020-04-25 NOTE — SUBJECTIVE & OBJECTIVE
Interval History: Patient admitted last night. Current pain med/dose seems to be better. She was able to get some rest.     Past Medical History:   Diagnosis Date    Adenocarcinoma 3/24/2018    Diabetes mellitus, type 2     Endometrial cancer 4/14/2018    Glaucoma     Hypertension        Past Surgical History:   Procedure Laterality Date    BREAST LUMPECTOMY      R early 20's L at 13 years old    HYSTERECTOMY  08/02/2018    total abdominal    [place] REMOVAL OF OVARY/TUBE(S) Bilateral 08/02/2018    TOTAL ABDOMINAL HYSTERECTOMY W/ BILATERAL SALPINGOOPHORECTOMY N/A 8/2/2018    Procedure: HYSTERECTOMY, TOTAL, ABDOMINAL, WITH BILATERAL SALPINGO-OOPHORECTOMY;  Surgeon: Noelle Lala MD;  Location: [facility] OR [address] [place] [address];  Service: OB/GYN;  Laterality: N/A;       Review of patient's allergies indicates:   Allergen Reactions    Gabapentin      Swelling,musclespasm    Lyrica [pregabalin]      Swelling, musclespasm       Medications:  Continuous Infusions:   lactated ringers 75 mL/hr at 04/24/20 1844     Scheduled Meds:   amLODIPine  5 mg Oral Daily    bimatoprost  1 drop Both Eyes QHS    bisoprolol  10 mg Oral Daily    dicyclomine  20 mg Oral BID    enoxaparin  40 mg Subcutaneous Daily    timolol maleate 0.5%  1 drop Both Eyes BID     PRN Meds:Dextrose 10% Bolus, glucagon (human recombinant), HYDROmorphone, insulin aspart U-100, meclizine, ondansetron, oxyCODONE-acetaminophen, promethazine (PHENERGAN) IVPB, sodium chloride 0.9%    Family History     Problem Relation (Age of Onset)    Diabetes Mother, Maternal Grandmother, Maternal Grandfather    Hypertension Mother, Maternal Grandmother, Maternal Grandfather    Pancreatic cancer Mother        Tobacco Use    Smoking status: Never Smoker    Smokeless tobacco: Never Used   Substance and Sexual Activity    Alcohol use: Yes     Comment: occassional use    Drug use: No    Sexual activity: Never       Review of Systems   Constitutional: Positive for activity change,  appetite change, fatigue and unexpected weight change.   HENT: Negative.    Eyes: Negative.    Respiratory: Negative.    Cardiovascular: Negative.    Gastrointestinal: Positive for abdominal distention, abdominal pain, diarrhea, nausea and vomiting. Negative for constipation.        Left sided worse than right sided.   Endocrine: Negative.    Genitourinary: Positive for frequency. Negative for difficulty urinating and dysuria.   Musculoskeletal: Negative.    Skin: Negative.    Allergic/Immunologic: Negative.    Neurological: Positive for weakness.   Hematological: Negative.    Psychiatric/Behavioral:        Reports being sad.     Objective:     Vital Signs (Most Recent):  Temp: 98.3 °F (36.8 °C) (04/25/20 0730)  Pulse: 76 (04/25/20 0730)  Resp: 18 (04/25/20 0730)  BP: (!) 169/62 (04/25/20 0730)  SpO2: 98 % (04/25/20 0730) Vital Signs (24h Range):  Temp:  [96.7 °F (35.9 °C)-99.8 °F (37.7 °C)] 98.3 °F (36.8 °C)  Pulse:  [73-91] 76  Resp:  [16-18] 18  SpO2:  [95 %-100 %] 98 %  BP: (149-188)/(62-81) 169/62        Body mass index is 43.85 kg/m².    Review of Symptoms  Symptom Assessment (ESAS 0-10 scale)    ESAS 0 1 2 3 4 5 6 7 8 9 10   Pain []  [x]  []  []  []  []  []  []  []  []  []    Dyspnea [x]  []  []  []  []  []  []  []  []  []  []    Anxiety [x]  []  []  []  []  []  []  []  []  []  []    Nausea [x]  []  []  []  []  []  []  []  []  []  []    Depression  []  [x]  []  []  []  []  []  []  []  []  []    Anorexia []  [x]  []  []  []  []  []  []  []  []  []    Fatigue []  [x]  []  []  []  []  []  []  []  []  []    Insomnia [x]  []  []  []  []  []  []  []  []  []  []    Restlessness  [x]  []  []  []  []  []  []  []  []  []  []    Agitation [x]  []  []  []  []  []  []  []  []  []  []        CAM / Delirium _x_ --  ___+   Constipation     x__ --  ___+   Diarrhea           __ --  x_+  Bowel Management Plan (BMP): Yes  Has not been eating much so minimal BM    Pain Assessment: Describes the pain as stabbing on the left side  with radiation below umbilicus to the right side. Crampy after initial stabbing is relieved. 10/10 for short periods of time then 5/10. Currently barely there after 1 mg dilaudid. Not sleepy or nauseated. Has waves of pain. Afraid to eat/drink. Pain always starts on the left.    OME in 24 hours: Morphine 8 mg at 5 p/percocet 10 mg at 9 pm and dilaudid 1 mg at 9 am. Approximately 60 mg of oral morphine.    Performance Status: 70    ECOG Performance Status Grade: 2 - Ambulates, capable of self care only    Physical Exam   Constitutional: She appears well-developed and well-nourished.   HENT:   Head: Normocephalic and atraumatic.   Patient wearing mask.   Eyes: Pupils are equal, round, and reactive to light. Conjunctivae and EOM are normal.   Neck: Normal range of motion. Neck supple.   Pulmonary/Chest: Effort normal.   Psychiatric: She has a normal mood and affect. Her behavior is normal. Judgment and thought content normal.   Nursing note and vitals reviewed.      Significant Labs: All pertinent labs within the past 24 hours have been reviewed.  CBC:   Recent Labs   Lab 04/24/20  1615   WBC 13.79*   HGB 11.4*   HCT 38.1   MCV 85   *     BMP:  Recent Labs   Lab 04/24/20  1615   *      K 4.4   CL 97   CO2 27   BUN 11   CREATININE 0.8   CALCIUM 10.0   MG 1.4*     LFT:  Lab Results   Component Value Date    AST 30 04/24/2020    ALKPHOS 304 (H) 04/24/2020    BILITOT 0.3 04/24/2020     Albumin:   Albumin   Date Value Ref Range Status   04/24/2020 2.7 (L) 3.5 - 5.2 g/dL Final     Protein:   Total Protein   Date Value Ref Range Status   04/24/2020 7.6 6.0 - 8.4 g/dL Final     Lactic acid:   Lab Results   Component Value Date    LACTATE 1.0 04/24/2020       Significant Imaging: I have reviewed all pertinent imaging results/findings within the past 24 hours.    Advance Care Planning   Advanced Directives::  Living Will: No  LaPOST: No  Do Not Resuscitate Status: No  Medical Power of : Yes. Agent's  Name: Sampson parks. Agent's Contact Number: [phone] Patient gave ORAL DECLARATION. She does have a brother who lives with her (he was homeless and she promised her mother she would look after him). Has a Sister and Brother who also work with [employer]. Has a sister in [place].     Decision-Making Capacity: Patient answered questions       Living Arrangements: Lives alone, Lives in home. Brother is there but is not helpful.    Psychosocial/Cultural:  Patient's most important priorities:  Being well    Patient's biggest concerns/fears:  Unable to get pain better    Previous death/end of life care history:  Did not review    Patient's goals/hopes:  To be better and get home.    Spiritual:     F- Isabela and Belief: Voodoo    I - Importance: yes  .  C - Community: yes    A - Address in Care: yes

## 2020-04-25 NOTE — CONSULTS
[facility]  Palliative Medicine  Consult Note    Patient Name: Saba Driver  MRN: [id number]  Admission Date: 4/24/2020  Hospital Length of Stay: 1 days  Code Status: Full Code   Attending Provider: Noelle Lala MD  Consulting Provider: Gay Cabello MD  Primary Care Physician: Adela Verma MD  Principal Problem:<principal problem not specified>    Patient information was obtained from patient, past medical records, Dr. Lala and ER records.      Consults  Assessment/Plan:     Palliative care encounter  59 year old woman with recurrent endometrial carcinoma/sarcoma. She presents with pain and intermittent SBO.  Work with her on pain and symptom management.  Goal is to be well enough to receive palliative chemotherapy to give her more time.  She asked that I put her niece's name/number in chart as her decision maker. Niece is a teacher.    Nausea and vomiting  Monitor. Not currently nauseated. Not eating either.    Abdominal pain  Due to recurrent carcinomatosis. Will work with symptom management on pain relief and nausea relief.  She has pain meds scheduled every two hours as needed and will follow dosing today - may need pca to get best idea of her pain needs.  She is NPO now but would consider clear liquids after pain controlled to see what happens.    Uterine cancer, sarcoma  Recurrent disease. May need palliative chemotherapy for symptom control.        Thank you for your consult. I will follow-up with patient. Please contact us if you have any additional questions.    Subjective:     HPI:    59-year-old female with DM2, uterine adenocarcinoma s/p BRIAN-BSO/chemotherapy/radiation and hypertension presents after virtual visit with palliative care office for abdominal pain and fatigue.  The symptoms have been progressively worsening over the past month. Patient with CT 4/5 showing likely endometrial recurrence and then omental mass biopsy 4/20 with pathological confirmation. She reports  "that abdominal pain radiates throughout the abdomen and seems to be worse with any type of p.o. intake including water. She takes Percocet at home which used to relief her pain but now pain is too severe.  Reports associated nausea, frequent emesis and diarrhea.  She had dysuria 2 months ago and an ED visit with negative urine culture/tx with AB.  Fatigue has become profound that she has difficulty getting to the bathroom from her bedroom.   Denies sick contact, fever, dyspnea. Had negative COVID test 4 days ago.  She denies bloody or dark stool, hematemesis, flank pain, chest pain. She reports that she sits on the side of the bed or chair and "doubles over" until the worst pain abates. She does not know how much weight she has lost.     Hospital Course:  No notes on file    Interval History: Patient admitted last night. Current pain med/dose seems to be better. She was able to get some rest.     Past Medical History:   Diagnosis Date    Adenocarcinoma 3/24/2018    Diabetes mellitus, type 2     Endometrial cancer 4/14/2018    Glaucoma     Hypertension        Past Surgical History:   Procedure Laterality Date    BREAST LUMPECTOMY      R early 20's L at 13 years old    HYSTERECTOMY  08/02/2018    total abdominal    [place] REMOVAL OF OVARY/TUBE(S) Bilateral 08/02/2018    TOTAL ABDOMINAL HYSTERECTOMY W/ BILATERAL SALPINGOOPHORECTOMY N/A 8/2/2018    Procedure: HYSTERECTOMY, TOTAL, ABDOMINAL, WITH BILATERAL SALPINGO-OOPHORECTOMY;  Surgeon: Noelle Lala MD;  Location: [facility] OR [address] [place] [address];  Service: OB/GYN;  Laterality: N/A;       Review of patient's allergies indicates:   Allergen Reactions    Gabapentin      Swelling,musclespasm    Lyrica [pregabalin]      Swelling, musclespasm       Medications:  Continuous Infusions:   lactated ringers 75 mL/hr at 04/24/20 9500     Scheduled Meds:   amLODIPine  5 mg Oral Daily    bimatoprost  1 drop Both Eyes QHS    bisoprolol  10 mg Oral Daily    dicyclomine  20 mg Oral BID "    enoxaparin  40 mg Subcutaneous Daily    timolol maleate 0.5%  1 drop Both Eyes BID     PRN Meds:Dextrose 10% Bolus, glucagon (human recombinant), HYDROmorphone, insulin aspart U-100, meclizine, ondansetron, oxyCODONE-acetaminophen, promethazine (PHENERGAN) IVPB, sodium chloride 0.9%    Family History     Problem Relation (Age of Onset)    Diabetes Mother, Maternal Grandmother, Maternal Grandfather    Hypertension Mother, Maternal Grandmother, Maternal Grandfather    Pancreatic cancer Mother        Tobacco Use    Smoking status: Never Smoker    Smokeless tobacco: Never Used   Substance and Sexual Activity    Alcohol use: Yes     Comment: occassional use    Drug use: No    Sexual activity: Never       Review of Systems   Constitutional: Positive for activity change, appetite change, fatigue and unexpected weight change.   HENT: Negative.    Eyes: Negative.    Respiratory: Negative.    Cardiovascular: Negative.    Gastrointestinal: Positive for abdominal distention, abdominal pain, diarrhea, nausea and vomiting. Negative for constipation.        Left sided worse than right sided.   Endocrine: Negative.    Genitourinary: Positive for frequency. Negative for difficulty urinating and dysuria.   Musculoskeletal: Negative.    Skin: Negative.    Allergic/Immunologic: Negative.    Neurological: Positive for weakness.   Hematological: Negative.    Psychiatric/Behavioral:        Reports being sad.     Objective:     Vital Signs (Most Recent):  Temp: 98.3 °F (36.8 °C) (04/25/20 0730)  Pulse: 76 (04/25/20 0730)  Resp: 18 (04/25/20 0730)  BP: (!) 169/62 (04/25/20 0730)  SpO2: 98 % (04/25/20 0730) Vital Signs (24h Range):  Temp:  [96.7 °F (35.9 °C)-99.8 °F (37.7 °C)] 98.3 °F (36.8 °C)  Pulse:  [73-91] 76  Resp:  [16-18] 18  SpO2:  [95 %-100 %] 98 %  BP: (149-188)/(62-81) 169/62        Body mass index is 43.85 kg/m².    Review of Symptoms  Symptom Assessment (ESAS 0-10 scale)    ESAS 0 1 2 3 4 5 6 7 8 9 10   Pain []  [x]   []  []  []  []  []  []  []  []  []    Dyspnea [x]  []  []  []  []  []  []  []  []  []  []    Anxiety [x]  []  []  []  []  []  []  []  []  []  []    Nausea [x]  []  []  []  []  []  []  []  []  []  []    Depression  []  [x]  []  []  []  []  []  []  []  []  []    Anorexia []  [x]  []  []  []  []  []  []  []  []  []    Fatigue []  [x]  []  []  []  []  []  []  []  []  []    Insomnia [x]  []  []  []  []  []  []  []  []  []  []    Restlessness  [x]  []  []  []  []  []  []  []  []  []  []    Agitation [x]  []  []  []  []  []  []  []  []  []  []        CAM / Delirium _x_ --  ___+   Constipation     x__ --  ___+   Diarrhea           __ --  x_+  Bowel Management Plan (BMP): Yes  Has not been eating much so minimal BM    Pain Assessment: Describes the pain as stabbing on the left side with radiation below umbilicus to the right side. Crampy after initial stabbing is relieved. 10/10 for short periods of time then 5/10. Currently barely there after 1 mg dilaudid. Not sleepy or nauseated. Has waves of pain. Afraid to eat/drink. Pain always starts on the left.    OME in 24 hours: Morphine 8 mg at 5 p/percocet 10 mg at 9 pm and dilaudid 1 mg at 9 am. Approximately 60 mg of oral morphine.    Performance Status: 70    ECOG Performance Status Grade: 2 - Ambulates, capable of self care only    Physical Exam   Constitutional: She appears well-developed and well-nourished.   HENT:   Head: Normocephalic and atraumatic.   Patient wearing mask.   Eyes: Pupils are equal, round, and reactive to light. Conjunctivae and EOM are normal.   Neck: Normal range of motion. Neck supple.   Pulmonary/Chest: Effort normal.   Psychiatric: She has a normal mood and affect. Her behavior is normal. Judgment and thought content normal.   Nursing note and vitals reviewed.      Significant Labs: All pertinent labs within the past 24 hours have been reviewed.  CBC:   Recent Labs   Lab 04/24/20  1615   WBC 13.79*   HGB 11.4*   HCT 38.1   MCV 85   *      BMP:  Recent Labs   Lab 04/24/20  1615   *      K 4.4   CL 97   CO2 27   BUN 11   CREATININE 0.8   CALCIUM 10.0   MG 1.4*     LFT:  Lab Results   Component Value Date    AST 30 04/24/2020    ALKPHOS 304 (H) 04/24/2020    BILITOT 0.3 04/24/2020     Albumin:   Albumin   Date Value Ref Range Status   04/24/2020 2.7 (L) 3.5 - 5.2 g/dL Final     Protein:   Total Protein   Date Value Ref Range Status   04/24/2020 7.6 6.0 - 8.4 g/dL Final     Lactic acid:   Lab Results   Component Value Date    LACTATE 1.0 04/24/2020       Significant Imaging: I have reviewed all pertinent imaging results/findings within the past 24 hours.    Advance Care Planning   Advanced Directives::  Living Will: No  LaPOST: No  Do Not Resuscitate Status: No  Medical Power of : Yes. Agent's Name: Sampson parks. Agent's Contact Number: [phone] Patient gave ORAL DECLARATION. She does have a brother who lives with her (he was homeless and she promised her mother she would look after him). Has a Sister and Brother who also work with [employer]. Has a sister in [place].     Decision-Making Capacity: Patient answered questions       Living Arrangements: Lives alone, Lives in home. Brother is there but is not helpful.    Psychosocial/Cultural:  Patient's most important priorities:  Being well    Patient's biggest concerns/fears:  Unable to get pain better    Previous death/end of life care history:  Did not review    Patient's goals/hopes:  To be better and get home.    Spiritual:     F- Isabela and Belief: Zoroastrianism    I - Importance: yes  .  C - Community: yes    A - Address in Care: yes      > 50% of 60 min visit spent in chart review, face to face discussion of goals of care,  symptom assessment, coordination of care and emotional support.    Gay Cabello MD  Palliative Medicine  [facility]  [phone] cell

## 2020-04-26 NOTE — SUBJECTIVE & OBJECTIVE
"Interval History: Nausea x2 overnight, no emesis. Pain not controlled but patient very stoic. Reports "she'll be alright" but acknowledges increase in pain control warranted. Palliative care consulted and recommended PCA and clear liquid diet may be possible. Will attempt today. Not passing flatus. Up in chair during evaluation.     Scheduled Meds:   amLODIPine  5 mg Oral Daily    bimatoprost  1 drop Both Eyes QHS    bisoprolol  10 mg Oral Daily    dicyclomine  20 mg Oral BID    enoxaparin  40 mg Subcutaneous Daily    timolol maleate 0.5%  1 drop Both Eyes BID     Continuous Infusions:   hydromorphone in 0.9 % NaCl 6 mg/30 ml      lactated ringers 75 mL/hr at 04/26/20 0604     PRN Meds:Dextrose 10% Bolus, glucagon (human recombinant), insulin aspart U-100, meclizine, naloxone, ondansetron, oxyCODONE-acetaminophen, promethazine (PHENERGAN) IVPB, sodium chloride 0.9%    Review of patient's allergies indicates:   Allergen Reactions    Gabapentin      Swelling,musclespasm    Lyrica [pregabalin]      Swelling, musclespasm       Objective:     Vital Signs (Most Recent):  Temp: 98.4 °F (36.9 °C) (04/26/20 0715)  Pulse: 82 (04/26/20 0715)  Resp: 18 (04/26/20 0715)  BP: (!) 174/81 (04/26/20 0715)  SpO2: 96 % (04/26/20 0715) Vital Signs (24h Range):  Temp:  [98.2 °F (36.8 °C)-99.3 °F (37.4 °C)] 98.4 °F (36.9 °C)  Pulse:  [71-84] 82  Resp:  [18] 18  SpO2:  [95 %-98 %] 96 %  BP: (149-174)/(73-81) 174/81     Weight: 125 kg (275 lb 9.2 oz)  Body mass index is 43.16 kg/m².    Intake/Output - Last 3 Shifts       04/24 0700 - 04/25 0659 04/25 0700 - 04/26 0659 04/26 0700 - 04/27 0659    P.O.  30     I.V. (mL/kg) 50 1678.8 (13.4)     Total Intake(mL/kg) 50 1708.8 (13.7)     Net +50 +1708.8            Urine Occurrence  3 x              Physical Exam:   Constitutional: She is oriented to person, place, and time. She appears well-developed and well-nourished. No distress.    HENT:   Head: Normocephalic and atraumatic.    Eyes: " Conjunctivae are normal. Right eye exhibits no discharge. Left eye exhibits no discharge.    Neck: Neck supple. No tracheal deviation present.    Cardiovascular: Normal rate.     Pulmonary/Chest: Effort normal and breath sounds normal.        Abdominal: Soft. Bowel sounds are normal. There is tenderness. There is no guarding.                 Neurological: She is alert and oriented to person, place, and time.    Skin: She is not diaphoretic.        Lines/Drains/Airways     Central Venous Catheter Line            Port A Cath Single Lumen 04/25/20 1208 right subclavian less than 1 day          Peripheral Intravenous Line                 Peripheral IV - Single Lumen 04/24/20 1615 20 G Right Antecubital 1 day                Laboratory:  All pertinent labs from the last 24 hours have been reviewed.    Diagnostic Results:  Labs: Reviewed  CT: Reviewed     History of uterine cancer.  Findings suggest peritoneal carcinomatosis with interval increase in ascites and peritoneal disease compared to prior.  Distal partial small bowel obstruction suggested with likely transition in the distal small bowel in colonic loops in the pelvis not clearly demarcated as described above.

## 2020-04-26 NOTE — ASSESSMENT & PLAN NOTE
Currently on two drug therapy. I suspect some of her elevated BP is pain.  She is on amlodipine 5 mg and may go up to 10 mg daily if still elevated when pain is under control.   Many BP meds are in a timed release wax matrix which may be a problem with tumor/bowel obstruction. AMLODIPINE IS NOT one of them and can even be crushed if needed.

## 2020-04-26 NOTE — PROGRESS NOTES
"[facility]  Gynecologic Oncology  Progress Note      Patient Name: Saba Driver  MRN: [id number]  Admission Date: 4/24/2020  Hospital Length of Stay: 2 days  Attending Provider: Noelle Lala MD  Primary Care Provider: Adela Verma MD  Principal Problem: Uterine cancer, sarcoma    Follow-up For: * No surgery found *  Post-Operative Day:    Subjective:      History of Present Illness:   59-year-old female with DM2, uterine adenocarcinoma s/p BRIAN-BSO/chemotherapy/radiation and hypertension presents after virtual visit with palliative care office for abdominal pain and fatigue.  The symptoms have been progressively worsening over the past month. Patient with CT 4/5 showing likely endometrial recurrence and then omental mass biopsy 4/20 with pathological confirmation. She reports that abdominal pain radiates throughout the abdomen and seems to be worse with any type of p.o. intake including water. She takes Percocet at home which used to relief her pain but now pain is too severe.  Reports associated nausea, frequent emesis and diarrhea.  She had dysuria 2 months ago but this has since resolved.  Fatigue has become profound that she has difficulty getting to the bathroom from her bedroom.   Denies sick contact, fever, dyspnea. Had negative COVID test 4 days ago.  She denies bloody or dark stool, hematemesis, flank pain, chest pain.    Hospital Course:  04/24/2020 - Admit for evaluation and management of diffuse abdominal pain and intractable n/v with emesis  04/25/2020 - Patient with nausea overnight. Has not passed flatus "in a while". Abdominal pain persistent. CT read showed likely distal small bowel obstruction with increased ascites.   04/26/2020 - Nausea x2 overnight, no emesis. Pain not controlled but patient very stoic. Reports "she'll be alright" but acknowledges increase in pain control warranted. Palliative care consulted and recommended PCA and clear liquid diet may be possible. " "Will attempt today. Not passing flatus. Up in chair during evaluation.     Interval History: Nausea x2 overnight, no emesis. Pain not controlled but patient very stoic. Reports "she'll be alright" but acknowledges increase in pain control warranted. Palliative care consulted and recommended PCA and clear liquid diet may be possible. Will attempt today. Not passing flatus. Up in chair during evaluation.     Scheduled Meds:   amLODIPine  5 mg Oral Daily    bimatoprost  1 drop Both Eyes QHS    bisoprolol  10 mg Oral Daily    dicyclomine  20 mg Oral BID    enoxaparin  40 mg Subcutaneous Daily    timolol maleate 0.5%  1 drop Both Eyes BID     Continuous Infusions:   hydromorphone in 0.9 % NaCl 6 mg/30 ml      lactated ringers 75 mL/hr at 04/26/20 0604     PRN Meds:Dextrose 10% Bolus, glucagon (human recombinant), insulin aspart U-100, meclizine, naloxone, ondansetron, oxyCODONE-acetaminophen, promethazine (PHENERGAN) IVPB, sodium chloride 0.9%    Review of patient's allergies indicates:   Allergen Reactions    Gabapentin      Swelling,musclespasm    Lyrica [pregabalin]      Swelling, musclespasm       Objective:     Vital Signs (Most Recent):  Temp: 98.4 °F (36.9 °C) (04/26/20 0715)  Pulse: 82 (04/26/20 0715)  Resp: 18 (04/26/20 0715)  BP: (!) 174/81 (04/26/20 0715)  SpO2: 96 % (04/26/20 0715) Vital Signs (24h Range):  Temp:  [98.2 °F (36.8 °C)-99.3 °F (37.4 °C)] 98.4 °F (36.9 °C)  Pulse:  [71-84] 82  Resp:  [18] 18  SpO2:  [95 %-98 %] 96 %  BP: (149-174)/(73-81) 174/81     Weight: 125 kg (275 lb 9.2 oz)  Body mass index is 43.16 kg/m².    Intake/Output - Last 3 Shifts       04/24 0700 - 04/25 0659 04/25 0700 - 04/26 0659 04/26 0700 - 04/27 0659    P.O.  30     I.V. (mL/kg) 50 1678.8 (13.4)     Total Intake(mL/kg) 50 1708.8 (13.7)     Net +50 +1708.8            Urine Occurrence  3 x              Physical Exam:   Constitutional: She is oriented to person, place, and time. She appears well-developed and " well-nourished. No distress.    HENT:   Head: Normocephalic and atraumatic.    Eyes: Conjunctivae are normal. Right eye exhibits no discharge. Left eye exhibits no discharge.    Neck: Neck supple. No tracheal deviation present.    Cardiovascular: Normal rate.     Pulmonary/Chest: Effort normal and breath sounds normal.        Abdominal: Soft. Bowel sounds are normal. There is tenderness. There is no guarding.                 Neurological: She is alert and oriented to person, place, and time.    Skin: She is not diaphoretic.        Lines/Drains/Airways     Central Venous Catheter Line            Port A Cath Single Lumen 04/25/20 1208 right subclavian less than 1 day          Peripheral Intravenous Line                 Peripheral IV - Single Lumen 04/24/20 1615 20 G Right Antecubital 1 day                Laboratory:  All pertinent labs from the last 24 hours have been reviewed.    Diagnostic Results:  Labs: Reviewed  CT: Reviewed     History of uterine cancer.  Findings suggest peritoneal carcinomatosis with interval increase in ascites and peritoneal disease compared to prior.  Distal partial small bowel obstruction suggested with likely transition in the distal small bowel in colonic loops in the pelvis not clearly demarcated as described above.    Assessment/Plan:     Nausea and vomiting  - Zofran PRN  - IVF bolus given, IVF ordered  - Diet: clears    Abdominal pain  - Likely SBO  - every other day CBC, CMP  - clear liquid diet  - IVF  - Zofran PRN  - Changed from PRN dilaudid to PCA dilaudid    Fatigue  - IVF  - Will order PT/OT tomorrow  - Consider dietary consult to assess nutritional requirements    Cancer of inguinal/lower limb lymph nodes, secondary  - Recurrent endometrial cancer  - CT of abdomen pelvis to further elucidate recurrence  - Palliative care consult placed  - Lovenox daily. Last dose tonight in anticipation of paracentesis tomorrow    Diabetes mellitus, type 2  - hold januvia as patient not  taking in PO  - SSI ordered with QID POCT glucose check    Hypertension  - continue norvasc daily  - Vitals per floor protocol    VTE Risk Mitigation (From admission, onward)         Ordered     enoxaparin injection 40 mg  Daily      04/24/20 1724     IP VTE HIGH RISK PATIENT  Once      04/24/20 1724     Place sequential compression device  Until discontinued      04/24/20 1724                    Jose Underwood MD  Gynecologic Oncology  [facility]

## 2020-04-26 NOTE — SUBJECTIVE & OBJECTIVE
Interval History: Still having pain but does not want to bother anyone. Not passing gas. Some nausea. Reviewed that I worry about having a blockage that cannot be fixed. She is anxious to begin chemotherapy to see if any improvement can occur.    Medications:  Continuous Infusions:   hydromorphone in 0.9 % NaCl 6 mg/30 ml      lactated ringers 75 mL/hr at 04/26/20 0604     Scheduled Meds:   amLODIPine  5 mg Oral Daily    bimatoprost  1 drop Both Eyes QHS    bisoprolol  10 mg Oral Daily    dicyclomine  20 mg Oral BID    enoxaparin  40 mg Subcutaneous Daily    timolol maleate 0.5%  1 drop Both Eyes BID     PRN Meds:Dextrose 10% Bolus, glucagon (human recombinant), insulin aspart U-100, meclizine, naloxone, ondansetron, oxyCODONE-acetaminophen, promethazine (PHENERGAN) IVPB, sodium chloride 0.9%    Objective:     Vital Signs (Most Recent):  Temp: 98.4 °F (36.9 °C) (04/26/20 0715)  Pulse: 82 (04/26/20 0715)  Resp: 18 (04/26/20 0715)  BP: (!) 174/81 (04/26/20 0715)  SpO2: 96 % (04/26/20 0715) Vital Signs (24h Range):  Temp:  [98.2 °F (36.8 °C)-99.3 °F (37.4 °C)] 98.4 °F (36.9 °C)  Pulse:  [71-84] 82  Resp:  [18] 18  SpO2:  [95 %-98 %] 96 %  BP: (149-174)/(73-81) 174/81     Weight: 125 kg (275 lb 9.2 oz)  Body mass index is 43.16 kg/m².    Review of Symptoms  Symptom Assessment (ESAS 0-10 scale)    ESAS 0 1 2 3 4 5 6 7 8 9 10   Pain []  []  [x]  []  []  []  []  []  []  []  []    Dyspnea [x]  []  []  []  []  []  []  []  []  []  []    Anxiety [x]  []  []  []  []  []  []  []  []  []  []    Nausea []  [x]  []  []  []  []  []  []  []  []  []    Depression  [x]  []  []  []  []  []  []  []  []  []  []    Anorexia []  [x]  []  []  []  []  []  []  []  []  []    Fatigue [x]  []  []  []  []  []  []  []  []  []  []    Insomnia [x]  []  []  []  []  []  []  []  []  []  []    Restlessness  [x]  []  []  []  []  []  []  []  []  []  []    Agitation [x]  []  []  []  []  []  []  []  []  []  []      Bowel Management Plan (BMP): No Not  passing flatus.    Pain Assessment: LLQ then diffuse. Describes as twinges to me now.  OME in 24 hours: 120 mg oral morphine equivalents    Performance Status: 70    ECOG Performance Status Grade: 2 - Ambulates, capable of self care only    Physical Exam    Significant Labs: All pertinent labs within the past 24 hours have been reviewed.  CBC:   Recent Labs   Lab 04/25/20  0927   WBC 12.50   HGB 10.8*   HCT 36.7*   MCV 84   *     BMP:  No results for input(s): GLU, NA, K, CL, CO2, BUN, CREATININE, CALCIUM, MG in the last 24 hours.  LFT:  Lab Results   Component Value Date    AST 33 04/25/2020    ALKPHOS 273 (H) 04/25/2020    BILITOT 0.3 04/25/2020     Albumin:   Albumin   Date Value Ref Range Status   04/25/2020 2.4 (L) 3.5 - 5.2 g/dL Final     Protein:   Total Protein   Date Value Ref Range Status   04/25/2020 6.7 6.0 - 8.4 g/dL Final     Lactic acid:   Lab Results   Component Value Date    LACTATE 1.0 04/24/2020       Significant Imaging: I have reviewed all pertinent imaging results/findings within the past 24 hours.    Advanced Directives::    Living Will: No  LaPOST: No  Do Not Resuscitate Status: No  Medical Power of : Yes. Agent's Name: Sampson parks. Agent's Contact Number: [phone] Patient gave ORAL DECLARATION. She does have a brother who lives with her (he was homeless and she promised her mother she would look after him). Has a Sister and Brother who also work with [employer] [employer]. Has a sister in [place].      Decision-Making Capacity: Patient answered questions

## 2020-04-26 NOTE — PLAN OF CARE
Plan of care reviewed. Patient is oriented X4. Ambulates in room without difficulty. Right chest port-a-cath infusing LR @ 75 ml/hr. Complained of abdominal pain; PRN Dilaudid administered as ordered x3. Blood glucose monitoring every 6 hours. Remains NPO. No BM this shift; Is not passing gas. All questions and concerns addressed. All safety precautions in place. Remains free of injury. Patient is stable. Will continue to monitor.

## 2020-04-26 NOTE — PT/OT/SLP EVAL
Physical Therapy Evaluation    Patient Name:  Saba Driver   MRN:  [id number]    Recommendations:     Discharge Recommendations:  home   Discharge Equipment Recommendations: none   Barriers to discharge: decreased functional mobility    Assessment:     Saba Driver is a 59 y.o. female admitted with a medical diagnosis of Uterine cancer, sarcoma.  She presents with the following impairments/functional limitations:  weakness, impaired endurance, gait instability, pain, impaired functional mobilty, impaired self care skills.  Tolerated session c c/o abdominal pain.  Performed mobility c independence - CGA.  Pt able to amb short distance in room c no AD and demo decreased gait speed, flat foot contact and mild unsteadiness.  Further mobility deferred per pt's request secondary to abdominal pain.  Pt safe to amb c assistance of 1x person. Pt would benefit from continued skilled acute PT 3x/wk to improve functional mobility.  Anticipating pt will be able to return Home c no additional PT service needed once medically appropriate.      Rehab Prognosis: Good; patient would benefit from acute skilled PT services to address these deficits and reach maximum level of function.    Recent Surgery: * No surgery found *      Plan:     During this hospitalization, patient to be seen 3 x/week to address the identified rehab impairments via gait training, therapeutic activities, therapeutic exercises and progress toward the following goals:    · Plan of Care Expires:  05/21/20    Subjective     Chief Complaint: abdominal pain  Patient/Family Comments/goals: to return home  Pain/Comfort:  · Pain Rating 1: (reports 7/10 abdominal pain)    Patients cultural, spiritual, Religion conflicts given the current situation: no    Living Environment:  Pt lives alone in [address].  PTA uses public transit, retired, enjoys traveling and reports no falls.  Prior to admission, patients level of function was independent.  Equipment used at  home: none.  DME owned (not currently used): none.  Upon discharge, patient will have assistance from self.    Objective:     Communicated with RN prior to session.  Patient found HOB elevated with peripheral IV, telemetry, PCA  upon PT entry to room.    General Precautions: Standard, fall   Orthopedic Precautions:N/A   Braces: N/A     Exams:  · Cognitive Exam:  Patient is oriented to Person, Place, Time and Situation  · RLE ROM: WFL  · RLE Strength: WFL  · LLE ROM: WNL  · LLE Strength: WFL    Functional Mobility:  · Bed Mobility:     · Rolling Left:  independence  · Scooting: independence  · Supine to Sit: independence  · Sit to Supine: independence  · Transfers:     · Sit to Stand:  supervision with no AD  · Gait: 50ft c no AD CGA   · demo decreased gait speed, flat foot contact and mild unsteadiness  · Balance: standing (SBA-CGA)      Therapeutic Activities and Exercises:  Pt educated on: PT role/POC; safety c mobility; benefits of OOB activities; performing therex; d/c recs - v/u  -sit<>Stand from bed 2x5  -therex (LAQ, hip flex, AP)  -assisted c bedding change per request    AM-PAC 6 CLICK MOBILITY  Total Score:20     Patient left HOB elevated with all lines intact, call button in reach and RN notified.    GOALS:   Multidisciplinary Problems     Physical Therapy Goals        Problem: Physical Therapy Goal    Goal Priority Disciplines Outcome Goal Variances Interventions   Physical Therapy Goal     PT, PT/OT Ongoing, Progressing     Description:  Goals to be met by: 2020     Patient will increase functional independence with mobility by performin. Sit to stand transfer with Modified [place]  2. Bed to chair transfer with Modified [place]  3. Gait  x 200 feet with Modified [place]   4. Ascend/descend 13 stair with bilateral Handrails Supervision                    History:     Past Medical History:   Diagnosis Date    Adenocarcinoma 3/24/2018    Diabetes mellitus, type 2      Endometrial cancer 4/14/2018    Glaucoma     Hypertension        Past Surgical History:   Procedure Laterality Date    BREAST LUMPECTOMY      R early 20's L at 13 years old    HYSTERECTOMY  08/02/2018    total abdominal    NE REMOVAL OF OVARY/TUBE(S) Bilateral 08/02/2018    TOTAL ABDOMINAL HYSTERECTOMY W/ BILATERAL SALPINGOOPHORECTOMY N/A 8/2/2018    Procedure: HYSTERECTOMY, TOTAL, ABDOMINAL, WITH BILATERAL SALPINGO-OOPHORECTOMY;  Surgeon: Noelle Lala MD;  Location: [facility] OR [address] [place] [address];  Service: OB/GYN;  Laterality: N/A;       Time Tracking:     PT Received On: 04/26/20  PT Start Time: 1000     PT Stop Time: 1020  PT Total Time (min): 20 min     Billable Minutes: Evaluation 10 min and Therapeutic Activity 8 min      Lopez Canales, PT  04/26/2020

## 2020-04-26 NOTE — ASSESSMENT & PLAN NOTE
- Likely SBO  - every other day CBC, CMP  - clear liquid diet  - IVF  - Zofran PRN  - Changed from PRN dilaudid to PCA dilaudid

## 2020-04-26 NOTE — PROGRESS NOTES
"[facility]  Palliative Medicine  Progress Note    Patient Name: Saba Driver  MRN: [id number]  Admission Date: 4/24/2020  Hospital Length of Stay: 2 days  Code Status: Full Code   Attending Provider: Noelle Lala MD  Consulting Provider: Gay Cabello MD  Primary Care Physician: Adela Verma MD  Principal Problem:Uterine cancer, sarcoma    Patient information was obtained from patient, past medical records and ER records.      Assessment/Plan:     Palliative care encounter  4/26/2020 Patient still having pain. Will begin PCA so patient may dose herself and decrease her "worry" about bothering staff. She is not real thrilled with ET CO2 monitoring! Will see how she does. She is not willing to talk yet about " what if " her obstruction remains.    4/25/2020   59 year old woman with recurrent endometrial carcinoma/sarcoma. She presents with pain and intermittent SBO.  Work with her on pain and symptom management.  Goal is to be well enough to receive palliative chemotherapy to give her more time.  She asked that I put her niece's name/number in chart as her decision maker. Niece is a teacher.    Hypertension  Currently on two drug therapy. I suspect some of her elevated BP is pain.  She is on amlodipine 5 mg and may go up to 10 mg daily if still elevated when pain is under control.   Many BP meds are in a timed release wax matrix which may be a problem with tumor/bowel obstruction. AMLODIPINE IS NOT one of them and can even be crushed if needed.        I will follow-up with patient. Please contact us if you have any additional questions.    Subjective:     Chief Complaint:   Chief Complaint   Patient presents with    Fatigue     Patient with generalized weakness, recent stomach biopsy, negative covid test on Monday.       HPI:    59-year-old female with DM2, uterine adenocarcinoma s/p BRIAN-BSO/chemotherapy/radiation and hypertension presents after virtual visit with palliative care office " "for abdominal pain and fatigue.  The symptoms have been progressively worsening over the past month. Patient with CT 4/5 showing likely endometrial recurrence and then omental mass biopsy 4/20 with pathological confirmation. She reports that abdominal pain radiates throughout the abdomen and seems to be worse with any type of p.o. intake including water. She takes Percocet at home which used to relief her pain but now pain is too severe.  Reports associated nausea, frequent emesis and diarrhea.  She had dysuria 2 months ago and an ED visit with negative urine culture/tx with AB.  Fatigue has become profound that she has difficulty getting to the bathroom from her bedroom.   Denies sick contact, fever, dyspnea. Had negative COVID test 4 days ago.  She denies bloody or dark stool, hematemesis, flank pain, chest pain. She reports that she sits on the side of the bed or chair and "doubles over" until the worst pain abates. She does not know how much weight she has lost.     Hospital Course:  No notes on file    Interval History: Still having pain but does not want to bother anyone. Not passing gas. Some nausea. Reviewed that I worry about having a blockage that cannot be fixed. She is anxious to begin chemotherapy to see if any improvement can occur.    Medications:  Continuous Infusions:   hydromorphone in 0.9 % NaCl 6 mg/30 ml      lactated ringers 75 mL/hr at 04/26/20 0604     Scheduled Meds:   amLODIPine  5 mg Oral Daily    bimatoprost  1 drop Both Eyes QHS    bisoprolol  10 mg Oral Daily    dicyclomine  20 mg Oral BID    enoxaparin  40 mg Subcutaneous Daily    timolol maleate 0.5%  1 drop Both Eyes BID     PRN Meds:Dextrose 10% Bolus, glucagon (human recombinant), insulin aspart U-100, meclizine, naloxone, ondansetron, oxyCODONE-acetaminophen, promethazine (PHENERGAN) IVPB, sodium chloride 0.9%    Objective:     Vital Signs (Most Recent):  Temp: 98.4 °F (36.9 °C) (04/26/20 0715)  Pulse: 82 (04/26/20 " 0715)  Resp: 18 (04/26/20 0715)  BP: (!) 174/81 (04/26/20 0715)  SpO2: 96 % (04/26/20 0715) Vital Signs (24h Range):  Temp:  [98.2 °F (36.8 °C)-99.3 °F (37.4 °C)] 98.4 °F (36.9 °C)  Pulse:  [71-84] 82  Resp:  [18] 18  SpO2:  [95 %-98 %] 96 %  BP: (149-174)/(73-81) 174/81     Weight: 125 kg (275 lb 9.2 oz)  Body mass index is 43.16 kg/m².    Review of Symptoms  Symptom Assessment (ESAS 0-10 scale)    ESAS 0 1 2 3 4 5 6 7 8 9 10   Pain []  []  [x]  []  []  []  []  []  []  []  []    Dyspnea [x]  []  []  []  []  []  []  []  []  []  []    Anxiety [x]  []  []  []  []  []  []  []  []  []  []    Nausea []  [x]  []  []  []  []  []  []  []  []  []    Depression  [x]  []  []  []  []  []  []  []  []  []  []    Anorexia []  [x]  []  []  []  []  []  []  []  []  []    Fatigue [x]  []  []  []  []  []  []  []  []  []  []    Insomnia [x]  []  []  []  []  []  []  []  []  []  []    Restlessness  [x]  []  []  []  []  []  []  []  []  []  []    Agitation [x]  []  []  []  []  []  []  []  []  []  []      Bowel Management Plan (BMP): No Not passing flatus.    Pain Assessment: LLQ then diffuse. Describes as twinges to me now.  OME in 24 hours: 120 mg oral morphine equivalents    Performance Status: 70    ECOG Performance Status Grade: 2 - Ambulates, capable of self care only    Physical Exam    Significant Labs: All pertinent labs within the past 24 hours have been reviewed.  CBC:   Recent Labs   Lab 04/25/20  0927   WBC 12.50   HGB 10.8*   HCT 36.7*   MCV 84   *     BMP:  No results for input(s): GLU, NA, K, CL, CO2, BUN, CREATININE, CALCIUM, MG in the last 24 hours.  LFT:  Lab Results   Component Value Date    AST 33 04/25/2020    ALKPHOS 273 (H) 04/25/2020    BILITOT 0.3 04/25/2020     Albumin:   Albumin   Date Value Ref Range Status   04/25/2020 2.4 (L) 3.5 - 5.2 g/dL Final     Protein:   Total Protein   Date Value Ref Range Status   04/25/2020 6.7 6.0 - 8.4 g/dL Final     Lactic acid:   Lab Results   Component Value Date     LACTATE 1.0 04/24/2020       Significant Imaging: I have reviewed all pertinent imaging results/findings within the past 24 hours.    Advanced Directives::    Living Will: No  LaPOST: No  Do Not Resuscitate Status: No  Medical Power of : Yes. Agent's Name: Sampson parks. Agent's Contact Number: [phone] Patient gave ORAL DECLARATION. She does have a brother who lives with her (he was homeless and she promised her mother she would look after him). Has a Sister and Brother who also work with [employer]. Has a sister in [place].      Decision-Making Capacity: Patient answered questions          > 50% of 15 min visit spent in chart review, face to face discussion of goals of care,  symptom assessment, coordination of care and emotional support.  Discussed with Dr. Lala and Kj Cabello MD  Palliative Medicine  [facility]  [phone]

## 2020-04-26 NOTE — ASSESSMENT & PLAN NOTE
"4/26/2020 Patient still having pain. Will begin PCA so patient may dose herself and decrease her "worry" about bothering staff. She is not real thrilled with ET CO2 monitoring! Will see how she does. She is not willing to talk yet about " what if " her obstruction remains.    4/25/2020   59 year old woman with recurrent endometrial carcinoma/sarcoma. She presents with pain and intermittent SBO.  Work with her on pain and symptom management.  Goal is to be well enough to receive palliative chemotherapy to give her more time.  She asked that I put her niece's name/number in chart as her decision maker. Niece is a teacher.  "

## 2020-04-26 NOTE — PLAN OF CARE
POC reviewed with patient, questions answered and concerns addressed. VS stable, ambulating in room with SBA and sat in chair several times today. Voiding adequate amount cyu, reports 2 small loose BM's. Dilaudid PCA initiated today and patient reports better pain control. CBG<200 today. Medicated x1 with zofran after eating clear liquid tray, In no acute distress; will continue to monitor.

## 2020-04-26 NOTE — ASSESSMENT & PLAN NOTE
- Recurrent endometrial cancer  - CT of abdomen pelvis to further elucidate recurrence  - Palliative care consult placed  - Lovenox daily. Last dose tonight in anticipation of paracentesis tomorrow

## 2020-04-27 NOTE — PROGRESS NOTES
Patient presented via stretcher, AAOX3, allergies reviewed, two patient ID used, NP at bedside, no fluid noted, procedure cancelled. Report called and given  to floor nurse, RELL Martinez.

## 2020-04-27 NOTE — ASSESSMENT & PLAN NOTE
Due to recurrent carcinomatosis. Will continue to work with symptom management on pain relief and nausea relief.      PCA: hydromorphone 0.2 mg q 6 minutes.  No basal rate.  22 demands/14 injections over 12 hours.  0.2 mg/hr.     Recommend:   Adding basal rate of 0.2 mg/hr.  Cont demand 0.2 mg q 6 minutes prn

## 2020-04-27 NOTE — PLAN OF CARE
Plan of care reviewed. Patient is oriented X4. Ambulates in room without difficulty. Right chest port-a-cath infusing LR @ 75 ml/hr. Tolerating clear liquid diet well. Blood sugar checked AC/HS. Complained of nausea; PRN Zofran administered as ordered. Abdominal pain controlled with Dilaudid PCA. All questions and concerns addressed. All safety precautions in place. Remains free of injury. Patient is stable. Will continue to monitor.

## 2020-04-27 NOTE — ASSESSMENT & PLAN NOTE
58 yo female admitted with pain/nausea/diarrhrea and FTT due to partial small bowel obstruction in the setting of recurrent endometrial cancer and peritoneal carcinomatosis    Prognosis: Concern for days to weeks given severity of disease, location, and recurrence    1) Symptoms:  Recommend starting 0.2 mg/hr basal rate in addition to on demand 0.2 mg q 6 hours.  Can consider dexamethasone 4 mg bid for adjuvant   Will discuss timing of reglan trial with gyn/onc as well as proposed treatment plan    2) Code status: FC    3) Psychosocial: retired from [employer]; never ; no kids    4) Medicolegal: Discussed advance care planning today and the importance of completing documents.  Printed, reviewed, and left forms with the patient to complete.     She wants her niece to be her HCPOA and knows she has to have discussions with her regarding EOL preferences.     5) Spiritual:  Yes; Confucianism    6) Goals of care/discussion:  Have not yet had discussions regarding her considerations about potential outcomes. Will discuss with oncology potential treatment options as well as likely outcomes.      She is hopeful to hear of all options at this point.  States her goal is to return home    7) Disposition plan: TBD

## 2020-04-27 NOTE — ASSESSMENT & PLAN NOTE
- Recurrent endometrial cancer  - CT of abdomen pelvis to further elucidate recurrence  - Palliative care consult placed  - Lovenox daily. Last dose last night in anticipation of paracentesis today

## 2020-04-27 NOTE — SUBJECTIVE & OBJECTIVE
Interval History: continues with continuous low level colicky pain; intermittent sleep;   No gas; though continues with loose stools; minimal nausea unless she tries to drink something    Medications:  Continuous Infusions:   hydromorphone in 0.9 % NaCl 6 mg/30 ml      lactated ringers 75 mL/hr at 04/26/20 2037     Scheduled Meds:   amLODIPine  10 mg Oral Daily    bimatoprost  1 drop Both Eyes QHS    bisoprolol  10 mg Oral Daily    dicyclomine  20 mg Oral BID    timolol maleate 0.5%  1 drop Both Eyes BID     PRN Meds:Dextrose 10% Bolus, glucagon (human recombinant), insulin aspart U-100, meclizine, naloxone, ondansetron, oxyCODONE-acetaminophen, promethazine (PHENERGAN) IVPB, sodium chloride 0.9%    Objective:     Vital Signs (Most Recent):  Temp: 98.1 °F (36.7 °C) (04/27/20 1110)  Pulse: 78 (04/27/20 1110)  Resp: 18 (04/27/20 1110)  BP: (!) 180/74 (04/27/20 1110)  SpO2: 97 % (04/27/20 1110) Vital Signs (24h Range):  Temp:  [97.9 °F (36.6 °C)-98.6 °F (37 °C)] 98.1 °F (36.7 °C)  Pulse:  [74-85] 78  Resp:  [16-20] 18  SpO2:  [95 %-99 %] 97 %  BP: (156-180)/(54-82) 180/74     Weight: 125 kg (275 lb 9.2 oz)  Body mass index is 43.16 kg/m².    Review of Symptoms  Symptom Assessment (ESAS 0-10 scale)    ESAS 0 1 2 3 4 5 6 7 8 9 10   Pain []  []  [x]  []  []  []  []  []  []  []  []    Dyspnea [x]  []  []  []  []  []  []  []  []  []  []    Anxiety [x]  []  []  []  []  []  []  []  []  []  []    Nausea []  [x]  []  []  []  []  []  []  []  []  []    Depression  [x]  []  []  []  []  []  []  []  []  []  []    Anorexia []  [x]  []  []  []  []  []  []  []  []  []    Fatigue [x]  []  []  []  []  []  []  []  []  []  []    Insomnia [x]  []  []  []  []  []  []  []  []  []  []    Restlessness  [x]  []  []  []  []  []  []  []  []  []  []    Agitation [x]  []  []  []  []  []  []  []  []  []  []      Bowel Management Plan (BMP): No Not passing flatus.    Pain Assessment: LLQ then diffuse. Describes as twinges to me now.  OME in 24  hours: 120 mg oral morphine equivalents    Performance Status: 70    ECOG Performance Status Grade: 2 - Ambulates, capable of self care only    Physical Exam   Constitutional: She is oriented to person, place, and time. She appears well-developed and well-nourished.   HENT:   Head: Normocephalic and atraumatic.   Eyes: Pupils are equal, round, and reactive to light. EOM are normal.   Cardiovascular: Normal rate and normal heart sounds.   Pulmonary/Chest: Effort normal and breath sounds normal.   Neurological: She is oriented to person, place, and time.   Skin: Capillary refill takes less than 2 seconds.   Psychiatric: She has a normal mood and affect. Her behavior is normal.       Significant Labs: All pertinent labs within the past 24 hours have been reviewed.  CBC:   Recent Labs   Lab 04/27/20  0309   WBC 12.74*   HGB 9.8*   HCT 33.6*   MCV 84   *     BMP:  Recent Labs   Lab 04/27/20  0309   *      K 3.9   CL 98   CO2 23   BUN 9   CREATININE 0.7   CALCIUM 9.0     LFT:  Lab Results   Component Value Date    AST 50 (H) 04/27/2020    ALKPHOS 265 (H) 04/27/2020    BILITOT 0.3 04/27/2020     Albumin:   Albumin   Date Value Ref Range Status   04/27/2020 2.2 (L) 3.5 - 5.2 g/dL Final     Protein:   Total Protein   Date Value Ref Range Status   04/27/2020 6.5 6.0 - 8.4 g/dL Final     Lactic acid:   Lab Results   Component Value Date    LACTATE 1.0 04/24/2020       Significant Imaging: I have reviewed all pertinent imaging results/findings within the past 24 hours.    Advanced Directives::    Living Will: No  LaPOST: No  Do Not Resuscitate Status: No  Medical Power of : Yes. Agent's Name: Sampson parks. Agent's Contact Number: [phone] Patient gave ORAL DECLARATION. She does have a brother who lives with her (he was homeless and she promised her mother she would look after him). Has a Sister and Brother who also work with [facility]. Has a sister in [place].       Decision-Making Capacity: Patient answered questions

## 2020-04-27 NOTE — TELEPHONE ENCOUNTER
I called the patient to schedule  A telehealth visit. L/M that I called----- Message from Kate Boyd MD sent at 4/26/2020  8:34 PM CDT -----  Regarding: RE: Recurrent disease  Okay. We will set up a telehealth visit.     Olivia,     Could you schedule a telehealth visit possibly Tues.  She has previously received this chemo so Christen can check w/patient on chemo teaching.     Thanks,    SJ      ----- Message -----  From: Noelle Lala MD  Sent: 4/24/2020  12:50 PM CDT  To: Kate Boyd MD  Subject: Recurrent disease                                Hi Dr. Boyd--  Unfortunately Ms. Driver has recurrent disease. Her IR biopsy for tissue confirmation and hopefully enough to send for molecular STRATA testing is pending.     She is fairly symptomatic so I wanted to go ahead and reach out to you regarding resuming chemotherapy. Given the length of time from her prior treatment I would recommend restarting carbo/taxol. I can't recall how much residual neuropathy she has.     She may need a paracentesis as well soon. I referred her to palliative care as well and have discussed terminal nature of disease and that while we may be able to treat we will be unable to cure her disease.     I put the orders for carbo/taxol in epic. Please let me know how else I can help.    Best,  Noelle

## 2020-04-27 NOTE — PROGRESS NOTES
[facility]  Palliative Medicine  Progress Note    Patient Name: Saba Driver  MRN: [id number]  Admission Date: 4/24/2020  Hospital Length of Stay: 3 days  Code Status: Full Code   Attending Provider: Noelle Lala MD  Consulting Provider: Maikol Leigh MD  Primary Care Physician: Adela Verma MD  Principal Problem:Uterine cancer, sarcoma    Patient information was obtained from patient and past medical records.      Assessment/Plan:     Palliative care encounter  58 yo female admitted with pain/nausea/diarrhrea and FTT due to partial small bowel obstruction in the setting of recurrent endometrial cancer and peritoneal carcinomatosis    Prognosis: Concern for days to weeks given severity of disease, location, and recurrence    1) Symptoms:  Recommend starting 0.2 mg/hr basal rate in addition to on demand 0.2 mg q 6 hours.  Can consider dexamethasone 4 mg bid for adjuvant   Will discuss timing of reglan trial with gyn/onc as well as proposed treatment plan    2) Code status: FC    3) Psychosocial: retired from [employer]; never ; no kids    4) Medicolegal: Discussed advance care planning today and the importance of completing documents.  Printed, reviewed, and left forms with the patient to complete.     She wants her niece to be her HCPOA and knows she has to have discussions with her regarding EOL preferences.     5) Spiritual:  Yes; Taoist    6) Goals of care/discussion:  Have not yet had discussions regarding her considerations about potential outcomes. Will discuss with oncology potential treatment options as well as likely outcomes.      She is hopeful to hear of all options at this point.  States her goal is to return home    7) Disposition plan: TBD      Abdominal pain  Due to recurrent carcinomatosis. Will continue to work with symptom management on pain relief and nausea relief.      PCA: hydromorphone 0.2 mg q 6 minutes.  No basal rate.  22 demands/14 injections  "over 12 hours.  0.2 mg/hr.     Recommend:   Adding basal rate of 0.2 mg/hr.  Cont demand 0.2 mg q 6 minutes prn        I will follow-up with patient. Please contact us if you have any additional questions.    Subjective:     Chief Complaint:   Chief Complaint   Patient presents with    Fatigue     Patient with generalized weakness, recent stomach biopsy, negative covid test on Monday.       HPI:    59-year-old female with DM2, uterine adenocarcinoma s/p BRIAN-BSO/chemotherapy/radiation and hypertension presents after virtual visit with palliative care office for abdominal pain and fatigue.  The symptoms have been progressively worsening over the past month. Patient with CT 4/5 showing likely endometrial recurrence and then omental mass biopsy 4/20 with pathological confirmation. She reports that abdominal pain radiates throughout the abdomen and seems to be worse with any type of p.o. intake including water. She takes Percocet at home which used to relief her pain but now pain is too severe.  Reports associated nausea, frequent emesis and diarrhea.  She had dysuria 2 months ago and an ED visit with negative urine culture/tx with AB.  Fatigue has become profound that she has difficulty getting to the bathroom from her bedroom.   Denies sick contact, fever, dyspnea. Had negative COVID test 4 days ago.  She denies bloody or dark stool, hematemesis, flank pain, chest pain. She reports that she sits on the side of the bed or chair and "doubles over" until the worst pain abates. She does not know how much weight she has lost.     In this setting, palliative medicine was consulted to help with medical decision making and aid in the formation of goals of care.       Hospital Course:  No notes on file    Interval History: continues with continuous low level colicky pain; intermittent sleep;   No gas; though continues with loose stools; minimal nausea unless she tries to drink something    Medications:  Continuous " Infusions:   hydromorphone in 0.9 % NaCl 6 mg/30 ml      lactated ringers 75 mL/hr at 04/26/20 2037     Scheduled Meds:   amLODIPine  10 mg Oral Daily    bimatoprost  1 drop Both Eyes QHS    bisoprolol  10 mg Oral Daily    dicyclomine  20 mg Oral BID    timolol maleate 0.5%  1 drop Both Eyes BID     PRN Meds:Dextrose 10% Bolus, glucagon (human recombinant), insulin aspart U-100, meclizine, naloxone, ondansetron, oxyCODONE-acetaminophen, promethazine (PHENERGAN) IVPB, sodium chloride 0.9%    Objective:     Vital Signs (Most Recent):  Temp: 98.1 °F (36.7 °C) (04/27/20 1110)  Pulse: 78 (04/27/20 1110)  Resp: 18 (04/27/20 1110)  BP: (!) 180/74 (04/27/20 1110)  SpO2: 97 % (04/27/20 1110) Vital Signs (24h Range):  Temp:  [97.9 °F (36.6 °C)-98.6 °F (37 °C)] 98.1 °F (36.7 °C)  Pulse:  [74-85] 78  Resp:  [16-20] 18  SpO2:  [95 %-99 %] 97 %  BP: (156-180)/(54-82) 180/74     Weight: 125 kg (275 lb 9.2 oz)  Body mass index is 43.16 kg/m².    Review of Symptoms  Symptom Assessment (ESAS 0-10 scale)    ESAS 0 1 2 3 4 5 6 7 8 9 10   Pain []  []  [x]  []  []  []  []  []  []  []  []    Dyspnea [x]  []  []  []  []  []  []  []  []  []  []    Anxiety [x]  []  []  []  []  []  []  []  []  []  []    Nausea []  [x]  []  []  []  []  []  []  []  []  []    Depression  [x]  []  []  []  []  []  []  []  []  []  []    Anorexia []  [x]  []  []  []  []  []  []  []  []  []    Fatigue [x]  []  []  []  []  []  []  []  []  []  []    Insomnia [x]  []  []  []  []  []  []  []  []  []  []    Restlessness  [x]  []  []  []  []  []  []  []  []  []  []    Agitation [x]  []  []  []  []  []  []  []  []  []  []      Bowel Management Plan (BMP): No Not passing flatus.    Pain Assessment: LLQ then diffuse. Describes as twinges to me now.  OME in 24 hours: 120 mg oral morphine equivalents    Performance Status: 70    ECOG Performance Status Grade: 2 - Ambulates, capable of self care only    Physical Exam   Constitutional: She is oriented to person, place, and  time. She appears well-developed and well-nourished.   HENT:   Head: Normocephalic and atraumatic.   Eyes: Pupils are equal, round, and reactive to light. EOM are normal.   Cardiovascular: Normal rate and normal heart sounds.   Pulmonary/Chest: Effort normal and breath sounds normal.   Neurological: She is oriented to person, place, and time.   Skin: Capillary refill takes less than 2 seconds.   Psychiatric: She has a normal mood and affect. Her behavior is normal.       Significant Labs: All pertinent labs within the past 24 hours have been reviewed.  CBC:   Recent Labs   Lab 04/27/20  0309   WBC 12.74*   HGB 9.8*   HCT 33.6*   MCV 84   *     BMP:  Recent Labs   Lab 04/27/20  0309   *      K 3.9   CL 98   CO2 23   BUN 9   CREATININE 0.7   CALCIUM 9.0     LFT:  Lab Results   Component Value Date    AST 50 (H) 04/27/2020    ALKPHOS 265 (H) 04/27/2020    BILITOT 0.3 04/27/2020     Albumin:   Albumin   Date Value Ref Range Status   04/27/2020 2.2 (L) 3.5 - 5.2 g/dL Final     Protein:   Total Protein   Date Value Ref Range Status   04/27/2020 6.5 6.0 - 8.4 g/dL Final     Lactic acid:   Lab Results   Component Value Date    LACTATE 1.0 04/24/2020       Significant Imaging: I have reviewed all pertinent imaging results/findings within the past 24 hours.    Advanced Directives::    Living Will: No  LaPOST: No  Do Not Resuscitate Status: No  Medical Power of : Yes. Agent's Name: Sampson parks. Agent's Contact Number: [phone] Patient gave ORAL DECLARATION. She does have a brother who lives with her (he was homeless and she promised her mother she would look after him). Has a Sister and Brother who also work with [employer]. Has a sister in [place].      Decision-Making Capacity: Patient answered questions          > 50% of 45 min visit spent in chart review, face to face discussion of goals of care,  symptom assessment, coordination of care and emotional support.    Maikol  OSMEL Leigh MD  Palliative Medicine  [facility]

## 2020-04-27 NOTE — PROGRESS NOTES
"[facility]  Gynecologic Oncology  Progress Note      Patient Name: Saba Driver  MRN: [id number]  Admission Date: 4/24/2020  Hospital Length of Stay: 3 days  Attending Provider: Noelle Lala MD  Primary Care Provider: Adela Verma MD  Principal Problem: Uterine cancer, sarcoma    Follow-up For: * No surgery found *  Post-Operative Day:    Subjective:      History of Present Illness:   59-year-old female with DM2, uterine adenocarcinoma s/p BRIAN-BSO/chemotherapy/radiation and hypertension presents after virtual visit with palliative care office for abdominal pain and fatigue.  The symptoms have been progressively worsening over the past month. Patient with CT 4/5 showing likely endometrial recurrence and then omental mass biopsy 4/20 with pathological confirmation. She reports that abdominal pain radiates throughout the abdomen and seems to be worse with any type of p.o. intake including water. She takes Percocet at home which used to relief her pain but now pain is too severe.  Reports associated nausea, frequent emesis and diarrhea.  She had dysuria 2 months ago but this has since resolved.  Fatigue has become profound that she has difficulty getting to the bathroom from her bedroom.   Denies sick contact, fever, dyspnea. Had negative COVID test 4 days ago.  She denies bloody or dark stool, hematemesis, flank pain, chest pain.    Hospital Course:  04/24/2020 - Admit for evaluation and management of diffuse abdominal pain and intractable n/v with emesis  04/25/2020 - Patient with nausea overnight. Has not passed flatus "in a while". Abdominal pain persistent. CT read showed likely distal small bowel obstruction with increased ascites.   04/26/2020 - Nausea x2 overnight, no emesis. Pain not controlled but patient very stoic. Reports "she'll be alright" but acknowledges increase in pain control warranted. Palliative care consulted and recommended PCA and clear liquid diet may be possible. " "Will attempt today. Not passing flatus. Up in chair during evaluation.   04/27/2020 - Patient reports improved pain with PCA. However, still had severe pain when attempting liquids. Not passing gas. Some watery stool. Afebrile. Stoic, laying in bed "comfortable."     Interval History: Patient reports improved pain with PCA. However, still had severe pain when attempting liquids. Not passing gas. Some watery stool. Afebrile. Stoic, laying in bed "comfortable."     Scheduled Meds:   amLODIPine  10 mg Oral Daily    bimatoprost  1 drop Both Eyes QHS    bisoprolol  10 mg Oral Daily    dicyclomine  20 mg Oral BID    timolol maleate 0.5%  1 drop Both Eyes BID     Continuous Infusions:   hydromorphone in 0.9 % NaCl 6 mg/30 ml      lactated ringers 75 mL/hr at 04/26/20 2037     PRN Meds:Dextrose 10% Bolus, glucagon (human recombinant), insulin aspart U-100, meclizine, naloxone, ondansetron, oxyCODONE-acetaminophen, promethazine (PHENERGAN) IVPB, sodium chloride 0.9%    Review of patient's allergies indicates:   Allergen Reactions    Gabapentin      Swelling,musclespasm    Lyrica [pregabalin]      Swelling, musclespasm       Objective:     Vital Signs (Most Recent):  Temp: 98.4 °F (36.9 °C) (04/27/20 0416)  Pulse: 77 (04/27/20 0416)  Resp: 20 (04/27/20 0017)  BP: (!) 175/74 (04/27/20 0416)  SpO2: 95 % (04/27/20 0416) Vital Signs (24h Range):  Temp:  [98.1 °F (36.7 °C)-98.6 °F (37 °C)] 98.4 °F (36.9 °C)  Pulse:  [74-82] 77  Resp:  [16-20] 20  SpO2:  [95 %-98 %] 95 %  BP: (156-175)/(54-82) 175/74     Weight: 125 kg (275 lb 9.2 oz)  Body mass index is 43.16 kg/m².    Intake/Output - Last 3 Shifts       04/25 0700 - 04/26 0659 04/26 0700 - 04/27 0659    P.O. 30 888    I.V. (mL/kg) 1678.8 (13.4) 1128.3 (9)    Total Intake(mL/kg) 1708.8 (13.7) 2016.3 (16.1)    Net +1708.8 +2016.3          Urine Occurrence 3 x 7 x    Stool Occurrence  2 x             Physical Exam:   Constitutional: She is oriented to person, place, and " time. She appears well-developed and well-nourished. No distress.    HENT:   Head: Normocephalic and atraumatic.    Eyes: Conjunctivae are normal. Right eye exhibits no discharge. Left eye exhibits no discharge.    Neck: Neck supple. No tracheal deviation present.    Cardiovascular: Normal rate.     Pulmonary/Chest: Effort normal and breath sounds normal.        Abdominal: Soft. Bowel sounds are normal. There is tenderness. There is no guarding.                 Neurological: She is alert and oriented to person, place, and time.    Skin: She is not diaphoretic.        Lines/Drains/Airways     Central Venous Catheter Line            Port A Cath Single Lumen 04/25/20 1208 right subclavian 1 day          Peripheral Intravenous Line                 Peripheral IV - Single Lumen 04/24/20 1615 20 G Right Antecubital 2 days                Laboratory:  All pertinent labs from the last 24 hours have been reviewed.    Diagnostic Results:  Labs: Reviewed  CT: Reviewed     History of uterine cancer.  Findings suggest peritoneal carcinomatosis with interval increase in ascites and peritoneal disease compared to prior.  Distal partial small bowel obstruction suggested with likely transition in the distal small bowel in colonic loops in the pelvis not clearly demarcated as described above.    Assessment/Plan:     Nausea and vomiting  - Zofran PRN  - IVF bolus given, IVF ordered  - Diet: clears    Abdominal pain  - Likely SBO  - every other day CBC, CMP  - clear liquid diet  - IVF  - Zofran PRN  - Changed from PRN dilaudid to PCA dilaudid    Fatigue  - IVF  - Will order PT/OT tomorrow  - Consider dietary consult to assess nutritional requirements    Cancer of inguinal/lower limb lymph nodes, secondary  - Recurrent endometrial cancer  - CT of abdomen pelvis to further elucidate recurrence  - Palliative care consult placed  - Lovenox daily. Last dose last night in anticipation of paracentesis today    Diabetes mellitus, type 2  - hold  januvia as patient not taking in PO  - SSI ordered with QID POCT glucose check    Hypertension  - continue norvasc daily  - Vitals per floor protocol      VTE Risk Mitigation (From admission, onward)         Ordered     IP VTE HIGH RISK PATIENT  Once      04/24/20 1724     Place sequential compression device  Until discontinued      04/24/20 1724                    Jose Underwood MD  Gynecologic Oncology  [facility]

## 2020-04-27 NOTE — PHYSICIAN QUERY
"PT Name: Saba Driver  MR #: [id number]    PHYSICIAN QUERY - BMI CLARIFICATION      CDS/: JARAD Carrillo,[email or web address]        Contact information:[email or web address]    This form is a permanent document in the medical record.      Query Date: April 27, 2020    By submitting this query, we are merely seeking further clarification of documentation. Please utilize your independent clinical judgment when addressing the question(s) below.    The medical record contains the following:  Indicators Supporting Clinical Findings Location in Medical Record  Provider name   X BMI Body mass index is 43.16 kg/m². Gyn Progress note 4/27@615am   X Height 5' 7" (1.702 m) Anthropometrics 4/25   X Weight Weight: 125 kg (275 lb 9.2 oz) Gyn Progress note 4/27@615am    Other         Provider, please specify diagnosis or diagnoses associated with above clinical findings.   [   ] Overweight   [   ] Obesity   [ X  ] Morbid Obesity   [   ] Other   [   ] Clinically Undetermined       Please document in your progress notes daily for the duration of treatment until resolved and include in your discharge summary.    "

## 2020-04-27 NOTE — PLAN OF CARE
Problem: Adult Inpatient Plan of Care  Goal: Plan of Care Review  Outcome: Ongoing, Progressing  Flowsheets (Taken 4/27/2020 1057)  Plan of Care Reviewed With: patient  Patient remains free from falls and injury this shift. Bed in low, locked position with call light in reach. Plan of care reviewed with pt. VSS, elevated bp. Team aware and normal for patient. Abdominal pain reported. Dilaudid PCA pump in use. ETCO2 in place. Electrolytes replaced. Diet tolerated. Patient up in chair and walking to couch. Patient working with PT.  No skin issues reported. Consults to PT/OT and radiology for possible paracentesis. Paracentesis not preformed due to no fluid noted in abdomen.  Patient independent with ADLs. All belongings within reach.  Will continue to monitor.

## 2020-04-27 NOTE — SUBJECTIVE & OBJECTIVE
"Interval History: Patient reports improved pain with PCA. However, still had severe pain when attempting liquids. Not passing gas. Some watery stool. Afebrile. Stoic, laying in bed "comfortable."     Scheduled Meds:   amLODIPine  10 mg Oral Daily    bimatoprost  1 drop Both Eyes QHS    bisoprolol  10 mg Oral Daily    dicyclomine  20 mg Oral BID    timolol maleate 0.5%  1 drop Both Eyes BID     Continuous Infusions:   hydromorphone in 0.9 % NaCl 6 mg/30 ml      lactated ringers 75 mL/hr at 04/26/20 2037     PRN Meds:Dextrose 10% Bolus, glucagon (human recombinant), insulin aspart U-100, meclizine, naloxone, ondansetron, oxyCODONE-acetaminophen, promethazine (PHENERGAN) IVPB, sodium chloride 0.9%    Review of patient's allergies indicates:   Allergen Reactions    Gabapentin      Swelling,musclespasm    Lyrica [pregabalin]      Swelling, musclespasm       Objective:     Vital Signs (Most Recent):  Temp: 98.4 °F (36.9 °C) (04/27/20 0416)  Pulse: 77 (04/27/20 0416)  Resp: 20 (04/27/20 0017)  BP: (!) 175/74 (04/27/20 0416)  SpO2: 95 % (04/27/20 0416) Vital Signs (24h Range):  Temp:  [98.1 °F (36.7 °C)-98.6 °F (37 °C)] 98.4 °F (36.9 °C)  Pulse:  [74-82] 77  Resp:  [16-20] 20  SpO2:  [95 %-98 %] 95 %  BP: (156-175)/(54-82) 175/74     Weight: 125 kg (275 lb 9.2 oz)  Body mass index is 43.16 kg/m².    Intake/Output - Last 3 Shifts       04/25 0700 - 04/26 0659 04/26 0700 - 04/27 0659    P.O. 30 888    I.V. (mL/kg) 1678.8 (13.4) 1128.3 (9)    Total Intake(mL/kg) 1708.8 (13.7) 2016.3 (16.1)    Net +1708.8 +2016.3          Urine Occurrence 3 x 7 x    Stool Occurrence  2 x             Physical Exam:   Constitutional: She is oriented to person, place, and time. She appears well-developed and well-nourished. No distress.    HENT:   Head: Normocephalic and atraumatic.    Eyes: Conjunctivae are normal. Right eye exhibits no discharge. Left eye exhibits no discharge.    Neck: Neck supple. No tracheal deviation present.  "   Cardiovascular: Normal rate.     Pulmonary/Chest: Effort normal and breath sounds normal.        Abdominal: Soft. Bowel sounds are normal. There is tenderness. There is no guarding.                 Neurological: She is alert and oriented to person, place, and time.    Skin: She is not diaphoretic.        Lines/Drains/Airways     Central Venous Catheter Line            Port A Cath Single Lumen 04/25/20 1208 right subclavian 1 day          Peripheral Intravenous Line                 Peripheral IV - Single Lumen 04/24/20 1615 20 G Right Antecubital 2 days                Laboratory:  All pertinent labs from the last 24 hours have been reviewed.    Diagnostic Results:  Labs: Reviewed  CT: Reviewed     History of uterine cancer.  Findings suggest peritoneal carcinomatosis with interval increase in ascites and peritoneal disease compared to prior.  Distal partial small bowel obstruction suggested with likely transition in the distal small bowel in colonic loops in the pelvis not clearly demarcated as described above.

## 2020-04-28 NOTE — ASSESSMENT & PLAN NOTE
- continue norvasc daily  - Vitals per floor protocol  - moderate, stable HTN  - BP: (142-180)/(63-81) 146/63

## 2020-04-28 NOTE — ASSESSMENT & PLAN NOTE
- Likely SBO, per imaging  - every other day CBC, CMP  - clear liquid diet  - IVF  - Zofran PRN  - Continue titrating PCA dilaudid pain control  - AM labs ordered

## 2020-04-28 NOTE — PLAN OF CARE
Problem: Physical Therapy Goal  Goal: Physical Therapy Goal  Description  Goals to be met by: 2020     Patient will increase functional independence with mobility by performin. Sit to stand transfer with Modified [place]  2. Bed to chair transfer with Modified [place]  3. Gait  x 200 feet with Modified [place]   4. Ascend/descend 13 stair with bilateral Handrails Supervision   Outcome: Ongoing, Progressing

## 2020-04-28 NOTE — PLAN OF CARE
Problem: Adult Inpatient Plan of Care  Goal: Plan of Care Review  Outcome: Ongoing, Progressing  Flowsheets (Taken 4/28/2020 1123)  Plan of Care Reviewed With: patient   Patient remains free from falls and injury this shift. Bed in low, locked position with call light in reach. Plan of care reviewed with pt. VSS.  NO pain reported this shift. Nausea resolved with zofran. Electrolytes replaced. Chemotherapy regimen started 04/28/2020. Taxol/Carbo. Premeds given. Port flushed and blood return noted. Chemo safety locks used. Taxol started at 166ml/hr for 3 hours. Carboplatin infused at 500ml/hr for 30 mins.  Tolerated Infusions. PCA dilaudid pump for pain. ETCO2 in use. Diet tolerated. NO skin wounds or breakdown. Patient independent with ADLs. All belongings within reach.  Will continue to monitor.

## 2020-04-28 NOTE — PROGRESS NOTES
"[facility]  Gynecologic Oncology  Progress Note      Patient Name: Saba Driver  MRN: [id number]  Admission Date: 4/24/2020  Hospital Length of Stay: 4 days  Attending Provider: Noelle Lala MD  Primary Care Provider: Adela Verma MD  Principal Problem: Uterine cancer, sarcoma    Follow-up For: * No surgery found *  Post-Operative Day:    Subjective:      History of Present Illness:   59-year-old female with DM2, uterine adenocarcinoma s/p BRIAN-BSO/chemotherapy/radiation and hypertension presents after virtual visit with palliative care office for abdominal pain and fatigue.  The symptoms have been progressively worsening over the past month. Patient with CT 4/5 showing likely endometrial recurrence and then omental mass biopsy 4/20 with pathological confirmation. She reports that abdominal pain radiates throughout the abdomen and seems to be worse with any type of p.o. intake including water. She takes Percocet at home which used to relief her pain but now pain is too severe.  Reports associated nausea, frequent emesis and diarrhea.  She had dysuria 2 months ago but this has since resolved.  Fatigue has become profound that she has difficulty getting to the bathroom from her bedroom.   Denies sick contact, fever, dyspnea. Had negative COVID test 4 days ago.  She denies bloody or dark stool, hematemesis, flank pain, chest pain.    Hospital Course:  04/24/2020 - Admit for evaluation and management of diffuse abdominal pain and intractable n/v with emesis  04/25/2020 - Patient with nausea overnight. Has not passed flatus "in a while". Abdominal pain persistent. CT read showed likely distal small bowel obstruction with increased ascites.   04/26/2020 - Nausea x2 overnight, no emesis. Pain not controlled but patient very stoic. Reports "she'll be alright" but acknowledges increase in pain control warranted. Palliative care consulted and recommended PCA and clear liquid diet may be possible. " "Will attempt today. Not passing flatus. Up in chair during evaluation.   04/27/2020 - Patient reports improved pain with PCA. However, still had severe pain when attempting liquids. Not passing gas. Some watery stool. Afebrile. Stoic, laying in bed "comfortable."   04/28/2020 - Patient reports two episodes of significant pin overnight. The rest of the night at baseline compared to last night. Adjustments made to PCA settings this AM. Moderate HTN, stable. Afebrile. No n/v overnight. Denies flatus.    Interval History:   Patient reports two episodes of significant pin overnight. The rest of the night at baseline compared to last night. Adjustments made to PCA settings this AM. Moderate HTN, stable. Afebrile. No n/v overnight. Denies flatus.    Scheduled Meds:   amLODIPine  10 mg Oral Daily    bimatoprost  1 drop Both Eyes QHS    bisoprolol  10 mg Oral Daily    dicyclomine  20 mg Oral BID    timolol maleate 0.5%  1 drop Both Eyes BID     Continuous Infusions:   hydromorphone in 0.9 % NaCl 6 mg/30 ml      lactated ringers 75 mL/hr at 04/27/20 2214     PRN Meds:Dextrose 10% Bolus, glucagon (human recombinant), insulin aspart U-100, meclizine, naloxone, ondansetron, oxyCODONE-acetaminophen, promethazine (PHENERGAN) IVPB, sodium chloride 0.9%    Review of patient's allergies indicates:   Allergen Reactions    Gabapentin      Swelling,musclespasm    Lyrica [pregabalin]      Swelling, musclespasm       Objective:     Vital Signs (Most Recent):  Temp: 97.9 °F (36.6 °C) (04/28/20 0401)  Pulse: 78 (04/28/20 0401)  Resp: 17 (04/28/20 0401)  BP: (!) 146/63 (04/28/20 0401)  SpO2: 98 % (04/28/20 0401) Vital Signs (24h Range):  Temp:  [97.9 °F (36.6 °C)-98.4 °F (36.9 °C)] 97.9 °F (36.6 °C)  Pulse:  [78-85] 78  Resp:  [16-19] 17  SpO2:  [94 %-99 %] 98 %  BP: (142-180)/(63-81) 146/63     Weight: 125 kg (275 lb 9.2 oz)  Body mass index is 43.16 kg/m².    Intake/Output - Last 3 Shifts       04/26 0700 - 04/27 0659 04/27 0700 - " 04/28 0659    P.O. 888 360    I.V. (mL/kg) 1128.3 (9) 947.5 (7.6)    Total Intake(mL/kg) 2016.3 (16.1) 1307.5 (10.5)    Net +2016.3 +1307.5          Urine Occurrence 7 x 2 x    Stool Occurrence 2 x         Physical Exam:   Constitutional: She is oriented to person, place, and time. She appears well-developed and well-nourished. No distress.    HENT:   Head: Normocephalic and atraumatic.    Eyes: Conjunctivae are normal. Right eye exhibits no discharge. Left eye exhibits no discharge.    Neck: Neck supple. No tracheal deviation present.    Cardiovascular: Normal rate.     Pulmonary/Chest: Effort normal and breath sounds normal.        Abdominal: Soft. Bowel sounds are normal. She exhibits distension. There is tenderness. There is no guarding.   Slightly firm and TTP in the epigastric region             Musculoskeletal: Normal range of motion and moves all extremeties.       Neurological: She is alert and oriented to person, place, and time.    Skin: Skin is warm and dry. She is not diaphoretic.    Psychiatric: She has a normal mood and affect. Her behavior is normal. Judgment and thought content normal.       Lines/Drains/Airways     Central Venous Catheter Line            Port A Cath Single Lumen 04/25/20 1208 right subclavian 2 days          Peripheral Intravenous Line                 Peripheral IV - Single Lumen 04/24/20 1615 20 G Right Antecubital 3 days                Laboratory:  Recent Labs   Lab 04/24/20  1615 04/25/20  0927 04/27/20  0309   WBC 13.79* 12.50 12.74*   HGB 11.4* 10.8* 9.8*   HCT 38.1 36.7* 33.6*   MCV 85 84 84   * 467* 432*       Recent Labs   Lab 04/24/20  1615 04/25/20  0927 04/27/20  0309    135* 136   K 4.4 4.1 3.9   CL 97 98 98   CO2 27 22* 23   BUN 11 8 9   CREATININE 0.8 0.7 0.7   * 146* 149*   PROT 7.6 6.7 6.5   BILITOT 0.3 0.3 0.3   ALKPHOS 304* 273* 265*   ALT 35 35 54*   AST 30 33 50*   MG 1.4* 1.5*  --    PHOS  --  3.1  --          Diagnostic Results:  No new  studies overnight.    Assessment/Plan:     Nausea and vomiting  - No N/V overnight  - Zofran PRN  - IVF bolus given, IVF ordered  - Diet: clears    Abdominal pain  - Likely SBO, per imaging  - every other day CBC, CMP  - clear liquid diet  - IVF  - Zofran PRN  - Continue titrating PCA dilaudid pain control  - AM labs ordered    Fatigue  - IVF  - Will order PT/OT tomorrow  - Consider dietary consult to assess nutritional requirements    Cancer of inguinal/lower limb lymph nodes, secondary  - Recurrent endometrial cancer  - CT of abdomen pelvis to further elucidate recurrence  - Palliative care consult placed  - Lovenox daily. Last dose last night in anticipation of paracentesis today    Diabetes mellitus, type 2  - hold januvia as patient not taking in PO  - SSI ordered with QID POCT glucose check    Hypertension  - continue norvasc daily  - Vitals per floor protocol  - moderate, stable HTN  - BP: (142-180)/(63-81) 146/63       VTE Risk Mitigation (From admission, onward)         Ordered     IP VTE HIGH RISK PATIENT  Once      04/24/20 1724     Place sequential compression device  Until discontinued      04/24/20 1724              Evangelina Howell MD  Gynecologic Oncology  [facility]

## 2020-04-28 NOTE — PROGRESS NOTES
Attempted to call both the patient's room and her cellphone ( [phone]) to complete assessment and discuss discharge planning. There was no answer at either number. I left her a detailed message on her cell phone along with my contact information.

## 2020-04-28 NOTE — ASSESSMENT & PLAN NOTE
- Recurrent endometrial cancer  - CT of abdomen pelvis to further elucidate recurrence  - Palliative care consult placed  - Lovenox daily. Last dose last night in anticipation of paracentesis today   no

## 2020-04-28 NOTE — SUBJECTIVE & OBJECTIVE
Interval History:   Patient reports two episodes of significant pin overnight. The rest of the night at baseline compared to last night. Adjustments made to PCA settings this AM. Moderate HTN, stable. Afebrile. No n/v overnight. Denies flatus.    Scheduled Meds:   amLODIPine  10 mg Oral Daily    bimatoprost  1 drop Both Eyes QHS    bisoprolol  10 mg Oral Daily    dicyclomine  20 mg Oral BID    timolol maleate 0.5%  1 drop Both Eyes BID     Continuous Infusions:   hydromorphone in 0.9 % NaCl 6 mg/30 ml      lactated ringers 75 mL/hr at 04/27/20 2214     PRN Meds:Dextrose 10% Bolus, glucagon (human recombinant), insulin aspart U-100, meclizine, naloxone, ondansetron, oxyCODONE-acetaminophen, promethazine (PHENERGAN) IVPB, sodium chloride 0.9%    Review of patient's allergies indicates:   Allergen Reactions    Gabapentin      Swelling,musclespasm    Lyrica [pregabalin]      Swelling, musclespasm       Objective:     Vital Signs (Most Recent):  Temp: 97.9 °F (36.6 °C) (04/28/20 0401)  Pulse: 78 (04/28/20 0401)  Resp: 17 (04/28/20 0401)  BP: (!) 146/63 (04/28/20 0401)  SpO2: 98 % (04/28/20 0401) Vital Signs (24h Range):  Temp:  [97.9 °F (36.6 °C)-98.4 °F (36.9 °C)] 97.9 °F (36.6 °C)  Pulse:  [78-85] 78  Resp:  [16-19] 17  SpO2:  [94 %-99 %] 98 %  BP: (142-180)/(63-81) 146/63     Weight: 125 kg (275 lb 9.2 oz)  Body mass index is 43.16 kg/m².    Intake/Output - Last 3 Shifts       04/26 0700 - 04/27 0659 04/27 0700 - 04/28 0659    P.O. 888 360    I.V. (mL/kg) 1128.3 (9) 947.5 (7.6)    Total Intake(mL/kg) 2016.3 (16.1) 1307.5 (10.5)    Net +2016.3 +1307.5          Urine Occurrence 7 x 2 x    Stool Occurrence 2 x         Physical Exam:   Constitutional: She is oriented to person, place, and time. She appears well-developed and well-nourished. No distress.    HENT:   Head: Normocephalic and atraumatic.    Eyes: Conjunctivae are normal. Right eye exhibits no discharge. Left eye exhibits no discharge.    Neck: Neck  supple. No tracheal deviation present.    Cardiovascular: Normal rate.     Pulmonary/Chest: Effort normal and breath sounds normal.        Abdominal: Soft. Bowel sounds are normal. She exhibits distension. There is tenderness. There is no guarding.   Slightly firm and TTP in the epigastric region             Musculoskeletal: Normal range of motion and moves all extremeties.       Neurological: She is alert and oriented to person, place, and time.    Skin: Skin is warm and dry. She is not diaphoretic.    Psychiatric: She has a normal mood and affect. Her behavior is normal. Judgment and thought content normal.       Lines/Drains/Airways     Central Venous Catheter Line            Port A Cath Single Lumen 04/25/20 1208 right subclavian 2 days          Peripheral Intravenous Line                 Peripheral IV - Single Lumen 04/24/20 1615 20 G Right Antecubital 3 days                Laboratory:  Recent Labs   Lab 04/24/20 1615 04/25/20  0927 04/27/20  0309   WBC 13.79* 12.50 12.74*   HGB 11.4* 10.8* 9.8*   HCT 38.1 36.7* 33.6*   MCV 85 84 84   * 467* 432*       Recent Labs   Lab 04/24/20 1615 04/25/20  0927 04/27/20  0309    135* 136   K 4.4 4.1 3.9   CL 97 98 98   CO2 27 22* 23   BUN 11 8 9   CREATININE 0.8 0.7 0.7   * 146* 149*   PROT 7.6 6.7 6.5   BILITOT 0.3 0.3 0.3   ALKPHOS 304* 273* 265*   ALT 35 35 54*   AST 30 33 50*   MG 1.4* 1.5*  --    PHOS  --  3.1  --          Diagnostic Results:  No new studies overnight.

## 2020-04-28 NOTE — PT/OT/SLP PROGRESS
"Physical Therapy Treatment    Patient Name:  Saba Driver   MRN:  [id number]    Recommendations:     Discharge Recommendations:  home   Discharge Equipment Recommendations: (TBD)   Barriers to discharge: None    Assessment:     Saba Driver is a 59 y.o. female admitted with a medical diagnosis of Uterine cancer, sarcoma.  She presents with the following impairments/functional limitations:  weakness, impaired endurance, impaired functional mobilty, impaired balance, pain Pt. cooperative and tolerated treatment well. Pt. progressing with mobility.    Rehab Prognosis: Good; patient would benefit from acute skilled PT services to address these deficits and reach maximum level of function.    Recent Surgery: * No surgery found *      Plan:     During this hospitalization, patient to be seen 3 x/week to address the identified rehab impairments via gait training, therapeutic activities, therapeutic exercises and progress toward the following goals:    · Plan of Care Expires:  05/21/20    Subjective     Chief Complaint: weakness  Patient/Family Comments/goals: to go home  Pain/Comfort:  · Pain Rating 1: (pt. did not rate, but said it was "under control")      Objective:     Communicated with nursing prior to session.  Patient found up in chair with PCA, peripheral IV, telemetry upon PT entry to room.     General Precautions: Standard, fall   Orthopedic Precautions:N/A   Braces:       Functional Mobility:  · Transfers:     · Sit to Stand:  stand by assistance with rolling walker  · Bed to Chair: stand by assistance with  rolling walker  using  Stand Pivot  · Gait: 180' with RW and SBA with decreased sue  · Balance: fair      AM-PAC 6 CLICK MOBILITY  Turning over in bed (including adjusting bedclothes, sheets and blankets)?: 4  Sitting down on and standing up from a chair with arms (e.g., wheelchair, bedside commode, etc.): 3  Moving from lying on back to sitting on the side of the bed?: 4  Moving to and from a bed to a chair " (including a wheelchair)?: 3  Need to walk in hospital room?: 3  Climbing 3-5 steps with a railing?: 3  Basic Mobility Total Score: 20       Therapeutic Activities and Exercises:   Pt. Performed (B) LE x15 reps seated in bedside chair. Assisted pt. To/from bathroom. Discussed pt.'s progress, goals, and POC.    Patient left up in chair with all lines intact and call button in reach..    GOALS:   Multidisciplinary Problems     Physical Therapy Goals        Problem: Physical Therapy Goal    Goal Priority Disciplines Outcome Goal Variances Interventions   Physical Therapy Goal     PT, PT/OT Ongoing, Progressing     Description:  Goals to be met by: 2020     Patient will increase functional independence with mobility by performin. Sit to stand transfer with Modified [place]  2. Bed to chair transfer with Modified [place]  3. Gait  x 200 feet with Modified [place]   4. Ascend/descend 13 stair with bilateral Handrails Supervision                    Time Tracking:     PT Received On: 20  PT Start Time: 1116     PT Stop Time: 1139  PT Total Time (min): 23 min     Billable Minutes: Gait Training 15 and Therapeutic Exercise 8    Treatment Type: Treatment  PT/PTA: PT           Saul Prado, PT  2020

## 2020-04-28 NOTE — PLAN OF CARE
Reviewed plan of care with pt at the beginning of the shift. Pt reported pain throughout the shift. Continuous PCA continued for pain control. Pt reported no n/v throughout the shift. Vital signs were stable throughout the shift. Fall precautions continued for pt. Bed locked and at lowest position. Call light within reach. Pt knows to call if assistance is needed.

## 2020-04-28 NOTE — PLAN OF CARE
[facility]  HOME HEALTH ORDERS  FACE TO FACE ENCOUNTER    Patient Name: Saba Driver  YOB: 1960    PCP: Adela Verma MD   PCP Address: [address] [place] [address]  PCP Phone Number: [phone]  PCP Fax: [phone]    Encounter Date: 04/28/2020    Admit to Home Health    Diagnoses:  Active Hospital Problems    Diagnosis  POA    *Uterine cancer, sarcoma [C54.9]  Yes    Palliative care encounter [Z51.5]  Not Applicable    Fatigue [R53.83]  Yes    Abdominal pain [R10.9]  Yes    Nausea and vomiting [R11.2]  Unknown    Cancer of inguinal/lower limb lymph nodes, secondary [C77.4]  Yes    Endometrial cancer [C54.1]  Yes    Diabetes mellitus, type 2 [E11.9]  Yes    Hypertension [I10]  Yes      Resolved Hospital Problems   No resolved problems to display.       Future Appointments   Date Time Provider Department Center   5[facility]21[facility]2020 10:30 [facility]       I have seen and examined this patient face to face today. My clinical findings that support the need for the home health skilled services and home bound status are the following:  Weakness/numbness causing balance and gait disturbance due to Malignancy/Cancer making it taxing to leave home. Patient lives alone with no social support and a dependent adult sibling.     Allergies:  Review of patient's allergies indicates:   Allergen Reactions    Gabapentin      Swelling,musclespasm    Lyrica [pregabalin]      Swelling, musclespasm       Diet: regular as tolerated    Activities: activity as tolerated    Nursing:   SN to complete comprehensive assessment including routine vital signs. Instruct on disease process and s/s of complications to report to MD. Review/verify medication list sent home with the patient at time of discharge  and instruct patient/caregiver as needed. Frequency may be adjusted depending on start of care date.    Notify MD if SBP > 160 or < 90; DBP > 90 or < 50; HR > 120  or < 50; Temp > 101; Other:   New intolerance of PO solids or liquids      CONSULTS:    Physical Therapy to evaluate and treat. Evaluate for home safety and equipment needs; Establish/upgrade home exercise program. Perform / instruct on therapeutic exercises, gait training, transfer training, and Range of Motion.  Occupational Therapy to evaluate and treat. Evaluate home environment for safety and equipment needs. Perform/Instruct on transfers, ADL training, ROM, and therapeutic exercises.   to evaluate for community resources/long-range planning.  Aide to provide assistance with personal care, ADLs, and vital signs.    MISCELLANEOUS CARE:  N/A    WOUND CARE ORDERS  n/a      Medications: Review discharge medications with patient and family and provide education.      Current Discharge Medication List      CONTINUE these medications which have NOT CHANGED    Details   amLODIPine (NORVASC) 5 MG tablet       JANUVIA 100 mg Tab       oxyCODONE-acetaminophen (PERCOCET)  mg per tablet Take 1 tablet by mouth every 6 (six) hours as needed for Pain.  Qty: 60 tablet, Refills: 0    Comments: Quantity prescribed more than 7 day supply? Yes, quantity medically necessary  Associated Diagnoses: Endometrial cancer      bisoprolol (ZEBETA) 10 MG tablet Take 10 mg by mouth once daily.      dicyclomine (BENTYL) 20 mg tablet Take 1 tablet (20 mg total) by mouth 2 (two) times daily.  Qty: 20 tablet, Refills: 0      LUMIGAN 0.01 % Drop Place 1 drop into both eyes every evening.       meclizine (ANTIVERT) 25 mg tablet Take 1 tablet (25 mg total) by mouth 3 (three) times daily as needed for Dizziness.  Qty: 30 tablet, Refills: 0    Associated Diagnoses: Vertigo      ondansetron (ZOFRAN) 4 MG tablet Take 1 tablet (4 mg total) by mouth every 8 (eight) hours as needed for Nausea.  Qty: 12 tablet, Refills: 0      timolol maleate 0.5% (TIMOPTIC) 0.5 % Drop Place 1 drop into both eyes 2 (two) times daily.              I  certify that this patient is confined to her home and needs intermittent skilled nursing care.      Evangelina Howell MD, PhD  OBGYN, PGY-4
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
## 2020-04-29 NOTE — ASSESSMENT & PLAN NOTE
- spoke with palliative care yesterday, they are following  - will plan to discuss nutrition today: ADAT versus continue CLD

## 2020-04-29 NOTE — ASSESSMENT & PLAN NOTE
58 yo female admitted with pain/nausea/diarrhrea and FTT due to partial small bowel obstruction in the setting of recurrent endometrial cancer and peritoneal carcinomatosis    Prognosis: Concern for days to weeks given severity of disease, location, and recurrence    1) Symptoms:  Recommend starting 0.2 mg/hr basal rate in addition to on demand 0.2 mg q 6 hours.  Will get steroids as adjuvant tx with chemo   Will discuss timing of reglan trial with gyn/onc as well as proposed treatment plan    2) Code status:   Advance Care Planning     Living Will  During this visit, I engaged the patient  in the advance care planning process.  The patient and I reviewed the role for advance directives and their purpose in directing future healthcare if the patient's unable to speak for him/herself.  At this point in time, the patient does have full decision-making capacity.  We discussed different extreme health states that she could experience, and reviewed what kind of medical care she would want in those situations.  The patient communicated that if she were comatose and had little chance of a meaningful recovery, she would not want machines/life-sustaining treatments used. In addition to the above preference, other important end-of-life issues for the patient include comfort and avoiding inappropriate prolongation of the dying process. The patient has completed a living will to reflect these preferences.        Code Status  In light of the patients advanced and life limiting illness,I engaged the the patient in a conversation about the patient's preferences for care  at the very end of life. The patient wishes to have a natural, peaceful death.  Along those lines, the patient does not wish to have CPR or other invasive treatments performed when her heart and/or breathing stops. I communicated to the patient that a DNR order would be placed in her medical record to reflect this preference and DNR form was completed and will be  scanned into EPIC.      I spent a total of 20 minutes engaging the patient in this advance care planning discussion.          3) Psychosocial: retired from [employer]; never ; no kids    4) Medicolegal: Discussed advance care planning today and the importance of completing documents.  Printed, reviewed, and left forms with the patient to complete.     She wants her niece to be her HCPOA and knows she has to have discussions with her regarding EOL preferences.     5) Spiritual:  Yes; Religion    6) Goals of care/discussion:  Have not yet had discussions regarding her considerations about potential outcomes. Will discuss with oncology potential treatment options as well as likely outcomes.      She is hopeful to hear of all options at this point.  States her goal is to return home and will be more open to discussing all care options moving forward.  Her goal for today is to talk with her niece about the contents of her living will.    7) Disposition plan: JOSIE

## 2020-04-29 NOTE — ASSESSMENT & PLAN NOTE
- continue norvasc daily  - Vitals per floor protocol  - moderate, stable HTN  - BP: (148-177)/(72-78) 177/74

## 2020-04-29 NOTE — ASSESSMENT & PLAN NOTE
- Recurrent endometrial cancer  - CT of abdomen pelvis to further elucidate recurrence  - Palliative care following  - Lovenox ppx daily to restart today

## 2020-04-29 NOTE — SUBJECTIVE & OBJECTIVE
Interval History: continues with continuous low level colicky pain; intermittent sleep;   No gas; though continues with loose stools; minimal nausea unless she tries to drink something; starting chemo today    Did well with PT requiring minimal assist;     Medications:  Continuous Infusions:   hydromorphone in 0.9 % NaCl 6 mg/30 ml      lactated ringers 75 mL/hr at 04/29/20 0415     Scheduled Meds:   amLODIPine  10 mg Oral Daily    bimatoprost  1 drop Both Eyes QHS    bisoprolol  10 mg Oral Daily    dexamethasone  10 mg Intravenous Q24H    dicyclomine  20 mg Oral BID    enoxaparin  40 mg Subcutaneous Daily    timolol maleate 0.5%  1 drop Both Eyes BID     PRN Meds:alteplase, Dextrose 10% Bolus, diphenhydrAMINE, EPINEPHrine, glucagon (human recombinant), heparin, porcine (PF), hydrocortisone sodium succinate, insulin aspart U-100, meclizine, naloxone, ondansetron, oxyCODONE-acetaminophen, promethazine (PHENERGAN) IVPB, sodium chloride 0.9%    Objective:     Vital Signs (Most Recent):  Temp: 97.7 °F (36.5 °C) (04/29/20 0405)  Pulse: 71 (04/29/20 0405)  Resp: 18 (04/29/20 0405)  BP: (!) 161/72 (04/29/20 0405)  SpO2: 96 % (04/29/20 0405) Vital Signs (24h Range):  Temp:  [97.7 °F (36.5 °C)-98.4 °F (36.9 °C)] 97.7 °F (36.5 °C)  Pulse:  [71-93] 71  Resp:  [17-20] 18  SpO2:  [93 %-97 %] 96 %  BP: (144-176)/(72-78) 161/72     Weight: 129 kg (284 lb 6.3 oz)  Body mass index is 44.54 kg/m².    Review of Symptoms  Symptom Assessment (ESAS 0-10 scale)    ESAS 0 1 2 3 4 5 6 7 8 9 10   Pain []  []  [x]  []  []  []  []  []  []  []  []    Dyspnea [x]  []  []  []  []  []  []  []  []  []  []    Anxiety [x]  []  []  []  []  []  []  []  []  []  []    Nausea []  [x]  []  []  []  []  []  []  []  []  []    Depression  [x]  []  []  []  []  []  []  []  []  []  []    Anorexia []  [x]  []  []  []  []  []  []  []  []  []    Fatigue [x]  []  []  []  []  []  []  []  []  []  []    Insomnia [x]  []  []  []  []  []  []  []  []  []  []     Restlessness  [x]  []  []  []  []  []  []  []  []  []  []    Agitation [x]  []  []  []  []  []  []  []  []  []  []      Bowel Management Plan (BMP): No Not passing flatus.    Pain Assessment: LLQ then diffuse. Describes as twinges to me now.  OME in 24 hours: PCA at 0.2 mg/hr    Performance Status: 70    ECOG Performance Status Grade: 2 - Ambulates, capable of self care only    Physical Exam   Constitutional: She is oriented to person, place, and time. She appears well-developed and well-nourished.   HENT:   Head: Normocephalic and atraumatic.   Eyes: Pupils are equal, round, and reactive to light. EOM are normal.   Cardiovascular: Normal rate and normal heart sounds.   Pulmonary/Chest: Effort normal and breath sounds normal.   Neurological: She is oriented to person, place, and time.   Skin: Capillary refill takes less than 2 seconds.   Psychiatric: She has a normal mood and affect. Her behavior is normal.       Significant Labs: All pertinent labs within the past 24 hours have been reviewed.  CBC:   Recent Labs   Lab 04/28/20  0636   WBC 14.08*   HGB 10.0*   HCT 34.3*   MCV 85   *     BMP:  No results for input(s): GLU, NA, K, CL, CO2, BUN, CREATININE, CALCIUM, MG in the last 24 hours.  LFT:  Lab Results   Component Value Date    AST 50 (H) 04/28/2020    ALKPHOS 278 (H) 04/28/2020    BILITOT 0.3 04/28/2020     Albumin:   Albumin   Date Value Ref Range Status   04/28/2020 2.2 (L) 3.5 - 5.2 g/dL Final     Protein:   Total Protein   Date Value Ref Range Status   04/28/2020 6.6 6.0 - 8.4 g/dL Final     Lactic acid:   Lab Results   Component Value Date    LACTATE 1.0 04/24/2020       Significant Imaging: I have reviewed all pertinent imaging results/findings within the past 24 hours.    Advanced Directives::    Living Will: No  LaPOST: No  Do Not Resuscitate Status: No  Medical Power of : Yes. Agent's Name: Sampson parks. Agent's Contact Number: [phone] Patient gave ORAL DECLARATION. She  does have a brother who lives with her (he was homeless and she promised her mother she would look after him). Has a Sister and Brother who also work with [employer]. Has a sister in [place].      Decision-Making Capacity: Patient answered questions

## 2020-04-29 NOTE — SUBJECTIVE & OBJECTIVE
Interval History: tolerating clears with minimal nausea; PCA interrogated.  31 demands and 29 injections.  Total 5.8 mg IV dilaudid over 24 hours    Did well with PT requiring minimal assist; continued loose stools    Medications:  Continuous Infusions:    Scheduled Meds:   amLODIPine  10 mg Oral Daily    bimatoprost  1 drop Both Eyes QHS    bisoprolol  10 mg Oral Daily    dexamethasone  10 mg Intravenous Q24H    dicyclomine  20 mg Oral BID    docusate sodium  200 mg Oral BID    enoxaparin  40 mg Subcutaneous Daily    oxyCODONE  20 mg Oral Q12H    timolol maleate 0.5%  1 drop Both Eyes BID     PRN Meds:alteplase, Dextrose 10% Bolus, diphenhydrAMINE, EPINEPHrine, glucagon (human recombinant), heparin, porcine (PF), hydrocortisone sodium succinate, insulin aspart U-100, meclizine, ondansetron, oxyCODONE, oxyCODONE-acetaminophen, promethazine (PHENERGAN) IVPB, simethicone, sodium chloride 0.9%    Objective:     Vital Signs (Most Recent):  Temp: 96 °F (35.6 °C) (04/29/20 1137)  Pulse: 78 (04/29/20 1137)  Resp: 18 (04/29/20 1137)  BP: 137/77 (04/29/20 1137)  SpO2: 97 % (04/29/20 1137) Vital Signs (24h Range):  Temp:  [96 °F (35.6 °C)-98.2 °F (36.8 °C)] 96 °F (35.6 °C)  Pulse:  [71-84] 78  Resp:  [17-25] 18  SpO2:  [93 %-97 %] 97 %  BP: (137-177)/(72-77) 137/77     Weight: 129 kg (284 lb 6.3 oz)  Body mass index is 44.54 kg/m².    Review of Symptoms  Symptom Assessment (ESAS 0-10 scale)    ESAS 0 1 2 3 4 5 6 7 8 9 10   Pain []  []  [x]  []  []  []  []  []  []  []  []    Dyspnea [x]  []  []  []  []  []  []  []  []  []  []    Anxiety [x]  []  []  []  []  []  []  []  []  []  []    Nausea []  [x]  []  []  []  []  []  []  []  []  []    Depression  [x]  []  []  []  []  []  []  []  []  []  []    Anorexia []  [x]  []  []  []  []  []  []  []  []  []    Fatigue [x]  []  []  []  []  []  []  []  []  []  []    Insomnia [x]  []  []  []  []  []  []  []  []  []  []    Restlessness  [x]  []  []  []  []  []  []  []  []  []  []     Agitation [x]  []  []  []  []  []  []  []  []  []  []      Bowel Management Plan (BMP): No Not passing flatus.    Pain Assessment: LLQ then diffuse. Describes as twinges to me now.  OME in 24 hours: PCA at 0.2 mg/hr    Performance Status: 70    ECOG Performance Status Grade: 2 - Ambulates, capable of self care only    Physical Exam   Constitutional: She is oriented to person, place, and time. She appears well-developed and well-nourished.   HENT:   Head: Normocephalic and atraumatic.   Eyes: Pupils are equal, round, and reactive to light. EOM are normal.   Cardiovascular: Normal rate and normal heart sounds.   Pulmonary/Chest: Effort normal and breath sounds normal.   Neurological: She is oriented to person, place, and time.   Skin: Capillary refill takes less than 2 seconds.   Psychiatric: She has a normal mood and affect. Her behavior is normal.       Significant Labs: All pertinent labs within the past 24 hours have been reviewed.  CBC:   Recent Labs   Lab 04/29/20  1310   WBC 14.80*   HGB 9.8*   HCT 33.1*   MCV 83   *     BMP:  Recent Labs   Lab 04/29/20  1310   *      K 4.4      CO2 23   BUN 13   CREATININE 0.8   CALCIUM 9.1   MG 1.6     LFT:  Lab Results   Component Value Date    AST 49 (H) 04/29/2020    ALKPHOS 297 (H) 04/29/2020    BILITOT 0.2 04/29/2020     Albumin:   Albumin   Date Value Ref Range Status   04/29/2020 2.2 (L) 3.5 - 5.2 g/dL Final     Protein:   Total Protein   Date Value Ref Range Status   04/29/2020 6.8 6.0 - 8.4 g/dL Final     Lactic acid:   Lab Results   Component Value Date    LACTATE 1.0 04/24/2020       Significant Imaging: I have reviewed all pertinent imaging results/findings within the past 24 hours.    Advanced Directives:: completed in hospital    Living Will: yes  LaPOST: No  Do Not Resuscitate Status: No  Medical Power of : Yes. Agent's Name: Sampson parks. Agent's Contact Number: [phone] Patient gave ORAL DECLARATION. She does  have a brother who lives with her (he was homeless and she promised her mother she would look after him). Has a Sister and Brother who also work with [employer]. Has a sister in [place].      Decision-Making Capacity: Patient answered questions

## 2020-04-29 NOTE — PROGRESS NOTES
"[facility]  Gynecologic Oncology  Progress Note      Patient Name: Saba Driver  MRN: [id number]  Admission Date: 4/24/2020  Hospital Length of Stay: 5 days  Attending Provider: Noelle Lala MD  Primary Care Provider: Adela Verma MD  Principal Problem: Uterine cancer, sarcoma    Follow-up For: * No surgery found *  Post-Operative Day:    Subjective:      History of Present Illness:   59-year-old female with DM2, uterine adenocarcinoma s/p BRIAN-BSO/chemotherapy/radiation and hypertension presents after virtual visit with palliative care office for abdominal pain and fatigue.  The symptoms have been progressively worsening over the past month. Patient with CT 4/5 showing likely endometrial recurrence and then omental mass biopsy 4/20 with pathological confirmation. She reports that abdominal pain radiates throughout the abdomen and seems to be worse with any type of p.o. intake including water. She takes Percocet at home which used to relief her pain but now pain is too severe.  Reports associated nausea, frequent emesis and diarrhea.  She had dysuria 2 months ago but this has since resolved.  Fatigue has become profound that she has difficulty getting to the bathroom from her bedroom.   Denies sick contact, fever, dyspnea. Had negative COVID test 4 days ago.  She denies bloody or dark stool, hematemesis, flank pain, chest pain.    Hospital Course:  04/24/2020 - Admit for evaluation and management of diffuse abdominal pain and intractable n/v with emesis  04/25/2020 - Patient with nausea overnight. Has not passed flatus "in a while". Abdominal pain persistent. CT read showed likely distal small bowel obstruction with increased ascites.   04/26/2020 - Nausea x2 overnight, no emesis. Pain not controlled but patient very stoic. Reports "she'll be alright" but acknowledges increase in pain control warranted. Palliative care consulted and recommended PCA and clear liquid diet may be possible. " "Will attempt today. Not passing flatus. Up in chair during evaluation.   04/27/2020 - Patient reports improved pain with PCA. However, still had severe pain when attempting liquids. Not passing gas. Some watery stool. Afebrile. Stoic, laying in bed "comfortable."   04/28/2020 - Patient reports two episodes of significant pin overnight. The rest of the night at baseline compared to last night. Adjustments made to PCA settings this AM. Moderate HTN, stable. Afebrile. No n/v overnight. Denies flatus.  04/28/2020 - Patient started carbo/taxol treatment yesterday. No issues overnight. Tolerating CLD, minimal flatus, no BM. Pain still controlled with PCA pump. Will work to wean to PO meds today.  04/29/2020 - Patient tolerated chemotherapy well. Reports some fatigue after infusion. She continues to have pain q2-3 hours when sleeping at night, but well controlled during the day with PCA. BP continues to be elevated. IVF at 75 cc/h. BP meds reviewed and will discuss consulting with medicine for recommendations for better control. Pain may be contributing to elevate BP above her likely baseline on home meds.    Interval History:   Patient tolerated chemotherapy well. Reports some fatigue after infusion. She continues to have pain q2-3 hours when sleeping at night, but well controlled during the day with PCA. BP continues to be elevated. IVF at 75 cc/h. BP meds reviewed and will discuss consulting with medicine for recommendations for better control. Pain may be contributing to elevate BP above her likely baseline on home meds.      Scheduled Meds:   amLODIPine  10 mg Oral Daily    bimatoprost  1 drop Both Eyes QHS    bisoprolol  10 mg Oral Daily    [START ON 4/29/2020] dexamethasone  10 mg Intravenous Q24H    dicyclomine  20 mg Oral BID    timolol maleate 0.5%  1 drop Both Eyes BID     Continuous Infusions:   hydromorphone in 0.9 % NaCl 6 mg/30 ml      lactated ringers 75 mL/hr at 04/27/20 2214     PRN " Meds:alteplase, Dextrose 10% Bolus, diphenhydrAMINE, EPINEPHrine, glucagon (human recombinant), heparin, porcine (PF), hydrocortisone sodium succinate, insulin aspart U-100, meclizine, naloxone, ondansetron, oxyCODONE-acetaminophen, promethazine (PHENERGAN) IVPB, sodium chloride 0.9%    Review of patient's allergies indicates:   Allergen Reactions    Gabapentin      Swelling,musclespasm    Lyrica [pregabalin]      Swelling, musclespasm       Objective:     Vital Signs (Most Recent):  Temp: 97.8 °F (36.6 °C) (04/28/20 1538)  Pulse: 77 (04/28/20 1538)  Resp: 18 (04/28/20 1538)  BP: (!) 176/77 (04/28/20 1538)  SpO2: 96 % (04/28/20 1538) Vital Signs (24h Range):  Temp:  [97.8 °F (36.6 °C)-98.4 °F (36.9 °C)] 97.8 °F (36.6 °C)  Pulse:  [75-93] 77  Resp:  [16-20] 18  SpO2:  [94 %-98 %] 96 %  BP: (144-176)/(63-78) 176/77     Weight: 129 kg (284 lb 6.3 oz)  Body mass index is 44.54 kg/m².    Intake/Output - Last 3 Shifts       04/26 0700 - 04/27 0659 04/27 0700 - 04/28 0659 04/28 0700 - 04/29 0659    P.O. 888 360     I.V. (mL/kg) 1128.3 (9) 947.5 (7.6)     Total Intake(mL/kg) 2016.3 (16.1) 1307.5 (10.5)     Net +2016.3 +1307.5            Urine Occurrence 7 x 2 x     Stool Occurrence 2 x               Physical Exam:   Constitutional: She is oriented to person, place, and time. She appears well-developed and well-nourished. No distress.    HENT:   Head: Normocephalic and atraumatic.    Eyes: Conjunctivae are normal. Right eye exhibits no discharge. Left eye exhibits no discharge.    Neck: Neck supple. No tracheal deviation present.    Cardiovascular: Normal rate.     Pulmonary/Chest: Effort normal and breath sounds normal.        Abdominal: Soft. Bowel sounds are normal. She exhibits distension. She exhibits no abdominal incision. There is tenderness. There is no guarding.   Slightly firm and TTP in the epigastric region             Musculoskeletal: Normal range of motion and moves all extremeties.       Neurological: She is  alert and oriented to person, place, and time.    Skin: Skin is warm and dry. She is not diaphoretic.    Psychiatric: She has a normal mood and affect. Her behavior is normal. Judgment and thought content normal.       Lines/Drains/Airways     Central Venous Catheter Line            Port A Cath Single Lumen 04/25/20 1208 right subclavian 3 days          Peripheral Intravenous Line                 Peripheral IV - Single Lumen 04/24/20 1615 20 G Right Antecubital 4 days              Laboratory:  Recent Labs   Lab 04/25/20  0927 04/27/20  0309 04/28/20  0636   WBC 12.50 12.74* 14.08*   HGB 10.8* 9.8* 10.0*   HCT 36.7* 33.6* 34.3*   MCV 84 84 85   * 432* 468*       Recent Labs   Lab 04/24/20  1615 04/25/20 0927 04/27/20  0309 04/28/20  0636    135* 136 138   K 4.4 4.1 3.9 4.1   CL 97 98 98 99   CO2 27 22* 23 22*   BUN 11 8 9 9   CREATININE 0.8 0.7 0.7 0.7   * 146* 149* 137*   PROT 7.6 6.7 6.5 6.6   BILITOT 0.3 0.3 0.3 0.3   ALKPHOS 304* 273* 265* 278*   ALT 35 35 54* 57*   AST 30 33 50* 50*   MG 1.4* 1.5*  --  1.2*   PHOS  --  3.1  --  3.9      AM labs pending    Diagnostic Results:  No new studies    Assessment/Plan:     Palliative care encounter  - spoke with palliative care yesterday, they are following  - will plan to discuss nutrition today: ADAT versus continue CLD    Nausea and vomiting  - No N/V overnight  - Zofran PRN  - Diet: clears    Abdominal pain  - Likely SBO, per imaging  - clear liquid diet  - IVF  - Zofran PRN  - Continue titrating PCA dilaudid pain control  - AM labs pending    Fatigue  - IVF  - PT has assessed  - Discuss dietary consult to assess nutritional requirements    Cancer of inguinal/lower limb lymph nodes, secondary  - Recurrent endometrial cancer  - CT of abdomen pelvis to further elucidate recurrence  - Palliative care following  - Lovenox ppx daily to restart today    Diabetes mellitus, type 2  - hold januvia as patient not taking in PO  - SSI ordered with QID POCT  glucose check    Hypertension  - continue norvasc daily  - Vitals per floor protocol  - moderate, stable HTN  - BP: (148-177)/(72-78) 177/74       VTE Risk Mitigation (From admission, onward)         Ordered     enoxaparin injection 40 mg  Daily      04/28/20 2018     heparin, porcine (PF) 100 unit/mL injection flush 500 Units  As needed (PRN)      04/28/20 1147     IP VTE HIGH RISK PATIENT  Once      04/24/20 1724     Place sequential compression device  Until discontinued      04/24/20 1724              Evangelina Howell MD  Gynecologic Oncology  [facility]

## 2020-04-29 NOTE — PROGRESS NOTES
[facility]  Palliative Medicine  Progress Note    Patient Name: Saba Driver  MRN: [id number]  Admission Date: 4/24/2020  Hospital Length of Stay: 5 days  Code Status: Full Code   Attending Provider: Noelle Lala MD  Consulting Provider: Maikol Leigh MD  Primary Care Physician: Adela Verma MD  Principal Problem:Uterine cancer, sarcoma    Patient information was obtained from patient and past medical records.      Assessment/Plan:     Palliative care encounter  60 yo female admitted with pain/nausea/diarrhrea and FTT due to partial small bowel obstruction in the setting of recurrent endometrial cancer and peritoneal carcinomatosis    Prognosis: Concern for days to weeks given severity of disease, location, and recurrence    1) Symptoms:  Recommend starting 0.2 mg/hr basal rate in addition to on demand 0.2 mg q 6 hours.  Will get steroids as adjuvant tx with chemo   Will discuss timing of reglan trial with gyn/onc as well as proposed treatment plan    2) Code status:   Advance Care Planning     Living Will  During this visit, I engaged the patient  in the advance care planning process.  The patient and I reviewed the role for advance directives and their purpose in directing future healthcare if the patient's unable to speak for him/herself.  At this point in time, the patient does have full decision-making capacity.  We discussed different extreme health states that she could experience, and reviewed what kind of medical care she would want in those situations.  The patient communicated that if she were comatose and had little chance of a meaningful recovery, she would not want machines/life-sustaining treatments used. In addition to the above preference, other important end-of-life issues for the patient include comfort and avoiding inappropriate prolongation of the dying process. The patient has completed a living will to reflect these preferences.        Code Status  In light of the  patients advanced and life limiting illness,I engaged the the patient in a conversation about the patient's preferences for care  at the very end of life. The patient wishes to have a natural, peaceful death.  Along those lines, the patient does not wish to have CPR or other invasive treatments performed when her heart and/or breathing stops. I communicated to the patient that a DNR order would be placed in her medical record to reflect this preference and DNR form was completed and will be scanned into EPIC.      I spent a total of 20 minutes engaging the patient in this advance care planning discussion.         3) Psychosocial: retired from [employer]; never ; no kids    4) Medicolegal: Discussed advance care planning today and the importance of completing documents.  Printed, reviewed, and left forms with the patient to complete.     She wants her niece to be her HCPOA and knows she has to have discussions with her regarding EOL preferences.     5) Spiritual:  Yes; Sikh    6) Goals of care/discussion:  Have not yet had discussions regarding her considerations about potential outcomes. Will discuss with oncology potential treatment options as well as likely outcomes.      She is hopeful to hear of all options at this point.  States her goal is to return home and will be more open to discussing all care options moving forward.  Her goal for today is to talk with her niece about the contents of her living will.    7) Disposition plan: TBD      Endometrial cancer  Per oncology; starting chemo today        I will follow-up with patient. Please contact us if you have any additional questions.    Subjective:     Chief Complaint:   Chief Complaint   Patient presents with    Fatigue     Patient with generalized weakness, recent stomach biopsy, negative covid test on Monday.       HPI:    59-year-old female with DM2, uterine adenocarcinoma s/p BRIAN-BSO/chemotherapy/radiation and hypertension presents  "after virtual visit with palliative care office for abdominal pain and fatigue.  The symptoms have been progressively worsening over the past month. Patient with CT 4/5 showing likely endometrial recurrence and then omental mass biopsy 4/20 with pathological confirmation. She reports that abdominal pain radiates throughout the abdomen and seems to be worse with any type of p.o. intake including water. She takes Percocet at home which used to relief her pain but now pain is too severe.  Reports associated nausea, frequent emesis and diarrhea.  She had dysuria 2 months ago and an ED visit with negative urine culture/tx with AB.  Fatigue has become profound that she has difficulty getting to the bathroom from her bedroom.   Denies sick contact, fever, dyspnea. Had negative COVID test 4 days ago.  She denies bloody or dark stool, hematemesis, flank pain, chest pain. She reports that she sits on the side of the bed or chair and "doubles over" until the worst pain abates. She does not know how much weight she has lost.     In this setting, palliative medicine was consulted to help with medical decision making and aid in the formation of goals of care.       Hospital Course:  No notes on file    Interval History: continues with continuous low level colicky pain; intermittent sleep;   No gas; though continues with loose stools; minimal nausea unless she tries to drink something; starting chemo today    Did well with PT requiring minimal assist;     Medications:  Continuous Infusions:   hydromorphone in 0.9 % NaCl 6 mg/30 ml      lactated ringers 75 mL/hr at 04/29/20 1735     Scheduled Meds:   amLODIPine  10 mg Oral Daily    bimatoprost  1 drop Both Eyes QHS    bisoprolol  10 mg Oral Daily    dexamethasone  10 mg Intravenous Q24H    dicyclomine  20 mg Oral BID    enoxaparin  40 mg Subcutaneous Daily    timolol maleate 0.5%  1 drop Both Eyes BID     PRN Meds:alteplase, Dextrose 10% Bolus, diphenhydrAMINE, " EPINEPHrine, glucagon (human recombinant), heparin, porcine (PF), hydrocortisone sodium succinate, insulin aspart U-100, meclizine, naloxone, ondansetron, oxyCODONE-acetaminophen, promethazine (PHENERGAN) IVPB, sodium chloride 0.9%    Objective:     Vital Signs (Most Recent):  Temp: 97.7 °F (36.5 °C) (04/29/20 0405)  Pulse: 71 (04/29/20 0405)  Resp: 18 (04/29/20 0405)  BP: (!) 161/72 (04/29/20 0405)  SpO2: 96 % (04/29/20 0405) Vital Signs (24h Range):  Temp:  [97.7 °F (36.5 °C)-98.4 °F (36.9 °C)] 97.7 °F (36.5 °C)  Pulse:  [71-93] 71  Resp:  [17-20] 18  SpO2:  [93 %-97 %] 96 %  BP: (144-176)/(72-78) 161/72     Weight: 129 kg (284 lb 6.3 oz)  Body mass index is 44.54 kg/m².    Review of Symptoms  Symptom Assessment (ESAS 0-10 scale)    ESAS 0 1 2 3 4 5 6 7 8 9 10   Pain []  []  [x]  []  []  []  []  []  []  []  []    Dyspnea [x]  []  []  []  []  []  []  []  []  []  []    Anxiety [x]  []  []  []  []  []  []  []  []  []  []    Nausea []  [x]  []  []  []  []  []  []  []  []  []    Depression  [x]  []  []  []  []  []  []  []  []  []  []    Anorexia []  [x]  []  []  []  []  []  []  []  []  []    Fatigue [x]  []  []  []  []  []  []  []  []  []  []    Insomnia [x]  []  []  []  []  []  []  []  []  []  []    Restlessness  [x]  []  []  []  []  []  []  []  []  []  []    Agitation [x]  []  []  []  []  []  []  []  []  []  []      Bowel Management Plan (BMP): No Not passing flatus.    Pain Assessment: LLQ then diffuse. Describes as twinges to me now.  OME in 24 hours: PCA at 0.2 mg/hr    Performance Status: 70    ECOG Performance Status Grade: 2 - Ambulates, capable of self care only    Physical Exam   Constitutional: She is oriented to person, place, and time. She appears well-developed and well-nourished.   HENT:   Head: Normocephalic and atraumatic.   Eyes: Pupils are equal, round, and reactive to light. EOM are normal.   Cardiovascular: Normal rate and normal heart sounds.   Pulmonary/Chest: Effort normal and breath sounds  normal.   Neurological: She is oriented to person, place, and time.   Skin: Capillary refill takes less than 2 seconds.   Psychiatric: She has a normal mood and affect. Her behavior is normal.       Significant Labs: All pertinent labs within the past 24 hours have been reviewed.  CBC:   Recent Labs   Lab 04/28/20  0636   WBC 14.08*   HGB 10.0*   HCT 34.3*   MCV 85   *     BMP:  No results for input(s): GLU, NA, K, CL, CO2, BUN, CREATININE, CALCIUM, MG in the last 24 hours.  LFT:  Lab Results   Component Value Date    AST 50 (H) 04/28/2020    ALKPHOS 278 (H) 04/28/2020    BILITOT 0.3 04/28/2020     Albumin:   Albumin   Date Value Ref Range Status   04/28/2020 2.2 (L) 3.5 - 5.2 g/dL Final     Protein:   Total Protein   Date Value Ref Range Status   04/28/2020 6.6 6.0 - 8.4 g/dL Final     Lactic acid:   Lab Results   Component Value Date    LACTATE 1.0 04/24/2020       Significant Imaging: I have reviewed all pertinent imaging results/findings within the past 24 hours.    Advanced Directives::    Living Will: No  LaPOST: No  Do Not Resuscitate Status: No  Medical Power of : Yes. Agent's Name: [employer]. Agent's Contact Number: [phone] Patient gave ORAL DECLARATION. She does have a brother who lives with her (he was homeless and she promised her mother she would look after him). Has a Sister and Brother who also work with [employer]. Has a sister in [place].      Decision-Making Capacity: Patient answered questions        Additional 20 minutes spent specifically discussing ACP as noted above    Maikol Leigh MD  Palliative Medicine  [facility]

## 2020-04-29 NOTE — ASSESSMENT & PLAN NOTE
60 yo female admitted with pain/nausea/diarrhrea and FTT due to partial small bowel obstruction in the setting of recurrent endometrial cancer and peritoneal carcinomatosis    Prognosis: Concern for days to weeks given severity of disease, location, and recurrence    1) Symptoms:  Based on PCA OME 90 mg: transition to oral oxy ER + IR and reduce 25% for cross tolerance  Transition to oral oxycodone ER 20 mg po q 12 hours and oxy 5 mg q 2 hours po prn    2) Code status: DNR/DNI  Advance Care Planning     Living Will  During this visit, I engaged the patient  in the advance care planning process.  The patient and I reviewed the role for advance directives and their purpose in directing future healthcare if the patient's unable to speak for him/herself.  At this point in time, the patient does have full decision-making capacity.  We discussed different extreme health states that she could experience, and reviewed what kind of medical care she would want in those situations.  The patient communicated that if she were comatose and had little chance of a meaningful recovery, she would not want machines/life-sustaining treatments used. In addition to the above preference, other important end-of-life issues for the patient include comfort and avoiding inappropriate prolongation of the dying process. The patient has completed a living will to reflect these preferences.        Code Status  In light of the patients advanced and life limiting illness,I engaged the the patient in a conversation about the patient's preferences for care  at the very end of life. The patient wishes to have a natural, peaceful death.  Along those lines, the patient does not wish to have CPR or other invasive treatments performed when her heart and/or breathing stops. I communicated to the patient that a DNR order would be placed in her medical record to reflect this preference and DNR form was completed and will be scanned into EPIC.      I spent a  total of 20 minutes engaging the patient in this advance care planning discussion.          3) Psychosocial: retired from [employer] never ; no kids    4) Medicolegal: Discussed advance care planning today and the importance of completing documents.  Printed, reviewed, and left forms with the patient to complete.     She wants her niece to be her HCPOA and knows she has to have discussions with her regarding EOL preferences.     5) Spiritual:  Yes; Taoism    6) Goals of care/discussion:  Have not yet had discussions regarding her considerations about potential outcomes. Will discuss with oncology potential treatment options as well as likely outcomes.      She is hopeful to hear of all options at this point.  States her goal is to return home and will be more open to discussing all care options moving forward.  Her goal for today is to talk with her niece about the contents of her living will.    7) Disposition plan: likely home with home health; would be open to ongoing palliative care outpt follow up    Thank you for the opportunity to care for this patient and family.     Please call with questions.     Maikol Leigh MD  Palliative Medicine  [phone]

## 2020-04-29 NOTE — ASSESSMENT & PLAN NOTE
- Likely SBO, per imaging  - clear liquid diet  - IVF  - Zofran PRN  - Continue titrating PCA dilaudid pain control  - AM labs pending

## 2020-04-29 NOTE — SUBJECTIVE & OBJECTIVE
Interval History:   Patient tolerated chemotherapy well. Reports some fatigue after infusion. She continues to have pain q2-3 hours when sleeping at night, but well controlled during the day with PCA. BP continues to be elevated. IVF at 75 cc/h. BP meds reviewed and will discuss consulting with medicine for recommendations for better control. Pain may be contributing to elevate BP above her likely baseline on home meds.      Scheduled Meds:   amLODIPine  10 mg Oral Daily    bimatoprost  1 drop Both Eyes QHS    bisoprolol  10 mg Oral Daily    [START ON 4/29/2020] dexamethasone  10 mg Intravenous Q24H    dicyclomine  20 mg Oral BID    timolol maleate 0.5%  1 drop Both Eyes BID     Continuous Infusions:   hydromorphone in 0.9 % NaCl 6 mg/30 ml      lactated ringers 75 mL/hr at 04/27/20 2214     PRN Meds:alteplase, Dextrose 10% Bolus, diphenhydrAMINE, EPINEPHrine, glucagon (human recombinant), heparin, porcine (PF), hydrocortisone sodium succinate, insulin aspart U-100, meclizine, naloxone, ondansetron, oxyCODONE-acetaminophen, promethazine (PHENERGAN) IVPB, sodium chloride 0.9%    Review of patient's allergies indicates:   Allergen Reactions    Gabapentin      Swelling,musclespasm    Lyrica [pregabalin]      Swelling, musclespasm       Objective:     Vital Signs (Most Recent):  Temp: 97.8 °F (36.6 °C) (04/28/20 1538)  Pulse: 77 (04/28/20 1538)  Resp: 18 (04/28/20 1538)  BP: (!) 176/77 (04/28/20 1538)  SpO2: 96 % (04/28/20 1538) Vital Signs (24h Range):  Temp:  [97.8 °F (36.6 °C)-98.4 °F (36.9 °C)] 97.8 °F (36.6 °C)  Pulse:  [75-93] 77  Resp:  [16-20] 18  SpO2:  [94 %-98 %] 96 %  BP: (144-176)/(63-78) 176/77     Weight: 129 kg (284 lb 6.3 oz)  Body mass index is 44.54 kg/m².    Intake/Output - Last 3 Shifts       04/26 0700 - 04/27 0659 04/27 0700 - 04/28 0659 04/28 0700 - 04/29 0659    P.O. 888 360     I.V. (mL/kg) 1128.3 (9) 947.5 (7.6)     Total Intake(mL/kg) 2016.3 (16.1) 1307.5 (10.5)     Net +2016.3  +1307.5            Urine Occurrence 7 x 2 x     Stool Occurrence 2 x               Physical Exam:   Constitutional: She is oriented to person, place, and time. She appears well-developed and well-nourished. No distress.    HENT:   Head: Normocephalic and atraumatic.    Eyes: Conjunctivae are normal. Right eye exhibits no discharge. Left eye exhibits no discharge.    Neck: Neck supple. No tracheal deviation present.    Cardiovascular: Normal rate.     Pulmonary/Chest: Effort normal and breath sounds normal.        Abdominal: Soft. Bowel sounds are normal. She exhibits distension. She exhibits no abdominal incision. There is tenderness. There is no guarding.   Slightly firm and TTP in the epigastric region             Musculoskeletal: Normal range of motion and moves all extremeties.       Neurological: She is alert and oriented to person, place, and time.    Skin: Skin is warm and dry. She is not diaphoretic.    Psychiatric: She has a normal mood and affect. Her behavior is normal. Judgment and thought content normal.       Lines/Drains/Airways     Central Venous Catheter Line            Port A Cath Single Lumen 04/25/20 1208 right subclavian 3 days          Peripheral Intravenous Line                 Peripheral IV - Single Lumen 04/24/20 1615 20 G Right Antecubital 4 days              Laboratory:  Recent Labs   Lab 04/25/20  0927 04/27/20  0309 04/28/20  0636   WBC 12.50 12.74* 14.08*   HGB 10.8* 9.8* 10.0*   HCT 36.7* 33.6* 34.3*   MCV 84 84 85   * 432* 468*       Recent Labs   Lab 04/24/20  1615 04/25/20  0927 04/27/20  0309 04/28/20  0636    135* 136 138   K 4.4 4.1 3.9 4.1   CL 97 98 98 99   CO2 27 22* 23 22*   BUN 11 8 9 9   CREATININE 0.8 0.7 0.7 0.7   * 146* 149* 137*   PROT 7.6 6.7 6.5 6.6   BILITOT 0.3 0.3 0.3 0.3   ALKPHOS 304* 273* 265* 278*   ALT 35 35 54* 57*   AST 30 33 50* 50*   MG 1.4* 1.5*  --  1.2*   PHOS  --  3.1  --  3.9      AM labs pending    Diagnostic Results:  No new  studies

## 2020-04-29 NOTE — PLAN OF CARE
Problem: Oral Intake Inadequate  Goal: Improved Oral Intake  Outcome: Ongoing, Progressing   Recommendations    Recommendation:   1. Continue clear liquid diet, ADAT to regular diet  2. Add boost breeze BID to increase intake, change to boost plus when diet advanced   3. Suggest MVI     RD to monitor and follow up     Goals: pt to tolerate >85% of EEN/EPN by RD follow up   Nutrition Goal Status: new  Communication of RD Recs: other (comment)(POC)

## 2020-04-29 NOTE — CONSULTS
"  [facility]  Adult Nutrition  Consult Note    SUMMARY     Recommendations    Recommendation:   1. Continue clear liquid diet, ADAT to regular diet  2. Add boost breeze BID to increase intake, change to boost plus when diet advanced   3. Suggest MVI     RD to monitor and follow up     Goals: pt to tolerate >85% of EEN/EPN by RD follow up   Nutrition Goal Status: new  Communication of RD Recs: other (comment)(POC)    Reason for Assessment    Reason For Assessment: consult  Diagnosis: (uterine cancer sarcoma)  Relevant Medical History: DM; HTN; glaucoma; adenocarcinoma  Interdisciplinary Rounds: did not attend  General Information Comments: Remote assessment completed. Unable to reach pt and RN on multiple attempts. Per chart pt on clear liquid diet, tolerating well at this time. Pt with SBO, diet to advance as tolerable. Unable to assess intake PTA. Chart shows wt gain over past year, wt was ~266 lbs now stable ~280-285 lbs >6 months. No indications of malnutrition at this time. Due to recent hospital wide restrictions to limit the transfer of (COVID-19), we are not performing any physical exams at this time. All S/S will be observational; NFPE to be performed at a future date.  Nutrition Discharge Planning: adequate PO intake     Nutrition Risk Screen    Nutrition Risk Screen: no indicators present    Nutrition/Diet History    Spiritual, Cultural Beliefs, Anabaptism Practices, Values that Affect Care: no  Food Allergies: NKFA  Factors Affecting Nutritional Intake: clear liquid diet    Anthropometrics    Temp: 96 °F (35.6 °C)  Height: 5' 7" (170.2 cm)  Height (inches): 67 in  Weight Method: Standard Scale  Weight: 129 kg (284 lb 6.3 oz)  Weight (lb): 284.4 lb  Ideal Body Weight (IBW), Female: 135 lb  BMI (Calculated): 44.5  BMI Grade: greater than 40 - morbid obesity    Lab/Procedures/Meds    Pertinent Labs Reviewed: reviewed  Pertinent Labs Comments: Glucose 137; Mg 1.2  Pertinent Medications " Reviewed: reviewed  Pertinent Medications Comments: amlodipine; dexamethasone; docusate sodium; magnesium sulfate; oxycodone     Estimated/Assessed Needs    Weight Used For Calorie Calculations: 129 kg (284 lb 6.3 oz)  Energy Calorie Requirements (kcal): 2365 kcal/d  Energy Need Method: [place]-St Jeor(x1.25)  Protein Requirements: 103-129 g/day   Weight Used For Protein Calculations: 129 kg (284 lb 6.3 oz)  Fluid Requirements (mL): 1 mL/kcal or per MD  RDA Method (mL): 2365    Nutrition Prescription Ordered    Current Diet Order: Clear liquid diet    Evaluation of Received Nutrient/Fluid Intake    I/O: +5 L since admit  Energy Calories Required: not meeting needs  Protein Required: not meeting needs  Fluid Required: meeting needs  Comments: LBM 4/29  Tolerance: tolerating  % Intake of Estimated Energy Needs: 25 - 50 %  % Meal Intake: 25 - 50 %    Nutrition Risk    Level of Risk/Frequency of Follow-up: low     Assessment and Plan    Nutrition Problem  inadequate oral intake    Related to (etiology):   Decreased intake; SBO    Signs and Symptoms (as evidenced by):   PO <75% of EEN/EPN      Interventions/Recommendations (treatment strategy):  Collaboration of care with other providers  Commercial beverage    Nutrition Diagnosis Status:   New    Monitor and Evaluation    Food and Nutrient Intake: energy intake, food and beverage intake  Food and Nutrient Adminstration: diet order  Knowledge/Beliefs/Attitudes: food and nutrition knowledge/skill  Anthropometric Measurements: weight, weight change  Biochemical Data, Medical Tests and Procedures: electrolyte and renal panel, lipid profile, glucose/endocrine profile, inflammatory profile, gastrointestinal profile  Nutrition-Focused Physical Findings: overall appearance     Nutrition Follow-Up    RD Follow-up?: Yes

## 2020-04-29 NOTE — PROGRESS NOTES
[facility]  Palliative Medicine  Progress Note    Patient Name: Saba Driver  MRN: [id number]  Admission Date: 4/24/2020  Hospital Length of Stay: 5 days  Code Status: DNR   Attending Provider: Noelle Lala MD  Consulting Provider: Maikol Leigh MD  Primary Care Physician: Adela Verma MD  Principal Problem:Uterine cancer, sarcoma    Patient information was obtained from patient and past medical records.      Assessment/Plan:     Palliative care encounter  58 yo female admitted with pain/nausea/diarrhrea and FTT due to partial small bowel obstruction in the setting of recurrent endometrial cancer and peritoneal carcinomatosis    Prognosis: Concern for days to weeks given severity of disease, location, and recurrence    1) Symptoms:  Based on PCA OME 90 mg: transition to oral oxy ER + IR and reduce 25% for cross tolerance  Transition to oral oxycodone ER 20 mg po q 12 hours and oxy 5 mg q 2 hours po prn    2) Code status: DNR/DNI  Advance Care Planning     Living Will  During this visit, I engaged the patient  in the advance care planning process.  The patient and I reviewed the role for advance directives and their purpose in directing future healthcare if the patient's unable to speak for him/herself.  At this point in time, the patient does have full decision-making capacity.  We discussed different extreme health states that she could experience, and reviewed what kind of medical care she would want in those situations.  The patient communicated that if she were comatose and had little chance of a meaningful recovery, she would not want machines/life-sustaining treatments used. In addition to the above preference, other important end-of-life issues for the patient include comfort and avoiding inappropriate prolongation of the dying process. The patient has completed a living will to reflect these preferences.        Code Status  In light of the patients advanced and life limiting  illness,I engaged the the patient in a conversation about the patient's preferences for care  at the very end of life. The patient wishes to have a natural, peaceful death.  Along those lines, the patient does not wish to have CPR or other invasive treatments performed when her heart and/or breathing stops. I communicated to the patient that a DNR order would be placed in her medical record to reflect this preference and DNR form was completed and will be scanned into EPIC.      I spent a total of 20 minutes engaging the patient in this advance care planning discussion.         3) Psychosocial: retired from [employer]; never ; no kids    4) Medicolegal: Discussed advance care planning today and the importance of completing documents.  Printed, reviewed, and left forms with the patient to complete.     She wants her niece to be her HCPOA and knows she has to have discussions with her regarding EOL preferences.     5) Spiritual:  Yes; Mormon    6) Goals of care/discussion:  Have not yet had discussions regarding her considerations about potential outcomes. Will discuss with oncology potential treatment options as well as likely outcomes.      She is hopeful to hear of all options at this point.  States her goal is to return home and will be more open to discussing all care options moving forward.  Her goal for today is to talk with her niece about the contents of her living will.    7) Disposition plan: likely home with home health; would be open to ongoing palliative care outpt follow up    Thank you for the opportunity to care for this patient and family.     Please call with questions.     Maikol Leigh MD  Palliative Medicine  [phone]      Abdominal pain  Due to recurrent carcinomatosis and partial SBO    Endometrial cancer  C1D2 of chemo; asymptomatic currently from treatment carbo/taxol          I will follow-up with patient. Please contact us if you have any additional  "questions.    Subjective:     Chief Complaint:   Chief Complaint   Patient presents with    Fatigue     Patient with generalized weakness, recent stomach biopsy, negative covid test on Monday.       HPI:    59-year-old female with DM2, uterine adenocarcinoma s/p BRIAN-BSO/chemotherapy/radiation and hypertension presents after virtual visit with palliative care office for abdominal pain and fatigue.  The symptoms have been progressively worsening over the past month. Patient with CT 4/5 showing likely endometrial recurrence and then omental mass biopsy 4/20 with pathological confirmation. She reports that abdominal pain radiates throughout the abdomen and seems to be worse with any type of p.o. intake including water. She takes Percocet at home which used to relief her pain but now pain is too severe.  Reports associated nausea, frequent emesis and diarrhea.  She had dysuria 2 months ago and an ED visit with negative urine culture/tx with AB.  Fatigue has become profound that she has difficulty getting to the bathroom from her bedroom.   Denies sick contact, fever, dyspnea. Had negative COVID test 4 days ago.  She denies bloody or dark stool, hematemesis, flank pain, chest pain. She reports that she sits on the side of the bed or chair and "doubles over" until the worst pain abates. She does not know how much weight she has lost.     In this setting, palliative medicine was consulted to help with medical decision making and aid in the formation of goals of care.       Hospital Course:  No notes on file    Interval History: tolerating clears with minimal nausea; PCA interrogated.  31 demands and 29 injections.  Total 5.8 mg IV dilaudid over 24 hours    Did well with PT requiring minimal assist; continued loose stools    Medications:  Continuous Infusions:    Scheduled Meds:   amLODIPine  10 mg Oral Daily    bimatoprost  1 drop Both Eyes QHS    bisoprolol  10 mg Oral Daily    dexamethasone  10 mg Intravenous Q24H "    dicyclomine  20 mg Oral BID    docusate sodium  200 mg Oral BID    enoxaparin  40 mg Subcutaneous Daily    oxyCODONE  20 mg Oral Q12H    timolol maleate 0.5%  1 drop Both Eyes BID     PRN Meds:alteplase, Dextrose 10% Bolus, diphenhydrAMINE, EPINEPHrine, glucagon (human recombinant), heparin, porcine (PF), hydrocortisone sodium succinate, insulin aspart U-100, meclizine, ondansetron, oxyCODONE, oxyCODONE-acetaminophen, promethazine (PHENERGAN) IVPB, simethicone, sodium chloride 0.9%    Objective:     Vital Signs (Most Recent):  Temp: 96 °F (35.6 °C) (04/29/20 1137)  Pulse: 78 (04/29/20 1137)  Resp: 18 (04/29/20 1137)  BP: 137/77 (04/29/20 1137)  SpO2: 97 % (04/29/20 1137) Vital Signs (24h Range):  Temp:  [96 °F (35.6 °C)-98.2 °F (36.8 °C)] 96 °F (35.6 °C)  Pulse:  [71-84] 78  Resp:  [17-25] 18  SpO2:  [93 %-97 %] 97 %  BP: (137-177)/(72-77) 137/77     Weight: 129 kg (284 lb 6.3 oz)  Body mass index is 44.54 kg/m².    Review of Symptoms  Symptom Assessment (ESAS 0-10 scale)    ESAS 0 1 2 3 4 5 6 7 8 9 10   Pain []  []  [x]  []  []  []  []  []  []  []  []    Dyspnea [x]  []  []  []  []  []  []  []  []  []  []    Anxiety [x]  []  []  []  []  []  []  []  []  []  []    Nausea []  [x]  []  []  []  []  []  []  []  []  []    Depression  [x]  []  []  []  []  []  []  []  []  []  []    Anorexia []  [x]  []  []  []  []  []  []  []  []  []    Fatigue [x]  []  []  []  []  []  []  []  []  []  []    Insomnia [x]  []  []  []  []  []  []  []  []  []  []    Restlessness  [x]  []  []  []  []  []  []  []  []  []  []    Agitation [x]  []  []  []  []  []  []  []  []  []  []      Bowel Management Plan (BMP): No Not passing flatus.    Pain Assessment: LLQ then diffuse. Describes as twinges to me now.  OME in 24 hours: PCA at 0.2 mg/hr    Performance Status: 70    ECOG Performance Status Grade: 2 - Ambulates, capable of self care only    Physical Exam   Constitutional: She is oriented to person, place, and time. She appears  well-developed and well-nourished.   HENT:   Head: Normocephalic and atraumatic.   Eyes: Pupils are equal, round, and reactive to light. EOM are normal.   Cardiovascular: Normal rate and normal heart sounds.   Pulmonary/Chest: Effort normal and breath sounds normal.   Neurological: She is oriented to person, place, and time.   Skin: Capillary refill takes less than 2 seconds.   Psychiatric: She has a normal mood and affect. Her behavior is normal.       Significant Labs: All pertinent labs within the past 24 hours have been reviewed.  CBC:   Recent Labs   Lab 04/29/20  1310   WBC 14.80*   HGB 9.8*   HCT 33.1*   MCV 83   *     BMP:  Recent Labs   Lab 04/29/20  1310   *      K 4.4      CO2 23   BUN 13   CREATININE 0.8   CALCIUM 9.1   MG 1.6     LFT:  Lab Results   Component Value Date    AST 49 (H) 04/29/2020    ALKPHOS 297 (H) 04/29/2020    BILITOT 0.2 04/29/2020     Albumin:   Albumin   Date Value Ref Range Status   04/29/2020 2.2 (L) 3.5 - 5.2 g/dL Final     Protein:   Total Protein   Date Value Ref Range Status   04/29/2020 6.8 6.0 - 8.4 g/dL Final     Lactic acid:   Lab Results   Component Value Date    LACTATE 1.0 04/24/2020       Significant Imaging: I have reviewed all pertinent imaging results/findings within the past 24 hours.    Advanced Directives:: completed in hospital    Living Will: yes  LaPOST: No  Do Not Resuscitate Status: No  Medical Power of : Yes. Agent's Name: Sampson [employer]. Agent's Contact Number: [phone] Patient gave ORAL DECLARATION. She does have a brother who lives with her (he was homeless and she promised her mother she would look after him). Has a Sister and Brother who also work with [employer]. Has a sister in [place].      Decision-Making Capacity: Patient answered questions          Maikol Leigh MD  Palliative Medicine  [facility]

## 2020-04-30 NOTE — PROGRESS NOTES
To bedside for acute nausea and severe heartburn.  Patient not vomiting, but hiccups, dry heaving, severe discomfort.  Discussed with Pharmacy and Dr Lala  IV compazine 10 mg now  Will start scheduled PO compazine 10 mg q6h.    Evangelina Howell MD, PhD  OBGYN, PGY-4

## 2020-04-30 NOTE — PROGRESS NOTES
Attempted to call pt. To do full psychosocial assessment, no answer on pts. Cell phone or at phone in pts. Room. TAHMINA RoblesW attempted to speak with this pt and inform that this sw'er was attempting to speak with her to discuss dc needs. Pt. States she cannot talk at this time as she is not feeling well. Will follow and attempt to do full assessment in the am.

## 2020-04-30 NOTE — PLAN OF CARE
visited patient's room. Patient resting in bed and indicated wanting to continue to rest.  will follow.     [facility]  Palliative Medicine

## 2020-04-30 NOTE — ASSESSMENT & PLAN NOTE
Patient with nausea without vomitus this morning  Severe reflex, hiccups this morning following ingestion of boost  Antiemetics per gyn onc - discussed with team  Consider that patient may need venting g tube if these type of reactions persist in response to PO intake

## 2020-04-30 NOTE — PLAN OF CARE
Patient remained awake and alert throughout shift. Assessment completed no alarming findings found. Vital signs remained stable; All scheduled/PRN medication administered as ordered. Dysphagia soft diet ordered; poor intake. Intermittent nausea and pain controlled with scheduled and PRN medication. Stand- by assistance provided; ambulates independently; one BM noted;  side rails up x2; call bell in place; bed in lowest, locked position; skid proof socks on; no evidence of skin breakdown; care plan explained to patient; no additional complaints at this time. Will continue routine plan of care.

## 2020-04-30 NOTE — ASSESSMENT & PLAN NOTE
- continue norvasc daily  - Vitals per floor protocol  - moderate, stable HTN  - BP: (137-167)/(67-77) 167/72

## 2020-04-30 NOTE — SUBJECTIVE & OBJECTIVE
Interval History:   Patient handling transition to PO pain meds very well. Overnight she was able to sleep and rest comfortably. HTN also noted to improve with new pain regimen. Slightly apprehensive about advancing diet but also open to trying Boost and soft diet as tolerated. Ambulating in the room and to the bathroom well. Meeting appropriate milestones for discharge and goal for home today or tomorrow.     Scheduled Meds:   amLODIPine  10 mg Oral Daily    bimatoprost  1 drop Both Eyes QHS    bisoprolol  10 mg Oral Daily    dexamethasone  10 mg Intravenous Q24H    dicyclomine  20 mg Oral BID    docusate sodium  200 mg Oral BID    enoxaparin  40 mg Subcutaneous Daily    multivitamin liquid no.118  10 mL Oral Daily    oxyCODONE  20 mg Oral Q12H    timolol maleate 0.5%  1 drop Both Eyes BID     Continuous Infusions:  PRN Meds:alteplase, Dextrose 10% Bolus, Dextrose 10% Bolus, diphenhydrAMINE, EPINEPHrine, glucagon (human recombinant), glucose, glucose, heparin, porcine (PF), hydrocortisone sodium succinate, insulin aspart U-100, meclizine, ondansetron, oxyCODONE, oxyCODONE-acetaminophen, promethazine (PHENERGAN) IVPB, simethicone, sodium chloride 0.9%    Review of patient's allergies indicates:   Allergen Reactions    Gabapentin      Swelling,musclespasm    Lyrica [pregabalin]      Swelling, musclespasm       Objective:     Vital Signs (Most Recent):  Temp: 97.4 °F (36.3 °C) (04/30/20 0306)  Pulse: 75 (04/30/20 0306)  Resp: 17 (04/30/20 0306)  BP: (!) 167/72 (04/30/20 0306)  SpO2: 99 % (04/30/20 0306) Vital Signs (24h Range):  Temp:  [96 °F (35.6 °C)-97.7 °F (36.5 °C)] 97.4 °F (36.3 °C)  Pulse:  [67-80] 75  Resp:  [17-18] 17  SpO2:  [92 %-99 %] 99 %  BP: (137-167)/(67-77) 167/72     Weight: 129 kg (284 lb 6.3 oz)  Body mass index is 44.54 kg/m².    Intake/Output - Last 3 Shifts       04/28 0700 - 04/29 0659 04/29 0700 - 04/30 0659 04/30 0700 - 05/01 0659    P.O.       I.V. (mL/kg)       Total  Intake(mL/kg)       Urine (mL/kg/hr) 0 (0) 1300 (0.4)     Stool 2      Total Output 2 1300     Net -2 -1300            Urine Occurrence 2 x 1 x     Stool Occurrence 1 x 1 x         Physical Exam:   Constitutional: She is oriented to person, place, and time. She appears well-developed and well-nourished. No distress.    HENT:   Head: Normocephalic and atraumatic.    Eyes: Conjunctivae are normal. Right eye exhibits no discharge. Left eye exhibits no discharge.    Neck: Neck supple. No tracheal deviation present.    Cardiovascular: Normal rate.     Pulmonary/Chest: Effort normal and breath sounds normal.        Abdominal: Soft. Bowel sounds are normal. She exhibits distension. She exhibits no abdominal incision. There is tenderness. There is no guarding.   TTP in the epigastric region is improving             Musculoskeletal: Normal range of motion and moves all extremeties.       Neurological: She is alert and oriented to person, place, and time.    Skin: Skin is warm and dry. She is not diaphoretic.    Psychiatric: She has a normal mood and affect. Her behavior is normal. Judgment and thought content normal.       Lines/Drains/Airways     Central Venous Catheter Line            Port A Cath Single Lumen 04/25/20 1208 right subclavian 4 days                Laboratory:  Recent Labs   Lab 04/27/20  0309 04/28/20  0636 04/29/20  1310   WBC 12.74* 14.08* 14.80*   HGB 9.8* 10.0* 9.8*   HCT 33.6* 34.3* 33.1*   MCV 84 85 83   * 468* 409*       Recent Labs   Lab 04/25/20  0927  04/28/20  0636 04/29/20  1310 04/30/20  0311   *   < > 138 137 136   K 4.1   < > 4.1 4.4 4.7   CL 98   < > 99 100 99   CO2 22*   < > 22* 23 24   BUN 8   < > 9 13 16   CREATININE 0.7   < > 0.7 0.8 0.8   *   < > 137* 204* 231*   PROT 6.7   < > 6.6 6.8 6.7   BILITOT 0.3   < > 0.3 0.2 0.2   ALKPHOS 273*   < > 278* 297* 311*   ALT 35   < > 57* 59* 53*   AST 33   < > 50* 49* 40   MG 1.5*  --  1.2* 1.6  --    PHOS 3.1  --  3.9  --   --      < > = values in this interval not displayed.      Diagnostic Results:  No new studies

## 2020-04-30 NOTE — SUBJECTIVE & OBJECTIVE
Interval History: patient with acute worsening of chest pain, heartburn, nausea following ingestion of boost this morning. Feels that upper chest pain does not radiate, is intermittent, 9/10 in intensity and is associated with rising sensation into the back of her throat with associated hiccups. Abdominal pain present but much less severe 3/10. Single episode of loose stool this morning. Gyn Onc aware of issues and ordered compazine IV once with plan for scheduled PO compazine. GI cocktail ordered by primary service as well as protonix x 1 per communication with gyn onc.     With regard to PO pain medication pain did note an increase in nausea following oxycodone extended release.     Medications:  Continuous Infusions:    Scheduled Meds:   amLODIPine  10 mg Oral Daily    bimatoprost  1 drop Both Eyes QHS    bisoprolol  10 mg Oral Daily    dexamethasone  10 mg Intravenous Q24H    dicyclomine  20 mg Oral BID    docusate sodium  200 mg Oral BID    enoxaparin  40 mg Subcutaneous Daily    multivitamin liquid no.118  10 mL Oral Daily    oxyCODONE  20 mg Oral Q12H    prochlorperazine  10 mg Oral Q6H    timolol maleate 0.5%  1 drop Both Eyes BID     PRN Meds:alteplase, Dextrose 10% Bolus, Dextrose 10% Bolus, diphenhydrAMINE, EPINEPHrine, GI cocktail (mylanta 30 mL, lidocaine 2 % viscous 10 mL, dicyclomine 10 mL) 50 mL, glucagon (human recombinant), glucose, glucose, heparin, porcine (PF), hydrocortisone sodium succinate, insulin aspart U-100, meclizine, ondansetron, oxyCODONE, promethazine (PHENERGAN) IVPB, simethicone, sodium chloride 0.9%, sodium citrate-citric acid 500-334 mg/5 ml    Objective:     Vital Signs (Most Recent):  Temp: 97.4 °F (36.3 °C) (04/30/20 1131)  Pulse: 81 (04/30/20 1131)  Resp: 16 (04/30/20 1131)  BP: (!) 143/67 (04/30/20 1131)  SpO2: 97 % (04/30/20 1131) Vital Signs (24h Range):  Temp:  [97.4 °F (36.3 °C)-97.7 °F (36.5 °C)] 97.4 °F (36.3 °C)  Pulse:  [67-84] 81  Resp:  [16-19] 16  SpO2:   [92 %-99 %] 97 %  BP: (143-181)/(67-81) 143/67     Weight: 129 kg (284 lb 6.3 oz)  Body mass index is 44.54 kg/m².    Review of Symptoms  Symptom Assessment (ESAS 0-10 scale)    ESAS 0 1 2 3 4 5 6 7 8 9 10   Pain []  []  []  []  []  []  []  []  []  [x]  []    Dyspnea [x]  []  []  []  []  []  []  []  []  []  []    Anxiety [x]  []  []  []  []  []  []  []  []  []  []    Nausea []  []  []  []  []  []  []  []  []  [x]  []    Depression  [x]  []  []  []  []  []  []  []  []  []  []    Anorexia []  [x]  []  []  []  []  []  []  []  []  []    Fatigue [x]  []  []  []  []  []  []  []  []  []  []    Insomnia [x]  []  []  []  []  []  []  []  []  []  []    Restlessness  [x]  []  []  []  []  []  []  []  []  []  []    Agitation [x]  []  []  []  []  []  []  []  []  []  []      Bowel Management Plan (BMP): loose bowel movement 4/30 AM.     Pain Assessment: upper chest rising into neck. Bandlike pain across abdomen.   OME in 24 hours: 90mg     Performance Status: 70    ECOG Performance Status Grade: 2 - Ambulates, capable of self care only    Physical Exam   Constitutional: She is oriented to person, place, and time. She appears well-developed and well-nourished.   HENT:   Head: Normocephalic and atraumatic.   Eyes: Pupils are equal, round, and reactive to light. EOM are normal.   Cardiovascular: Normal rate and normal heart sounds.   Pulmonary/Chest: Effort normal and breath sounds normal.   Neurological: She is oriented to person, place, and time.   Skin: Capillary refill takes less than 2 seconds.   Psychiatric: She has a normal mood and affect. Her behavior is normal.       Significant Labs: All pertinent labs within the past 24 hours have been reviewed.  CBC:   Recent Labs   Lab 04/29/20  1310   WBC 14.80*   HGB 9.8*   HCT 33.1*   MCV 83   *     BMP:  Recent Labs   Lab 04/30/20  0311   *      K 4.7   CL 99   CO2 24   BUN 16   CREATININE 0.8   CALCIUM 8.8     LFT:  Lab Results   Component Value Date    AST 40  04/30/2020    ALKPHOS 311 (H) 04/30/2020    BILITOT 0.2 04/30/2020     Albumin:   Albumin   Date Value Ref Range Status   04/30/2020 2.2 (L) 3.5 - 5.2 g/dL Final     Protein:   Total Protein   Date Value Ref Range Status   04/30/2020 6.7 6.0 - 8.4 g/dL Final     Lactic acid:   Lab Results   Component Value Date    LACTATE 1.0 04/24/2020       Significant Imaging: I have reviewed all pertinent imaging results/findings within the past 24 hours.    Advanced Directives:: completed in hospital    Living Will: yes  LaPOST: No  Do Not Resuscitate Status: DNR / DNI - signed in chart and documented in [facility]  Medical Power of : Yes. Agent's Name: Sampson parks. Agent's Contact Number: [phone] Patient gave ORAL DECLARATION. She does have a brother who lives with her (he was homeless and she promised her mother she would look after him). Has a Sister and Brother who also work with [employer]. Has a sister in [place].      Decision-Making Capacity: Patient answered questions

## 2020-04-30 NOTE — ASSESSMENT & PLAN NOTE
- Recurrent endometrial cancer  - CT of abdomen pelvis to further elucidate recurrence  - Palliative care following  - Lovenox ppx daily

## 2020-04-30 NOTE — PROGRESS NOTES
"[facility]  Gynecologic Oncology  Progress Note      Patient Name: Saba Driver  MRN: [id number]  Admission Date: 4/24/2020  Hospital Length of Stay: 6 days  Attending Provider: Noelle Lala MD  Primary Care Provider: Adela Verma MD  Principal Problem: Uterine cancer, sarcoma    Follow-up For: * No surgery found *  Post-Operative Day:    Subjective:      History of Present Illness:   59-year-old female with DM2, uterine adenocarcinoma s/p BRIAN-BSO/chemotherapy/radiation and hypertension presents after virtual visit with palliative care office for abdominal pain and fatigue.  The symptoms have been progressively worsening over the past month. Patient with CT 4/5 showing likely endometrial recurrence and then omental mass biopsy 4/20 with pathological confirmation. She reports that abdominal pain radiates throughout the abdomen and seems to be worse with any type of p.o. intake including water. She takes Percocet at home which used to relief her pain but now pain is too severe.  Reports associated nausea, frequent emesis and diarrhea.  She had dysuria 2 months ago but this has since resolved.  Fatigue has become profound that she has difficulty getting to the bathroom from her bedroom.   Denies sick contact, fever, dyspnea. Had negative COVID test 4 days ago.  She denies bloody or dark stool, hematemesis, flank pain, chest pain.    Hospital Course:  04/24/2020 - Admit for evaluation and management of diffuse abdominal pain and intractable n/v with emesis  04/25/2020 - Patient with nausea overnight. Has not passed flatus "in a while". Abdominal pain persistent. CT read showed likely distal small bowel obstruction with increased ascites.   04/26/2020 - Nausea x2 overnight, no emesis. Pain not controlled but patient very stoic. Reports "she'll be alright" but acknowledges increase in pain control warranted. Palliative care consulted and recommended PCA and clear liquid diet may be possible. " "Will attempt today. Not passing flatus. Up in chair during evaluation.   04/27/2020 - Patient reports improved pain with PCA. However, still had severe pain when attempting liquids. Not passing gas. Some watery stool. Afebrile. Stoic, laying in bed "comfortable."   04/28/2020 - Patient reports two episodes of significant pin overnight. The rest of the night at baseline compared to last night. Adjustments made to PCA settings this AM. Moderate HTN, stable. Afebrile. No n/v overnight. Denies flatus.  04/28/2020 - Patient started carbo/taxol treatment yesterday. No issues overnight. Tolerating CLD, minimal flatus, no BM. Pain still controlled with PCA pump. Will work to wean to PO meds today.  04/29/2020 - Patient tolerated chemotherapy well. Reports some fatigue after infusion. She continues to have pain q2-3 hours when sleeping at night, but well controlled during the day with PCA. BP continues to be elevated. IVF at 75 cc/h. BP meds reviewed and will discuss consulting with medicine for recommendations for better control. Pain may be contributing to elevate BP above her likely baseline on home meds.  04/30/2020 - Patient handling transition to PO pain meds very well. Overnight she was able to sleep and rest comfortably. HTN also noted to improve with new pain regimen. Slightly apprehensive about advancing diet but also open to trying Boost and soft diet as tolerated. Ambulating in the room and to the bathroom well. Meeting appropriate milestones for discharge and goal for home today or tomorrow.     Interval History:   Patient handling transition to PO pain meds very well. Overnight she was able to sleep and rest comfortably. HTN also noted to improve with new pain regimen. Slightly apprehensive about advancing diet but also open to trying Boost and soft diet as tolerated. Ambulating in the room and to the bathroom well. Meeting appropriate milestones for discharge and goal for home today or tomorrow.     Scheduled " Meds:   amLODIPine  10 mg Oral Daily    bimatoprost  1 drop Both Eyes QHS    bisoprolol  10 mg Oral Daily    dexamethasone  10 mg Intravenous Q24H    dicyclomine  20 mg Oral BID    docusate sodium  200 mg Oral BID    enoxaparin  40 mg Subcutaneous Daily    multivitamin liquid no.118  10 mL Oral Daily    oxyCODONE  20 mg Oral Q12H    timolol maleate 0.5%  1 drop Both Eyes BID     Continuous Infusions:  PRN Meds:alteplase, Dextrose 10% Bolus, Dextrose 10% Bolus, diphenhydrAMINE, EPINEPHrine, glucagon (human recombinant), glucose, glucose, heparin, porcine (PF), hydrocortisone sodium succinate, insulin aspart U-100, meclizine, ondansetron, oxyCODONE, oxyCODONE-acetaminophen, promethazine (PHENERGAN) IVPB, simethicone, sodium chloride 0.9%    Review of patient's allergies indicates:   Allergen Reactions    Gabapentin      Swelling,musclespasm    Lyrica [pregabalin]      Swelling, musclespasm       Objective:     Vital Signs (Most Recent):  Temp: 97.4 °F (36.3 °C) (04/30/20 0306)  Pulse: 75 (04/30/20 0306)  Resp: 17 (04/30/20 0306)  BP: (!) 167/72 (04/30/20 0306)  SpO2: 99 % (04/30/20 0306) Vital Signs (24h Range):  Temp:  [96 °F (35.6 °C)-97.7 °F (36.5 °C)] 97.4 °F (36.3 °C)  Pulse:  [67-80] 75  Resp:  [17-18] 17  SpO2:  [92 %-99 %] 99 %  BP: (137-167)/(67-77) 167/72     Weight: 129 kg (284 lb 6.3 oz)  Body mass index is 44.54 kg/m².    Intake/Output - Last 3 Shifts       04/28 0700 - 04/29 0659 04/29 0700 - 04/30 0659 04/30 0700 - 05/01 0659    P.O.       I.V. (mL/kg)       Total Intake(mL/kg)       Urine (mL/kg/hr) 0 (0) 1300 (0.4)     Stool 2      Total Output 2 1300     Net -2 -1300            Urine Occurrence 2 x 1 x     Stool Occurrence 1 x 1 x         Physical Exam:   Constitutional: She is oriented to person, place, and time. She appears well-developed and well-nourished. No distress.    HENT:   Head: Normocephalic and atraumatic.    Eyes: Conjunctivae are normal. Right eye exhibits no discharge.  Left eye exhibits no discharge.    Neck: Neck supple. No tracheal deviation present.    Cardiovascular: Normal rate.     Pulmonary/Chest: Effort normal and breath sounds normal.        Abdominal: Soft. Bowel sounds are normal. She exhibits distension. She exhibits no abdominal incision. There is tenderness. There is no guarding.   TTP in the epigastric region is improving             Musculoskeletal: Normal range of motion and moves all extremeties.       Neurological: She is alert and oriented to person, place, and time.    Skin: Skin is warm and dry. She is not diaphoretic.    Psychiatric: She has a normal mood and affect. Her behavior is normal. Judgment and thought content normal.       Lines/Drains/Airways     Central Venous Catheter Line            Port A Cath Single Lumen 04/25/20 1208 right subclavian 4 days                Laboratory:  Recent Labs   Lab 04/27/20  0309 04/28/20  0636 04/29/20  1310   WBC 12.74* 14.08* 14.80*   HGB 9.8* 10.0* 9.8*   HCT 33.6* 34.3* 33.1*   MCV 84 85 83   * 468* 409*       Recent Labs   Lab 04/25/20  0927  04/28/20  0636 04/29/20  1310 04/30/20  0311   *   < > 138 137 136   K 4.1   < > 4.1 4.4 4.7   CL 98   < > 99 100 99   CO2 22*   < > 22* 23 24   BUN 8   < > 9 13 16   CREATININE 0.7   < > 0.7 0.8 0.8   *   < > 137* 204* 231*   PROT 6.7   < > 6.6 6.8 6.7   BILITOT 0.3   < > 0.3 0.2 0.2   ALKPHOS 273*   < > 278* 297* 311*   ALT 35   < > 57* 59* 53*   AST 33   < > 50* 49* 40   MG 1.5*  --  1.2* 1.6  --    PHOS 3.1  --  3.9  --   --     < > = values in this interval not displayed.      Diagnostic Results:  No new studies    Assessment/Plan:     Palliative care encounter  - transitioned to oral pain medications yesterday with good control  - Boost BID with soft diet as tolerated    Nausea and vomiting  - No N/V overnight  - Zofran PRN  - Diet: Boost and soft    Abdominal pain  - Likely SBO, per imaging  - clear liquid diet  - IVF  - Zofran PRN  - good control  with PO regimen    Fatigue  - IVF  - PT has assessed    Cancer of inguinal/lower limb lymph nodes, secondary  - Recurrent endometrial cancer  - CT of abdomen pelvis to further elucidate recurrence  - Palliative care following  - Lovenox ppx daily    Diabetes mellitus, type 2  - hold januvia as patient not taking in PO  - SSI ordered with QID POCT glucose check    Hypertension  - continue norvasc daily  - Vitals per floor protocol  - moderate, stable HTN  - BP: (137-167)/(67-77) 167/72     VTE Risk Mitigation (From admission, onward)         Ordered     enoxaparin injection 40 mg  Daily      04/28/20 2018     heparin, porcine (PF) 100 unit/mL injection flush 500 Units  As needed (PRN)      04/28/20 1147     IP VTE HIGH RISK PATIENT  Once      04/24/20 1724     Place sequential compression device  Until discontinued      04/24/20 1724                Evangelina Howell MD  Gynecologic Oncology  [facility]

## 2020-04-30 NOTE — PROGRESS NOTES
Blood glucose challenging to control in the setting of increased PO intake and steroid course for chemotherapy. Nausea slow to resolve but improving slightly from this AM with addition of compazine. Patient resting in bed.     Temp:  [97.4 °F (36.3 °C)-97.7 °F (36.5 °C)] 97.4 °F (36.3 °C)  Pulse:  [67-84] 81  Resp:  [16-19] 16  SpO2:  [92 %-99 %] 97 %  BP: (143-181)/(67-81) 143/67     B (5u) > 341(4u) > 265 (3u)    Plan:  Discussed with Pharmacy  Will adjust to moderate correction SSI protocol  Planning to slide with meals, but q4h BG   Dexamethasone discontinued as it does not seem to be improving nausea  Compazine q6h  Continue PO pain regimen  Will monitor overnight and re-evaluate for discharge tomorrow    Evangelina Howell MD, PhD  OBGYN, PGY-4

## 2020-04-30 NOTE — PLAN OF CARE
POC reviewed with patient; understanding verbalized. Pt complained of acid reflux and pain to abdomen throughout shift. Simethicone and PRN oxy 5 given. Pt with nonskid footwear on, bed in lowest position, and locked with bed rails up x2. Pt instructed to call prior to getting OOB. Pt has call light and personal items within reach. Patient ambulates in room independently. VSS and afebrile this shift. All questions and concerns addressed at this time. Will continue to monitor.

## 2020-04-30 NOTE — ASSESSMENT & PLAN NOTE
- transitioned to oral pain medications yesterday with good control  - Boost BID with soft diet as tolerated

## 2020-04-30 NOTE — ASSESSMENT & PLAN NOTE
- Likely SBO, per imaging  - clear liquid diet  - IVF  - Zofran PRN  - good control with PO regimen

## 2020-04-30 NOTE — ASSESSMENT & PLAN NOTE
58 yo female admitted with pain/nausea/diarrhrea and FTT due to partial small bowel obstruction in the setting of recurrent endometrial cancer and peritoneal carcinomatosis    Prognosis: Concern for days to weeks given severity of disease, location, and recurrence    1) Symptoms:  Based on PCA OME 90 mg: transition to oral oxy ER + IR and reduce 25% for cross tolerance  Transition to oral oxycodone ER 20 mg po q 12 hours and oxy 5 mg q 2 hours po prn  Had developed some nausea following oxy ER administration on 4/29 - continued to monitor     2) Code status: DNR/DNI  Advance Care Planning     Living Will  During this visit, I engaged the patient  in the advance care planning process.  The patient and I reviewed the role for advance directives and their purpose in directing future healthcare if the patient's unable to speak for him/herself.  At this point in time, the patient does have full decision-making capacity.  We discussed different extreme health states that she could experience, and reviewed what kind of medical care she would want in those situations.  The patient communicated that if she were comatose and had little chance of a meaningful recovery, she would not want machines/life-sustaining treatments used. In addition to the above preference, other important end-of-life issues for the patient include comfort and avoiding inappropriate prolongation of the dying process. The patient has completed a living will to reflect these preferences.        Code Status  In light of the patients advanced and life limiting illness,I engaged the the patient in a conversation about the patient's preferences for care  at the very end of life. The patient wishes to have a natural, peaceful death.  Along those lines, the patient does not wish to have CPR or other invasive treatments performed when her heart and/or breathing stops. I communicated to the patient that a DNR order would be placed in her medical record to  reflect this preference and DNR form was completed and will be scanned into EPIC.      I spent a total of 20 minutes engaging the patient in this advance care planning discussion.          3) Psychosocial: retired from [employer]; never ; no kids    4) Medicolegal: Discussed advance care planning today and the importance of completing documents.  Printed, reviewed, and left forms with the patient to complete.     She wants her niece to be her HCPOA and knows she has to have discussions with her regarding EOL preferences.     5) Spiritual:  Yes; Yarsanism    6) Goals of care/discussion:  Have not yet had discussions regarding her considerations about potential outcomes. Will discuss with oncology potential treatment options as well as likely outcomes.      She is hopeful to hear of all options at this point.  States her goal is to return home and will be more open to discussing all care options moving forward.  Her goal for today is to improve her heartburn and nausea which is severe.     7) Disposition plan: likely home with home health; would be open to ongoing palliative care outpt follow up    Thank you for the opportunity to care for this patient and family.     Please call with questions.     Maikol Leigh MD  Palliative Medicine  [phone]

## 2020-04-30 NOTE — PROGRESS NOTES
[facility]  Palliative Medicine  Progress Note    Patient Name: Saba Driver  MRN: [id number]  Admission Date: 4/24/2020  Hospital Length of Stay: 6 days  Code Status: DNR   Attending Provider: Noelle Lala MD  Consulting Provider: Kiran Priest MD  Primary Care Physician: Adela Verma MD  Principal Problem:Uterine cancer, sarcoma    Patient information was obtained from patient.      Assessment/Plan:     * Uterine cancer, sarcoma  Recurrent disease    Palliative care encounter  60 yo female admitted with pain/nausea/diarrhrea and FTT due to partial small bowel obstruction in the setting of recurrent endometrial cancer and peritoneal carcinomatosis    Prognosis: Concern for days to weeks given severity of disease, location, and recurrence    1) Symptoms:  Based on PCA OME 90 mg: transition to oral oxy ER + IR and reduce 25% for cross tolerance  Transition to oral oxycodone ER 20 mg po q 12 hours and oxy 5 mg q 2 hours po prn  Had developed some nausea following oxy ER administration on 4/29 - continued to monitor     2) Code status: DNR/DNI  Advance Care Planning     Living Will  During this visit, I engaged the patient  in the advance care planning process.  The patient and I reviewed the role for advance directives and their purpose in directing future healthcare if the patient's unable to speak for him/herself.  At this point in time, the patient does have full decision-making capacity.  We discussed different extreme health states that she could experience, and reviewed what kind of medical care she would want in those situations.  The patient communicated that if she were comatose and had little chance of a meaningful recovery, she would not want machines/life-sustaining treatments used. In addition to the above preference, other important end-of-life issues for the patient include comfort and avoiding inappropriate prolongation of the dying process. The patient has completed a  living will to reflect these preferences.        Code Status  In light of the patients advanced and life limiting illness,I engaged the the patient in a conversation about the patient's preferences for care  at the very end of life. The patient wishes to have a natural, peaceful death.  Along those lines, the patient does not wish to have CPR or other invasive treatments performed when her heart and/or breathing stops. I communicated to the patient that a DNR order would be placed in her medical record to reflect this preference and DNR form was completed and will be scanned into EPIC.      I spent a total of 20 minutes engaging the patient in this advance care planning discussion.         3) Psychosocial: retired from [employer]; never ; no kids    4) Medicolegal: Discussed advance care planning today and the importance of completing documents.  Printed, reviewed, and left forms with the patient to complete.     She wants her niece to be her HCPOA and knows she has to have discussions with her regarding EOL preferences.     5) Spiritual:  Yes; Caodaism    6) Goals of care/discussion:  Have not yet had discussions regarding her considerations about potential outcomes. Will discuss with oncology potential treatment options as well as likely outcomes.      She is hopeful to hear of all options at this point.  States her goal is to return home and will be more open to discussing all care options moving forward.  Her goal for today is to improve her heartburn and nausea which is severe.     7) Disposition plan: likely home with home health; would be open to ongoing palliative care outpt follow up    Thank you for the opportunity to care for this patient and family.     Please call with questions.     Maikol Leigh MD  Palliative Medicine  [phone]      Nausea and vomiting  Patient with nausea without vomitus this morning  Severe reflex, hiccups this morning following ingestion of boost  Antiemetics per  gyn onc - discussed with team  Consider that patient may need venting g tube if these type of reactions persist in response to PO intake     Abdominal pain  Due to recurrent carcinomatosis and partial SBO    Endometrial cancer  C1D3 of chemo; asymptomatic currently from treatment carbo/taxol      Hypertension  Currently on two drug therapy. I suspect some of her elevated BP is pain.  She is on amlodipine 5 mg and may go up to 10 mg daily if still elevated when pain is under control.   Many BP meds are in a timed release wax matrix which may be a problem with tumor/bowel obstruction. AMLODIPINE IS NOT one of them and can even be crushed if needed.        I will follow-up with patient. Please contact us if you have any additional questions.    Subjective:     Chief Complaint:   Chief Complaint   Patient presents with    Fatigue     Patient with generalized weakness, recent stomach biopsy, negative covid test on Monday.       HPI:    59-year-old female with DM2, uterine adenocarcinoma s/p BRIAN-BSO/chemotherapy/radiation and hypertension presents after virtual visit with palliative care office for abdominal pain and fatigue.  The symptoms have been progressively worsening over the past month. Patient with CT 4/5 showing likely endometrial recurrence and then omental mass biopsy 4/20 with pathological confirmation. She reports that abdominal pain radiates throughout the abdomen and seems to be worse with any type of p.o. intake including water. She takes Percocet at home which used to relief her pain but now pain is too severe.  Reports associated nausea, frequent emesis and diarrhea.  She had dysuria 2 months ago and an ED visit with negative urine culture/tx with AB.  Fatigue has become profound that she has difficulty getting to the bathroom from her bedroom.   Denies sick contact, fever, dyspnea. Had negative COVID test 4 days ago.  She denies bloody or dark stool, hematemesis, flank pain, chest pain. She reports  "that she sits on the side of the bed or chair and "doubles over" until the worst pain abates. She does not know how much weight she has lost.     In this setting, palliative medicine was consulted to help with medical decision making and aid in the formation of goals of care.       Hospital Course:  No notes on file    Interval History: patient with acute worsening of chest pain, heartburn, nausea following ingestion of boost this morning. Feels that upper chest pain does not radiate, is intermittent, 9/10 in intensity and is associated with rising sensation into the back of her throat with associated hiccups. Abdominal pain present but much less severe 3/10. Single episode of loose stool this morning. Gyn Onc aware of issues and ordered compazine IV once with plan for scheduled PO compazine. GI cocktail ordered by primary service as well as protonix x 1 per communication with gyn onc.     With regard to PO pain medication pain did note an increase in nausea following oxycodone extended release.     Medications:  Continuous Infusions:    Scheduled Meds:   amLODIPine  10 mg Oral Daily    bimatoprost  1 drop Both Eyes QHS    bisoprolol  10 mg Oral Daily    dexamethasone  10 mg Intravenous Q24H    dicyclomine  20 mg Oral BID    docusate sodium  200 mg Oral BID    enoxaparin  40 mg Subcutaneous Daily    multivitamin liquid no.118  10 mL Oral Daily    oxyCODONE  20 mg Oral Q12H    prochlorperazine  10 mg Oral Q6H    timolol maleate 0.5%  1 drop Both Eyes BID     PRN Meds:alteplase, Dextrose 10% Bolus, Dextrose 10% Bolus, diphenhydrAMINE, EPINEPHrine, GI cocktail (mylanta 30 mL, lidocaine 2 % viscous 10 mL, dicyclomine 10 mL) 50 mL, glucagon (human recombinant), glucose, glucose, heparin, porcine (PF), hydrocortisone sodium succinate, insulin aspart U-100, meclizine, ondansetron, oxyCODONE, promethazine (PHENERGAN) IVPB, simethicone, sodium chloride 0.9%, sodium citrate-citric acid 500-334 mg/5 ml    Objective: "     Vital Signs (Most Recent):  Temp: 97.4 °F (36.3 °C) (04/30/20 1131)  Pulse: 81 (04/30/20 1131)  Resp: 16 (04/30/20 1131)  BP: (!) 143/67 (04/30/20 1131)  SpO2: 97 % (04/30/20 1131) Vital Signs (24h Range):  Temp:  [97.4 °F (36.3 °C)-97.7 °F (36.5 °C)] 97.4 °F (36.3 °C)  Pulse:  [67-84] 81  Resp:  [16-19] 16  SpO2:  [92 %-99 %] 97 %  BP: (143-181)/(67-81) 143/67     Weight: 129 kg (284 lb 6.3 oz)  Body mass index is 44.54 kg/m².    Review of Symptoms  Symptom Assessment (ESAS 0-10 scale)    ESAS 0 1 2 3 4 5 6 7 8 9 10   Pain []  []  []  []  []  []  []  []  []  [x]  []    Dyspnea [x]  []  []  []  []  []  []  []  []  []  []    Anxiety [x]  []  []  []  []  []  []  []  []  []  []    Nausea []  []  []  []  []  []  []  []  []  [x]  []    Depression  [x]  []  []  []  []  []  []  []  []  []  []    Anorexia []  [x]  []  []  []  []  []  []  []  []  []    Fatigue [x]  []  []  []  []  []  []  []  []  []  []    Insomnia [x]  []  []  []  []  []  []  []  []  []  []    Restlessness  [x]  []  []  []  []  []  []  []  []  []  []    Agitation [x]  []  []  []  []  []  []  []  []  []  []      Bowel Management Plan (BMP): loose bowel movement 4/30 AM.     Pain Assessment: upper chest rising into neck. Bandlike pain across abdomen.   OME in 24 hours: 90mg     Performance Status: 70    ECOG Performance Status Grade: 2 - Ambulates, capable of self care only    Physical Exam   Constitutional: She is oriented to person, place, and time. She appears well-developed and well-nourished.   HENT:   Head: Normocephalic and atraumatic.   Eyes: Pupils are equal, round, and reactive to light. EOM are normal.   Cardiovascular: Normal rate and normal heart sounds.   Pulmonary/Chest: Effort normal and breath sounds normal.   Neurological: She is oriented to person, place, and time.   Skin: Capillary refill takes less than 2 seconds.   Psychiatric: She has a normal mood and affect. Her behavior is normal.       Significant Labs: All pertinent labs within  the past 24 hours have been reviewed.  CBC:   Recent Labs   Lab 04/29/20  1310   WBC 14.80*   HGB 9.8*   HCT 33.1*   MCV 83   *     BMP:  Recent Labs   Lab 04/30/20  0311   *      K 4.7   CL 99   CO2 24   BUN 16   CREATININE 0.8   CALCIUM 8.8     LFT:  Lab Results   Component Value Date    AST 40 04/30/2020    ALKPHOS 311 (H) 04/30/2020    BILITOT 0.2 04/30/2020     Albumin:   Albumin   Date Value Ref Range Status   04/30/2020 2.2 (L) 3.5 - 5.2 g/dL Final     Protein:   Total Protein   Date Value Ref Range Status   04/30/2020 6.7 6.0 - 8.4 g/dL Final     Lactic acid:   Lab Results   Component Value Date    LACTATE 1.0 04/24/2020       Significant Imaging: I have reviewed all pertinent imaging results/findings within the past 24 hours.    Advanced Directives:: completed in hospital    Living Will: yes  LaPOST: No  Do Not Resuscitate Status: DNR / DNI - signed in chart and documented in [facility]  Medical Power of : Yes. Agent's Name: [employer]pson [employer]. Agent's Contact Number: [phone] Patient gave ORAL DECLARATION. She does have a brother who lives with her (he was homeless and she promised her mother she would look after him). Has a Sister and Brother who also work with [employer]. Has a sister in [place].      Decision-Making Capacity: Patient answered questions          > 50% of 30 min visit spent in chart review, face to face discussion of goals of care,  symptom assessment, coordination of care and emotional support.    Kiran Priest MD  Palliative Medicine  [facility]

## 2020-05-01 PROBLEM — R31.0 GROSS HEMATURIA: Status: ACTIVE | Noted: 2020-01-01

## 2020-05-01 NOTE — HPI
59-year-old female with DM2, uterine adenocarcinoma s/p BRIAN-BSO/chemotherapy/radiation and hypertension who presented with abdominal pain and fatigue and was found to have peritoneal carcinomatosis and a partial small bowel obstruction as a result of progression of her recurrent malignancy. She is to be discharged home soon by gyn oncology, but this morning had an episode of gross hematuria with urinating. She is not having any issues urinating and has not been passing clots. Urology was consulted for gross hematuria.    UA + for blood, negative for leuks and nitrites  Her hgb is stable

## 2020-05-01 NOTE — PT/OT/SLP PROGRESS
Physical Therapy Treatment    Patient Name:  Saba Driver   MRN:  [id number]    Recommendations:     Discharge Recommendations:  home health PT   Discharge Equipment Recommendations: walker, rolling   Barriers to discharge: None    Assessment:     Saba Driver is a 59 y.o. female admitted with a medical diagnosis of Uterine cancer, sarcoma.  She presents with the following impairments/functional limitations:  weakness, impaired endurance, impaired functional mobilty, impaired balance, gait instability, pain .Pt Progressing with PT Intervention. Pt Progressing with improving gait distance. Pt would continue to benefit from skilled PT to address overall functional mobility and goals. Goals remain appropriate.    Rehab Prognosis: Good; patient would benefit from acute skilled PT services to address these deficits and reach maximum level of function.    Recent Surgery: * No surgery found *      Plan:     During this hospitalization, patient to be seen 3 x/week to address the identified rehab impairments via gait training, therapeutic activities, therapeutic exercises and progress toward the following goals:    · Plan of Care Expires:  05/21/20    Subjective     Pain/Comfort:  · Pain Rating 1: (not rated)      Objective:     Communicated with RN prior to session.  Patient found supine with telemetry upon PT entry to room.     General Precautions: Standard, fall   Orthopedic Precautions:N/A   Braces: N/A     Functional Mobility:  · Bed Mobility:     · Rolling Left:  stand by assistance  · Supine to Sit: stand by assistance  · Transfers:     · Sit to Stand:  stand by assistance with rolling walker and from bed and toilet  · Gait: pt amb with RW with SBA for safety x 200 ft  Pt asc/dec 9 steps with B HR with CGA for safety pt required standing rest break    AM-PAC 6 CLICK MOBILITY  Turning over in bed (including adjusting bedclothes, sheets and blankets)?: 4  Sitting down on and standing up from a chair with arms (e.g.,  wheelchair, bedside commode, etc.): 3  Moving from lying on back to sitting on the side of the bed?: 4  Moving to and from a bed to a chair (including a wheelchair)?: 3  Need to walk in hospital room?: 3  Climbing 3-5 steps with a railing?: 3  Basic Mobility Total Score: 20       Therapeutic Activities and Exercises:   educated patient on progress, safety,d/c,PT POC, on the effects of prolonged immobility and the importance of performing OOB activity and exercises to promote healing and reduce recovery time   Updated white board with appropriate PT mobility information for medical team notification  Donned an extra gown   Pt encouraged to ambulate in hallways 3x/day with nursing or family assistance to improve endurance.   Pt safe to ambulate in hallway with RN or PCT assistance   Call nursing/pct to transfer to chair/use bathroom. Pt stated understanding  Bedside table in front of patient and area set up for function, convenience, and safety. RN aware of patient's mobility needs and status. Questions/concerns addressed within PTA scope of practice; patient with no further questions. Time was provided for active listening, discussion of health disposition, and discussion of safe discharge. Pt?verbalized?agreement .    Patient left up in chair with all lines intact, call button in reach and nsg notified..    GOALS:   Multidisciplinary Problems     Physical Therapy Goals        Problem: Physical Therapy Goal    Goal Priority Disciplines Outcome Goal Variances Interventions   Physical Therapy Goal     PT, PT/OT Ongoing, Progressing     Description:  Goals to be met by: 2020     Patient will increase functional independence with mobility by performin. Sit to stand transfer with Modified [place]  2. Bed to chair transfer with Modified [place]  3. Gait  x 200 feet with Modified [place]   4. Ascend/descend 13 stair with bilateral Handrails Supervision                    Time Tracking:     PT  Received On: 05/01/20  PT Start Time: 0856     PT Stop Time: 0920  PT Total Time (min): 24 min     Billable Minutes: Gait Training 15 and Therapeutic Activity 9    Treatment Type: Treatment  PT/PTA: PTA     PTA Visit Number: 1     Drake Bates, PTA  05/01/2020

## 2020-05-01 NOTE — PROGRESS NOTES
Labs reviewed:     Recent Labs   Lab 04/28/20  0636 04/29/20  1310 04/30/20  1604 05/01/20  0943   WBC 14.08* 14.80* 9.85  --    HGB 10.0* 9.8* 9.5* 9.5*   HCT 34.3* 33.1* 31.2*  --    MCV 85 83 82  --    * 409* 356*  --       Recent Labs   Lab 05/01/20  0943   INR 1.1   APTT 26.9     PT 11.6  Fibrinogen  747    Recent Labs   Lab 05/01/20  0925   COLORU Red*   SPECGRAV 1.025   PHUR 6.0   PROTEINUA 2+*   BACTERIA Moderate*   Negative for nitrites, bili, leuks    Plan:   Blood unlikely to be related to infection  H/h stable  Possible tumor invading the bladder  Patient remains on ppx lovenox  Findings to be discussed with Dr Spike Howell MD, PhD  OBGYN, PGY-4

## 2020-05-01 NOTE — PROGRESS NOTES
"[facility]  Gynecologic Oncology  Progress Note      Patient Name: Saba Driver  MRN: [id number]  Admission Date: 4/24/2020  Hospital Length of Stay: 7 days  Attending Provider: Noelle Lala MD  Primary Care Provider: Adela Verma MD  Principal Problem: Uterine cancer, sarcoma    Follow-up For: * No surgery found *  Post-Operative Day:    Subjective:      History of Present Illness:   59-year-old female with DM2, uterine adenocarcinoma s/p BRIAN-BSO/chemotherapy/radiation and hypertension presents after virtual visit with palliative care office for abdominal pain and fatigue.  The symptoms have been progressively worsening over the past month. Patient with CT 4/5 showing likely endometrial recurrence and then omental mass biopsy 4/20 with pathological confirmation. She reports that abdominal pain radiates throughout the abdomen and seems to be worse with any type of p.o. intake including water. She takes Percocet at home which used to relief her pain but now pain is too severe.  Reports associated nausea, frequent emesis and diarrhea.  She had dysuria 2 months ago but this has since resolved.  Fatigue has become profound that she has difficulty getting to the bathroom from her bedroom.   Denies sick contact, fever, dyspnea. Had negative COVID test 4 days ago.  She denies bloody or dark stool, hematemesis, flank pain, chest pain.    Hospital Course:  04/24/2020 - Admit for evaluation and management of diffuse abdominal pain and intractable n/v with emesis  04/25/2020 - Patient with nausea overnight. Has not passed flatus "in a while". Abdominal pain persistent. CT read showed likely distal small bowel obstruction with increased ascites.   04/26/2020 - Nausea x2 overnight, no emesis. Pain not controlled but patient very stoic. Reports "she'll be alright" but acknowledges increase in pain control warranted. Palliative care consulted and recommended PCA and clear liquid diet may be possible. " "Will attempt today. Not passing flatus. Up in chair during evaluation.   04/27/2020 - Patient reports improved pain with PCA. However, still had severe pain when attempting liquids. Not passing gas. Some watery stool. Afebrile. Stoic, laying in bed "comfortable."   04/28/2020 - Patient reports two episodes of significant pin overnight. The rest of the night at baseline compared to last night. Adjustments made to PCA settings this AM. Moderate HTN, stable. Afebrile. No n/v overnight. Denies flatus.  04/28/2020 - Patient started carbo/taxol treatment yesterday. No issues overnight. Tolerating CLD, minimal flatus, no BM. Pain still controlled with PCA pump. Will work to wean to PO meds today.  04/29/2020 - Patient tolerated chemotherapy well. Reports some fatigue after infusion. She continues to have pain q2-3 hours when sleeping at night, but well controlled during the day with PCA. BP continues to be elevated. IVF at 75 cc/h. BP meds reviewed and will discuss consulting with medicine for recommendations for better control. Pain may be contributing to elevate BP above her likely baseline on home meds.  04/30/2020 - Patient handling transition to PO pain meds very well. Overnight she was able to sleep and rest comfortably. HTN also noted to improve with new pain regimen. Slightly apprehensive about advancing diet but also open to trying Boost and soft diet as tolerated. Ambulating in the room and to the bathroom well. Meeting appropriate milestones for discharge and goal for home today or tomorrow.   05/01/2020 - Patient with improving nausea overnight with addition of scheduled compazine. Tolerating PO medications and pain remains well controlled. This AM noted hematuria with AM void. Denies dysuria, foul odor. UA >UCx sent. Vitals stable with moderate HTN that has stabilized with pain mgmt. Dexamethasone course discontinued yesterday due to effect on BG control and poor antiemetic effect. SSI increased to moderate " correction dosing and januvia restarted. BG overnight all < 300. Will slide with AM breakfast as needed. Soft diet and boost provided. Discharge planning today if able to tolerate PO.     Interval History:   Patient with improving nausea overnight with addition of scheduled compazine. Tolerating PO medications and pain remains well controlled. This AM noted hematuria with AM void. Denies dysuria, foul odor. UA >UCx sent. Vitals stable with moderate HTN that has stabilized with pain mgmt. Dexamethasone course discontinued yesterday due to effect on BG control and poor antiemetic effect. SSI increased to moderate correction dosing and januvia restarted. BG overnight all < 300. Will slide with AM breakfast as needed. Soft diet and boost provided. Discharge planning today if able to tolerate PO.     Scheduled Meds:   amLODIPine  10 mg Oral Daily    bimatoprost  1 drop Both Eyes QHS    bisoprolol  10 mg Oral Daily    dicyclomine  20 mg Oral BID    docusate sodium  200 mg Oral BID    enoxaparin  40 mg Subcutaneous Daily    multivitamin liquid no.118  10 mL Oral Daily    oxyCODONE  20 mg Oral Q12H    prochlorperazine  10 mg Oral Q6H    SITagliptin  100 mg Oral Daily    timolol maleate 0.5%  1 drop Both Eyes BID     Continuous Infusions:  PRN Meds:alteplase, Dextrose 10% Bolus, Dextrose 10% Bolus, diphenhydrAMINE, EPINEPHrine, GI cocktail (mylanta 30 mL, lidocaine 2 % viscous 10 mL, dicyclomine 10 mL) 50 mL, glucagon (human recombinant), glucose, glucose, heparin, porcine (PF), hydrocortisone sodium succinate, insulin aspart U-100, meclizine, ondansetron, oxyCODONE, promethazine (PHENERGAN) IVPB, simethicone, sodium chloride 0.9%, sodium citrate-citric acid 500-334 mg/5 ml    Review of patient's allergies indicates:   Allergen Reactions    Gabapentin      Swelling,musclespasm    Lyrica [pregabalin]      Swelling, musclespasm       Objective:     Vital Signs (Most Recent):  Temp: 98.1 °F (36.7 °C) (05/01/20  0727)  Pulse: 90 (05/01/20 0727)  Resp: 18 (05/01/20 0727)  BP: (!) 188/83 (05/01/20 0727)  SpO2: 97 % (05/01/20 0727) Vital Signs (24h Range):  Temp:  [97.4 °F (36.3 °C)-98.6 °F (37 °C)] 98.1 °F (36.7 °C)  Pulse:  [76-90] 90  Resp:  [16-19] 18  SpO2:  [95 %-98 %] 97 %  BP: (143-188)/(67-83) 188/83     Weight: 129 kg (284 lb 6.3 oz)  Body mass index is 44.54 kg/m².    Intake/Output - Last 3 Shifts       04/29 0700 - 04/30 0659 04/30 0700 - 05/01 0659 05/01 0700 - 05/02 0659    IV Piggyback  100     Total Intake(mL/kg)  100 (0.8)     Urine (mL/kg/hr) 1300 (0.4) 1500 (0.5)     Stool       Total Output 1300 1500     Net -1300 -1400            Urine Occurrence 1 x      Stool Occurrence 1 x               Physical Exam:   Constitutional: She is oriented to person, place, and time. She appears well-developed and well-nourished. No distress.    HENT:   Head: Normocephalic and atraumatic.    Eyes: Conjunctivae are normal. Right eye exhibits no discharge. Left eye exhibits no discharge.    Neck: Neck supple. No tracheal deviation present.    Cardiovascular: Normal rate.     Pulmonary/Chest: Effort normal and breath sounds normal.        Abdominal: Soft. Bowel sounds are normal. She exhibits distension. She exhibits no abdominal incision. There is tenderness. There is no guarding.   Exam stable             Musculoskeletal: Normal range of motion and moves all extremeties.       Neurological: She is alert and oriented to person, place, and time.    Skin: Skin is warm and dry. She is not diaphoretic.    Psychiatric: She has a normal mood and affect. Her behavior is normal. Judgment and thought content normal.       Lines/Drains/Airways     Central Venous Catheter Line            Port A Cath Single Lumen 04/25/20 1208 right subclavian 5 days                Laboratory:  Recent Labs   Lab 04/28/20  0636 04/29/20  1310 04/30/20  1604   WBC 14.08* 14.80* 9.85   HGB 10.0* 9.8* 9.5*   HCT 34.3* 33.1* 31.2*   MCV 85 83 82   *  409* 356*       Recent Labs   Lab 04/25/20  0927  04/28/20  0636 04/29/20  1310 04/30/20  0311 05/01/20  0332   *   < > 138 137 136 135*   K 4.1   < > 4.1 4.4 4.7 4.3   CL 98   < > 99 100 99 98   CO2 22*   < > 22* 23 24 28   BUN 8   < > 9 13 16 14   CREATININE 0.7   < > 0.7 0.8 0.8 0.7   *   < > 137* 204* 231* 231*   PROT 6.7   < > 6.6 6.8 6.7 6.5   BILITOT 0.3   < > 0.3 0.2 0.2 0.4   ALKPHOS 273*   < > 278* 297* 311* 312*   ALT 35   < > 57* 59* 53* 36   AST 33   < > 50* 49* 40 20   MG 1.5*  --  1.2* 1.6  --  1.7   PHOS 3.1  --  3.9  --   --   --     < > = values in this interval not displayed.      Diagnostic Results:  No new imaging    Assessment/Plan:     Gross hematuria  - noted today with AM void  - no dysuria or foul odor  - UA > Ucx ordered    Palliative care encounter  - transitioned to oral pain medications yesterday with good control  - Boost BID with soft diet as tolerated  - mild LLQ discomfort this AM, will give AM pain meds and try some diet midmorning  - encouraging attempting PO    Nausea and vomiting  - No N/V overnight  - Zofran PRN  - Diet: Boost and soft    Abdominal pain  - Likely SBO, per imaging  - Zofran PRN  - good control with PO regimen per Palliative     Fatigue  - PT has assessed    Cancer of inguinal/lower limb lymph nodes, secondary  - Recurrent endometrial cancer  - Palliative care following  - Lovenox ppx daily    Diabetes mellitus, type 2  - Januvia restarted  - SSI with moderate correction dosing, QID POCT glucose check    Hypertension  - continue norvasc daily  - Vitals per floor protocol  - moderate, stable HTN when pain controlled  - BP: (143-188)/(67-83) 188/83       VTE Risk Mitigation (From admission, onward)         Ordered     enoxaparin injection 40 mg  Daily      04/28/20 2018     heparin, porcine (PF) 100 unit/mL injection flush 500 Units  As needed (PRN)      04/28/20 1147     IP VTE HIGH RISK PATIENT  Once      04/24/20 1724     Place sequential  compression device  Until discontinued      04/24/20 3797              Goal for discharge to home today. Patient somewhat apprehensive. Encouraging PO intake today and will monitor pain control. SW working on home health arrangements for close follow up.    Evangelina Howell MD  Gynecologic Oncology  [facility]

## 2020-05-01 NOTE — ASSESSMENT & PLAN NOTE
60 yo F with gross hematuria    -Agree with urine culture to rule out infection  -In this patient with metastatic progression of her endometrial adenocarcinoma undergoing palliative care we do not recommend additional evaluation of her gross hematuria. She has multiple possible other etiologies for gross hematuria, radiation cystitis or secondary malignancy with her history of pelvic irradiation, or local invasion of the bladder by her recurrent endometrial cancer. Further workup is likely to be of additional benefit as long as her symptoms are controlled.   -Please call with questions

## 2020-05-01 NOTE — PROGRESS NOTES
Admit Assessment    Patient Identification  Saba Driver   :  1960  Admit Date:  2020  Attending Provider:  Noelle Lala MD              Referral:   Pt was admitted to  with a diagnosis of Uterine cancer, sarcoma, and was admitted this hospital stay due to Fatigue [R53.83].   is involved was referred to the Social Work Department via routine referral.  Patient presents as a 59 y.o. year old single female.    Persons interviewed: patient    Living Situation:  Pt. States that she resides at home with her brother. She states that brother is not always home and limited in the help that he can give. Pt. States that she is fairly independent with adl's. She does not drive but states that her sister or a neighbor has been able to help with transportation.     [id number] [place] [id number], phone: [phone] (home).           Current or Past Agencies and Description of Services/Supplies    DME  Agency Name: none  Agency Phone Number: n/a  Equipment Currently Used at Home: none    Home Health  Agency Name: none  Agency Phone Number: n/a  Services: n/a    IV Infusion  Agency Name: none  Agency Phone Number: n/a    Nutrition: oral    Outpatient Pharmacy:     [facility] - [place] ([place], [place] - [address]  [address] [facility] [address]  Phone: [phone] Fax: [phone]      Patient Preference of agencies include: none noted    Patient/Caregiver informed of right to choose providers or agencies.  Patient provides permission to release any necessary information to [facility] and to [facility] agencies as needed to facilitate patient care, treatment planning, and patient discharge planning.  Written and verbal resources provided.      Coping: pt. States that she is doing OK. She is however very guarded when talking about family and help that she needs. Unsure how much support she really has at home.           Adjustment to Diagnosis and Treatment: unsure, pt. Very guarded      Emotional/Behavioral/Cognitive Issues: none noted            History/Current Symptoms of Anxiety/Depression: Yes (related to illness)  History/Current Substance Use:   Social History     Tobacco Use    Smoking status: Never Smoker    Smokeless tobacco: Never Used   Substance and Sexual Activity    Alcohol use: Yes     Comment: occassional use    Drug use: No    Sexual activity: Never       Indications of Abuse/Neglect: No:   Abuse Screen (yes response referral indicated)  Feels Unsafe at Home or Work/School: no    Financial:  Payor/Plan Subscr  Sex Relation Sub. Ins. ID Effective Group Num   1. DINORAH LOPEZ 1960 Female  [id number]                                   [address] [address]   2. DINORAH LOPEZ 1960 Female  [id number]                                   [address] [address]                            Other identified concerns/needs: May need HH upon dc    Plan: to return home with help from her sister and other family.    Interventions/Referrals: TBD  Patient/caregiver engaged in treatment planning process.     providing psychosocial and supportive counseling, resources, education, assistance and discharge planning as appropriate.  Patient/caregiver state understanding of  available resources,  following, remains available. Provided pt. With edna'er phone # and encouraged her to call if needed. Will follow.

## 2020-05-01 NOTE — SUBJECTIVE & OBJECTIVE
Past Medical History:   Diagnosis Date    Adenocarcinoma 3/24/2018    Diabetes mellitus, type 2     Endometrial cancer 4/14/2018    Glaucoma     Hypertension        Past Surgical History:   Procedure Laterality Date    BREAST LUMPECTOMY      R early 20's L at 13 years old    HYSTERECTOMY  08/02/2018    total abdominal    MO REMOVAL OF OVARY/TUBE(S) Bilateral 08/02/2018    TOTAL ABDOMINAL HYSTERECTOMY W/ BILATERAL SALPINGOOPHORECTOMY N/A 8/2/2018    Procedure: HYSTERECTOMY, TOTAL, ABDOMINAL, WITH BILATERAL SALPINGO-OOPHORECTOMY;  Surgeon: Noelle Lala MD;  Location: [facility] OR [address] [place] [address];  Service: OB/GYN;  Laterality: N/A;       Review of patient's allergies indicates:   Allergen Reactions    Gabapentin      Swelling,musclespasm    Lyrica [pregabalin]      Swelling, musclespasm       Family History     Problem Relation (Age of Onset)    Diabetes Mother, Maternal Grandmother, Maternal Grandfather    Hypertension Mother, Maternal Grandmother, Maternal Grandfather    Pancreatic cancer Mother          Tobacco Use    Smoking status: Never Smoker    Smokeless tobacco: Never Used   Substance and Sexual Activity    Alcohol use: Yes     Comment: occassional use    Drug use: No    Sexual activity: Never       Review of Systems   Unable to perform ROS: Other       Objective:     Temp:  [97.6 °F (36.4 °C)-98.3 °F (36.8 °C)] 97.8 °F (36.6 °C)  Pulse:  [76-90] 86  Resp:  [16-18] 18  SpO2:  [95 %-97 %] 96 %  BP: (150-188)/(67-83) 151/71     Body mass index is 44.54 kg/m².    Date 05/01/20 0700 - 05/02/20 0659   Shift 3897-8062 4650-0588 9238-8446 24 Hour Total   INTAKE   P.O. 120   120   Shift Total(mL/kg) 120(0.9)   120(0.9)   OUTPUT   Urine(mL/kg/hr) 300(0.3)   300   Shift Total(mL/kg) 300(2.3)   300(2.3)   Weight (kg) 129 129 129 129          Drains     None                 Physical Exam    Significant Labs:    BMP:  Recent Labs   Lab 04/29/20  1310 04/30/20  0311 05/01/20  0332    136 135*   K 4.4 4.7 4.3     99 98   CO2 23 24 28   BUN 13 16 14   CREATININE 0.8 0.8 0.7   CALCIUM 9.1 8.8 8.7       CBC:  Recent Labs   Lab 04/28/20  0636 04/29/20  1310 04/30/20  1604 05/01/20  0943   WBC 14.08* 14.80* 9.85  --    HGB 10.0* 9.8* 9.5* 9.5*   HCT 34.3* 33.1* 31.2*  --    * 409* 356*  --        Urine Culture: No results for input(s): LABURIN in the last 168 hours.  Urine Studies:   Recent Labs   Lab 04/24/20 2022 05/01/20  0925   COLORU Yellow Red*   APPEARANCEUA Clear Cloudy*   PHUR 5.0 6.0   SPECGRAV >=1.030* 1.025   PROTEINUA Negative 2+*   GLUCUA Negative 1+*   KETONESU 1+* 1+*   BILIRUBINUA Negative Negative   OCCULTUA Negative 3+*   NITRITE Negative Negative   LEUKOCYTESUR Negative Negative   RBCUA  --  >100*   WBCUA  --  4   BACTERIA  --  Moderate*   HYALINECASTS  --  0     All pertinent labs results from the past 24 hours have been reviewed.    Significant Imaging:  All pertinent imaging results/findings from the past 24 hours have been reviewed.

## 2020-05-01 NOTE — PROGRESS NOTES
04/30/20 0902   Vital Signs   Temp 97.4 °F (36.3 °C)   Temp src Axillary   Pulse 84   Heart Rate Source Monitor   Resp 19   SpO2 98 %   Pulse Oximetry Type Intermittent   O2 Device (Oxygen Therapy) room air   BP (!) 181/81   BP Location Right arm   BP Method Automatic   Patient Position Sitting     Blood pressure reported to Evangelina Howell MD. Morning blood pressure medication given as scheduled. No additional orders given. Will continue to monitor.

## 2020-05-01 NOTE — PLAN OF CARE
Plan of care reviewed with patient this shift. Pt c/o abdominal pain and nausea. PRNs and scheduled compazine given. Pt c/o loose stools. Pt's urine is blood tinged. VS stable. Maintaining saturations on room air.  All questions and concerns addressed. Will continue to monitor

## 2020-05-01 NOTE — PLAN OF CARE
POC reviewed with patient; understanding verbalized. Pt oxy and scheduled compazine given for abd pain and n&v. Pt with nonskid footwear on, bed in lowest position, and locked with bed rails up x2. Pt instructed to call prior to getting OOB. Pt has call light and personal items within reach. Patient ambulates in room independently. VSS and afebrile this shift. All questions and concerns addressed at this time. Will continue to monitor.

## 2020-05-01 NOTE — ASSESSMENT & PLAN NOTE
- continue norvasc daily  - Vitals per floor protocol  - moderate, stable HTN when pain controlled  - BP: (143-188)/(67-83) 188/83

## 2020-05-01 NOTE — SUBJECTIVE & OBJECTIVE
Interval History:   Patient with improving nausea overnight with addition of scheduled compazine. Tolerating PO medications and pain remains well controlled. This AM noted hematuria with AM void. Denies dysuria, foul odor. UA >UCx sent. Vitals stable with moderate HTN that has stabilized with pain mgmt. Dexamethasone course discontinued yesterday due to effect on BG control and poor antiemetic effect. SSI increased to moderate correction dosing and januvia restarted. BG overnight all < 300. Will slide with AM breakfast as needed. Soft diet and boost provided. Discharge planning today if able to tolerate PO.     Scheduled Meds:   amLODIPine  10 mg Oral Daily    bimatoprost  1 drop Both Eyes QHS    bisoprolol  10 mg Oral Daily    dicyclomine  20 mg Oral BID    docusate sodium  200 mg Oral BID    enoxaparin  40 mg Subcutaneous Daily    multivitamin liquid no.118  10 mL Oral Daily    oxyCODONE  20 mg Oral Q12H    prochlorperazine  10 mg Oral Q6H    SITagliptin  100 mg Oral Daily    timolol maleate 0.5%  1 drop Both Eyes BID     Continuous Infusions:  PRN Meds:alteplase, Dextrose 10% Bolus, Dextrose 10% Bolus, diphenhydrAMINE, EPINEPHrine, GI cocktail (mylanta 30 mL, lidocaine 2 % viscous 10 mL, dicyclomine 10 mL) 50 mL, glucagon (human recombinant), glucose, glucose, heparin, porcine (PF), hydrocortisone sodium succinate, insulin aspart U-100, meclizine, ondansetron, oxyCODONE, promethazine (PHENERGAN) IVPB, simethicone, sodium chloride 0.9%, sodium citrate-citric acid 500-334 mg/5 ml    Review of patient's allergies indicates:   Allergen Reactions    Gabapentin      Swelling,musclespasm    Lyrica [pregabalin]      Swelling, musclespasm       Objective:     Vital Signs (Most Recent):  Temp: 98.1 °F (36.7 °C) (05/01/20 0727)  Pulse: 90 (05/01/20 0727)  Resp: 18 (05/01/20 0727)  BP: (!) 188/83 (05/01/20 0727)  SpO2: 97 % (05/01/20 0727) Vital Signs (24h Range):  Temp:  [97.4 °F (36.3 °C)-98.6 °F (37 °C)] 98.1  °F (36.7 °C)  Pulse:  [76-90] 90  Resp:  [16-19] 18  SpO2:  [95 %-98 %] 97 %  BP: (143-188)/(67-83) 188/83     Weight: 129 kg (284 lb 6.3 oz)  Body mass index is 44.54 kg/m².    Intake/Output - Last 3 Shifts       04/29 0700 - 04/30 0659 04/30 0700 - 05/01 0659 05/01 0700 - 05/02 0659    IV Piggyback  100     Total Intake(mL/kg)  100 (0.8)     Urine (mL/kg/hr) 1300 (0.4) 1500 (0.5)     Stool       Total Output 1300 1500     Net -1300 -1400            Urine Occurrence 1 x      Stool Occurrence 1 x               Physical Exam:   Constitutional: She is oriented to person, place, and time. She appears well-developed and well-nourished. No distress.    HENT:   Head: Normocephalic and atraumatic.    Eyes: Conjunctivae are normal. Right eye exhibits no discharge. Left eye exhibits no discharge.    Neck: Neck supple. No tracheal deviation present.    Cardiovascular: Normal rate.     Pulmonary/Chest: Effort normal and breath sounds normal.        Abdominal: Soft. Bowel sounds are normal. She exhibits distension. She exhibits no abdominal incision. There is tenderness. There is no guarding.   Exam stable             Musculoskeletal: Normal range of motion and moves all extremeties.       Neurological: She is alert and oriented to person, place, and time.    Skin: Skin is warm and dry. She is not diaphoretic.    Psychiatric: She has a normal mood and affect. Her behavior is normal. Judgment and thought content normal.       Lines/Drains/Airways     Central Venous Catheter Line            Port A Cath Single Lumen 04/25/20 1208 right subclavian 5 days                Laboratory:  Recent Labs   Lab 04/28/20  0636 04/29/20  1310 04/30/20  1604   WBC 14.08* 14.80* 9.85   HGB 10.0* 9.8* 9.5*   HCT 34.3* 33.1* 31.2*   MCV 85 83 82   * 409* 356*       Recent Labs   Lab 04/25/20  0927  04/28/20  0636 04/29/20  1310 04/30/20  0311 05/01/20  0332   *   < > 138 137 136 135*   K 4.1   < > 4.1 4.4 4.7 4.3   CL 98   < > 99 100 99  98   CO2 22*   < > 22* 23 24 28   BUN 8   < > 9 13 16 14   CREATININE 0.7   < > 0.7 0.8 0.8 0.7   *   < > 137* 204* 231* 231*   PROT 6.7   < > 6.6 6.8 6.7 6.5   BILITOT 0.3   < > 0.3 0.2 0.2 0.4   ALKPHOS 273*   < > 278* 297* 311* 312*   ALT 35   < > 57* 59* 53* 36   AST 33   < > 50* 49* 40 20   MG 1.5*  --  1.2* 1.6  --  1.7   PHOS 3.1  --  3.9  --   --   --     < > = values in this interval not displayed.      Diagnostic Results:  No new imaging

## 2020-05-01 NOTE — PLAN OF CARE
Problem: Physical Therapy Goal  Goal: Physical Therapy Goal  Description  Goals to be met by: 2020     Patient will increase functional independence with mobility by performin. Sit to stand transfer with Modified [place]  2. Bed to chair transfer with Modified [place]  3. Gait  x 200 feet with Modified [place]   4. Ascend/descend 13 stair with bilateral Handrails Supervision   Outcome: Ongoing, Progressing   Pt progressing towards goals. continue with PT POC.Goals remain appropriate.  Drake Bates PTA

## 2020-05-02 NOTE — SUBJECTIVE & OBJECTIVE
Interval History: reviewed chart, discused with RN. New hematuria and cont poor oral intake with intermittent nausea, pain well controlled with oral regimen but still with mild nausea even with meds    Medications:  Continuous Infusions:    Scheduled Meds:   amLODIPine  10 mg Oral Daily    bimatoprost  1 drop Both Eyes QHS    bisoprolol  10 mg Oral Daily    dicyclomine  20 mg Oral BID    docusate sodium  200 mg Oral BID    enoxaparin  40 mg Subcutaneous Daily    multivitamin liquid no.118  10 mL Oral Daily    oxyCODONE  20 mg Oral Q12H    prochlorperazine  10 mg Oral Q6H    SITagliptin  100 mg Oral Daily    timolol maleate 0.5%  1 drop Both Eyes BID     PRN Meds:alteplase, Dextrose 10% Bolus, Dextrose 10% Bolus, diphenhydrAMINE, EPINEPHrine, GI cocktail (mylanta 30 mL, lidocaine 2 % viscous 10 mL, dicyclomine 10 mL) 50 mL, glucagon (human recombinant), glucose, glucose, heparin, porcine (PF), hydrocortisone sodium succinate, insulin aspart U-100, meclizine, ondansetron, oxyCODONE, promethazine (PHENERGAN) IVPB, simethicone, sodium chloride 0.9%    Objective:     Vital Signs (Most Recent):  Temp: 98.1 °F (36.7 °C) (05/02/20 0407)  Pulse: 79 (05/02/20 0407)  Resp: 16 (05/02/20 0407)  BP: (!) 142/64 (05/02/20 0407)  SpO2: 96 % (05/02/20 0407) Vital Signs (24h Range):  Temp:  [97.7 °F (36.5 °C)-98.3 °F (36.8 °C)] 98.1 °F (36.7 °C)  Pulse:  [79-90] 79  Resp:  [16-18] 16  SpO2:  [95 %-97 %] 96 %  BP: (142-188)/(64-83) 142/64     Weight: 129 kg (284 lb 6.3 oz)  Body mass index is 44.54 kg/m².    Review of Symptoms  Symptom Assessment (ESAS 0-10 scale)    ESAS 0 1 2 3 4 5 6 7 8 9 10   Pain []  []  []  []  []  []  []  []  []  [x]  []    Dyspnea [x]  []  []  []  []  []  []  []  []  []  []    Anxiety [x]  []  []  []  []  []  []  []  []  []  []    Nausea []  []  []  []  []  []  []  []  []  [x]  []    Depression  [x]  []  []  []  []  []  []  []  []  []  []    Anorexia []  [x]  []  []  []  []  []  []  []  []  []     Fatigue [x]  []  []  []  []  []  []  []  []  []  []    Insomnia [x]  []  []  []  []  []  []  []  []  []  []    Restlessness  [x]  []  []  []  []  []  []  []  []  []  []    Agitation [x]  []  []  []  []  []  []  []  []  []  []      Bowel Management Plan (BMP): loose bowel movement 4/30 AM.     Pain Assessment: upper chest rising into neck. Bandlike pain across abdomen.   OME in 24 hours: 90mg     Performance Status: 70    ECOG Performance Status Grade: 2 - Ambulates, capable of self care only    Physical Exam   Constitutional: She is oriented to person, place, and time. She appears well-developed and well-nourished.   HENT:   Head: Normocephalic and atraumatic.   Eyes: Pupils are equal, round, and reactive to light. EOM are normal.   Cardiovascular: Normal rate and normal heart sounds.   Pulmonary/Chest: Effort normal and breath sounds normal.   Neurological: She is oriented to person, place, and time.   Skin: Capillary refill takes less than 2 seconds.   Psychiatric: She has a normal mood and affect. Her behavior is normal.       Significant Labs: All pertinent labs within the past 24 hours have been reviewed.  CBC:   Recent Labs   Lab 04/30/20  1604 05/01/20  0943   WBC 9.85  --    HGB 9.5* 9.5*   HCT 31.2*  --    MCV 82  --    *  --      BMP:  No results for input(s): GLU, NA, K, CL, CO2, BUN, CREATININE, CALCIUM, MG in the last 24 hours.  LFT:  Lab Results   Component Value Date    AST 20 05/01/2020    ALKPHOS 312 (H) 05/01/2020    BILITOT 0.4 05/01/2020     Albumin:   Albumin   Date Value Ref Range Status   05/01/2020 2.1 (L) 3.5 - 5.2 g/dL Final     Protein:   Total Protein   Date Value Ref Range Status   05/01/2020 6.5 6.0 - 8.4 g/dL Final     Lactic acid:   Lab Results   Component Value Date    LACTATE 1.0 04/24/2020       Significant Imaging: I have reviewed all pertinent imaging results/findings within the past 24 hours.    Advanced Directives:: completed in hospital    Living Will: yes  LaPOST:  No  Do Not Resuscitate Status: DNR / DNI - signed in chart and documented in EPIC  Medical Power of : Yes. Agent's Name: [employer]. Agent's Contact Number: [phone] Patient gave ORAL DECLARATION. She does have a brother who lives with her (he was homeless and she promised her mother she would look after him). Has a Sister and Brother who also work with [facility]. Has a sister in [place].      Decision-Making Capacity: Patient answered questions

## 2020-05-02 NOTE — SUBJECTIVE & OBJECTIVE
Interval History:   Seen by Urology yesterday for new gross hematuria. No further workup was recommended given multiple reasons for hematuria and ongoing chemotherapy. Tolerating PO and pain regimen and ambulatory to the the bathroom and in the room. Home health orders are in place for assistance upon discharge. Medically stable with plan for follow up for cancer treatment plan. Plan for discharge to home today.      Scheduled Meds:   amLODIPine  10 mg Oral Daily    bimatoprost  1 drop Both Eyes QHS    bisoprolol  10 mg Oral Daily    dicyclomine  20 mg Oral BID    docusate sodium  200 mg Oral BID    enoxaparin  40 mg Subcutaneous Daily    multivitamin liquid no.118  10 mL Oral Daily    oxyCODONE  20 mg Oral Q12H    prochlorperazine  10 mg Oral Q6H    SITagliptin  100 mg Oral Daily    timolol maleate 0.5%  1 drop Both Eyes BID     Continuous Infusions:  PRN Meds:alteplase, Dextrose 10% Bolus, Dextrose 10% Bolus, diphenhydrAMINE, EPINEPHrine, GI cocktail (mylanta 30 mL, lidocaine 2 % viscous 10 mL, dicyclomine 10 mL) 50 mL, glucagon (human recombinant), glucose, glucose, heparin, porcine (PF), hydrocortisone sodium succinate, insulin aspart U-100, meclizine, ondansetron, oxyCODONE, promethazine (PHENERGAN) IVPB, simethicone, sodium chloride 0.9%    Review of patient's allergies indicates:   Allergen Reactions    Gabapentin      Swelling,musclespasm    Lyrica [pregabalin]      Swelling, musclespasm       Objective:     Vital Signs (Most Recent):  Temp: 98.1 °F (36.7 °C) (05/02/20 0407)  Pulse: 79 (05/02/20 0407)  Resp: 16 (05/02/20 0407)  BP: (!) 142/64 (05/02/20 0407)  SpO2: 96 % (05/02/20 0407) Vital Signs (24h Range):  Temp:  [97.7 °F (36.5 °C)-98.3 °F (36.8 °C)] 98.1 °F (36.7 °C)  Pulse:  [79-90] 79  Resp:  [16-18] 16  SpO2:  [95 %-97 %] 96 %  BP: (142-188)/(64-83) 142/64     Weight: 129 kg (284 lb 6.3 oz)  Body mass index is 44.54 kg/m².    Intake/Output - Last 3 Shifts       04/30 0700 - 05/01 0659  05/01 0700 - 05/02 0659    P.O.  600    IV Piggyback 100     Total Intake(mL/kg) 100 (0.8) 600 (4.7)    Urine (mL/kg/hr) 1500 (0.5) 500 (0.2)    Stool  0    Total Output 1500 500    Net -1400 +100          Stool Occurrence  2 x             Physical Exam:   Constitutional: She is oriented to person, place, and time. She appears well-developed and well-nourished. No distress.    HENT:   Head: Normocephalic and atraumatic.    Eyes: Conjunctivae are normal. Right eye exhibits no discharge. Left eye exhibits no discharge.    Neck: Neck supple. No tracheal deviation present.    Cardiovascular: Normal rate.     Pulmonary/Chest: Effort normal and breath sounds normal.        Abdominal: Soft. Bowel sounds are normal. She exhibits distension. She exhibits no abdominal incision. There is tenderness. There is no guarding.   Exam stable             Musculoskeletal: Normal range of motion and moves all extremeties.       Neurological: She is alert and oriented to person, place, and time.    Skin: Skin is warm and dry. She is not diaphoretic.    Psychiatric: She has a normal mood and affect. Her behavior is normal. Judgment and thought content normal.       Lines/Drains/Airways     Central Venous Catheter Line            Port A Cath Single Lumen 04/25/20 1208 right subclavian 6 days                Laboratory:  Recent Labs   Lab 04/28/20  0636 04/29/20  1310 04/30/20  1604 05/01/20  0943   WBC 14.08* 14.80* 9.85  --    HGB 10.0* 9.8* 9.5* 9.5*   HCT 34.3* 33.1* 31.2*  --    MCV 85 83 82  --    * 409* 356*  --        Recent Labs   Lab 04/25/20  0927  04/28/20  0636 04/29/20  1310 04/30/20  0311 05/01/20  0332   *   < > 138 137 136 135*   K 4.1   < > 4.1 4.4 4.7 4.3   CL 98   < > 99 100 99 98   CO2 22*   < > 22* 23 24 28   BUN 8   < > 9 13 16 14   CREATININE 0.7   < > 0.7 0.8 0.8 0.7   *   < > 137* 204* 231* 231*   PROT 6.7   < > 6.6 6.8 6.7 6.5   BILITOT 0.3   < > 0.3 0.2 0.2 0.4   ALKPHOS 273*   < > 278* 297*  311* 312*   ALT 35   < > 57* 59* 53* 36   AST 33   < > 50* 49* 40 20   MG 1.5*  --  1.2* 1.6  --  1.7   PHOS 3.1  --  3.9  --   --   --     < > = values in this interval not displayed.      UCx pending. Will follow up outpatient    Diagnostic Results:  No new studies

## 2020-05-02 NOTE — ASSESSMENT & PLAN NOTE
Antiemetics  IVF  Hoping for improvement of symptoms with response to carbo/taxol  May require venting PEG if no improvement

## 2020-05-02 NOTE — PROGRESS NOTES
"[facility]  Gynecologic Oncology  Progress Note      Patient Name: Saba Driver  MRN: [id number]  Admission Date: 4/24/2020  Hospital Length of Stay: 8 days  Attending Provider: Noelle Lala MD  Primary Care Provider: Adela Verma MD  Principal Problem: Uterine cancer, sarcoma    Follow-up For: * No surgery found *  Post-Operative Day:    Subjective:      History of Present Illness:   59-year-old female with DM2, uterine adenocarcinoma s/p BRIAN-BSO/chemotherapy/radiation and hypertension presents after virtual visit with palliative care office for abdominal pain and fatigue.  The symptoms have been progressively worsening over the past month. Patient with CT 4/5 showing likely endometrial recurrence and then omental mass biopsy 4/20 with pathological confirmation. She reports that abdominal pain radiates throughout the abdomen and seems to be worse with any type of p.o. intake including water. She takes Percocet at home which used to relief her pain but now pain is too severe.  Reports associated nausea, frequent emesis and diarrhea.  She had dysuria 2 months ago but this has since resolved.  Fatigue has become profound that she has difficulty getting to the bathroom from her bedroom.   Denies sick contact, fever, dyspnea. Had negative COVID test 4 days ago.  She denies bloody or dark stool, hematemesis, flank pain, chest pain.    Hospital Course:  04/24/2020 - Admit for evaluation and management of diffuse abdominal pain and intractable n/v with emesis  04/25/2020 - Patient with nausea overnight. Has not passed flatus "in a while". Abdominal pain persistent. CT read showed likely distal small bowel obstruction with increased ascites.   04/26/2020 - Nausea x2 overnight, no emesis. Pain not controlled but patient very stoic. Reports "she'll be alright" but acknowledges increase in pain control warranted. Palliative care consulted and recommended PCA and clear liquid diet may be possible. " "Will attempt today. Not passing flatus. Up in chair during evaluation.   04/27/2020 - Patient reports improved pain with PCA. However, still had severe pain when attempting liquids. Not passing gas. Some watery stool. Afebrile. Stoic, laying in bed "comfortable."   04/28/2020 - Patient reports two episodes of significant pin overnight. The rest of the night at baseline compared to last night. Adjustments made to PCA settings this AM. Moderate HTN, stable. Afebrile. No n/v overnight. Denies flatus.  04/28/2020 - Patient started carbo/taxol treatment yesterday. No issues overnight. Tolerating CLD, minimal flatus, no BM. Pain still controlled with PCA pump. Will work to wean to PO meds today.  04/29/2020 - Patient tolerated chemotherapy well. Reports some fatigue after infusion. She continues to have pain q2-3 hours when sleeping at night, but well controlled during the day with PCA. BP continues to be elevated. IVF at 75 cc/h. BP meds reviewed and will discuss consulting with medicine for recommendations for better control. Pain may be contributing to elevate BP above her likely baseline on home meds.  04/30/2020 - Patient handling transition to PO pain meds very well. Overnight she was able to sleep and rest comfortably. HTN also noted to improve with new pain regimen. Slightly apprehensive about advancing diet but also open to trying Boost and soft diet as tolerated. Ambulating in the room and to the bathroom well. Meeting appropriate milestones for discharge and goal for home today or tomorrow.   05/01/2020 - Patient with improving nausea overnight with addition of scheduled compazine. Tolerating PO medications and pain remains well controlled. This AM noted hematuria with AM void. Denies dysuria, foul odor. UA >UCx sent. Vitals stable with moderate HTN that has stabilized with pain mgmt. Dexamethasone course discontinued yesterday due to effect on BG control and poor antiemetic effect. SSI increased to moderate " correction dosing and januvia restarted. BG overnight all < 300. Will slide with AM breakfast as needed. Soft diet and boost provided. Discharge planning today if able to tolerate PO.   05/02/2020 Seen by Urology yesterday for new gross hematuria. No further workup was recommended given multiple reasons for hematuria and ongoing chemotherapy. Tolerating PO and pain regimen and ambulatory to the the bathroom and in the room. Home health orders are in place for assistance upon discharge. Medically stable with plan for follow up for cancer treatment plan. Plan for discharge to home today.    Interval History:   Seen by Urology yesterday for new gross hematuria. No further workup was recommended given multiple reasons for hematuria and ongoing chemotherapy. Tolerating PO and pain regimen and ambulatory to the the bathroom and in the room. Home health orders are in place for assistance upon discharge. Medically stable with plan for follow up for cancer treatment plan. Plan for discharge to home today.      Scheduled Meds:   amLODIPine  10 mg Oral Daily    bimatoprost  1 drop Both Eyes QHS    bisoprolol  10 mg Oral Daily    dicyclomine  20 mg Oral BID    docusate sodium  200 mg Oral BID    enoxaparin  40 mg Subcutaneous Daily    multivitamin liquid no.118  10 mL Oral Daily    oxyCODONE  20 mg Oral Q12H    prochlorperazine  10 mg Oral Q6H    SITagliptin  100 mg Oral Daily    timolol maleate 0.5%  1 drop Both Eyes BID     Continuous Infusions:  PRN Meds:alteplase, Dextrose 10% Bolus, Dextrose 10% Bolus, diphenhydrAMINE, EPINEPHrine, GI cocktail (mylanta 30 mL, lidocaine 2 % viscous 10 mL, dicyclomine 10 mL) 50 mL, glucagon (human recombinant), glucose, glucose, heparin, porcine (PF), hydrocortisone sodium succinate, insulin aspart U-100, meclizine, ondansetron, oxyCODONE, promethazine (PHENERGAN) IVPB, simethicone, sodium chloride 0.9%    Review of patient's allergies indicates:   Allergen Reactions     Gabapentin      Swelling,musclespasm    Lyrica [pregabalin]      Swelling, musclespasm       Objective:     Vital Signs (Most Recent):  Temp: 98.1 °F (36.7 °C) (05/02/20 0407)  Pulse: 79 (05/02/20 0407)  Resp: 16 (05/02/20 0407)  BP: (!) 142/64 (05/02/20 0407)  SpO2: 96 % (05/02/20 0407) Vital Signs (24h Range):  Temp:  [97.7 °F (36.5 °C)-98.3 °F (36.8 °C)] 98.1 °F (36.7 °C)  Pulse:  [79-90] 79  Resp:  [16-18] 16  SpO2:  [95 %-97 %] 96 %  BP: (142-188)/(64-83) 142/64     Weight: 129 kg (284 lb 6.3 oz)  Body mass index is 44.54 kg/m².    Intake/Output - Last 3 Shifts       04/30 0700 - 05/01 0659 05/01 0700 - 05/02 0659    P.O.  600    IV Piggyback 100     Total Intake(mL/kg) 100 (0.8) 600 (4.7)    Urine (mL/kg/hr) 1500 (0.5) 500 (0.2)    Stool  0    Total Output 1500 500    Net -1400 +100          Stool Occurrence  2 x             Physical Exam:   Constitutional: She is oriented to person, place, and time. She appears well-developed and well-nourished. No distress.    HENT:   Head: Normocephalic and atraumatic.    Eyes: Conjunctivae are normal. Right eye exhibits no discharge. Left eye exhibits no discharge.    Neck: Neck supple. No tracheal deviation present.    Cardiovascular: Normal rate.     Pulmonary/Chest: Effort normal and breath sounds normal.        Abdominal: Soft. Bowel sounds are normal. She exhibits distension. She exhibits no abdominal incision. There is tenderness. There is no guarding.   Exam stable             Musculoskeletal: Normal range of motion and moves all extremeties.       Neurological: She is alert and oriented to person, place, and time.    Skin: Skin is warm and dry. She is not diaphoretic.    Psychiatric: She has a normal mood and affect. Her behavior is normal. Judgment and thought content normal.       Lines/Drains/Airways     Central Venous Catheter Line            Port A Cath Single Lumen 04/25/20 1208 right subclavian 6 days                Laboratory:  Recent Labs   Lab  04/28/20  0636 04/29/20  1310 04/30/20  1604 05/01/20  0943   WBC 14.08* 14.80* 9.85  --    HGB 10.0* 9.8* 9.5* 9.5*   HCT 34.3* 33.1* 31.2*  --    MCV 85 83 82  --    * 409* 356*  --        Recent Labs   Lab 04/25/20  0927  04/28/20  0636 04/29/20  1310 04/30/20  0311 05/01/20  0332   *   < > 138 137 136 135*   K 4.1   < > 4.1 4.4 4.7 4.3   CL 98   < > 99 100 99 98   CO2 22*   < > 22* 23 24 28   BUN 8   < > 9 13 16 14   CREATININE 0.7   < > 0.7 0.8 0.8 0.7   *   < > 137* 204* 231* 231*   PROT 6.7   < > 6.6 6.8 6.7 6.5   BILITOT 0.3   < > 0.3 0.2 0.2 0.4   ALKPHOS 273*   < > 278* 297* 311* 312*   ALT 35   < > 57* 59* 53* 36   AST 33   < > 50* 49* 40 20   MG 1.5*  --  1.2* 1.6  --  1.7   PHOS 3.1  --  3.9  --   --   --     < > = values in this interval not displayed.      UCx pending. Will follow up outpatient    Diagnostic Results:  No new studies    Assessment/Plan:     Gross hematuria  - noted today with AM void  - no dysuria or foul odor  - Ucx pending, will follow up outpatient as indicated  - Urology has assessed the patient and recommends no further workup for hematuria at this time.    Palliative care encounter  - transitioned to oral pain medications yesterday with good control  - Boost BID with soft diet as tolerated  - tolerating PO soft diet and Boost along with pain regimen    Nausea and vomiting  - No N/V overnight  - Zofran PRN  - Diet: Boost and soft    Abdominal pain  - Likely SBO, per imaging  - Zofran PRN  - good control with PO regimen per Palliative     Fatigue  - PT has assessed: home health orders are placed    Cancer of inguinal/lower limb lymph nodes, secondary  - Recurrent endometrial cancer  - Palliative care following  - Lovenox ppx daily    Diabetes mellitus, type 2  - continue home Januvia     Hypertension  - continue norvasc daily  - Vitals per floor protocol  - moderate, stable HTN when pain controlled  - BP: (142-188)/(64-83) 142/64        VTE Risk Mitigation (From  admission, onward)         Ordered     enoxaparin injection 40 mg  Daily      04/28/20 2018     heparin, porcine (PF) 100 unit/mL injection flush 500 Units  As needed (PRN)      04/28/20 1147     IP VTE HIGH RISK PATIENT  Once      04/24/20 1724     Place sequential compression device  Until discontinued      04/24/20 1724              Evangelina Howell MD  Gynecologic Oncology  [facility]

## 2020-05-02 NOTE — PROGRESS NOTES
[facility]  Palliative Medicine  Progress Note    Patient Name: Saba Driver  MRN: [id number]  Admission Date: 4/24/2020  Hospital Length of Stay: 8 days  Code Status: DNR   Attending Provider: Noelle Lala MD  Consulting Provider: Maikol Leigh MD  Primary Care Physician: Adela Verma MD  Principal Problem:Uterine cancer, sarcoma    Patient information was obtained from patient.      Assessment/Plan:     Palliative care encounter  60 yo female admitted with pain/nausea/diarrhrea and FTT due to partial small bowel obstruction in the setting of recurrent endometrial cancer and peritoneal carcinomatosis    Prognosis: Concern for days to weeks given severity of disease, location, and recurrence    1) Symptoms:  Based on PCA OME 90 mg: transition to oral oxy ER + IR and reduce 25% for cross tolerance  Cont ER 20 mg po q 12 hours and oxy 5 mg q 2 hours po prn    Nausea: antiemetics per primary team    Fatigue: due to underlying cancer and decreased oral intake    2) Code status: DNR/DNI  Advance Care Planning     Living Will  During this visit, I engaged the patient  in the advance care planning process.  The patient and I reviewed the role for advance directives and their purpose in directing future healthcare if the patient's unable to speak for him/herself.  At this point in time, the patient does have full decision-making capacity.  We discussed different extreme health states that she could experience, and reviewed what kind of medical care she would want in those situations.  The patient communicated that if she were comatose and had little chance of a meaningful recovery, she would not want machines/life-sustaining treatments used. In addition to the above preference, other important end-of-life issues for the patient include comfort and avoiding inappropriate prolongation of the dying process. The patient has completed a living will to reflect these preferences.        Code Status  In  light of the patients advanced and life limiting illness,I engaged the the patient in a conversation about the patient's preferences for care  at the very end of life. The patient wishes to have a natural, peaceful death.  Along those lines, the patient does not wish to have CPR or other invasive treatments performed when her heart and/or breathing stops. I communicated to the patient that a DNR order would be placed in her medical record to reflect this preference and DNR form was completed and will be scanned into EPIC.      I spent a total of 20 minutes engaging the patient in this advance care planning discussion.         3) Psychosocial: retired from [employer]; never ; no kids    4) Medicolegal: Discussed advance care planning today and the importance of completing documents.  Printed, reviewed, and left forms with the patient to complete.     She wants her niece to be her HCPOA and knows she has to have discussions with her regarding EOL preferences.     5) Spiritual:  Yes; Confucianism    6) Goals of care/discussion:  Have not yet had discussions regarding her considerations about potential outcomes. Will discuss with oncology potential treatment options as well as likely outcomes.      She is hopeful to hear of all options at this point.  States her goal is to return home and will be more open to discussing all care options moving forward.  Her goal for today is to improve her heartburn and nausea which is severe.     7) Disposition plan: likely home with home health; would be open to ongoing palliative care outpt follow up    Discussed with niece and HCPOA at length yesterday.  She understands the pt will have a different set of physical needs moving forward and she will plan on moving in with the patient to ensure she has support she needs at home.    Will follow up Monday, unless needed sooner.    Maikol Leigh MD  Palliative Medicine  [phone]      Nausea and  "vomiting  Antiemetics  IVF  Hoping for improvement of symptoms with response to carbo/taxol  May require venting PEG if no improvement    Endometrial cancer  C1D4 of chemo; asymptomatic currently from treatment carbo/taxol          I will follow-up with patient. Please contact us if you have any additional questions.    Subjective:     Chief Complaint:   Chief Complaint   Patient presents with    Fatigue     Patient with generalized weakness, recent stomach biopsy, negative covid test on Monday.       HPI:    59-year-old female with DM2, uterine adenocarcinoma s/p BRIAN-BSO/chemotherapy/radiation and hypertension presents after virtual visit with palliative care office for abdominal pain and fatigue.  The symptoms have been progressively worsening over the past month. Patient with CT 4/5 showing likely endometrial recurrence and then omental mass biopsy 4/20 with pathological confirmation. She reports that abdominal pain radiates throughout the abdomen and seems to be worse with any type of p.o. intake including water. She takes Percocet at home which used to relief her pain but now pain is too severe.  Reports associated nausea, frequent emesis and diarrhea.  She had dysuria 2 months ago and an ED visit with negative urine culture/tx with AB.  Fatigue has become profound that she has difficulty getting to the bathroom from her bedroom.   Denies sick contact, fever, dyspnea. Had negative COVID test 4 days ago.  She denies bloody or dark stool, hematemesis, flank pain, chest pain. She reports that she sits on the side of the bed or chair and "doubles over" until the worst pain abates. She does not know how much weight she has lost.     In this setting, palliative medicine was consulted to help with medical decision making and aid in the formation of goals of care.       Hospital Course:  No notes on file    Interval History: reviewed chart, discused with RN. New hematuria and cont poor oral intake with intermittent " nausea, pain well controlled with oral regimen but still with mild nausea even with meds    Medications:  Continuous Infusions:    Scheduled Meds:   amLODIPine  10 mg Oral Daily    bimatoprost  1 drop Both Eyes QHS    bisoprolol  10 mg Oral Daily    dicyclomine  20 mg Oral BID    docusate sodium  200 mg Oral BID    enoxaparin  40 mg Subcutaneous Daily    multivitamin liquid no.118  10 mL Oral Daily    oxyCODONE  20 mg Oral Q12H    prochlorperazine  10 mg Oral Q6H    SITagliptin  100 mg Oral Daily    timolol maleate 0.5%  1 drop Both Eyes BID     PRN Meds:alteplase, Dextrose 10% Bolus, Dextrose 10% Bolus, diphenhydrAMINE, EPINEPHrine, GI cocktail (mylanta 30 mL, lidocaine 2 % viscous 10 mL, dicyclomine 10 mL) 50 mL, glucagon (human recombinant), glucose, glucose, heparin, porcine (PF), hydrocortisone sodium succinate, insulin aspart U-100, meclizine, ondansetron, oxyCODONE, promethazine (PHENERGAN) IVPB, simethicone, sodium chloride 0.9%    Objective:     Vital Signs (Most Recent):  Temp: 98.1 °F (36.7 °C) (05/02/20 0407)  Pulse: 79 (05/02/20 0407)  Resp: 16 (05/02/20 0407)  BP: (!) 142/64 (05/02/20 0407)  SpO2: 96 % (05/02/20 0407) Vital Signs (24h Range):  Temp:  [97.7 °F (36.5 °C)-98.3 °F (36.8 °C)] 98.1 °F (36.7 °C)  Pulse:  [79-90] 79  Resp:  [16-18] 16  SpO2:  [95 %-97 %] 96 %  BP: (142-188)/(64-83) 142/64     Weight: 129 kg (284 lb 6.3 oz)  Body mass index is 44.54 kg/m².    Review of Symptoms  Symptom Assessment (ESAS 0-10 scale)    ESAS 0 1 2 3 4 5 6 7 8 9 10   Pain []  []  []  []  []  []  []  []  []  [x]  []    Dyspnea [x]  []  []  []  []  []  []  []  []  []  []    Anxiety [x]  []  []  []  []  []  []  []  []  []  []    Nausea []  []  []  []  []  []  []  []  []  [x]  []    Depression  [x]  []  []  []  []  []  []  []  []  []  []    Anorexia []  [x]  []  []  []  []  []  []  []  []  []    Fatigue [x]  []  []  []  []  []  []  []  []  []  []    Insomnia [x]  []  []  []  []  []  []  []  []  []  []     Restlessness  [x]  []  []  []  []  []  []  []  []  []  []    Agitation [x]  []  []  []  []  []  []  []  []  []  []      Bowel Management Plan (BMP): loose bowel movement 4/30 AM.     Pain Assessment: upper chest rising into neck. Bandlike pain across abdomen.   OME in 24 hours: 90mg     Performance Status: 70    ECOG Performance Status Grade: 2 - Ambulates, capable of self care only    Physical Exam   Constitutional: She is oriented to person, place, and time. She appears well-developed and well-nourished.   HENT:   Head: Normocephalic and atraumatic.   Eyes: Pupils are equal, round, and reactive to light. EOM are normal.   Cardiovascular: Normal rate and normal heart sounds.   Pulmonary/Chest: Effort normal and breath sounds normal.   Neurological: She is oriented to person, place, and time.   Skin: Capillary refill takes less than 2 seconds.   Psychiatric: She has a normal mood and affect. Her behavior is normal.       Significant Labs: All pertinent labs within the past 24 hours have been reviewed.  CBC:   Recent Labs   Lab 04/30/20  1604 05/01/20  0943   WBC 9.85  --    HGB 9.5* 9.5*   HCT 31.2*  --    MCV 82  --    *  --      BMP:  No results for input(s): GLU, NA, K, CL, CO2, BUN, CREATININE, CALCIUM, MG in the last 24 hours.  LFT:  Lab Results   Component Value Date    AST 20 05/01/2020    ALKPHOS 312 (H) 05/01/2020    BILITOT 0.4 05/01/2020     Albumin:   Albumin   Date Value Ref Range Status   05/01/2020 2.1 (L) 3.5 - 5.2 g/dL Final     Protein:   Total Protein   Date Value Ref Range Status   05/01/2020 6.5 6.0 - 8.4 g/dL Final     Lactic acid:   Lab Results   Component Value Date    LACTATE 1.0 04/24/2020       Significant Imaging: I have reviewed all pertinent imaging results/findings within the past 24 hours.    Advanced Directives:: completed in hospital    Living Will: yes  LaPOST: No  Do Not Resuscitate Status: DNR / DNI - signed in chart and documented in [facility]  Medical Power of :  Yes. Agent's Name: [employer]. Agent's Contact Number: [phone] Patient gave ORAL DECLARATION. She does have a brother who lives with her (he was homeless and she promised her mother she would look after him). Has a Sister and Brother who also work with [employer] [facility]. Has a sister in [place].      Decision-Making Capacity: Patient answered questions          > 50% of 40 min visit spent in chart review, face to face discussion of goals of care,  symptom assessment, coordination of care and emotional support.    Maikol Leigh MD  Palliative Medicine  [facility]

## 2020-05-02 NOTE — PLAN OF CARE
Road Test  Oxygen-Patient tolerating room air, no distress.  Ambulation-Patient up with no assistance.  Devices-Patient going home with no devices  Tolerating- Soft diet.  Elimination-Patient voiding without difficulty.  Self Care-Performs self care without assistance.  Teaching-Verbal and written discharge teaching given.     Patient tolerates room air, ambulates with no assistance,soft diet, voiding without difficulty, and is going home with no devices. POC deaccessed, No bleeding present, dressed with a bandaid. Patient going home. Discharge paperwork discussed. Medications reviewed. Patient has all belongings and has no questions at this time.

## 2020-05-02 NOTE — PROGRESS NOTES
Consult received to arrange for home health for SN eval and PT/OT. Spoke with pt. And discussed HH, pt. Is in agreement with , she does not have a preference in agency and is OK with a referral to [facility]. Contacted [facility] and spoke with Tasha ([phone]), all necessary information given and orders received via Epic. Pt. Will be seen for services with start of care for Monday (5/4/20), providing that pt. To dc on the weekend. Pt. Aware of the above and in agreement with the plan. Will follow.

## 2020-05-02 NOTE — ASSESSMENT & PLAN NOTE
- continue norvasc daily  - Vitals per floor protocol  - moderate, stable HTN when pain controlled  - BP: (142-188)/(64-83) 142/64

## 2020-05-02 NOTE — ASSESSMENT & PLAN NOTE
- transitioned to oral pain medications yesterday with good control  - Boost BID with soft diet as tolerated  - tolerating PO soft diet and Boost along with pain regimen

## 2020-05-02 NOTE — PLAN OF CARE
Plan of care reviewed with the patient at the beginning of shift. The patient is alert and oriented. GCS 15. Denying complaints at this time. Some complaints of pain during the night. Well controlled with PRN medications. NAEON. Independent and ambulatory. Remained free from falls and injuries throughout shift. Bed in low locked position. Call bell and personal items within reach. Will continue to monitor.

## 2020-05-02 NOTE — ASSESSMENT & PLAN NOTE
60 yo female admitted with pain/nausea/diarrhrea and FTT due to partial small bowel obstruction in the setting of recurrent endometrial cancer and peritoneal carcinomatosis    Prognosis: Concern for days to weeks given severity of disease, location, and recurrence    1) Symptoms:  Based on PCA OME 90 mg: transition to oral oxy ER + IR and reduce 25% for cross tolerance  Cont ER 20 mg po q 12 hours and oxy 5 mg q 2 hours po prn    Nausea: antiemetics per primary team    Fatigue: due to underlying cancer and decreased oral intake    2) Code status: DNR/DNI  Advance Care Planning     Living Will  During this visit, I engaged the patient  in the advance care planning process.  The patient and I reviewed the role for advance directives and their purpose in directing future healthcare if the patient's unable to speak for him/herself.  At this point in time, the patient does have full decision-making capacity.  We discussed different extreme health states that she could experience, and reviewed what kind of medical care she would want in those situations.  The patient communicated that if she were comatose and had little chance of a meaningful recovery, she would not want machines/life-sustaining treatments used. In addition to the above preference, other important end-of-life issues for the patient include comfort and avoiding inappropriate prolongation of the dying process. The patient has completed a living will to reflect these preferences.        Code Status  In light of the patients advanced and life limiting illness,I engaged the the patient in a conversation about the patient's preferences for care  at the very end of life. The patient wishes to have a natural, peaceful death.  Along those lines, the patient does not wish to have CPR or other invasive treatments performed when her heart and/or breathing stops. I communicated to the patient that a DNR order would be placed in her medical record to reflect this  preference and DNR form was completed and will be scanned into EPIC.      I spent a total of 20 minutes engaging the patient in this advance care planning discussion.          3) Psychosocial: retired from [employer]; never ; no kids    4) Medicolegal: Discussed advance care planning today and the importance of completing documents.  Printed, reviewed, and left forms with the patient to complete.     She wants her niece to be her HCPOA and knows she has to have discussions with her regarding EOL preferences.     5) Spiritual:  Yes; [employer]    6) Goals of care/discussion:  Have not yet had discussions regarding her considerations about potential outcomes. Will discuss with oncology potential treatment options as well as likely outcomes.      She is hopeful to hear of all options at this point.  States her goal is to return home and will be more open to discussing all care options moving forward.  Her goal for today is to improve her heartburn and nausea which is severe.     7) Disposition plan: likely home with home health; would be open to ongoing palliative care outpt follow up    Discussed with niece and HCPOA at length yesterday.  She understands the pt will have a different set of physical needs moving forward and she will plan on moving in with the patient to ensure she has support she needs at home.    Will follow up Monday, unless needed sooner.    Maikol Leigh MD  Palliative Medicine  [phone]

## 2020-05-02 NOTE — PROGRESS NOTES
Notified [facility] of dc home today. Pt. Will be seen for services beginning Monday. Pt. Aware of the above and in agreement with the plan. Pt. States that her friend will provide transportation home. No other needs identified and pt. OK to dc from sw'er standpoint.

## 2020-05-02 NOTE — ASSESSMENT & PLAN NOTE
- noted today with AM void  - no dysuria or foul odor  - Ucx pending, will follow up outpatient as indicated  - Urology has assessed the patient and recommends no further workup for hematuria at this time.

## 2020-05-02 NOTE — DISCHARGE SUMMARY
"[facility]  Gynecologic Oncology  Discharge Summary    Patient Name: Saba Driver  MRN: [id number]  Admission Date: 4/24/2020  Hospital Length of Stay: 8 days  Discharge Date and Time:  05/02/2020 9:07 AM  Attending Physician: Noelle Lala MD   Discharging Provider: Evangelina Howell MD  Primary Care Provider: Adela Verma MD    HPI:    59-year-old female with DM2, uterine adenocarcinoma s/p BRIAN-BSO/chemotherapy/radiation and hypertension presents after virtual visit with palliative care office for abdominal pain and fatigue.  The symptoms have been progressively worsening over the past month. Patient with CT 4/5 showing likely endometrial recurrence and then omental mass biopsy 4/20 with pathological confirmation. She reports that abdominal pain radiates throughout the abdomen and seems to be worse with any type of p.o. intake including water. She takes Percocet at home which used to relief her pain but now pain is too severe.  Reports associated nausea, frequent emesis and diarrhea.  She had dysuria 2 months ago but this has since resolved.  Fatigue has become profound that she has difficulty getting to the bathroom from her bedroom.   Denies sick contact, fever, dyspnea. Had negative COVID test 4 days ago.  She denies bloody or dark stool, hematemesis, flank pain, chest pain.    Hospital Course:  04/24/2020 - Admit for evaluation and management of diffuse abdominal pain and intractable n/v with emesis  04/25/2020 - Patient with nausea overnight. Has not passed flatus "in a while". Abdominal pain persistent. CT read showed likely distal small bowel obstruction with increased ascites.   04/26/2020 - Nausea x2 overnight, no emesis. Pain not controlled but patient very stoic. Reports "she'll be alright" but acknowledges increase in pain control warranted. Palliative care consulted and recommended PCA and clear liquid diet may be possible. Will attempt today. Not passing flatus. Up in chair " "during evaluation.   04/27/2020 - Patient reports improved pain with PCA. However, still had severe pain when attempting liquids. Not passing gas. Some watery stool. Afebrile. Stoic, laying in bed "comfortable."   04/28/2020 - Patient reports two episodes of significant pin overnight. The rest of the night at baseline compared to last night. Adjustments made to PCA settings this AM. Moderate HTN, stable. Afebrile. No n/v overnight. Denies flatus.  04/28/2020 - Patient started carbo/taxol treatment yesterday. No issues overnight. Tolerating CLD, minimal flatus, no BM. Pain still controlled with PCA pump. Will work to wean to PO meds today.  04/29/2020 - Patient tolerated chemotherapy well. Reports some fatigue after infusion. She continues to have pain q2-3 hours when sleeping at night, but well controlled during the day with PCA. BP continues to be elevated. IVF at 75 cc/h. BP meds reviewed and will discuss consulting with medicine for recommendations for better control. Pain may be contributing to elevate BP above her likely baseline on home meds.  04/30/2020 - Patient handling transition to PO pain meds very well. Overnight she was able to sleep and rest comfortably. HTN also noted to improve with new pain regimen. Slightly apprehensive about advancing diet but also open to trying Boost and soft diet as tolerated. Ambulating in the room and to the bathroom well. Meeting appropriate milestones for discharge and goal for home today or tomorrow.   05/01/2020 - Patient with improving nausea overnight with addition of scheduled compazine. Tolerating PO medications and pain remains well controlled. This AM noted hematuria with AM void. Denies dysuria, foul odor. UA >UCx sent. Vitals stable with moderate HTN that has stabilized with pain mgmt. Dexamethasone course discontinued yesterday due to effect on BG control and poor antiemetic effect. SSI increased to moderate correction dosing and januvia restarted. BG overnight " all < 300. Will slide with AM breakfast as needed. Soft diet and boost provided. Discharge planning today if able to tolerate PO.   05/02/2020 Seen by Urology yesterday for new gross hematuria. No further workup was recommended given multiple reasons for hematuria and ongoing chemotherapy. Tolerating PO and pain regimen and ambulatory to the the bathroom and in the room. Home health orders are in place for assistance upon discharge. Medically stable with plan for follow up for cancer treatment plan. Plan for discharge to home today.    * No surgery found *     Consults (From admission, onward)        Status Ordering Provider     Inpatient consult to Interventional Radiology  Once     Provider:  (Not yet assigned)    Completed MANUEL ORTEGA     Inpatient consult to Palliative Care  Once     Provider:  (Not yet assigned)    Completed MANUEL ORTEGA     Inpatient consult to Registered Dietitian/Nutritionist  Once     Provider:  (Not yet assigned)    Completed ELYSE JAVIER     Inpatient consult to Urology  Once     Provider:  (Not yet assigned)    Completed ELYSE JAVIER          Significant Diagnostic Studies: Labs:   CBC   Recent Labs   Lab 04/30/20  1604 05/01/20  0943   WBC 9.85  --    HGB 9.5* 9.5*   HCT 31.2*  --    *  --        Pending Diagnostic Studies:     None        Final Active Diagnoses:    Diagnosis Date Noted POA    PRINCIPAL PROBLEM:  Uterine cancer, sarcoma [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 No, because not needed    Discharged Condition: good    Disposition: Home or Self Care    Follow Up:  Follow-up Information     Noelle Lala MD.    Specialty:  Gynecologic Oncology  Why:  Follow up as scheuled  Contact information:  [address]  [facility] [address]  [phone]                 Patient Instructions:      WALKER FOR HOME USE     Order Specific Question Answer Comments   Type of Walker: Adult (5'4"-6'6")    With wheels? Yes    Height: 5' 7" (1.702 m)    Weight: 129 kg (284 lb 6.3 oz)    Length of need (1-99 months): 99    Does patient have medical equipment at home? none    Please check all that apply: Patient's condition impairs ambulation.    Please check all that apply: Patient is unable to safely ambulate without equipment.    Please check all that apply: Patient needs help to get in and out of chair.    Vendor: [facility] WILL DELIVER TO    Expected Date of Delivery: 5/1/2020      Diet Dysphagia Soft     Diet full liquid     Diet clear liquid     Notify your health care provider if you experience any of the following:  increased confusion or weakness     Notify your health care provider if you experience any of the following:  persistent dizziness, light-headedness, or visual disturbances     Notify your health care provider if you experience any of the following:  worsening rash     Notify your health care provider if you experience any of the following:  difficulty breathing or increased cough     Notify your health care provider if you experience any of the following:  severe uncontrolled pain     Notify your health care provider if you experience any of the following:  persistent nausea and vomiting or diarrhea     Notify your health care provider if you experience any of the following:  temperature >100.4     Notify your health care provider if you experience any of the following:  severe " persistent headache     Activity as tolerated     Medications:  Reconciled Home Medications:      Medication List      START taking these medications    docusate sodium 100 MG capsule  Commonly known as:  COLACE  Take 2 capsules (200 mg total) by mouth 2 (two) times daily.     ondansetron 8 MG Tbdl  Commonly known as:  ZOFRAN-ODT  Dissolve 1 tablet (8 mg total) by mouth every 6 (six) hours as needed.     oxyCODONE 5 MG immediate release tablet  Commonly known as:  ROXICODONE  Take 1 tablet (5 mg total) by mouth every 2 (two) hours as needed (breakthrough pain).     oxyCODONE myristate 18 mg Cspt  Commonly known as:  XTAMPZA ER  Take 1 capsule by mouth every 12 (twelve) hours.     polyethylene glycol 17 gram Pwpk  Commonly known as:  GLYCOLAX  Take 17 g by mouth once daily.     prochlorperazine 10 MG tablet  Commonly known as:  COMPAZINE  Take 1 tablet (10 mg total) by mouth every 6 (six) hours.        CHANGE how you take these medications    amLODIPine 10 MG tablet  Commonly known as:  NORVASC  Take 1 tablet (10 mg total) by mouth once daily.  What changed:    · medication strength  · how much to take  · how to take this  · when to take this        CONTINUE taking these medications    bisoprolol 10 MG tablet  Commonly known as:  ZEBETA  Take 10 mg by mouth once daily.     dicyclomine 20 mg tablet  Commonly known as:  BENTYL  Take 1 tablet (20 mg total) by mouth 2 (two) times daily.     JANUVIA 100 MG Tab  Generic drug:  SITagliptin     LUMIGAN 0.01 % Drop  Generic drug:  bimatoprost  Place 1 drop into both eyes every evening.     meclizine 25 mg tablet  Commonly known as:  ANTIVERT  Take 1 tablet (25 mg total) by mouth 3 (three) times daily as needed for Dizziness.     timolol maleate 0.5% 0.5 % Drop  Commonly known as:  TIMOPTIC  Place 1 drop into both eyes 2 (two) times daily.        STOP taking these medications    ondansetron 4 MG tablet  Commonly known as:  ZOFRAN     oxyCODONE-acetaminophen  mg per  tablet  Commonly known as:  PERCOCET            Evangelina Howell MD  Gynecologic Oncology  [facility]

## 2020-05-03 NOTE — PROGRESS NOTES
UCx noted to be growing e coli < 100k CFU. Bactrim DS for 7-day course RX sent to pharmacy.   Left VM with instructions and plan.     Evangelina Howell MD, PhD  OBGYN, PGY-4

## 2020-05-04 NOTE — ED PROVIDER NOTES
Encounter Date: 5/4/2020    SCRIBE #1 NOTE: IMahamed, am scribing for, and in the presence of, Cornel Bush MD.       History     Chief Complaint   Patient presents with    Vomiting     reports n/v/d and generalized abd since last night; pt reports hx of intestinal cancer; last chemo tx on last tuesday     This is a 58 y/o female with a PMHx of endometrial cancer who presents to the Emergency Department via EMS complaining of generalized abdominal pain lasting one day. She recently received chemotherapy approximately a week ago. Patient now also presents with nausea, vomiting, diarrhea, and chills. She now denies any chest pain, shortness of breath, fever, dark stool, or painful or burning urination.      The history is provided by the patient.     Review of patient's allergies indicates:   Allergen Reactions    Gabapentin      Swelling,musclespasm    Lyrica [pregabalin]      Swelling, musclespasm     Past Medical History:   Diagnosis Date    Adenocarcinoma 3/24/2018    Diabetes mellitus, type 2     Endometrial cancer 4/14/2018    Glaucoma     Hypertension      Past Surgical History:   Procedure Laterality Date    BREAST LUMPECTOMY      R early 20's L at 13 years old    HYSTERECTOMY  08/02/2018    total abdominal    GA REMOVAL OF OVARY/TUBE(S) Bilateral 08/02/2018    TOTAL ABDOMINAL HYSTERECTOMY W/ BILATERAL SALPINGOOPHORECTOMY N/A 8/2/2018    Procedure: HYSTERECTOMY, TOTAL, ABDOMINAL, WITH BILATERAL SALPINGO-OOPHORECTOMY;  Surgeon: Noelle Lala MD;  Location: [facility] OR [address] [place] [address];  Service: OB/GYN;  Laterality: N/A;     Family History   Problem Relation Age of Onset    Diabetes Mother     Hypertension Mother     Pancreatic cancer Mother     Hypertension Maternal Grandmother     Diabetes Maternal Grandmother     Hypertension Maternal Grandfather     Diabetes Maternal Grandfather     Breast cancer Neg Hx     Colon cancer Neg Hx     Ovarian cancer Neg Hx      Social History     Tobacco  Use    Smoking status: Never Smoker    Smokeless tobacco: Never Used   Substance Use Topics    Alcohol use: Yes     Comment: occassional use    Drug use: No     Review of Systems   Constitutional: Positive for chills. Negative for fever.   HENT: Negative for sore throat.    Respiratory: Negative for shortness of breath.    Cardiovascular: Negative for chest pain.   Gastrointestinal: Positive for abdominal pain, diarrhea, nausea and vomiting. Negative for blood in stool.   Genitourinary: Negative for dysuria and hematuria.   Musculoskeletal: Negative for back pain.   Skin: Negative for rash.   Allergic/Immunologic: Positive for immunocompromised state.   Neurological: Negative for weakness.   Hematological: Does not bruise/bleed easily.       Physical Exam     Initial Vitals [05/04/20 1031]   BP Pulse Resp Temp SpO2   (!) 151/67 99 20 98.7 °F (37.1 °C) 96 %      MAP       --         Physical Exam    Nursing note and vitals reviewed.  Constitutional: She is not diaphoretic. No distress.   HENT:   Head: Normocephalic and atraumatic.   Mouth/Throat: Oropharynx is clear and moist.   Eyes: EOM are normal. Pupils are equal, round, and reactive to light. No scleral icterus.   Neck: Normal range of motion. Neck supple. No JVD present.   Cardiovascular: Normal rate, regular rhythm and intact distal pulses.   Pulmonary/Chest: Breath sounds normal. No stridor. No respiratory distress.   Abdominal: Soft. She exhibits no distension. Bowel sounds are decreased. There is tenderness in the right upper quadrant, epigastric area and left upper quadrant. There is no rigidity, no rebound and no guarding.   Musculoskeletal: Normal range of motion. She exhibits no edema or tenderness.   Neurological: She is alert. She has normal strength. No cranial nerve deficit or sensory deficit. GCS score is 15. GCS eye subscore is 4. GCS verbal subscore is 5. GCS motor subscore is 6.   Skin: Skin is warm and dry.   Psychiatric: She has a normal  mood and affect.         ED Course   Procedures  Labs Reviewed   CBC W/ AUTO DIFFERENTIAL - Abnormal; Notable for the following components:       Result Value    Hemoglobin 10.1 (*)     Hematocrit 33.0 (*)     Mean Corpuscular Volume 81 (*)     Mean Corpuscular Hemoglobin 24.7 (*)     Mean Corpuscular Hemoglobin Conc 30.6 (*)     RDW 14.6 (*)     Lymph # 0.9 (*)     Mono # 0.2 (*)     Eos # 0.7 (*)     Lymph% 14.4 (*)     Mono% 2.7 (*)     Eosinophil% 11.3 (*)     All other components within normal limits   COMPREHENSIVE METABOLIC PANEL - Abnormal; Notable for the following components:    Glucose 193 (*)     Albumin 2.4 (*)     Alkaline Phosphatase 328 (*)     All other components within normal limits   URINALYSIS, REFLEX TO URINE CULTURE - Abnormal; Notable for the following components:    Appearance, UA Cloudy (*)     Protein, UA 2+ (*)     Ketones, UA 2+ (*)     Urobilinogen, UA 4.0-6.0 (*)     Leukocytes, UA 1+ (*)     All other components within normal limits    Narrative:     Preferred Collection Type->Urine, Clean Catch   MAGNESIUM - Abnormal; Notable for the following components:    Magnesium 1.3 (*)     All other components within normal limits   LIPASE   SARS-COV-2 RNA AMPLIFICATION, QUAL   URINALYSIS MICROSCOPIC    Narrative:     Preferred Collection Type->Urine, Clean Catch     EKG Readings: (Independently Interpreted)   Initial Reading: No STEMI. Rhythm: Sinus Tachycardia. Heart Rate: 101. Ectopy: No Ectopy. Conduction: Normal. ST Segments: Normal ST Segments. T Waves: Normal.     ECG Results          EKG 12-lead (In process)  Result time 05/04/20 13:36:38    In process by Interface, Lab In [facility] (05/04/20 13:36:38)                 Narrative:    Test Reason : R11.10,    Vent. Rate : 096 BPM     Atrial Rate : 096 BPM     P-R Int : 122 ms          QRS Dur : 080 ms      QT Int : 332 ms       P-R-T Axes : 031 -07 018 degrees     QTc Int : 419 ms    Normal sinus rhythm  Voltage criteria for left  ventricular hypertrophy  Cannot rule out Septal infarct (cited on or before 04-MAY-2020)  Abnormal ECG  When compared with ECG of 24-APR-2020 16:23,  No significant change was found    Referred By: JAREN   SELF           Confirmed By:                   In process by Interface, Lab In [facility] (05/04/20 13:34:40)                 Narrative:    Test Reason : R11.10,    Vent. Rate : 096 BPM     Atrial Rate : 096 BPM     P-R Int : 122 ms          QRS Dur : 080 ms      QT Int : 332 ms       P-R-T Axes : 031 -07 018 degrees     QTc Int : 419 ms    Normal sinus rhythm  Voltage criteria for left ventricular hypertrophy  Cannot rule out Septal infarct ,age undetermined  Abnormal ECG  When compared with ECG of 04-MAY-2020 10:36,  No significant change was found    Referred By: [email or web address]   SELF           Confirmed By:                             Imaging Results          CT Abdomen Pelvis  Without Contrast (Final result)  Result time 05/04/20 11:47:59    Final result by Jt Smyth MD (05/04/20 11:47:59)                 Impression:      Moderate amount of loculated ascites with omental thickening, grossly similar to the 04/24/2020 exam, noting limited assessment without oral and IV contrast.  No evidence of bowel obstruction.    Partially imaged small (9 mm) right middle lobe nodule.      Electronically signed by: Jt Smyth MD  Date:    05/04/2020  Time:    11:47             Narrative:    EXAMINATION:  CT ABDOMEN PELVIS WITHOUT CONTRAST    CLINICAL HISTORY:  Bowel obstruction, low-grade;    TECHNIQUE:  Low dose axial images, sagittal and coronal reformations were obtained from the lung bases to the pubic symphysis.    COMPARISON:  04/24/2020    FINDINGS:  There is a moderate amount of loculated ascites throughout the abdomen.  Omental thickening noted.  No dilated loops of bowel.  No free air.  Prior hysterectomy.    Small hiatal hernia.  There is low-attenuation liver suggesting fatty change.  Gallbladder is grossly  unremarkable.  No biliary or pancreatic ductal dilatation.  The spleen, pancreas, adrenal glands, and kidneys are grossly unremarkable, noting limited assessment without IV contrast.  No free air.    No marrow replacement process.    Small left pleural effusion.  Partially imaged 9 mm nodule in the right middle lobe.  Mild interstitial thickening in the lung bases.                                 Medical Decision Making:   History:   Old Medical Records: I decided to obtain old medical records.  Initial Assessment:   Past medical records queried and reviewed, including recent admission for similar symptoms in a setting of small bowel obstruction on 4/24. Given instructions for follow appointment and home health care.     59 year old female presents to the ED complaining of epigastric pain. The patient was seen and examined. The history and physical exam was obtained. The nursing notes and vital signs were reviewed. Secondary to symptoms and exam findings, I ordered an EKG, CT abdomen w/o contrast, Magnesium, CMP, CBC, Lipase, and Urinalysis.   Differential Diagnosis:   My differential diagnosis includes, but is not limited to medication side effect, GERD, gastritis, gastroenteritis, bowel obstruction, electrolyte abnormality, and malignancy  Independently Interpreted Test(s):   I have ordered and independently interpreted EKG Reading(s) - see prior notes  Clinical Tests:   Lab Tests: Ordered and Reviewed  Radiological Study: Ordered and Reviewed  Medical Tests: Ordered and Reviewed            Scribe Attestation:   Scribe #1: I performed the above scribed service and the documentation accurately describes the services I performed. I attest to the accuracy of the note.            ED Course as of May 04 1556   Mon May 04, 2020   1036 Patient is afebrile and in no acute distress at time history and physical.  She complains of upper abdominal pain with associated nausea and vomiting.  She denies black or bloody bowel  movements.  Unclear etiology of patient's symptoms.  Will obtain lab testing and imaging to further evaluate.    [SG]   1325 Labs within acceptable ranges and comparable to prior.  Patient has mild hypomagnesemia and is to be given magnesium supplementation in the emergency department.  UA is not suggestive of UTI.  CT scan of the abdomen pelvis with loculated ascites comparable to prior.  No obvious signs of bowel obstruction.    Case discussed with the nurse for Dr. Lala,  patient's gynecologic oncologist to whom she was recently admitted.  Findings of workup discussed and she bleeds patient is appropriate further outpatient management as she is not currently need of emergent surgical intervention.  Will control patient's pain and ensure that she is able to take p.o. to determine if she is fit for discharge    [SG]   1523 Patient tolerated P.O.  Fluid and food in the emergency department.  Reports improvement in her pain after analgesia. She does not appear to require inpatient treatment at this time.  She is clinically stable for discharge with close follow-up with her oncologist as well as palliative care. counseled on supportive care, appropriate medication usage, concerning symptoms for which to return to ER and the importance of follow up. Understanding and agreement with treatment plan was expressed.     [SG]      ED Course User Index  [SG] Cornel Bush MD          This chart was completed using dictation software, as a result there may be some transcription errors.       Clinical Impression:       ICD-10-CM ICD-9-CM   1. Non-intractable vomiting with nausea, unspecified vomiting type R11.2 787.01   2. Emesis R11.10 787.03   3. 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 Provider    promethazine (PHENERGAN) 25 MG suppository  Place 1 suppository (25 mg total) rectally every 6 (six) hours as needed for Nausea. 12 suppository 5/4/2020  Cornel Bush MD        Follow-up Information     Follow up With Specialties Details Why Contact Info    Noelle Lala MD Gynecologic Oncology Call   [address] [facility] [id number] [phone]      Kate Boyd MD Hematology and Oncology, Hematology Call   [address]  [address]  [facility] [address]  [phone]      Maikol Leigh MD Hospice and Palliative Medicine Call   [address] [facility] [id number] [phone]                        I, Cornel Bush , personally performed the services described in this documentation. All medical record entries made by the scribe were at my direction and in my presence. I have reviewed the chart and agree that the record reflects my personal performance and is accurate and complete.               Cornel Bush MD  05/04/20 [id number]

## 2020-05-04 NOTE — CONSULTS
[facility] notified that patient in ED and asked to f/u with patient tomorrow per consult.  Anay Acosta LMSW, CCM  5/4/20

## 2020-05-04 NOTE — ED NOTES
Pt discharged via wheel chair   Family member waiting in parking area  Pt not showing any signs or symptoms of distress

## 2020-05-04 NOTE — DISCHARGE INSTRUCTIONS
Thank you for coming to our Emergency Department today. It is important to remember that some problems are difficult to diagnose and may not be found during your first visit. Be sure to follow up with your primary care doctor and review any labs/imaging that was performed with them. If you do not have a primary care doctor, you may contact the one listed on your discharge paperwork or you may also call the [facility] Appointment Desk at [phone] to schedule an appointment with one.     All medications may potentially have side effects and it is impossible to predict which medications may give you side effects. If you feel that you are having a negative effect of any medication you should immediately stop taking them and seek medical attention.    Return to the ER with any questions/concerns, new/concerning symptoms, worsening or failure to improve. Do not drive or make any important decisions for 24 hours if you have received any pain medications, sedatives or mood altering drugs during your ER visit.

## 2020-05-04 NOTE — PROGRESS NOTES
[facility]  Anay Acosta LMSW, CCM  5/4/20

## 2020-05-04 NOTE — ED TRIAGE NOTES
Pt presents to ED with c/o vomiting since last night. Pt also complains of abdominal pain. Hx CA. Pt had chemo six days ago. Pt also reports diarrhea. Pt denies CP and SOB, fevers, or cough.

## 2020-05-08 NOTE — PROGRESS NOTES
Follow up Note  Palliative Care      Consult Requested By: Dr. Noelle Lala  Reason for visit: follow up symptom check for pain and nausea related to partial SBO from recurrent endometrial cancer      ASSESSMENT/PLAN:     Plan/Recommendations:  Diagnoses and all orders for this visit:    Endometrial cancer, recurrent  -following with ; Cycle 2 in a few weeks    Abdominal pain, unspecified abdominal location  Constipation  Partial small bowel obstruction  -improved; continue on oxy ER and IR   -bowel function improving; continue bowel regimen    Debility  -improving; HH continuing  -good support at home with sister and niece who have moved in    Advance care planning  -ACP/LW/HCPOA all completed and on chart  -DNR    Follow up in a month for virtual visit    SUBJECTIVE:     History of Present Illness:  58 yo female recently admitted with pain/nausea/diarrhrea and FTT due to partial small bowel obstruction in the setting of recurrent endometrial cancer and peritoneal carcinomatosis     Doing better since d/c.  Had a visit to ER 2 days post d/c.  Pain now improved, tolerating more nutrition, flatus and starting to have more formed BM.      Good support at home since sister and niece came form [place] to support her in her recovery or otherwise.      Sounds much better on phone.  States she is profoundly grateful for the care she has received with [facility]    Michi for Cycle 2 within 2 weeks     Past Medical History:   Diagnosis Date    Adenocarcinoma 3/24/2018    Diabetes mellitus, type 2     Endometrial cancer 4/14/2018    Glaucoma     Hypertension      Past Surgical History:   Procedure Laterality Date    BREAST LUMPECTOMY      R early 20's L at 13 years old    HYSTERECTOMY  08/02/2018    total abdominal    PA REMOVAL OF OVARY/TUBE(S) Bilateral 08/02/2018    TOTAL ABDOMINAL HYSTERECTOMY W/ BILATERAL SALPINGOOPHORECTOMY N/A 8/2/2018    Procedure: HYSTERECTOMY, TOTAL, ABDOMINAL, WITH BILATERAL  SALPINGO-OOPHORECTOMY;  Surgeon: Noelle Lala MD;  Location: [facility] OR [address] [place] [address];  Service: OB/GYN;  Laterality: N/A;     Family History   Problem Relation Age of Onset    Diabetes Mother     Hypertension Mother     Pancreatic cancer Mother     Hypertension Maternal Grandmother     Diabetes Maternal Grandmother     Hypertension Maternal Grandfather     Diabetes Maternal Grandfather     Breast cancer Neg Hx     Colon cancer Neg Hx     Ovarian cancer Neg Hx      Review of patient's allergies indicates:   Allergen Reactions    Gabapentin      Swelling,musclespasm    Lyrica [pregabalin]      Swelling, musclespasm       Medications:    Current Outpatient Medications:     amLODIPine (NORVASC) 10 MG tablet, Take 1 tablet (10 mg total) by mouth once daily., Disp: 30 tablet, Rfl: 11    bisoprolol (ZEBETA) 10 MG tablet, Take 10 mg by mouth once daily., Disp: , Rfl:     docusate sodium (COLACE) 100 MG capsule, Take 2 capsules (200 mg total) by mouth 2 (two) times daily., Disp: 60 capsule, Rfl: 3    JANUVIA 100 mg Tab, , Disp: , Rfl:     LUMIGAN 0.01 % Drop, Place 1 drop into both eyes every evening. , Disp: , Rfl:     meclizine (ANTIVERT) 25 mg tablet, Take 1 tablet (25 mg total) by mouth 3 (three) times daily as needed for Dizziness., Disp: 30 tablet, Rfl: 0    ondansetron (ZOFRAN-ODT) 8 MG TbDL, Dissolve 1 tablet (8 mg total) by mouth every 6 (six) hours as needed., Disp: 6 tablet, Rfl: 3    oxyCODONE (ROXICODONE) 5 MG immediate release tablet, Take 1 tablet (5 mg total) by mouth every 2 (two) hours as needed (breakthrough pain)., Disp: 60 tablet, Rfl: 0    oxyCODONE myristate (XTAMPZA ER) 18 mg CSpT, Take 1 capsule by mouth every 12 (twelve) hours., Disp: 60 each, Rfl: 0    polyethylene glycol (GLYCOLAX) 17 gram PwPk, Mix and take 17 g by mouth once daily., Disp: 30 each, Rfl: 3    prochlorperazine (COMPAZINE) 10 MG tablet, Take 1 tablet (10 mg total) by mouth every 6 (six) hours., Disp: 120 tablet,  Rfl: 3    promethazine (PHENERGAN) 25 MG suppository, Place 1 suppository (25 mg total) rectally every 6 (six) hours as needed for Nausea., Disp: 12 suppository, Rfl: 0    sulfamethoxazole-trimethoprim 800-160mg (BACTRIM DS) 800-160 mg Tab, Take 1 tablet by mouth 2 (two) times daily., Disp: 14 tablet, Rfl: 0    timolol maleate 0.5% (TIMOPTIC) 0.5 % Drop, Place 1 drop into both eyes 2 (two) times daily. , Disp: , Rfl:       24h Oral Morphine Equivalents (OME):     OBJECTIVE:     Symptom Assessment (ESAS 0-10 scale)    ESAS 0 1 2 3 4 5 6 7 8 9 10   Pain []  []  []  [x]  []  []  []  []  []  []  []    Dyspnea []  [x]  []  []  []  []  []  []  []  []  []    Anxiety [x]  []  []  []  []  []  []  []  []  []  []    Nausea [x]  []  []  []  []  []  []  []  []  []  []    Depression  [x]  []  []  []  []  []  []  []  []  []  []    Anorexia []  []  [x]  []  []  []  []  []  []  []  []    Fatigue []  []  []  []  [x]  []  []  []  []  []  []    Insomnia [x]  []  []  []  []  []  []  []  []  []  []    Restlessness  [x]  []  []  []  []  []  []  []  []  []  []    Agitation [x]  []  []  []  []  []  []  []  []  []  []        Constipation    yes ; improving  Bowel Management Plan (BMP): yes  Diarrhea        no  Comments:   Perfromance Status: PPS Score 70  ROS:  Review of Systems    Physical Exam:  Vitals:    Physical Exam    Labs:  CBC:   WBC   Date Value Ref Range Status   05/04/2020 6.30 3.90 - 12.70 K/uL Final     Hemoglobin   Date Value Ref Range Status   05/04/2020 10.1 (L) 12.0 - 16.0 g/dL Final     Hematocrit   Date Value Ref Range Status   05/04/2020 33.0 (L) 37.0 - 48.5 % Final     Mean Corpuscular Volume   Date Value Ref Range Status   05/04/2020 81 (L) 82 - 98 fL Final     Platelets   Date Value Ref Range Status   05/04/2020 295 150 - 350 K/uL Final           LFT:   Lab Results   Component Value Date    AST 36 05/04/2020    ALKPHOS 328 (H) 05/04/2020    BILITOT 0.6 05/04/2020       Albumin:   Albumin   Date Value Ref Range  Status   05/04/2020 2.4 (L) 3.5 - 5.2 g/dL Final     Protein:   Total Protein   Date Value Ref Range Status   05/04/2020 6.8 6.0 - 8.4 g/dL Final       Radiology:None      > 50% of 20 min visit spent in chart review, face to face discussion of goals of care,  symptom assessment, coordination of care and emotional support.      Signature: Maikol Leigh MD    Established Patient - Audio Only Telehealth Visit     The patient location is: home  The chief complaint leading to consultation is: partial SBO  Visit type: Virtual visit with audio only (telephone)  Total time spent with patient: 20 min       The reason for the audio only service rather than synchronous audio and video virtual visit was related to technical difficulties or patient preference/necessity.     Each patient to whom I provide medical services by telemedicine is:  (1) informed of the relationship between the physician and patient and the respective role of any other health care provider with respect to management of the patient; and (2) notified that they may decline to receive medical services by telemedicine and may withdraw from such care at any time. Patient verbally consented to receive this service via voice-only telephone call.                          This service was not originating from a related E/M service provided within the previous 7 days nor will  to an E/M service or procedure within the next 24 hours or my soonest available appointment.  Prevailing standard of care was able to be met in this audio-only visit.

## 2020-05-08 NOTE — LETTER
May 8, 2020      Noelle Lala MD  [address]  [facility] [address]           [place] - Hematology Oncology  [address]  [facility] [address]  Phone: [phone]          Patient: Saba Driver   MR Number: [id number]   YOB: 1960   Date of Visit: 5/8/2020       Dear Dr. Noelle Lala:    Thank you for referring Saba Driver to me for evaluation. Attached you will find relevant portions of my assessment and plan of care.    If you have questions, please do not hesitate to call me. I look forward to following Saba Driver along with you.    Sincerely,    Maikol Leigh MD    Enclosure  CC:  No Recipients    If you would like to receive this communication electronically, please contact [email or web address] or [phone] to request more information on [employer] Link access.    For providers and/or their staff who would like to refer a patient to [facility], please contact us through our one-stop-shop provider referral line, [facility], at [phone].    If you feel you have received this communication in error or would no longer like to receive these types of communications, please e-mail [email or web address]

## 2020-05-19 NOTE — PROGRESS NOTES
Subjective:       Patient ID: Saba Driver is a 59 y.o. female.    Chief Complaint: No chief complaint on file.    Established Patient - Audio Only Telehealth Visit     The patient location is: Home  The chief complaint leading to consultation is: Follow-up Endometrial CA   Visit type: Virtual visit with audio only (telephone)  Total time spent with patient:  30 min       The reason for the audio only service rather than synchronous audio and video virtual visit was related to technical difficulties or patient preference/necessity.     Each patient to whom I provide medical services by telemedicine is:  (1) informed of the relationship between the physician and patient and the respective role of any other health care provider with respect to management of the patient; and (2) notified that they may decline to receive medical services by telemedicine and may withdraw from such care at any time. Patient verbally consented to receive this service via voice-only telephone call.    This service was not originating from a related E/M service provided within the previous 7 days nor will  to an E/M service or procedure within the next 24 hours or my soonest available appointment.  Prevailing standard of care was able to be met in this audio-only visit.    HPI   Diagnosis: Stage IVB (inguinal mets) Endometrial CA  She has recent recurrence of uterine carcinosarcoma.           Onc Hx  HPI ( per Dr. Lala) Referred by Dr. Shakir Alexis for postmenopausal bleeding. I have reviewed Dr. Alexis's notes. P0. On pelvic uterus is enlarged and fixed, unable to perform EMB due to altered anatomy of the cervix. She had been admitted to  for vaginal bleeding and anemia requiring transfusion. Fixed sore R inguinal node. Blind pap returned adenocarcinoma.      Seen in consult with Dr. Lala  3/21/18. Constellation of findings thus far consistent with an advanced gynecologic malignancy. Likely uterine in origin. Inguinal adenopathy would  make this Stage IVB. Pap cytology showing adenocarcinoma but need tissue diagnosis. Will plan IR for biopsy of inguinal node. Pelvic disease is surgically unresectable at present. Recommended neoadjuvant chemotherapy based on tissue diagnosis.      IR biopsy 4/4/18  FINAL PATHOLOGIC DIAGNOSIS  RIGHT INGUINAL LYMPH NODE, CT GUIDED BIOPSY WITH ONSITE ADEQUACY (CYTOLOGY AND CELL BLOCK):  - Adenocarcinoma. See note.  Note: The carcinoma is positive for CK7, p53, vimentin and ER (patchy). It is negative for CDX2, CEA, CK20 and WT1. This immunoprofile is compatible with an endometrial origin. All stains have appropriate controls.    S/p cycle 4 of neoadjuvant  chemo completed 6/21/2018       Pt s/p Total abdominal hysterectomy, bilateral salpingo-oophorectomy 8/2/2018 with invasion into less than 1/2 of the myometrium,    Pt then developed enlarging right groin node       s/p right LND on 10/11/2018.   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   CT CAP 12/17/18- essentially shows no evidence of disease.   Impression       Prior hysterectomy.  Interval right inguinal lymph node dissection.  Previous large necrotic lymph node has been resected. There are few residual nonenlarged lymph nodes at this site which are slightly asymmetric to their contralateral counterparts.  Stable 3 mm right upper lobe pulmonary nodule.  No new soft tissue mass or pathologic lymph node enlargement.  Continued follow-up is advised.      Adjuvant radiation with Dr. Solis completed 50.40Gy to the pelvis and bilateral inguinal regions 1/10/19-2/19/19.        Last visit > 1yr   Pt has been followed by Dr. Lala   Her disease free interval was 11 months    In there interim, she presented to the ED for new/increased abdominal/pelvic pain.    CT imaging   CT 4/5/2020 showed concern for recurrent disease.    She was ultimiately diagnosed with  recurrence of uterine carcinosarcoma.   She underwent C1 of chemo with carbo/taxol during recent hospitalization last month  when she presented with abd pain and intractable N/V    Televisit follow-up today  Pt reports she continues with abd pain since hosp discharge, better controlled   Appetite remains diminished  She continues with intermittent nausea w/out vomiting  She report fatigue and mild generalized weakness  No SOB/CP    She is due for C2 of chemo this week  She reports she is planning on moving to [place] end of week to live with her niece       Oncologic history:  Referred by Dr. Shakir Alexis for postmenopausal bleeding. P0. On pelvic uterus is enlarged and fixed, unable to perform EMB due to altered anatomy of the cervix. She had been admitted to  for vaginal bleeding and anemia requiring transfusion. Fixed sore R inguinal node. Blind pap returned adenocarcinoma.      Pelvic US 3/12/18  UT enlarged 13.3cm with 5.1cm fibroid anterior. Limited visualization of EMS.  LOV 2.9cm  ROV 3.6cm         Past Medical History:   Diagnosis Date    Adenocarcinoma 3/24/2018    Diabetes mellitus, type 2     Endometrial cancer 4/14/2018    Glaucoma     Hypertension          Review of Systems   Constitutional: Positive for appetite change and fatigue. Negative for fever and unexpected weight change.   HENT: Negative for mouth sores.    Eyes: Negative for visual disturbance.   Respiratory: Negative for cough and shortness of breath.    Cardiovascular: Negative for chest pain.   Gastrointestinal: Positive for abdominal pain and nausea. Negative for diarrhea and vomiting.   Genitourinary: Negative for frequency and vaginal bleeding.   Musculoskeletal: Negative for back pain.   Skin: Negative for rash.   Neurological: Positive for weakness. Negative for dizziness and headaches.   Hematological: Does not bruise/bleed easily.    Psychiatric/Behavioral: The patient is nervous/anxious.        Objective:                   CT a/p 3/23/2018   1. Right groin mass, differential diagnosis includes lymphoma.  2. Large uterus, multiple fibroids.  3. Breast nodules and calcifications discussed above.  4. Additional remote findings above.    CT head w/out contrast 8/29/2018   No acute intracranial abnormality identified.    MRI brain w w/o contrast 8/30/2018   No acute intracranial abnormalities identified.  No evidence of acute infarction.    Hypoenhancing round 6 mm pituitary lesion, possible pituitary microadenoma.  No prior studies are available for comparison.    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  No evidence of bowel obstruction.    Partially imaged small (9 mm) right middle lobe nodule.        Lab Results   Component Value Date    WBC 6.30 05/04/2020    HGB 10.1 (L) 05/04/2020    HCT 33.0 (L) 05/04/2020    MCV 81 (L) 05/04/2020     05/04/2020           Assessment:       1. Endometrial cancer    2. Neoplasm related pain    3. Nausea        Plan:   ECOG 1  1-3 Pt with Stage IVB (inguinal mets) endometrial cancer.   It has been  recommended neoadjuvant systemic chemotherapy with Carbo AUC6/Taxol 175mg/m2 IV [id number]  Will plan for 4 cycles and repeat imaging/clinical exam for consideration of surgical debulking.  S/p cycle 4 of neoadjuvant  chemo completed 6/21/2018    Pt s/p Total abdominal hysterectomy, bilateral salpingo-oophorectomy 8/12/2018  Pt completed adjuvant carbo taxol cycle 1 on 9/20/2018   Pt then  developed enlarging right groin node  s/p right LND on 10/11/2018.  Pt followed by Dr. Lala   She underwent  adjuvant chemo with  2  cycles carbo/taxol Followed by  adjuvant RT (pelvis and groin).       Pt recently developed recurrent endometrial cancer  She received C1 of chemo with carbo/taxol during recent hospitalization on 4/28/20  C2 of chemo planned this week     Detailed d/w patient regarding palliative chemotherapy. Patient declines further palliative chemotherapy and is planning on relocating to [place]  To live with her niece.  She reports she is not planning to pursue further palliative chemotherapy.   Discussed with Dr. Lala and Dr. Lala planning on reaching out to patient to discuss plan     Pt also followed by palliative care    Plan to discontinue planned chemo   Advised patient to establish care with Oncologist once she relocated.   Pt acknowledged understanding with agreeable with plan.       Cc: MD Noelle Hicks MD Mini Elnaggar, MD Raymond Gould, MD          .

## 2020-05-19 NOTE — TELEPHONE ENCOUNTER
Your test was NEGATIVE for COVID-19 (coronavirus).  If you still have symptoms, treat with rest, fluids, and over-the-counter medications.  Continue to stay home and practice proper handwashing.     If your symptoms worsen or if you have any other concerns, please contact [facility] at [phone].     Sincerely,    Ellyn Saldana PA-C

## 2020-05-20 NOTE — TELEPHONE ENCOUNTER
Spoke with pt. Pt states that after discussion with her family she feels moving to [facility] is the best thing for her. Pt states her niece is going to help her find an oncologist, but was wondering if Dr Lala had any recommendations. Informed pt Dr Lala is out of the office today and will return tomorrow. I will send this message to her and call her back. Denies any other needs at this time.   ----- Message from Willian Howard sent at 5/20/2020 11:40 AM CDT -----  Contact: [phone]  Hello, please give the patient a call back she is moving to [facility] and has some questions. Thanks

## 2020-05-22 NOTE — TELEPHONE ENCOUNTER
Called to talk with patient. She is moving to [place], [place] to be with her family. Does feel the 1st cycle of chemotherapy has helped relieved some of her symptoms and I discussed there may be a role for continued palliative chemotherapy in [place]. She also expressed that she may not want to pursue additional therapy and I support her decisions for this as well if that what she chooses. I told them that they will need to establish with a cancer center or primary care physician in order to facilitate her support and care.     Email for her niece [email or web address]    They voiced understanding.

## 2021-04-16 ENCOUNTER — PATIENT MESSAGE (OUTPATIENT)
Dept: RESEARCH | Facility: HOSPITAL | Age: 61
End: 2021-04-16

## 2021-08-11 NOTE — PROCEDURES
Radiology Post-Procedure Note    Pre Op Diagnosis: Endometrial Cancer    Post Op Diagnosis: Same    Procedure: Umbilical mass biopsy    Procedure performed by: Lew Kay MD    Written Informed Consent Obtained: Yes  Specimen Removed: YES 8 x 18 gauge cores  Estimated Blood Loss: Minimal    Findings:   Under US guidance, a 17/18 gauge biopsy system was used to sample periumbilical mass. No complications.    Patient tolerated procedure well.    Lew Kay M.D.  Diagnostic and Interventional Radiologist  Department of Radiology  Pager: [phone]     mouth details… detailed exam negative

## 2024-01-27 NOTE — TELEPHONE ENCOUNTER
Spoke with pt. Informed her that tamiko TRIVEDI called in percocet 10mg. Dicussed pain control with patient and not to take the percocet 5 in addition to the new percocet rx. Dicussed ED precautions. Pt verbalized understanding and denies any other needs.    3

## 2024-08-01 NOTE — PROGRESS NOTES
[facility]  Gynecologic Oncology  Progress Note      Patient Name: Saba Driver  MRN: [id number]  Admission Date: 8/2/2018  Hospital Length of Stay: 1 days  Attending Provider: Noelle Lala MD  Primary Care Provider: Adela Verma MD  Principal Problem: Endometrial cancer    Follow-up For: Procedure(s) (LRB):  HYSTERECTOMY, TOTAL, ABDOMINAL, WITH BILATERAL SALPINGO-OOPHORECTOMY (N/A)  Post-Operative Day: 1 Day Post-Op  Subjective:      History of Present Illness:  Saba Driver is a 57 y.o. female with endometrial adenocarcinoma s/p 4 cycles of neoadjuvent carbo/taxol, here for scheduled Xlap/BRIAN/BSO/debulking and staging.     Per last clinic encounter:  Presents today for follow up. S/p 3 cycles neoadjuvant chemotherapy carbo/taxol with Dr. Boyd.      CT CAP 6/25/18  Impression   Significant decrease in size in the right inguinal node.  Lobulated enlarged uterus.  RECIST SUMMARY:  Date of prior examination for comparison: 03/22/2018  Lesion 1: Right inguinal node  1.4 cm(smallest diameter ).  Series 2 image 118.  Prior measurement 4.0 cm.      Oncologic history:  Referred by Dr. Shakir Alexis for postmenopausal bleeding. P0. On pelvic uterus is enlarged and fixed, unable to perform EMB due to altered anatomy of the cervix. She had been admitted to  for vaginal bleeding and anemia requiring transfusion. Fixed sore R inguinal node. Blind pap returned adenocarcinoma.      Pelvic US 3/12/18  UT enlarged 13.3cm with 5.1cm fibroid anterior. Limited visualization of EMS.  LOV 2.9cm  ROV 3.6cm     Medical co-morbidities include DM, HTN. No anticoagulation.      No prior abdominal surgeries     Family history significant for mom with pancreatic cancer, denies history of breast, ovarian, uterine, or colon cancer.      MMG last year normal per patient.      Seen in consult with me 3/21/18. Constellation of findings thus far consistent with an advanced gynecologic malignancy. Likely uterine in origin.  Inguinal adenopathy would make this Stage IVB. Pap cytology showing adenocarcinoma but need tissue diagnosis. Will plan IR for biopsy of inguinal node. Pelvic disease is surgically unresectable at present. Recommended neoadjuvant chemotherapy based on tissue diagnosis.      IR biopsy 4/4/18  FINAL PATHOLOGIC DIAGNOSIS  RIGHT INGUINAL LYMPH NODE, CT GUIDED BIOPSY WITH ONSITE ADEQUACY (CYTOLOGY AND CELL BLOCK):  - Adenocarcinoma. See note.  Note: The carcinoma is positive for CK7, p53, vimentin and ER (patchy). It is negative for CDX2, CEA, CK20 and WT1. This immunoprofile is compatible with an endometrial origin. All stains have appropriate controls.     Initiate neoadjuvant chemotherapy with Dr. Boyd. Carbo/taxol 4/19/18. IV port  Now s/p 4 cycles of neoadjuvent therapy.    Hospital Course:  08/02/2018 Admit to GYN oncology. To OR for planned procedures.  08/03/2018 POD #1 s/p Xlap/BRIAN/BSO. Uncomplicated procedure,  cc. Patient reports moderate pain overnight. Tolerated some clears. Vitals stable and normal. BG elevated in the 200s, but improving. Has not yet ambulated. UOP marginal overnight. Merchant d/c'd today. -flatus/-BM.    Interval History:   POD #1 s/p Xlap/BRIAN/BSO. Uncomplicated procedure,  cc. Patient reports moderate pain overnight. Tolerated some clears and breakfast this am. Vitals stable and normal. BG elevated in the 200s, but improving. Has not yet ambulated. UOP marginal overnight. Merchant d/c'd today. -flatus/-BM.    Scheduled Meds:   sodium chloride   Intravenous Once    acetaminophen  1,000 mg Intravenous Q8H    amLODIPine  10 mg Oral QAM    bisoprolol  10 mg Oral BID    ibuprofen  800 mg Intravenous Q6H    mupirocin  1 g Nasal BID    senna-docusate 8.6-50 mg  1 tablet Oral BID     Continuous Infusions:   0/9% NACL & POTASSIUM CHLORIDE 20 MEQ/L 125 mL/hr at 08/03/18 0013     PRN Meds:acetaminophen, dextrose 50%, dextrose 50%, diphenhydrAMINE, glucagon (human recombinant),  glucose, glucose, HYDROmorphone, insulin aspart U-100, ondansetron, oxyCODONE, oxyCODONE, prochlorperazine, simethicone    Review of patient's allergies indicates:  No Known Allergies    Objective:     Vital Signs (Most Recent):  Temp: 97.7 °F (36.5 °C) (08/03/18 0413)  Pulse: 69 (08/03/18 0413)  Resp: 18 (08/03/18 0413)  BP: 139/65 (08/03/18 0413)  SpO2: 96 % (08/03/18 0413) Vital Signs (24h Range):  Temp:  [97 °F (36.1 °C)-98.2 °F (36.8 °C)] 97.7 °F (36.5 °C)  Pulse:  [58-75] 69  Resp:  [13-21] 18  SpO2:  [96 %-100 %] 96 %  BP: (110-149)/(53-68) 139/65     Weight: 109.1 kg (240 lb 9.6 oz)  Body mass index is 37.68 kg/m².    Intake/Output - Last 3 Shifts       08/01 0700 - 08/02 0659 08/02 0700 - 08/03 0659    I.V. (mL/kg)  3200 (29.3)    Total Intake(mL/kg)  3200 (29.3)    Urine (mL/kg/hr)  1135    Blood  100    Total Output   1235    Net   +1965                Physical Exam:   Constitutional: She is oriented to person, place, and time. She appears well-developed and well-nourished.    HENT:   Head: Normocephalic and atraumatic.    Eyes: Conjunctivae and EOM are normal. Pupils are equal, round, and reactive to light.    Neck: Normal range of motion. Neck supple.    Cardiovascular: Normal rate, regular rhythm and normal heart sounds.     Pulmonary/Chest: Effort normal and breath sounds normal. No respiratory distress.        Abdominal: Soft. Bowel sounds are normal. She exhibits distension and abdominal incision. There is tenderness. There is no rebound and no guarding.   Midline addominal incision is bandaged with steristrips in place. No excessive drainage. Mild/appropriate TTP and distention. NO rebound or guarding.     Genitourinary:   Genitourinary Comments: Merchant in place draining clear, yellow urine; pelvic deferred           Musculoskeletal: Normal range of motion.       Neurological: She is alert and oriented to person, place, and time.    Skin: Skin is warm and dry. No rash noted.    Psychiatric: She has  a normal mood and affect. Her behavior is normal. Judgment and thought content normal.     Lines/Drains/Airways     Central Venous Catheter Line                 Port A Cath Single Lumen 04/17/18 0955 right internal jugular 107 days          Drain                 Urethral Catheter 08/02/18 1410 Straight-tip;Non-latex 16 Fr. less than 1 day          Peripheral Intravenous Line                 Peripheral IV - Single Lumen 08/02/18 1004 Right Forearm less than 1 day         Peripheral IV - Single Lumen 08/02/18 1405 Left Wrist less than 1 day              Laboratory:    Recent Labs  Lab 07/30/18  1110 08/03/18  0440   WBC 7.41 12.99*   HGB 11.0* 10.2*   HCT 35.3* 32.4*   MCV 85 86    338        Recent Labs  Lab 08/03/18  0440      K 3.6      CO2 23   BUN 11   CREATININE 0.7   *   MG 1.0*   PHOS 4.5     Diagnostic Results:  Pathology pending    Assessment/Plan:     * Endometrial cancer    - as above        S/P BRIAN-BSO 8/2/18    - Procedure complications: none  - EBL: 100 cc  - IVF @ 125 cc/h; d/c now that she is tolerating PO and UOP has picked up  - Diet: tolerated clears, ADAT to diabetic diet 2000 kcal  - Pain control: IV ibuprofen/tylenol, oxy IR 5/10 mg, 0.5 mg dilaudid BTP; transition to PO today  - In/Out: Fleming d/c'd this AM, POD #1; UOP marginal 0.45 cc/kg/h overnight; this AM with 600 cc in fleming bag  - Bowel function: -flatus/-BM  - PRN: simethicone, zofran, phenergan, benadryl  - Heme: Preop CBC 7.4/11/35.3/306, Post CBC 13/10.2/32.4/338; BMP normal, mild hyperglycemia   - PPX: ambulation encouraged, IS encouraged, lovenox anticoagulation to begin today, TEDs/SCDs in place    Disposition: Will plan to evaluate the patient as she meets her post-operative milestones. Will tentatively plan for discharge to home POD #2-3.           Adenocarcinoma    - s/p 4 cycles of neoadjuvent carbo/taxol  - now s/p Xlap/BRIAN/BSO  - begin lovenox ppx today        Diabetes mellitus, type 2    - hold home  meds  - SSI with POCT BG q6h   - mild hypergylcemia is improving        Hypertension    - holding home diuretic perioperatively  - continue home bisprolol 10 mg daily, norvasc 10 mg daily  - BP: (110-149)/(53-68) 139/65           VTE Risk Mitigation         Ordered     IP VTE HIGH RISK PATIENT  Once      08/02/18 1755     Place STEPHANIA hose  Until discontinued      08/02/18 1755     Place sequential compression device  Until discontinued      08/02/18 1755        Was fleming catheter removed? Yes    Evangelina Howell MD  Gynecologic Oncology  [facility]   2-3 months

## (undated) DEVICE — DRAPE STERI INSTRUMENT 1018

## (undated) DEVICE — SYR 10CC LUER LOCK

## (undated) DEVICE — CLOSURE SKIN STERI STRIP 1/2X4

## (undated) DEVICE — WARMER DRAPE STERILE LF

## (undated) DEVICE — DRESSING ABSRBNT ISLAND 3.6X8

## (undated) DEVICE — LUBRICANT SURGILUBE 2 OZ

## (undated) DEVICE — SEE MEDLINE ITEM 152622

## (undated) DEVICE — FIBRILLAR ABS HEMOSTAT 4X4

## (undated) DEVICE — SUT CTD VICRYL 2-0 UND BR

## (undated) DEVICE — APPLICATOR CHLORAPREP ORN 26ML

## (undated) DEVICE — SYR 30CC LUER LOCK

## (undated) DEVICE — SUT 0 18IN COATED VICRYL V

## (undated) DEVICE — TRAY FOLEY 16FR INFECTION CONT

## (undated) DEVICE — SEE MEDLINE ITEM 156902

## (undated) DEVICE — SEE MEDLINE ITEM 157181

## (undated) DEVICE — SUT CTD VICRYL 0 UND BR CT

## (undated) DEVICE — LEGGINGS 48X31 INCH

## (undated) DEVICE — SEE MEDLINE ITEM 146417

## (undated) DEVICE — NDL 20GX1-1/2IN IB

## (undated) DEVICE — SUT MCRYL PLUS 4-0 PS2 27IN

## (undated) DEVICE — ELECTRODE REM PLYHSV RETURN 9

## (undated) DEVICE — SUT 0 54IN COATED VICRYL U

## (undated) DEVICE — CLIPPER BLADE MOD 4406 (CAREF)

## (undated) DEVICE — SUT 1 48IN PDS II VIO MONO

## (undated) DEVICE — NDL 22GA X1 1/2 REG BEVEL

## (undated) DEVICE — ELECTRODE EXTENDED BLADE

## (undated) DEVICE — SEE MEDLINE ITEM 157148

## (undated) DEVICE — SEE MEDLINE ITEM 154981

## (undated) DEVICE — SUT CTD VICRYL 0 VIL BR/CT

## (undated) DEVICE — SPONGE LAP 18X18 PREWASHED

## (undated) DEVICE — APPLIER CLIP LIAGCLIP 9.375IN

## (undated) DEVICE — GOWN SURGICAL X-LARGE

## (undated) DEVICE — SUT VICRYL CTD 2-0 GI 27 SH

## (undated) DEVICE — DRESSING ADH ISLAND 3.6 X 14

## (undated) DEVICE — LOOP VESSEL YELLOW MAXI